# Patient Record
Sex: FEMALE | Race: ASIAN | NOT HISPANIC OR LATINO | Employment: UNEMPLOYED | URBAN - METROPOLITAN AREA
[De-identification: names, ages, dates, MRNs, and addresses within clinical notes are randomized per-mention and may not be internally consistent; named-entity substitution may affect disease eponyms.]

---

## 2017-01-13 ENCOUNTER — COMMUNICATION - HEALTHEAST (OUTPATIENT)
Dept: FAMILY MEDICINE | Facility: CLINIC | Age: 52
End: 2017-01-13

## 2017-01-13 ENCOUNTER — COMMUNICATION - HEALTHEAST (OUTPATIENT)
Dept: NURSING | Facility: CLINIC | Age: 52
End: 2017-01-13

## 2017-01-15 ENCOUNTER — AMBULATORY - HEALTHEAST (OUTPATIENT)
Dept: BEHAVIORAL HEALTH | Facility: CLINIC | Age: 52
End: 2017-01-15

## 2018-10-17 ENCOUNTER — OFFICE VISIT - HEALTHEAST (OUTPATIENT)
Dept: FAMILY MEDICINE | Facility: CLINIC | Age: 53
End: 2018-10-17

## 2018-10-17 DIAGNOSIS — Z76.89 ESTABLISHING CARE WITH NEW DOCTOR, ENCOUNTER FOR: ICD-10-CM

## 2018-10-17 DIAGNOSIS — M54.50 CHRONIC LOWER BACK PAIN: ICD-10-CM

## 2018-10-17 DIAGNOSIS — K21.9 GASTROESOPHAGEAL REFLUX DISEASE WITHOUT ESOPHAGITIS: ICD-10-CM

## 2018-10-17 DIAGNOSIS — F32.A DEPRESSION: ICD-10-CM

## 2018-10-17 DIAGNOSIS — J30.1 SEASONAL ALLERGIC RHINITIS DUE TO POLLEN: ICD-10-CM

## 2018-10-17 DIAGNOSIS — G89.29 CHRONIC LOWER BACK PAIN: ICD-10-CM

## 2018-10-17 DIAGNOSIS — J00 COMMON COLD: ICD-10-CM

## 2018-10-17 DIAGNOSIS — E53.8 B12 DEFICIENCY: ICD-10-CM

## 2018-10-17 DIAGNOSIS — D64.9 ANEMIA: ICD-10-CM

## 2018-10-17 DIAGNOSIS — G89.4 CHRONIC PAIN SYNDROME: ICD-10-CM

## 2018-10-18 LAB
ALBUMIN SERPL-MCNC: 3.9 G/DL (ref 3.5–5)
ALP SERPL-CCNC: 85 U/L (ref 45–120)
ALT SERPL W P-5'-P-CCNC: 62 U/L (ref 0–45)
ANION GAP SERPL CALCULATED.3IONS-SCNC: 10 MMOL/L (ref 5–18)
AST SERPL W P-5'-P-CCNC: 43 U/L (ref 0–40)
BASOPHILS # BLD AUTO: 0.1 THOU/UL (ref 0–0.2)
BASOPHILS NFR BLD AUTO: 1 % (ref 0–2)
BILIRUB SERPL-MCNC: 0.9 MG/DL (ref 0–1)
BUN SERPL-MCNC: 11 MG/DL (ref 8–22)
CALCIUM SERPL-MCNC: 9.3 MG/DL (ref 8.5–10.5)
CHLORIDE BLD-SCNC: 103 MMOL/L (ref 98–107)
CHOLEST SERPL-MCNC: 180 MG/DL
CO2 SERPL-SCNC: 28 MMOL/L (ref 22–31)
CREAT SERPL-MCNC: 0.69 MG/DL (ref 0.6–1.1)
EOSINOPHIL # BLD AUTO: 0.2 THOU/UL (ref 0–0.4)
EOSINOPHIL NFR BLD AUTO: 2 % (ref 0–6)
ERYTHROCYTE [DISTWIDTH] IN BLOOD BY AUTOMATED COUNT: 13.7 % (ref 11–14.5)
FASTING STATUS PATIENT QL REPORTED: YES
GFR SERPL CREATININE-BSD FRML MDRD: >60 ML/MIN/1.73M2
GLUCOSE BLD-MCNC: 72 MG/DL (ref 70–125)
HBA1C MFR BLD: 5.8 % (ref 3.5–6)
HCT VFR BLD AUTO: 43 % (ref 35–47)
HDLC SERPL-MCNC: 44 MG/DL
HGB BLD-MCNC: 12.8 G/DL (ref 12–16)
LDLC SERPL CALC-MCNC: 110 MG/DL
LYMPHOCYTES # BLD AUTO: 4.1 THOU/UL (ref 0.8–4.4)
LYMPHOCYTES NFR BLD AUTO: 38 % (ref 20–40)
MCH RBC QN AUTO: 22.9 PG (ref 27–34)
MCHC RBC AUTO-ENTMCNC: 29.8 G/DL (ref 32–36)
MCV RBC AUTO: 77 FL (ref 80–100)
MONOCYTES # BLD AUTO: 0.7 THOU/UL (ref 0–0.9)
MONOCYTES NFR BLD AUTO: 6 % (ref 2–10)
NEUTROPHILS # BLD AUTO: 5.8 THOU/UL (ref 2–7.7)
NEUTROPHILS NFR BLD AUTO: 54 % (ref 50–70)
PLATELET # BLD AUTO: 215 THOU/UL (ref 140–440)
PMV BLD AUTO: 13.8 FL (ref 8.5–12.5)
POTASSIUM BLD-SCNC: 3.7 MMOL/L (ref 3.5–5)
PROT SERPL-MCNC: 7.9 G/DL (ref 6–8)
RBC # BLD AUTO: 5.58 MILL/UL (ref 3.8–5.4)
SODIUM SERPL-SCNC: 141 MMOL/L (ref 136–145)
TRIGL SERPL-MCNC: 132 MG/DL
WBC: 10.8 THOU/UL (ref 4–11)

## 2018-10-19 LAB — 25(OH)D3 SERPL-MCNC: 16 NG/ML (ref 30–80)

## 2018-10-22 ENCOUNTER — COMMUNICATION - HEALTHEAST (OUTPATIENT)
Dept: FAMILY MEDICINE | Facility: CLINIC | Age: 53
End: 2018-10-22

## 2018-11-16 ENCOUNTER — OFFICE VISIT - HEALTHEAST (OUTPATIENT)
Dept: FAMILY MEDICINE | Facility: CLINIC | Age: 53
End: 2018-11-16

## 2018-11-16 DIAGNOSIS — R05.9 COUGH: ICD-10-CM

## 2018-11-16 DIAGNOSIS — R74.8 ELEVATED LIVER ENZYMES: ICD-10-CM

## 2018-11-16 DIAGNOSIS — R71.8 MICROCYTOSIS: ICD-10-CM

## 2018-11-16 DIAGNOSIS — K21.9 GASTROESOPHAGEAL REFLUX DISEASE WITHOUT ESOPHAGITIS: ICD-10-CM

## 2018-11-16 LAB — FERRITIN SERPL-MCNC: 1384 NG/ML (ref 10–130)

## 2018-11-19 LAB
GAMMA INTERFERON BACKGROUND BLD IA-ACNC: 0.17 IU/ML
M TB IFN-G BLD-IMP: POSITIVE
MITOGEN IGNF BCKGRD COR BLD-ACNC: 5.65 IU/ML
MITOGEN IGNF BCKGRD COR BLD-ACNC: 5.83 IU/ML
QTF INTERPRETATION: ABNORMAL
QTF MITOGEN - NIL: >10 IU/ML

## 2018-11-21 LAB
HEMOGLOBIN A2 QUANTITATION: 4.5 % (ref 2.2–3.5)
HEMOGLOBIN ELECTROPHRESIS: ABNORMAL
HEMOGLOBIN F QUANTITATION: <0.8 % (ref 0–2)
PATH ICD:: ABNORMAL
REVIEWING PATHOLOGIST: ABNORMAL

## 2018-11-27 ENCOUNTER — OFFICE VISIT - HEALTHEAST (OUTPATIENT)
Dept: FAMILY MEDICINE | Facility: CLINIC | Age: 53
End: 2018-11-27

## 2018-11-27 DIAGNOSIS — R76.12 POSITIVE QUANTIFERON-TB GOLD TEST: ICD-10-CM

## 2018-11-27 DIAGNOSIS — R79.89 ELEVATED FERRITIN LEVEL: ICD-10-CM

## 2018-11-27 LAB
ALBUMIN SERPL-MCNC: 3.7 G/DL (ref 3.5–5)
ALP SERPL-CCNC: 83 U/L (ref 45–120)
ALT SERPL W P-5'-P-CCNC: 43 U/L (ref 0–45)
AST SERPL W P-5'-P-CCNC: 32 U/L (ref 0–40)
BILIRUB DIRECT SERPL-MCNC: 0.1 MG/DL
BILIRUB SERPL-MCNC: 0.4 MG/DL (ref 0–1)
FERRITIN SERPL-MCNC: 1446 NG/ML (ref 10–130)
IRON SATN MFR SERPL: 90 % (ref 20–50)
IRON SERPL-MCNC: 171 UG/DL (ref 42–175)
PROT SERPL-MCNC: 7.8 G/DL (ref 6–8)
TIBC SERPL-MCNC: 189 UG/DL (ref 313–563)
TRANSFERRIN SERPL-MCNC: 151 MG/DL (ref 212–360)

## 2018-11-29 ENCOUNTER — OFFICE VISIT - HEALTHEAST (OUTPATIENT)
Dept: BEHAVIORAL HEALTH | Facility: CLINIC | Age: 53
End: 2018-11-29

## 2018-11-29 DIAGNOSIS — F33.9 EPISODE OF RECURRENT MAJOR DEPRESSIVE DISORDER, UNSPECIFIED DEPRESSION EPISODE SEVERITY (H): ICD-10-CM

## 2018-12-14 ENCOUNTER — OFFICE VISIT - HEALTHEAST (OUTPATIENT)
Dept: BEHAVIORAL HEALTH | Facility: CLINIC | Age: 53
End: 2018-12-14

## 2018-12-14 DIAGNOSIS — F43.10 PTSD (POST-TRAUMATIC STRESS DISORDER): ICD-10-CM

## 2018-12-14 DIAGNOSIS — F33.1 MODERATE EPISODE OF RECURRENT MAJOR DEPRESSIVE DISORDER (H): ICD-10-CM

## 2018-12-28 ENCOUNTER — OFFICE VISIT - HEALTHEAST (OUTPATIENT)
Dept: BEHAVIORAL HEALTH | Facility: CLINIC | Age: 53
End: 2018-12-28

## 2018-12-28 ENCOUNTER — AMBULATORY - HEALTHEAST (OUTPATIENT)
Dept: BEHAVIORAL HEALTH | Facility: CLINIC | Age: 53
End: 2018-12-28

## 2018-12-28 DIAGNOSIS — F43.10 PTSD (POST-TRAUMATIC STRESS DISORDER): ICD-10-CM

## 2018-12-28 DIAGNOSIS — F33.9 EPISODE OF RECURRENT MAJOR DEPRESSIVE DISORDER, UNSPECIFIED DEPRESSION EPISODE SEVERITY (H): ICD-10-CM

## 2019-01-02 ENCOUNTER — COMMUNICATION - HEALTHEAST (OUTPATIENT)
Dept: NURSING | Facility: CLINIC | Age: 54
End: 2019-01-02

## 2019-01-02 ENCOUNTER — AMBULATORY - HEALTHEAST (OUTPATIENT)
Dept: CARE COORDINATION | Facility: CLINIC | Age: 54
End: 2019-01-02

## 2019-01-02 DIAGNOSIS — Z22.7 TB LUNG, LATENT: ICD-10-CM

## 2019-01-02 DIAGNOSIS — F32.0 MAJOR DEPRESSIVE DISORDER, SINGLE EPISODE, MILD (H): ICD-10-CM

## 2019-01-02 DIAGNOSIS — F43.20 ADJUSTMENT REACTION: ICD-10-CM

## 2019-01-03 ENCOUNTER — COMMUNICATION - HEALTHEAST (OUTPATIENT)
Dept: NURSING | Facility: CLINIC | Age: 54
End: 2019-01-03

## 2019-01-04 ENCOUNTER — COMMUNICATION - HEALTHEAST (OUTPATIENT)
Dept: FAMILY MEDICINE | Facility: CLINIC | Age: 54
End: 2019-01-04

## 2019-01-07 ENCOUNTER — AMBULATORY - HEALTHEAST (OUTPATIENT)
Dept: NURSING | Facility: CLINIC | Age: 54
End: 2019-01-07

## 2019-01-07 ENCOUNTER — COMMUNICATION - HEALTHEAST (OUTPATIENT)
Dept: NURSING | Facility: CLINIC | Age: 54
End: 2019-01-07

## 2019-01-07 ENCOUNTER — AMBULATORY - HEALTHEAST (OUTPATIENT)
Dept: CARE COORDINATION | Facility: CLINIC | Age: 54
End: 2019-01-07

## 2019-01-07 DIAGNOSIS — F43.20 ADJUSTMENT REACTION: ICD-10-CM

## 2019-01-07 DIAGNOSIS — F32.0 MAJOR DEPRESSIVE DISORDER, SINGLE EPISODE, MILD (H): ICD-10-CM

## 2019-01-11 ENCOUNTER — RECORDS - HEALTHEAST (OUTPATIENT)
Dept: MAMMOGRAPHY | Facility: CLINIC | Age: 54
End: 2019-01-11

## 2019-01-11 ENCOUNTER — OFFICE VISIT - HEALTHEAST (OUTPATIENT)
Dept: BEHAVIORAL HEALTH | Facility: CLINIC | Age: 54
End: 2019-01-11

## 2019-01-11 ENCOUNTER — OFFICE VISIT - HEALTHEAST (OUTPATIENT)
Dept: FAMILY MEDICINE | Facility: CLINIC | Age: 54
End: 2019-01-11

## 2019-01-11 DIAGNOSIS — M54.50 CHRONIC LOWER BACK PAIN: ICD-10-CM

## 2019-01-11 DIAGNOSIS — G89.4 CHRONIC PAIN SYNDROME: ICD-10-CM

## 2019-01-11 DIAGNOSIS — R10.13 EPIGASTRIC PAIN: ICD-10-CM

## 2019-01-11 DIAGNOSIS — F33.1 MODERATE EPISODE OF RECURRENT MAJOR DEPRESSIVE DISORDER (H): ICD-10-CM

## 2019-01-11 DIAGNOSIS — Z12.31 VISIT FOR SCREENING MAMMOGRAM: ICD-10-CM

## 2019-01-11 DIAGNOSIS — Z22.7 TB LUNG, LATENT: ICD-10-CM

## 2019-01-11 DIAGNOSIS — F43.10 PTSD (POST-TRAUMATIC STRESS DISORDER): ICD-10-CM

## 2019-01-11 DIAGNOSIS — E53.8 B12 DEFICIENCY: ICD-10-CM

## 2019-01-11 DIAGNOSIS — J30.1 SEASONAL ALLERGIC RHINITIS DUE TO POLLEN: ICD-10-CM

## 2019-01-11 DIAGNOSIS — J00 COMMON COLD: ICD-10-CM

## 2019-01-11 DIAGNOSIS — G89.29 CHRONIC LOWER BACK PAIN: ICD-10-CM

## 2019-01-11 DIAGNOSIS — Z12.31 ENCOUNTER FOR SCREENING MAMMOGRAM FOR MALIGNANT NEOPLASM OF BREAST: ICD-10-CM

## 2019-01-11 DIAGNOSIS — R79.89 ELEVATED FERRITIN: ICD-10-CM

## 2019-01-11 DIAGNOSIS — K21.9 GASTROESOPHAGEAL REFLUX DISEASE WITHOUT ESOPHAGITIS: ICD-10-CM

## 2019-01-11 LAB
ALBUMIN SERPL-MCNC: 3.8 G/DL (ref 3.5–5)
ALP SERPL-CCNC: 71 U/L (ref 45–120)
ALT SERPL W P-5'-P-CCNC: 33 U/L (ref 0–45)
AST SERPL W P-5'-P-CCNC: 26 U/L (ref 0–40)
BILIRUB DIRECT SERPL-MCNC: 0.2 MG/DL
BILIRUB SERPL-MCNC: 0.9 MG/DL (ref 0–1)
FERRITIN SERPL-MCNC: 1217 NG/ML (ref 10–130)
IRON SATN MFR SERPL: 64 % (ref 20–50)
IRON SERPL-MCNC: 129 UG/DL (ref 42–175)
PROT SERPL-MCNC: 7.9 G/DL (ref 6–8)
TIBC SERPL-MCNC: 201 UG/DL (ref 313–563)
TRANSFERRIN SERPL-MCNC: 161 MG/DL (ref 212–360)

## 2019-01-11 ASSESSMENT — MIFFLIN-ST. JEOR: SCORE: 1179.63

## 2019-01-14 ENCOUNTER — AMBULATORY - HEALTHEAST (OUTPATIENT)
Dept: FAMILY MEDICINE | Facility: CLINIC | Age: 54
End: 2019-01-14

## 2019-01-14 DIAGNOSIS — R79.89 ELEVATED FERRITIN LEVEL: ICD-10-CM

## 2019-01-14 DIAGNOSIS — R79.89 ELEVATED FERRITIN: ICD-10-CM

## 2019-01-25 ENCOUNTER — OFFICE VISIT - HEALTHEAST (OUTPATIENT)
Dept: BEHAVIORAL HEALTH | Facility: CLINIC | Age: 54
End: 2019-01-25

## 2019-01-25 ENCOUNTER — RECORDS - HEALTHEAST (OUTPATIENT)
Dept: ADMINISTRATIVE | Facility: OTHER | Age: 54
End: 2019-01-25

## 2019-01-25 DIAGNOSIS — F43.10 PTSD (POST-TRAUMATIC STRESS DISORDER): ICD-10-CM

## 2019-01-25 DIAGNOSIS — F33.1 MODERATE EPISODE OF RECURRENT MAJOR DEPRESSIVE DISORDER (H): ICD-10-CM

## 2019-02-05 ENCOUNTER — COMMUNICATION - HEALTHEAST (OUTPATIENT)
Dept: NURSING | Facility: CLINIC | Age: 54
End: 2019-02-05

## 2019-02-06 ENCOUNTER — COMMUNICATION - HEALTHEAST (OUTPATIENT)
Dept: FAMILY MEDICINE | Facility: CLINIC | Age: 54
End: 2019-02-06

## 2019-02-06 ENCOUNTER — AMBULATORY - HEALTHEAST (OUTPATIENT)
Dept: BEHAVIORAL HEALTH | Facility: CLINIC | Age: 54
End: 2019-02-06

## 2019-02-08 ENCOUNTER — OFFICE VISIT - HEALTHEAST (OUTPATIENT)
Dept: FAMILY MEDICINE | Facility: CLINIC | Age: 54
End: 2019-02-08

## 2019-02-08 ENCOUNTER — COMMUNICATION - HEALTHEAST (OUTPATIENT)
Dept: NURSING | Facility: CLINIC | Age: 54
End: 2019-02-08

## 2019-02-08 DIAGNOSIS — G89.29 OTHER CHRONIC PAIN: ICD-10-CM

## 2019-02-08 DIAGNOSIS — R42 DIZZINESS: ICD-10-CM

## 2019-02-08 DIAGNOSIS — G44.209 TENSION HEADACHE: ICD-10-CM

## 2019-02-08 DIAGNOSIS — R10.13 EPIGASTRIC PAIN: ICD-10-CM

## 2019-02-08 DIAGNOSIS — F33.1 MODERATE EPISODE OF RECURRENT MAJOR DEPRESSIVE DISORDER (H): ICD-10-CM

## 2019-02-08 DIAGNOSIS — R79.89 ELEVATED FERRITIN LEVEL: ICD-10-CM

## 2019-02-11 ENCOUNTER — COMMUNICATION - HEALTHEAST (OUTPATIENT)
Dept: NURSING | Facility: CLINIC | Age: 54
End: 2019-02-11

## 2019-02-12 ENCOUNTER — AMBULATORY - HEALTHEAST (OUTPATIENT)
Dept: LAB | Facility: CLINIC | Age: 54
End: 2019-02-12

## 2019-02-12 DIAGNOSIS — R10.13 EPIGASTRIC PAIN: ICD-10-CM

## 2019-02-14 LAB
H PYLORI AG STL QL IA: NORMAL
REPORT STATUS: NORMAL
SPECIMEN DESCRIPTION: NORMAL

## 2019-02-15 ENCOUNTER — OFFICE VISIT - HEALTHEAST (OUTPATIENT)
Dept: BEHAVIORAL HEALTH | Facility: CLINIC | Age: 54
End: 2019-02-15

## 2019-02-15 DIAGNOSIS — F33.1 MODERATE EPISODE OF RECURRENT MAJOR DEPRESSIVE DISORDER (H): ICD-10-CM

## 2019-02-15 DIAGNOSIS — F43.10 PTSD (POST-TRAUMATIC STRESS DISORDER): ICD-10-CM

## 2019-02-20 ENCOUNTER — COMMUNICATION - HEALTHEAST (OUTPATIENT)
Dept: NURSING | Facility: CLINIC | Age: 54
End: 2019-02-20

## 2019-02-22 LAB — MOLECULAR DX INHERIT DISORDER - HISTORICAL: NORMAL

## 2019-02-24 ENCOUNTER — AMBULATORY - HEALTHEAST (OUTPATIENT)
Dept: FAMILY MEDICINE | Facility: CLINIC | Age: 54
End: 2019-02-24

## 2019-02-24 DIAGNOSIS — R79.89 ELEVATED FERRITIN LEVEL: ICD-10-CM

## 2019-02-25 ENCOUNTER — COMMUNICATION - HEALTHEAST (OUTPATIENT)
Dept: ONCOLOGY | Facility: HOSPITAL | Age: 54
End: 2019-02-25

## 2019-02-25 ENCOUNTER — COMMUNICATION - HEALTHEAST (OUTPATIENT)
Dept: FAMILY MEDICINE | Facility: CLINIC | Age: 54
End: 2019-02-25

## 2019-02-27 ENCOUNTER — OFFICE VISIT - HEALTHEAST (OUTPATIENT)
Dept: FAMILY MEDICINE | Facility: CLINIC | Age: 54
End: 2019-02-27

## 2019-02-27 DIAGNOSIS — R10.13 EPIGASTRIC PAIN: ICD-10-CM

## 2019-02-27 DIAGNOSIS — M99.02 SOMATIC DYSFUNCTION OF THORACIC REGION: ICD-10-CM

## 2019-02-27 DIAGNOSIS — G44.209 TENSION HEADACHE: ICD-10-CM

## 2019-02-27 DIAGNOSIS — M99.09 SOMATIC DYSFUNCTION OF ABDOMINAL REGION: ICD-10-CM

## 2019-02-27 DIAGNOSIS — M99.01 SOMATIC DYSFUNCTION OF CERVICAL REGION: ICD-10-CM

## 2019-02-27 DIAGNOSIS — M99.00 SOMATIC DYSFUNCTION OF HEAD REGION: ICD-10-CM

## 2019-03-01 ENCOUNTER — OFFICE VISIT - HEALTHEAST (OUTPATIENT)
Dept: BEHAVIORAL HEALTH | Facility: CLINIC | Age: 54
End: 2019-03-01

## 2019-03-01 DIAGNOSIS — F43.10 PTSD (POST-TRAUMATIC STRESS DISORDER): ICD-10-CM

## 2019-03-01 DIAGNOSIS — F33.1 MODERATE EPISODE OF RECURRENT MAJOR DEPRESSIVE DISORDER (H): ICD-10-CM

## 2019-03-08 ENCOUNTER — COMMUNICATION - HEALTHEAST (OUTPATIENT)
Dept: NURSING | Facility: CLINIC | Age: 54
End: 2019-03-08

## 2019-03-13 ENCOUNTER — OFFICE VISIT - HEALTHEAST (OUTPATIENT)
Dept: FAMILY MEDICINE | Facility: CLINIC | Age: 54
End: 2019-03-13

## 2019-03-13 DIAGNOSIS — M99.02 SOMATIC DYSFUNCTION OF THORACIC REGION: ICD-10-CM

## 2019-03-13 DIAGNOSIS — M99.09 SOMATIC DYSFUNCTION OF ABDOMINAL REGION: ICD-10-CM

## 2019-03-13 DIAGNOSIS — G44.209 TENSION HEADACHE: ICD-10-CM

## 2019-03-13 DIAGNOSIS — M54.50 CHRONIC BILATERAL LOW BACK PAIN WITHOUT SCIATICA: ICD-10-CM

## 2019-03-13 DIAGNOSIS — M99.00 SOMATIC DYSFUNCTION OF HEAD REGION: ICD-10-CM

## 2019-03-13 DIAGNOSIS — G89.29 CHRONIC BILATERAL LOW BACK PAIN WITHOUT SCIATICA: ICD-10-CM

## 2019-03-13 DIAGNOSIS — M99.01 SOMATIC DYSFUNCTION OF CERVICAL REGION: ICD-10-CM

## 2019-03-13 DIAGNOSIS — R79.89 ELEVATED FERRITIN LEVEL: ICD-10-CM

## 2019-03-13 DIAGNOSIS — M99.05 SOMATIC DYSFUNCTION OF PELVIS REGION: ICD-10-CM

## 2019-03-13 LAB
FERRITIN SERPL-MCNC: 1367 NG/ML (ref 10–130)
IRON SATN MFR SERPL: 63 % (ref 20–50)
IRON SERPL-MCNC: 119 UG/DL (ref 42–175)
TIBC SERPL-MCNC: 190 UG/DL (ref 313–563)
TRANSFERRIN SERPL-MCNC: 152 MG/DL (ref 212–360)

## 2019-03-14 ENCOUNTER — OFFICE VISIT - HEALTHEAST (OUTPATIENT)
Dept: BEHAVIORAL HEALTH | Facility: CLINIC | Age: 54
End: 2019-03-14

## 2019-03-14 ENCOUNTER — COMMUNICATION - HEALTHEAST (OUTPATIENT)
Dept: ONCOLOGY | Facility: HOSPITAL | Age: 54
End: 2019-03-14

## 2019-03-14 DIAGNOSIS — F33.1 MODERATE EPISODE OF RECURRENT MAJOR DEPRESSIVE DISORDER (H): ICD-10-CM

## 2019-03-14 DIAGNOSIS — F43.10 PTSD (POST-TRAUMATIC STRESS DISORDER): ICD-10-CM

## 2019-03-15 ENCOUNTER — AMBULATORY - HEALTHEAST (OUTPATIENT)
Dept: ONCOLOGY | Facility: HOSPITAL | Age: 54
End: 2019-03-15

## 2019-03-18 ENCOUNTER — AMBULATORY - HEALTHEAST (OUTPATIENT)
Dept: CARE COORDINATION | Facility: CLINIC | Age: 54
End: 2019-03-18

## 2019-03-21 ENCOUNTER — OFFICE VISIT - HEALTHEAST (OUTPATIENT)
Dept: ONCOLOGY | Facility: HOSPITAL | Age: 54
End: 2019-03-21

## 2019-03-21 ENCOUNTER — COMMUNICATION - HEALTHEAST (OUTPATIENT)
Dept: ONCOLOGY | Facility: HOSPITAL | Age: 54
End: 2019-03-21

## 2019-03-21 DIAGNOSIS — R79.89 ELEVATED FERRITIN LEVEL: ICD-10-CM

## 2019-03-21 DIAGNOSIS — R71.8 RBC MICROCYTOSIS: ICD-10-CM

## 2019-03-21 ASSESSMENT — MIFFLIN-ST. JEOR: SCORE: 1179.86

## 2019-03-22 ENCOUNTER — COMMUNICATION - HEALTHEAST (OUTPATIENT)
Dept: ONCOLOGY | Facility: HOSPITAL | Age: 54
End: 2019-03-22

## 2019-03-22 DIAGNOSIS — R79.89 ELEVATED FERRITIN LEVEL: ICD-10-CM

## 2019-03-27 ENCOUNTER — OFFICE VISIT - HEALTHEAST (OUTPATIENT)
Dept: BEHAVIORAL HEALTH | Facility: CLINIC | Age: 54
End: 2019-03-27

## 2019-03-27 ENCOUNTER — AMBULATORY - HEALTHEAST (OUTPATIENT)
Dept: BEHAVIORAL HEALTH | Facility: CLINIC | Age: 54
End: 2019-03-27

## 2019-03-27 DIAGNOSIS — F43.10 PTSD (POST-TRAUMATIC STRESS DISORDER): ICD-10-CM

## 2019-03-27 DIAGNOSIS — F33.1 MODERATE EPISODE OF RECURRENT MAJOR DEPRESSIVE DISORDER (H): ICD-10-CM

## 2019-03-29 ENCOUNTER — COMMUNICATION - HEALTHEAST (OUTPATIENT)
Dept: NURSING | Facility: CLINIC | Age: 54
End: 2019-03-29

## 2019-03-29 ENCOUNTER — OFFICE VISIT - HEALTHEAST (OUTPATIENT)
Dept: FAMILY MEDICINE | Facility: CLINIC | Age: 54
End: 2019-03-29

## 2019-03-29 ENCOUNTER — AMBULATORY - HEALTHEAST (OUTPATIENT)
Dept: NURSING | Facility: CLINIC | Age: 54
End: 2019-03-29

## 2019-03-29 DIAGNOSIS — G89.4 CHRONIC PAIN SYNDROME: ICD-10-CM

## 2019-03-29 DIAGNOSIS — M99.00 SOMATIC DYSFUNCTION OF HEAD REGION: ICD-10-CM

## 2019-03-29 DIAGNOSIS — J00 COMMON COLD: ICD-10-CM

## 2019-03-29 DIAGNOSIS — K21.9 GASTROESOPHAGEAL REFLUX DISEASE WITHOUT ESOPHAGITIS: ICD-10-CM

## 2019-03-29 DIAGNOSIS — J30.1 SEASONAL ALLERGIC RHINITIS DUE TO POLLEN: ICD-10-CM

## 2019-03-29 DIAGNOSIS — E53.8 B12 DEFICIENCY: ICD-10-CM

## 2019-03-29 DIAGNOSIS — M99.01 SOMATIC DYSFUNCTION OF CERVICAL REGION: ICD-10-CM

## 2019-03-29 DIAGNOSIS — M99.02 SOMATIC DYSFUNCTION OF THORACIC REGION: ICD-10-CM

## 2019-04-01 ENCOUNTER — AMBULATORY - HEALTHEAST (OUTPATIENT)
Dept: ONCOLOGY | Facility: HOSPITAL | Age: 54
End: 2019-04-01

## 2019-04-02 ENCOUNTER — AMBULATORY - HEALTHEAST (OUTPATIENT)
Dept: NURSING | Facility: CLINIC | Age: 54
End: 2019-04-02

## 2019-04-03 ENCOUNTER — COMMUNICATION - HEALTHEAST (OUTPATIENT)
Dept: NURSING | Facility: CLINIC | Age: 54
End: 2019-04-03

## 2019-04-12 ENCOUNTER — AMBULATORY - HEALTHEAST (OUTPATIENT)
Dept: NURSING | Facility: CLINIC | Age: 54
End: 2019-04-12

## 2019-04-12 ENCOUNTER — COMMUNICATION - HEALTHEAST (OUTPATIENT)
Dept: ONCOLOGY | Facility: HOSPITAL | Age: 54
End: 2019-04-12

## 2019-04-12 ENCOUNTER — COMMUNICATION - HEALTHEAST (OUTPATIENT)
Dept: INFUSION THERAPY | Facility: HOSPITAL | Age: 54
End: 2019-04-12

## 2019-04-12 ENCOUNTER — COMMUNICATION - HEALTHEAST (OUTPATIENT)
Dept: NURSING | Facility: CLINIC | Age: 54
End: 2019-04-12

## 2019-04-12 ENCOUNTER — OFFICE VISIT - HEALTHEAST (OUTPATIENT)
Dept: FAMILY MEDICINE | Facility: CLINIC | Age: 54
End: 2019-04-12

## 2019-04-12 DIAGNOSIS — R79.89 ELEVATED FERRITIN LEVEL: ICD-10-CM

## 2019-04-12 DIAGNOSIS — I73.9 CLAUDICATION (H): ICD-10-CM

## 2019-04-12 DIAGNOSIS — G89.29 OTHER CHRONIC PAIN: ICD-10-CM

## 2019-04-12 DIAGNOSIS — E53.8 VITAMIN B 12 DEFICIENCY: ICD-10-CM

## 2019-04-12 DIAGNOSIS — M79.89 LEG SWELLING: ICD-10-CM

## 2019-04-12 DIAGNOSIS — Z22.7 TB LUNG, LATENT: ICD-10-CM

## 2019-04-12 LAB
ALBUMIN SERPL-MCNC: 3.9 G/DL (ref 3.5–5)
ALP SERPL-CCNC: 74 U/L (ref 45–120)
ALT SERPL W P-5'-P-CCNC: 46 U/L (ref 0–45)
ANION GAP SERPL CALCULATED.3IONS-SCNC: 8 MMOL/L (ref 5–18)
AST SERPL W P-5'-P-CCNC: 35 U/L (ref 0–40)
BILIRUB SERPL-MCNC: 0.5 MG/DL (ref 0–1)
BNP SERPL-MCNC: 26 PG/ML (ref 0–84)
BUN SERPL-MCNC: 11 MG/DL (ref 8–22)
CALCIUM SERPL-MCNC: 9.6 MG/DL (ref 8.5–10.5)
CHLORIDE BLD-SCNC: 105 MMOL/L (ref 98–107)
CO2 SERPL-SCNC: 29 MMOL/L (ref 22–31)
CREAT SERPL-MCNC: 0.68 MG/DL (ref 0.6–1.1)
GFR SERPL CREATININE-BSD FRML MDRD: >60 ML/MIN/1.73M2
GLUCOSE BLD-MCNC: 105 MG/DL (ref 70–125)
POTASSIUM BLD-SCNC: 4.3 MMOL/L (ref 3.5–5)
PROT SERPL-MCNC: 7.7 G/DL (ref 6–8)
SODIUM SERPL-SCNC: 142 MMOL/L (ref 136–145)
TSH SERPL DL<=0.005 MIU/L-ACNC: 0.61 UIU/ML (ref 0.3–5)
VIT B12 SERPL-MCNC: >2000 PG/ML (ref 213–816)

## 2019-04-15 ENCOUNTER — AMBULATORY - HEALTHEAST (OUTPATIENT)
Dept: BEHAVIORAL HEALTH | Facility: CLINIC | Age: 54
End: 2019-04-15

## 2019-04-15 ENCOUNTER — OFFICE VISIT - HEALTHEAST (OUTPATIENT)
Dept: BEHAVIORAL HEALTH | Facility: CLINIC | Age: 54
End: 2019-04-15

## 2019-04-15 ENCOUNTER — HOSPITAL ENCOUNTER (OUTPATIENT)
Dept: ULTRASOUND IMAGING | Facility: CLINIC | Age: 54
Discharge: HOME | End: 2019-04-15
Attending: FAMILY MEDICINE

## 2019-04-15 DIAGNOSIS — I73.9 CLAUDICATION (H): ICD-10-CM

## 2019-04-15 DIAGNOSIS — F43.10 PTSD (POST-TRAUMATIC STRESS DISORDER): ICD-10-CM

## 2019-04-15 DIAGNOSIS — F33.1 MODERATE EPISODE OF RECURRENT MAJOR DEPRESSIVE DISORDER (H): ICD-10-CM

## 2019-04-19 ENCOUNTER — HOME CARE/HOSPICE - HEALTHEAST (OUTPATIENT)
Dept: HOME HEALTH SERVICES | Facility: HOME HEALTH | Age: 54
End: 2019-04-19

## 2019-04-19 ENCOUNTER — AMBULATORY - HEALTHEAST (OUTPATIENT)
Dept: NURSING | Facility: CLINIC | Age: 54
End: 2019-04-19

## 2019-04-19 DIAGNOSIS — F43.10 PTSD (POST-TRAUMATIC STRESS DISORDER): ICD-10-CM

## 2019-04-19 DIAGNOSIS — G89.29 OTHER CHRONIC PAIN: ICD-10-CM

## 2019-04-19 DIAGNOSIS — R79.89 ELEVATED FERRITIN LEVEL: ICD-10-CM

## 2019-04-22 ENCOUNTER — COMMUNICATION - HEALTHEAST (OUTPATIENT)
Dept: HOME HEALTH SERVICES | Facility: HOME HEALTH | Age: 54
End: 2019-04-22

## 2019-04-23 ENCOUNTER — HOME CARE/HOSPICE - HEALTHEAST (OUTPATIENT)
Dept: HOME HEALTH SERVICES | Facility: HOME HEALTH | Age: 54
End: 2019-04-23

## 2019-04-24 ENCOUNTER — AMBULATORY - HEALTHEAST (OUTPATIENT)
Dept: NURSING | Facility: CLINIC | Age: 54
End: 2019-04-24

## 2019-04-24 ENCOUNTER — COMMUNICATION - HEALTHEAST (OUTPATIENT)
Dept: HOME HEALTH SERVICES | Facility: HOME HEALTH | Age: 54
End: 2019-04-24

## 2019-04-24 ENCOUNTER — COMMUNICATION - HEALTHEAST (OUTPATIENT)
Dept: CARE COORDINATION | Facility: CLINIC | Age: 54
End: 2019-04-24

## 2019-04-24 ENCOUNTER — HOME CARE/HOSPICE - HEALTHEAST (OUTPATIENT)
Dept: HOME HEALTH SERVICES | Facility: HOME HEALTH | Age: 54
End: 2019-04-24

## 2019-04-25 ENCOUNTER — HOME CARE/HOSPICE - HEALTHEAST (OUTPATIENT)
Dept: HOME HEALTH SERVICES | Facility: HOME HEALTH | Age: 54
End: 2019-04-25

## 2019-04-26 ENCOUNTER — HOSPITAL ENCOUNTER (OUTPATIENT)
Dept: ULTRASOUND IMAGING | Facility: CLINIC | Age: 54
Discharge: HOME | End: 2019-04-26
Attending: FAMILY MEDICINE

## 2019-04-29 ENCOUNTER — HOME CARE/HOSPICE - HEALTHEAST (OUTPATIENT)
Dept: HOME HEALTH SERVICES | Facility: HOME HEALTH | Age: 54
End: 2019-04-29

## 2019-04-30 ENCOUNTER — OFFICE VISIT - HEALTHEAST (OUTPATIENT)
Dept: BEHAVIORAL HEALTH | Facility: CLINIC | Age: 54
End: 2019-04-30

## 2019-04-30 ENCOUNTER — COMMUNICATION - HEALTHEAST (OUTPATIENT)
Dept: HOME HEALTH SERVICES | Facility: HOME HEALTH | Age: 54
End: 2019-04-30

## 2019-04-30 DIAGNOSIS — F33.1 MODERATE EPISODE OF RECURRENT MAJOR DEPRESSIVE DISORDER (H): ICD-10-CM

## 2019-04-30 DIAGNOSIS — R42 DIZZINESS: ICD-10-CM

## 2019-04-30 DIAGNOSIS — G89.4 CHRONIC PAIN SYNDROME: ICD-10-CM

## 2019-04-30 DIAGNOSIS — F43.10 PTSD (POST-TRAUMATIC STRESS DISORDER): ICD-10-CM

## 2019-05-01 ENCOUNTER — COMMUNICATION - HEALTHEAST (OUTPATIENT)
Dept: FAMILY MEDICINE | Facility: CLINIC | Age: 54
End: 2019-05-01

## 2019-05-01 ENCOUNTER — HOME CARE/HOSPICE - HEALTHEAST (OUTPATIENT)
Dept: HOME HEALTH SERVICES | Facility: HOME HEALTH | Age: 54
End: 2019-05-01

## 2019-05-01 DIAGNOSIS — G89.29 OTHER CHRONIC PAIN: ICD-10-CM

## 2019-05-02 ENCOUNTER — HOME CARE/HOSPICE - HEALTHEAST (OUTPATIENT)
Dept: HOME HEALTH SERVICES | Facility: HOME HEALTH | Age: 54
End: 2019-05-02

## 2019-05-03 ENCOUNTER — HOME CARE/HOSPICE - HEALTHEAST (OUTPATIENT)
Dept: HOME HEALTH SERVICES | Facility: HOME HEALTH | Age: 54
End: 2019-05-03

## 2019-05-04 ENCOUNTER — COMMUNICATION - HEALTHEAST (OUTPATIENT)
Dept: HOME HEALTH SERVICES | Facility: HOME HEALTH | Age: 54
End: 2019-05-04

## 2019-05-04 DIAGNOSIS — G89.4 CHRONIC PAIN SYNDROME: ICD-10-CM

## 2019-05-07 ENCOUNTER — HOME CARE/HOSPICE - HEALTHEAST (OUTPATIENT)
Dept: HOME HEALTH SERVICES | Facility: HOME HEALTH | Age: 54
End: 2019-05-07

## 2019-05-07 ENCOUNTER — COMMUNICATION - HEALTHEAST (OUTPATIENT)
Dept: NURSING | Facility: CLINIC | Age: 54
End: 2019-05-07

## 2019-05-08 ENCOUNTER — COMMUNICATION - HEALTHEAST (OUTPATIENT)
Dept: NURSING | Facility: CLINIC | Age: 54
End: 2019-05-08

## 2019-05-09 ENCOUNTER — HOME CARE/HOSPICE - HEALTHEAST (OUTPATIENT)
Dept: HOME HEALTH SERVICES | Facility: HOME HEALTH | Age: 54
End: 2019-05-09

## 2019-05-10 ENCOUNTER — HOME CARE/HOSPICE - HEALTHEAST (OUTPATIENT)
Dept: HOME HEALTH SERVICES | Facility: HOME HEALTH | Age: 54
End: 2019-05-10

## 2019-05-12 ENCOUNTER — COMMUNICATION - HEALTHEAST (OUTPATIENT)
Dept: FAMILY MEDICINE | Facility: CLINIC | Age: 54
End: 2019-05-12

## 2019-05-12 DIAGNOSIS — K21.9 GASTROESOPHAGEAL REFLUX DISEASE WITHOUT ESOPHAGITIS: ICD-10-CM

## 2019-05-13 ENCOUNTER — COMMUNICATION - HEALTHEAST (OUTPATIENT)
Dept: NURSING | Facility: CLINIC | Age: 54
End: 2019-05-13

## 2019-05-14 ENCOUNTER — HOME CARE/HOSPICE - HEALTHEAST (OUTPATIENT)
Dept: HOME HEALTH SERVICES | Facility: HOME HEALTH | Age: 54
End: 2019-05-14

## 2019-05-14 ENCOUNTER — OFFICE VISIT - HEALTHEAST (OUTPATIENT)
Dept: BEHAVIORAL HEALTH | Facility: CLINIC | Age: 54
End: 2019-05-14

## 2019-05-14 DIAGNOSIS — F43.10 PTSD (POST-TRAUMATIC STRESS DISORDER): ICD-10-CM

## 2019-05-14 DIAGNOSIS — F33.1 MODERATE EPISODE OF RECURRENT MAJOR DEPRESSIVE DISORDER (H): ICD-10-CM

## 2019-05-16 ENCOUNTER — HOME CARE/HOSPICE - HEALTHEAST (OUTPATIENT)
Dept: HOME HEALTH SERVICES | Facility: HOME HEALTH | Age: 54
End: 2019-05-16

## 2019-05-16 ENCOUNTER — COMMUNICATION - HEALTHEAST (OUTPATIENT)
Dept: HOME HEALTH SERVICES | Facility: HOME HEALTH | Age: 54
End: 2019-05-16

## 2019-05-16 DIAGNOSIS — G89.29 OTHER CHRONIC PAIN: ICD-10-CM

## 2019-05-17 ENCOUNTER — HOME CARE/HOSPICE - HEALTHEAST (OUTPATIENT)
Dept: HOME HEALTH SERVICES | Facility: HOME HEALTH | Age: 54
End: 2019-05-17

## 2019-05-17 ENCOUNTER — COMMUNICATION - HEALTHEAST (OUTPATIENT)
Dept: CARE COORDINATION | Facility: CLINIC | Age: 54
End: 2019-05-17

## 2019-05-17 ENCOUNTER — COMMUNICATION - HEALTHEAST (OUTPATIENT)
Dept: NURSING | Facility: CLINIC | Age: 54
End: 2019-05-17

## 2019-05-20 ENCOUNTER — HOME CARE/HOSPICE - HEALTHEAST (OUTPATIENT)
Dept: HOME HEALTH SERVICES | Facility: HOME HEALTH | Age: 54
End: 2019-05-20

## 2019-05-21 ENCOUNTER — OFFICE VISIT - HEALTHEAST (OUTPATIENT)
Dept: FAMILY MEDICINE | Facility: CLINIC | Age: 54
End: 2019-05-21

## 2019-05-21 ENCOUNTER — HOME CARE/HOSPICE - HEALTHEAST (OUTPATIENT)
Dept: HOME HEALTH SERVICES | Facility: HOME HEALTH | Age: 54
End: 2019-05-21

## 2019-05-21 DIAGNOSIS — R10.13 ABDOMINAL PAIN, EPIGASTRIC: ICD-10-CM

## 2019-05-21 DIAGNOSIS — R79.89 ELEVATED FERRITIN LEVEL: ICD-10-CM

## 2019-05-21 DIAGNOSIS — G44.209 TENSION HEADACHE: ICD-10-CM

## 2019-05-21 LAB
ALBUMIN SERPL-MCNC: 3.8 G/DL (ref 3.5–5)
ALP SERPL-CCNC: 77 U/L (ref 45–120)
ALT SERPL W P-5'-P-CCNC: 38 U/L (ref 0–45)
AST SERPL W P-5'-P-CCNC: 25 U/L (ref 0–40)
BILIRUB DIRECT SERPL-MCNC: <0.1 MG/DL
BILIRUB SERPL-MCNC: 0.3 MG/DL (ref 0–1)
PROT SERPL-MCNC: 7.5 G/DL (ref 6–8)

## 2019-05-22 ENCOUNTER — HOME CARE/HOSPICE - HEALTHEAST (OUTPATIENT)
Dept: HOME HEALTH SERVICES | Facility: HOME HEALTH | Age: 54
End: 2019-05-22

## 2019-05-23 ENCOUNTER — HOME CARE/HOSPICE - HEALTHEAST (OUTPATIENT)
Dept: HOME HEALTH SERVICES | Facility: HOME HEALTH | Age: 54
End: 2019-05-23

## 2019-05-24 ENCOUNTER — COMMUNICATION - HEALTHEAST (OUTPATIENT)
Dept: HOME HEALTH SERVICES | Facility: HOME HEALTH | Age: 54
End: 2019-05-24

## 2019-05-24 ENCOUNTER — HOME CARE/HOSPICE - HEALTHEAST (OUTPATIENT)
Dept: HOME HEALTH SERVICES | Facility: HOME HEALTH | Age: 54
End: 2019-05-24

## 2019-05-27 ENCOUNTER — COMMUNICATION - HEALTHEAST (OUTPATIENT)
Dept: HOME HEALTH SERVICES | Facility: HOME HEALTH | Age: 54
End: 2019-05-27

## 2019-05-28 ENCOUNTER — OFFICE VISIT - HEALTHEAST (OUTPATIENT)
Dept: BEHAVIORAL HEALTH | Facility: CLINIC | Age: 54
End: 2019-05-28

## 2019-05-28 ENCOUNTER — COMMUNICATION - HEALTHEAST (OUTPATIENT)
Dept: HOME HEALTH SERVICES | Facility: HOME HEALTH | Age: 54
End: 2019-05-28

## 2019-05-28 ENCOUNTER — HOME CARE/HOSPICE - HEALTHEAST (OUTPATIENT)
Dept: HOME HEALTH SERVICES | Facility: HOME HEALTH | Age: 54
End: 2019-05-28

## 2019-05-28 ENCOUNTER — COMMUNICATION - HEALTHEAST (OUTPATIENT)
Dept: FAMILY MEDICINE | Facility: CLINIC | Age: 54
End: 2019-05-28

## 2019-05-28 DIAGNOSIS — F43.10 PTSD (POST-TRAUMATIC STRESS DISORDER): ICD-10-CM

## 2019-05-28 DIAGNOSIS — F33.1 MODERATE EPISODE OF RECURRENT MAJOR DEPRESSIVE DISORDER (H): ICD-10-CM

## 2019-05-28 LAB
HEMOGLOBIN A2 QUANTITATION: 3 % (ref 2.2–3.5)
HEMOGLOBIN ELECTROPHRESIS: NORMAL
HEMOGLOBIN F QUANTITATION: <0.8 % (ref 0–2)
PATH ICD:: NORMAL
REVIEWING PATHOLOGIST: NORMAL

## 2019-05-29 ENCOUNTER — HOME CARE/HOSPICE - HEALTHEAST (OUTPATIENT)
Dept: HOME HEALTH SERVICES | Facility: HOME HEALTH | Age: 54
End: 2019-05-29

## 2019-05-30 ENCOUNTER — HOME CARE/HOSPICE - HEALTHEAST (OUTPATIENT)
Dept: HOME HEALTH SERVICES | Facility: HOME HEALTH | Age: 54
End: 2019-05-30

## 2019-05-31 ENCOUNTER — HOME CARE/HOSPICE - HEALTHEAST (OUTPATIENT)
Dept: HOME HEALTH SERVICES | Facility: HOME HEALTH | Age: 54
End: 2019-05-31

## 2019-06-03 ENCOUNTER — HOME CARE/HOSPICE - HEALTHEAST (OUTPATIENT)
Dept: HOME HEALTH SERVICES | Facility: HOME HEALTH | Age: 54
End: 2019-06-03

## 2019-06-03 ENCOUNTER — OFFICE VISIT - HEALTHEAST (OUTPATIENT)
Dept: FAMILY MEDICINE | Facility: CLINIC | Age: 54
End: 2019-06-03

## 2019-06-03 DIAGNOSIS — G89.4 CHRONIC PAIN SYNDROME: ICD-10-CM

## 2019-06-03 DIAGNOSIS — E53.8 DISORDER OF VITAMIN B12: ICD-10-CM

## 2019-06-03 DIAGNOSIS — M79.89 LEG SWELLING: ICD-10-CM

## 2019-06-03 DIAGNOSIS — R42 DIZZINESS: ICD-10-CM

## 2019-06-03 DIAGNOSIS — R10.13 EPIGASTRIC PAIN: ICD-10-CM

## 2019-06-03 DIAGNOSIS — R79.89 ELEVATED FERRITIN LEVEL: ICD-10-CM

## 2019-06-03 LAB
AMYLASE SERPL-CCNC: 67 U/L (ref 5–120)
FERRITIN SERPL-MCNC: 957 NG/ML (ref 10–130)
IRON SATN MFR SERPL: 53 % (ref 20–50)
IRON SERPL-MCNC: 109 UG/DL (ref 42–175)
LIPASE SERPL-CCNC: 17 U/L (ref 0–52)
TIBC SERPL-MCNC: 207 UG/DL (ref 313–563)
TRANSFERRIN SERPL-MCNC: 166 MG/DL (ref 212–360)
VIT B12 SERPL-MCNC: 987 PG/ML (ref 213–816)

## 2019-06-03 ASSESSMENT — MIFFLIN-ST. JEOR: SCORE: 1189.84

## 2019-06-04 ENCOUNTER — HOME CARE/HOSPICE - HEALTHEAST (OUTPATIENT)
Dept: HOME HEALTH SERVICES | Facility: HOME HEALTH | Age: 54
End: 2019-06-04

## 2019-06-06 ENCOUNTER — HOME CARE/HOSPICE - HEALTHEAST (OUTPATIENT)
Dept: HOME HEALTH SERVICES | Facility: HOME HEALTH | Age: 54
End: 2019-06-06

## 2019-06-06 ENCOUNTER — AMBULATORY - HEALTHEAST (OUTPATIENT)
Dept: BEHAVIORAL HEALTH | Facility: CLINIC | Age: 54
End: 2019-06-06

## 2019-06-10 ENCOUNTER — COMMUNICATION - HEALTHEAST (OUTPATIENT)
Dept: FAMILY MEDICINE | Facility: CLINIC | Age: 54
End: 2019-06-10

## 2019-06-10 ENCOUNTER — HOME CARE/HOSPICE - HEALTHEAST (OUTPATIENT)
Dept: HOME HEALTH SERVICES | Facility: HOME HEALTH | Age: 54
End: 2019-06-10

## 2019-06-10 ENCOUNTER — COMMUNICATION - HEALTHEAST (OUTPATIENT)
Dept: HOME HEALTH SERVICES | Facility: HOME HEALTH | Age: 54
End: 2019-06-10

## 2019-06-10 ENCOUNTER — COMMUNICATION - HEALTHEAST (OUTPATIENT)
Dept: ONCOLOGY | Facility: HOSPITAL | Age: 54
End: 2019-06-10

## 2019-06-10 DIAGNOSIS — R79.89 ELEVATED FERRITIN LEVEL: ICD-10-CM

## 2019-06-11 ENCOUNTER — OFFICE VISIT - HEALTHEAST (OUTPATIENT)
Dept: BEHAVIORAL HEALTH | Facility: CLINIC | Age: 54
End: 2019-06-11

## 2019-06-11 DIAGNOSIS — F43.10 PTSD (POST-TRAUMATIC STRESS DISORDER): ICD-10-CM

## 2019-06-11 DIAGNOSIS — F33.1 MODERATE EPISODE OF RECURRENT MAJOR DEPRESSIVE DISORDER (H): ICD-10-CM

## 2019-06-13 ENCOUNTER — COMMUNICATION - HEALTHEAST (OUTPATIENT)
Dept: NURSING | Facility: CLINIC | Age: 54
End: 2019-06-13

## 2019-06-13 ENCOUNTER — HOME CARE/HOSPICE - HEALTHEAST (OUTPATIENT)
Dept: HOME HEALTH SERVICES | Facility: HOME HEALTH | Age: 54
End: 2019-06-13

## 2019-06-18 ENCOUNTER — HOME CARE/HOSPICE - HEALTHEAST (OUTPATIENT)
Dept: HOME HEALTH SERVICES | Facility: HOME HEALTH | Age: 54
End: 2019-06-18

## 2019-06-19 ENCOUNTER — HOME CARE/HOSPICE - HEALTHEAST (OUTPATIENT)
Dept: HOME HEALTH SERVICES | Facility: HOME HEALTH | Age: 54
End: 2019-06-19

## 2019-06-19 ENCOUNTER — COMMUNICATION - HEALTHEAST (OUTPATIENT)
Dept: FAMILY MEDICINE | Facility: CLINIC | Age: 54
End: 2019-06-19

## 2019-06-20 ENCOUNTER — COMMUNICATION - HEALTHEAST (OUTPATIENT)
Dept: HOME HEALTH SERVICES | Facility: HOME HEALTH | Age: 54
End: 2019-06-20

## 2019-06-21 ENCOUNTER — COMMUNICATION - HEALTHEAST (OUTPATIENT)
Dept: NURSING | Facility: CLINIC | Age: 54
End: 2019-06-21

## 2019-06-25 ENCOUNTER — AMBULATORY - HEALTHEAST (OUTPATIENT)
Dept: BEHAVIORAL HEALTH | Facility: CLINIC | Age: 54
End: 2019-06-25

## 2019-06-25 ENCOUNTER — COMMUNICATION - HEALTHEAST (OUTPATIENT)
Dept: NURSING | Facility: CLINIC | Age: 54
End: 2019-06-25

## 2019-06-25 ENCOUNTER — OFFICE VISIT - HEALTHEAST (OUTPATIENT)
Dept: BEHAVIORAL HEALTH | Facility: CLINIC | Age: 54
End: 2019-06-25

## 2019-06-25 DIAGNOSIS — F33.1 MODERATE EPISODE OF RECURRENT MAJOR DEPRESSIVE DISORDER (H): ICD-10-CM

## 2019-06-25 DIAGNOSIS — F43.10 PTSD (POST-TRAUMATIC STRESS DISORDER): ICD-10-CM

## 2019-06-26 ENCOUNTER — COMMUNICATION - HEALTHEAST (OUTPATIENT)
Dept: FAMILY MEDICINE | Facility: CLINIC | Age: 54
End: 2019-06-26

## 2019-06-26 ENCOUNTER — OFFICE VISIT - HEALTHEAST (OUTPATIENT)
Dept: FAMILY MEDICINE | Facility: CLINIC | Age: 54
End: 2019-06-26

## 2019-06-26 DIAGNOSIS — R42 DIZZINESS: ICD-10-CM
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
## 2019-06-26 DIAGNOSIS — Z02.89 ENCOUNTER FOR COMPLETION OF FORM WITH PATIENT: ICD-10-CM

## 2019-06-26 DIAGNOSIS — H53.8 BLURRED VISION: ICD-10-CM

## 2019-06-26 DIAGNOSIS — G89.4 CHRONIC PAIN SYNDROME: ICD-10-CM

## 2019-06-26 ASSESSMENT — MIFFLIN-ST. JEOR: SCORE: 1180.2

## 2019-07-02 ENCOUNTER — HOME CARE/HOSPICE - HEALTHEAST (OUTPATIENT)
Dept: HOME HEALTH SERVICES | Facility: HOME HEALTH | Age: 54
End: 2019-07-02

## 2019-07-15 ENCOUNTER — AMBULATORY - HEALTHEAST (OUTPATIENT)
Dept: ONCOLOGY | Facility: HOSPITAL | Age: 54
End: 2019-07-15

## 2019-07-15 DIAGNOSIS — R79.89 ELEVATED FERRITIN LEVEL: ICD-10-CM

## 2019-07-16 ENCOUNTER — HOME CARE/HOSPICE - HEALTHEAST (OUTPATIENT)
Dept: HOME HEALTH SERVICES | Facility: HOME HEALTH | Age: 54
End: 2019-07-16

## 2019-07-18 ENCOUNTER — COMMUNICATION - HEALTHEAST (OUTPATIENT)
Dept: NURSING | Facility: CLINIC | Age: 54
End: 2019-07-18

## 2019-07-22 ENCOUNTER — OFFICE VISIT - HEALTHEAST (OUTPATIENT)
Dept: BEHAVIORAL HEALTH | Facility: CLINIC | Age: 54
End: 2019-07-22

## 2019-07-22 DIAGNOSIS — F43.10 PTSD (POST-TRAUMATIC STRESS DISORDER): ICD-10-CM

## 2019-07-22 DIAGNOSIS — F33.1 MODERATE EPISODE OF RECURRENT MAJOR DEPRESSIVE DISORDER (H): ICD-10-CM

## 2019-07-24 ENCOUNTER — RECORDS - HEALTHEAST (OUTPATIENT)
Dept: ADMINISTRATIVE | Facility: OTHER | Age: 54
End: 2019-07-24

## 2019-07-30 ENCOUNTER — HOME CARE/HOSPICE - HEALTHEAST (OUTPATIENT)
Dept: HOME HEALTH SERVICES | Facility: HOME HEALTH | Age: 54
End: 2019-07-30

## 2019-07-31 ENCOUNTER — COMMUNICATION - HEALTHEAST (OUTPATIENT)
Dept: NURSING | Facility: CLINIC | Age: 54
End: 2019-07-31

## 2019-08-05 ENCOUNTER — COMMUNICATION - HEALTHEAST (OUTPATIENT)
Dept: BEHAVIORAL HEALTH | Facility: CLINIC | Age: 54
End: 2019-08-05

## 2019-08-05 ENCOUNTER — OFFICE VISIT - HEALTHEAST (OUTPATIENT)
Dept: BEHAVIORAL HEALTH | Facility: CLINIC | Age: 54
End: 2019-08-05

## 2019-08-05 DIAGNOSIS — F33.1 MODERATE EPISODE OF RECURRENT MAJOR DEPRESSIVE DISORDER (H): ICD-10-CM

## 2019-08-05 DIAGNOSIS — F43.10 PTSD (POST-TRAUMATIC STRESS DISORDER): ICD-10-CM

## 2019-08-05 DIAGNOSIS — R10.13 EPIGASTRIC PAIN: ICD-10-CM

## 2019-08-09 ENCOUNTER — OFFICE VISIT - HEALTHEAST (OUTPATIENT)
Dept: ONCOLOGY | Facility: HOSPITAL | Age: 54
End: 2019-08-09

## 2019-08-09 ENCOUNTER — AMBULATORY - HEALTHEAST (OUTPATIENT)
Dept: INFUSION THERAPY | Facility: HOSPITAL | Age: 54
End: 2019-08-09

## 2019-08-09 DIAGNOSIS — R79.89 ELEVATED FERRITIN LEVEL: ICD-10-CM

## 2019-08-09 LAB
ALBUMIN SERPL-MCNC: 4.1 G/DL (ref 3.5–5)
ALP SERPL-CCNC: 81 U/L (ref 45–120)
ALT SERPL W P-5'-P-CCNC: 25 U/L (ref 0–45)
ANION GAP SERPL CALCULATED.3IONS-SCNC: 7 MMOL/L (ref 5–18)
AST SERPL W P-5'-P-CCNC: 23 U/L (ref 0–40)
BASOPHILS # BLD AUTO: 0.1 THOU/UL (ref 0–0.2)
BASOPHILS NFR BLD AUTO: 1 % (ref 0–2)
BILIRUB SERPL-MCNC: 0.6 MG/DL (ref 0–1)
BUN SERPL-MCNC: 9 MG/DL (ref 8–22)
CALCIUM SERPL-MCNC: 10 MG/DL (ref 8.5–10.5)
CHLORIDE BLD-SCNC: 106 MMOL/L (ref 98–107)
CO2 SERPL-SCNC: 29 MMOL/L (ref 22–31)
CREAT SERPL-MCNC: 0.76 MG/DL (ref 0.6–1.1)
EOSINOPHIL # BLD AUTO: 0.2 THOU/UL (ref 0–0.4)
EOSINOPHIL NFR BLD AUTO: 2 % (ref 0–6)
ERYTHROCYTE [DISTWIDTH] IN BLOOD BY AUTOMATED COUNT: 14.2 % (ref 11–14.5)
FERRITIN SERPL-MCNC: 791 NG/ML (ref 10–130)
GFR SERPL CREATININE-BSD FRML MDRD: >60 ML/MIN/1.73M2
GLUCOSE BLD-MCNC: 103 MG/DL (ref 70–125)
HCT VFR BLD AUTO: 41.1 % (ref 35–47)
HGB BLD-MCNC: 12.4 G/DL (ref 12–16)
LYMPHOCYTES # BLD AUTO: 2.6 THOU/UL (ref 0.8–4.4)
LYMPHOCYTES NFR BLD AUTO: 28 % (ref 20–40)
MCH RBC QN AUTO: 23.3 PG (ref 27–34)
MCHC RBC AUTO-ENTMCNC: 30.2 G/DL (ref 32–36)
MCV RBC AUTO: 77 FL (ref 80–100)
MONOCYTES # BLD AUTO: 0.5 THOU/UL (ref 0–0.9)
MONOCYTES NFR BLD AUTO: 5 % (ref 2–10)
NEUTROPHILS # BLD AUTO: 5.9 THOU/UL (ref 2–7.7)
NEUTROPHILS NFR BLD AUTO: 64 % (ref 50–70)
PLATELET # BLD AUTO: 246 THOU/UL (ref 140–440)
PMV BLD AUTO: 10.2 FL (ref 8.5–12.5)
POTASSIUM BLD-SCNC: 3.9 MMOL/L (ref 3.5–5)
PROT SERPL-MCNC: 8.1 G/DL (ref 6–8)
RBC # BLD AUTO: 5.33 MILL/UL (ref 3.8–5.4)
SODIUM SERPL-SCNC: 142 MMOL/L (ref 136–145)
WBC: 9.2 THOU/UL (ref 4–11)

## 2019-08-13 ENCOUNTER — HOME CARE/HOSPICE - HEALTHEAST (OUTPATIENT)
Dept: HOME HEALTH SERVICES | Facility: HOME HEALTH | Age: 54
End: 2019-08-13

## 2019-08-13 ENCOUNTER — AMBULATORY - HEALTHEAST (OUTPATIENT)
Dept: BEHAVIORAL HEALTH | Facility: CLINIC | Age: 54
End: 2019-08-13

## 2019-08-20 ENCOUNTER — HOME CARE/HOSPICE - HEALTHEAST (OUTPATIENT)
Dept: HOME HEALTH SERVICES | Facility: HOME HEALTH | Age: 54
End: 2019-08-20

## 2019-08-20 ENCOUNTER — COMMUNICATION - HEALTHEAST (OUTPATIENT)
Dept: HOME HEALTH SERVICES | Facility: HOME HEALTH | Age: 54
End: 2019-08-20

## 2019-08-26 ENCOUNTER — COMMUNICATION - HEALTHEAST (OUTPATIENT)
Dept: NURSING | Facility: CLINIC | Age: 54
End: 2019-08-26

## 2019-08-26 ENCOUNTER — HOME CARE/HOSPICE - HEALTHEAST (OUTPATIENT)
Dept: HOME HEALTH SERVICES | Facility: HOME HEALTH | Age: 54
End: 2019-08-26

## 2019-08-26 ENCOUNTER — OFFICE VISIT - HEALTHEAST (OUTPATIENT)
Dept: BEHAVIORAL HEALTH | Facility: CLINIC | Age: 54
End: 2019-08-26

## 2019-08-26 DIAGNOSIS — F43.10 PTSD (POST-TRAUMATIC STRESS DISORDER): ICD-10-CM

## 2019-08-26 DIAGNOSIS — F33.1 MODERATE EPISODE OF RECURRENT MAJOR DEPRESSIVE DISORDER (H): ICD-10-CM

## 2019-08-27 ENCOUNTER — COMMUNICATION - HEALTHEAST (OUTPATIENT)
Dept: CARE COORDINATION | Facility: CLINIC | Age: 54
End: 2019-08-27

## 2019-08-30 ENCOUNTER — HOME CARE/HOSPICE - HEALTHEAST (OUTPATIENT)
Dept: HOME HEALTH SERVICES | Facility: HOME HEALTH | Age: 54
End: 2019-08-30

## 2019-09-04 ENCOUNTER — COMMUNICATION - HEALTHEAST (OUTPATIENT)
Dept: HOME HEALTH SERVICES | Facility: HOME HEALTH | Age: 54
End: 2019-09-04

## 2019-09-09 ENCOUNTER — COMMUNICATION - HEALTHEAST (OUTPATIENT)
Dept: FAMILY MEDICINE | Facility: CLINIC | Age: 54
End: 2019-09-09

## 2019-09-09 DIAGNOSIS — G89.29 CHRONIC LOWER BACK PAIN: ICD-10-CM

## 2019-09-09 DIAGNOSIS — M54.50 CHRONIC LOWER BACK PAIN: ICD-10-CM

## 2019-09-10 ENCOUNTER — COMMUNICATION - HEALTHEAST (OUTPATIENT)
Dept: HOME HEALTH SERVICES | Facility: HOME HEALTH | Age: 54
End: 2019-09-10

## 2019-09-10 ENCOUNTER — HOME CARE/HOSPICE - HEALTHEAST (OUTPATIENT)
Dept: HOME HEALTH SERVICES | Facility: HOME HEALTH | Age: 54
End: 2019-09-10

## 2019-09-11 ENCOUNTER — OFFICE VISIT - HEALTHEAST (OUTPATIENT)
Dept: BEHAVIORAL HEALTH | Facility: CLINIC | Age: 54
End: 2019-09-11

## 2019-09-11 ENCOUNTER — OFFICE VISIT - HEALTHEAST (OUTPATIENT)
Dept: FAMILY MEDICINE | Facility: CLINIC | Age: 54
End: 2019-09-11

## 2019-09-11 DIAGNOSIS — F33.1 MODERATE EPISODE OF RECURRENT MAJOR DEPRESSIVE DISORDER (H): ICD-10-CM

## 2019-09-11 DIAGNOSIS — H00.033: ICD-10-CM

## 2019-09-11 DIAGNOSIS — F43.10 PTSD (POST-TRAUMATIC STRESS DISORDER): ICD-10-CM

## 2019-09-11 ASSESSMENT — MIFFLIN-ST. JEOR: SCORE: 1185.87

## 2019-09-13 ENCOUNTER — AMBULATORY - HEALTHEAST (OUTPATIENT)
Dept: INFUSION THERAPY | Facility: HOSPITAL | Age: 54
End: 2019-09-13

## 2019-09-13 DIAGNOSIS — R79.89 ELEVATED FERRITIN LEVEL: ICD-10-CM

## 2019-09-13 LAB
ALBUMIN SERPL-MCNC: 3.8 G/DL (ref 3.5–5)
ALP SERPL-CCNC: 79 U/L (ref 45–120)
ALT SERPL W P-5'-P-CCNC: 18 U/L (ref 0–45)
ANION GAP SERPL CALCULATED.3IONS-SCNC: 5 MMOL/L (ref 5–18)
AST SERPL W P-5'-P-CCNC: 17 U/L (ref 0–40)
BASOPHILS # BLD AUTO: 0 THOU/UL (ref 0–0.2)
BASOPHILS NFR BLD AUTO: 0 % (ref 0–2)
BILIRUB SERPL-MCNC: 0.5 MG/DL (ref 0–1)
BUN SERPL-MCNC: 14 MG/DL (ref 8–22)
CALCIUM SERPL-MCNC: 9.4 MG/DL (ref 8.5–10.5)
CHLORIDE BLD-SCNC: 106 MMOL/L (ref 98–107)
CO2 SERPL-SCNC: 31 MMOL/L (ref 22–31)
CREAT SERPL-MCNC: 0.79 MG/DL (ref 0.6–1.1)
EOSINOPHIL # BLD AUTO: 0.2 THOU/UL (ref 0–0.4)
EOSINOPHIL NFR BLD AUTO: 2 % (ref 0–6)
ERYTHROCYTE [DISTWIDTH] IN BLOOD BY AUTOMATED COUNT: 14.3 % (ref 11–14.5)
FERRITIN SERPL-MCNC: 549 NG/ML (ref 10–130)
GFR SERPL CREATININE-BSD FRML MDRD: >60 ML/MIN/1.73M2
GLUCOSE BLD-MCNC: 133 MG/DL (ref 70–125)
HCT VFR BLD AUTO: 39.5 % (ref 35–47)
HGB BLD-MCNC: 11.8 G/DL (ref 12–16)
LYMPHOCYTES # BLD AUTO: 2.5 THOU/UL (ref 0.8–4.4)
LYMPHOCYTES NFR BLD AUTO: 28 % (ref 20–40)
MCH RBC QN AUTO: 23.2 PG (ref 27–34)
MCHC RBC AUTO-ENTMCNC: 29.9 G/DL (ref 32–36)
MCV RBC AUTO: 78 FL (ref 80–100)
MONOCYTES # BLD AUTO: 0.5 THOU/UL (ref 0–0.9)
MONOCYTES NFR BLD AUTO: 6 % (ref 2–10)
NEUTROPHILS # BLD AUTO: 5.6 THOU/UL (ref 2–7.7)
NEUTROPHILS NFR BLD AUTO: 64 % (ref 50–70)
PLATELET # BLD AUTO: 262 THOU/UL (ref 140–440)
PMV BLD AUTO: 11.4 FL (ref 8.5–12.5)
POTASSIUM BLD-SCNC: 3.7 MMOL/L (ref 3.5–5)
PROT SERPL-MCNC: 7.8 G/DL (ref 6–8)
RBC # BLD AUTO: 5.08 MILL/UL (ref 3.8–5.4)
SODIUM SERPL-SCNC: 142 MMOL/L (ref 136–145)
WBC: 8.9 THOU/UL (ref 4–11)

## 2019-09-18 ENCOUNTER — HOSPITAL ENCOUNTER (OUTPATIENT)
Dept: MRI IMAGING | Facility: HOSPITAL | Age: 54
Setting detail: RADIATION/ONCOLOGY SERIES
Discharge: STILL ADMITTED OR ADMINISTRATIVE | End: 2019-09-18
Attending: INTERNAL MEDICINE

## 2019-09-18 ENCOUNTER — HOSPITAL ENCOUNTER (OUTPATIENT)
Dept: MRI IMAGING | Facility: HOSPITAL | Age: 54
Discharge: HOME | End: 2019-09-18
Attending: INTERNAL MEDICINE

## 2019-09-18 DIAGNOSIS — R79.89 ELEVATED FERRITIN LEVEL: ICD-10-CM

## 2019-09-20 LAB
MR HEIGHT: 1.5 M
MR WEIGHT: 68 KG

## 2019-09-23 ENCOUNTER — OFFICE VISIT - HEALTHEAST (OUTPATIENT)
Dept: BEHAVIORAL HEALTH | Facility: CLINIC | Age: 54
End: 2019-09-23

## 2019-09-23 ENCOUNTER — COMMUNICATION - HEALTHEAST (OUTPATIENT)
Dept: FAMILY MEDICINE | Facility: CLINIC | Age: 54
End: 2019-09-23

## 2019-09-23 ENCOUNTER — HOME CARE/HOSPICE - HEALTHEAST (OUTPATIENT)
Dept: HOME HEALTH SERVICES | Facility: HOME HEALTH | Age: 54
End: 2019-09-23

## 2019-09-23 DIAGNOSIS — F33.1 MODERATE EPISODE OF RECURRENT MAJOR DEPRESSIVE DISORDER (H): ICD-10-CM

## 2019-09-23 DIAGNOSIS — F43.10 PTSD (POST-TRAUMATIC STRESS DISORDER): ICD-10-CM

## 2019-09-24 ENCOUNTER — COMMUNICATION - HEALTHEAST (OUTPATIENT)
Dept: NURSING | Facility: CLINIC | Age: 54
End: 2019-09-24

## 2019-10-04 ENCOUNTER — HOSPITAL ENCOUNTER (OUTPATIENT)
Dept: MRI IMAGING | Facility: HOSPITAL | Age: 54
Setting detail: RADIATION/ONCOLOGY SERIES
Discharge: STILL ADMITTED OR ADMINISTRATIVE | End: 2019-10-04
Attending: INTERNAL MEDICINE

## 2019-10-04 DIAGNOSIS — R79.89 ELEVATED FERRITIN LEVEL: ICD-10-CM

## 2019-10-07 ENCOUNTER — HOME CARE/HOSPICE - HEALTHEAST (OUTPATIENT)
Dept: HOME HEALTH SERVICES | Facility: HOME HEALTH | Age: 54
End: 2019-10-07

## 2019-10-07 ENCOUNTER — AMBULATORY - HEALTHEAST (OUTPATIENT)
Dept: FAMILY MEDICINE | Facility: CLINIC | Age: 54
End: 2019-10-07

## 2019-10-07 ENCOUNTER — AMBULATORY - HEALTHEAST (OUTPATIENT)
Dept: BEHAVIORAL HEALTH | Facility: CLINIC | Age: 54
End: 2019-10-07

## 2019-10-10 ENCOUNTER — OFFICE VISIT - HEALTHEAST (OUTPATIENT)
Dept: BEHAVIORAL HEALTH | Facility: CLINIC | Age: 54
End: 2019-10-10

## 2019-10-10 DIAGNOSIS — F33.1 MODERATE EPISODE OF RECURRENT MAJOR DEPRESSIVE DISORDER (H): ICD-10-CM

## 2019-10-10 DIAGNOSIS — F43.10 PTSD (POST-TRAUMATIC STRESS DISORDER): ICD-10-CM

## 2019-10-11 ENCOUNTER — OFFICE VISIT - HEALTHEAST (OUTPATIENT)
Dept: FAMILY MEDICINE | Facility: CLINIC | Age: 54
End: 2019-10-11

## 2019-10-11 DIAGNOSIS — R07.9 ACUTE CHEST PAIN: ICD-10-CM

## 2019-10-11 DIAGNOSIS — R79.89 ELEVATED FERRITIN LEVEL: ICD-10-CM

## 2019-10-11 LAB
ALBUMIN SERPL-MCNC: 3.8 G/DL (ref 3.5–5)
ALP SERPL-CCNC: 78 U/L (ref 45–120)
ALT SERPL W P-5'-P-CCNC: 17 U/L (ref 0–45)
ANION GAP SERPL CALCULATED.3IONS-SCNC: 7 MMOL/L (ref 5–18)
AST SERPL W P-5'-P-CCNC: 18 U/L (ref 0–40)
ATRIAL RATE - MUSE: 76 BPM
BASOPHILS # BLD AUTO: 0.1 THOU/UL (ref 0–0.2)
BASOPHILS NFR BLD AUTO: 1 % (ref 0–2)
BILIRUB SERPL-MCNC: 0.5 MG/DL (ref 0–1)
BUN SERPL-MCNC: 12 MG/DL (ref 8–22)
CALCIUM SERPL-MCNC: 9.2 MG/DL (ref 8.5–10.5)
CHLORIDE BLD-SCNC: 105 MMOL/L (ref 98–107)
CHOLEST SERPL-MCNC: 163 MG/DL
CO2 SERPL-SCNC: 30 MMOL/L (ref 22–31)
CREAT SERPL-MCNC: 0.71 MG/DL (ref 0.6–1.1)
DIASTOLIC BLOOD PRESSURE - MUSE: NORMAL
EOSINOPHIL # BLD AUTO: 0.2 THOU/UL (ref 0–0.4)
EOSINOPHIL NFR BLD AUTO: 2 % (ref 0–6)
ERYTHROCYTE [DISTWIDTH] IN BLOOD BY AUTOMATED COUNT: 13.4 % (ref 11–14.5)
FASTING STATUS PATIENT QL REPORTED: YES
FERRITIN SERPL-MCNC: 579 NG/ML (ref 10–130)
GFR SERPL CREATININE-BSD FRML MDRD: >60 ML/MIN/1.73M2
GLUCOSE BLD-MCNC: 97 MG/DL (ref 70–125)
HCT VFR BLD AUTO: 36.2 % (ref 35–47)
HDLC SERPL-MCNC: 44 MG/DL
HGB BLD-MCNC: 11.8 G/DL (ref 12–16)
INTERPRETATION ECG - MUSE: NORMAL
LDLC SERPL CALC-MCNC: 88 MG/DL
LYMPHOCYTES # BLD AUTO: 3.2 THOU/UL (ref 0.8–4.4)
LYMPHOCYTES NFR BLD AUTO: 33 % (ref 20–40)
MCH RBC QN AUTO: 23.8 PG (ref 27–34)
MCHC RBC AUTO-ENTMCNC: 32.6 G/DL (ref 32–36)
MCV RBC AUTO: 73 FL (ref 80–100)
MONOCYTES # BLD AUTO: 0.6 THOU/UL (ref 0–0.9)
MONOCYTES NFR BLD AUTO: 6 % (ref 2–10)
NEUTROPHILS # BLD AUTO: 5.7 THOU/UL (ref 2–7.7)
NEUTROPHILS NFR BLD AUTO: 58 % (ref 50–70)
P AXIS - MUSE: 57 DEGREES
PLATELET # BLD AUTO: 223 THOU/UL (ref 140–440)
PMV BLD AUTO: 8.7 FL (ref 7–10)
POTASSIUM BLD-SCNC: 4.1 MMOL/L (ref 3.5–5)
PR INTERVAL - MUSE: 126 MS
PROT SERPL-MCNC: 7.6 G/DL (ref 6–8)
QRS DURATION - MUSE: 78 MS
QT - MUSE: 400 MS
QTC - MUSE: 450 MS
R AXIS - MUSE: 50 DEGREES
RBC # BLD AUTO: 4.94 MILL/UL (ref 3.8–5.4)
SODIUM SERPL-SCNC: 142 MMOL/L (ref 136–145)
SYSTOLIC BLOOD PRESSURE - MUSE: NORMAL
T AXIS - MUSE: 33 DEGREES
TRIGL SERPL-MCNC: 154 MG/DL
TROPONIN I SERPL-MCNC: 0.01 NG/ML (ref 0–0.29)
TSH SERPL DL<=0.005 MIU/L-ACNC: 0.64 UIU/ML (ref 0.3–5)
VENTRICULAR RATE- MUSE: 76 BPM
WBC: 9.7 THOU/UL (ref 4–11)

## 2019-10-11 ASSESSMENT — MIFFLIN-ST. JEOR: SCORE: 1174.76

## 2019-10-14 ENCOUNTER — COMMUNICATION - HEALTHEAST (OUTPATIENT)
Dept: FAMILY MEDICINE | Facility: CLINIC | Age: 54
End: 2019-10-14

## 2019-10-17 ENCOUNTER — COMMUNICATION - HEALTHEAST (OUTPATIENT)
Dept: HOME HEALTH SERVICES | Facility: HOME HEALTH | Age: 54
End: 2019-10-17

## 2019-10-17 ENCOUNTER — HOME CARE/HOSPICE - HEALTHEAST (OUTPATIENT)
Dept: HOME HEALTH SERVICES | Facility: HOME HEALTH | Age: 54
End: 2019-10-17

## 2019-10-21 ENCOUNTER — OFFICE VISIT - HEALTHEAST (OUTPATIENT)
Dept: BEHAVIORAL HEALTH | Facility: CLINIC | Age: 54
End: 2019-10-21

## 2019-10-21 ENCOUNTER — HOME CARE/HOSPICE - HEALTHEAST (OUTPATIENT)
Dept: HOME HEALTH SERVICES | Facility: HOME HEALTH | Age: 54
End: 2019-10-21

## 2019-10-21 ENCOUNTER — AMBULATORY - HEALTHEAST (OUTPATIENT)
Dept: BEHAVIORAL HEALTH | Facility: CLINIC | Age: 54
End: 2019-10-21

## 2019-10-21 DIAGNOSIS — F43.10 PTSD (POST-TRAUMATIC STRESS DISORDER): ICD-10-CM

## 2019-10-21 DIAGNOSIS — F33.1 MODERATE EPISODE OF RECURRENT MAJOR DEPRESSIVE DISORDER (H): ICD-10-CM

## 2019-10-21 ASSESSMENT — ANXIETY QUESTIONNAIRES
5. BEING SO RESTLESS THAT IT IS HARD TO SIT STILL: NOT AT ALL
7. FEELING AFRAID AS IF SOMETHING AWFUL MIGHT HAPPEN: SEVERAL DAYS
IF YOU CHECKED OFF ANY PROBLEMS ON THIS QUESTIONNAIRE, HOW DIFFICULT HAVE THESE PROBLEMS MADE IT FOR YOU TO DO YOUR WORK, TAKE CARE OF THINGS AT HOME, OR GET ALONG WITH OTHER PEOPLE: SOMEWHAT DIFFICULT
4. TROUBLE RELAXING: SEVERAL DAYS
2. NOT BEING ABLE TO STOP OR CONTROL WORRYING: SEVERAL DAYS
1. FEELING NERVOUS, ANXIOUS, OR ON EDGE: SEVERAL DAYS
GAD7 TOTAL SCORE: 5
6. BECOMING EASILY ANNOYED OR IRRITABLE: NOT AT ALL
3. WORRYING TOO MUCH ABOUT DIFFERENT THINGS: SEVERAL DAYS

## 2019-10-21 ASSESSMENT — PATIENT HEALTH QUESTIONNAIRE - PHQ9: SUM OF ALL RESPONSES TO PHQ QUESTIONS 1-9: 12

## 2019-10-30 ENCOUNTER — HOME CARE/HOSPICE - HEALTHEAST (OUTPATIENT)
Dept: HOME HEALTH SERVICES | Facility: HOME HEALTH | Age: 54
End: 2019-10-30

## 2019-11-04 ENCOUNTER — HOME CARE/HOSPICE - HEALTHEAST (OUTPATIENT)
Dept: HOME HEALTH SERVICES | Facility: HOME HEALTH | Age: 54
End: 2019-11-04

## 2019-11-05 ENCOUNTER — COMMUNICATION - HEALTHEAST (OUTPATIENT)
Dept: BEHAVIORAL HEALTH | Facility: CLINIC | Age: 54
End: 2019-11-05

## 2019-11-05 ENCOUNTER — COMMUNICATION - HEALTHEAST (OUTPATIENT)
Dept: NURSING | Facility: CLINIC | Age: 54
End: 2019-11-05

## 2019-11-05 ENCOUNTER — OFFICE VISIT - HEALTHEAST (OUTPATIENT)
Dept: BEHAVIORAL HEALTH | Facility: CLINIC | Age: 54
End: 2019-11-05

## 2019-11-05 ENCOUNTER — COMMUNICATION - HEALTHEAST (OUTPATIENT)
Dept: FAMILY MEDICINE | Facility: CLINIC | Age: 54
End: 2019-11-05

## 2019-11-05 DIAGNOSIS — F43.10 PTSD (POST-TRAUMATIC STRESS DISORDER): ICD-10-CM

## 2019-11-05 DIAGNOSIS — F33.1 MODERATE EPISODE OF RECURRENT MAJOR DEPRESSIVE DISORDER (H): ICD-10-CM

## 2019-11-05 DIAGNOSIS — R79.89 ELEVATED FERRITIN LEVEL: ICD-10-CM

## 2019-11-18 ENCOUNTER — HOME CARE/HOSPICE - HEALTHEAST (OUTPATIENT)
Dept: HOME HEALTH SERVICES | Facility: HOME HEALTH | Age: 54
End: 2019-11-18

## 2019-11-19 ENCOUNTER — OFFICE VISIT - HEALTHEAST (OUTPATIENT)
Dept: BEHAVIORAL HEALTH | Facility: CLINIC | Age: 54
End: 2019-11-19

## 2019-11-19 DIAGNOSIS — F33.1 MODERATE EPISODE OF RECURRENT MAJOR DEPRESSIVE DISORDER (H): ICD-10-CM

## 2019-11-19 DIAGNOSIS — F43.10 PTSD (POST-TRAUMATIC STRESS DISORDER): ICD-10-CM

## 2019-11-20 ENCOUNTER — COMMUNICATION - HEALTHEAST (OUTPATIENT)
Dept: HOME HEALTH SERVICES | Facility: HOME HEALTH | Age: 54
End: 2019-11-20

## 2019-11-20 ENCOUNTER — OFFICE VISIT - HEALTHEAST (OUTPATIENT)
Dept: FAMILY MEDICINE | Facility: CLINIC | Age: 54
End: 2019-11-20

## 2019-11-20 ENCOUNTER — HOME CARE/HOSPICE - HEALTHEAST (OUTPATIENT)
Dept: HOME HEALTH SERVICES | Facility: HOME HEALTH | Age: 54
End: 2019-11-20

## 2019-11-20 DIAGNOSIS — F43.10 PTSD (POST-TRAUMATIC STRESS DISORDER): ICD-10-CM

## 2019-11-20 DIAGNOSIS — J98.01 BRONCHOSPASM: ICD-10-CM

## 2019-11-20 DIAGNOSIS — R79.89 ELEVATED FERRITIN LEVEL: ICD-10-CM

## 2019-11-20 DIAGNOSIS — Z22.7 TB LUNG, LATENT: ICD-10-CM

## 2019-11-22 ENCOUNTER — AMBULATORY - HEALTHEAST (OUTPATIENT)
Dept: NURSING | Facility: CLINIC | Age: 54
End: 2019-11-22

## 2019-11-25 ENCOUNTER — COMMUNICATION - HEALTHEAST (OUTPATIENT)
Dept: NURSING | Facility: CLINIC | Age: 54
End: 2019-11-25

## 2019-11-29 ENCOUNTER — OFFICE VISIT - HEALTHEAST (OUTPATIENT)
Dept: FAMILY MEDICINE | Facility: CLINIC | Age: 54
End: 2019-11-29

## 2019-11-29 ENCOUNTER — HOME CARE/HOSPICE - HEALTHEAST (OUTPATIENT)
Dept: HOME HEALTH SERVICES | Facility: HOME HEALTH | Age: 54
End: 2019-11-29

## 2019-11-29 DIAGNOSIS — R05.9 COUGH: ICD-10-CM

## 2019-11-29 DIAGNOSIS — R79.89 ELEVATED FERRITIN LEVEL: ICD-10-CM

## 2019-11-29 DIAGNOSIS — R25.2 FOOT CRAMPS: ICD-10-CM

## 2019-11-29 DIAGNOSIS — M79.672 LEFT FOOT PAIN: ICD-10-CM

## 2019-11-29 DIAGNOSIS — Z11.59 ENCOUNTER FOR HEPATITIS C SCREENING TEST FOR LOW RISK PATIENT: ICD-10-CM

## 2019-11-29 DIAGNOSIS — Z11.4 SCREENING FOR HIV (HUMAN IMMUNODEFICIENCY VIRUS): ICD-10-CM

## 2019-11-29 LAB
FERRITIN SERPL-MCNC: 625 NG/ML (ref 10–130)
HIV 1+2 AB+HIV1 P24 AG SERPL QL IA: NEGATIVE
IRON SATN MFR SERPL: 53 % (ref 20–50)
IRON SERPL-MCNC: 122 UG/DL (ref 42–175)
TIBC SERPL-MCNC: 230 UG/DL (ref 313–563)
TRANSFERRIN SERPL-MCNC: 184 MG/DL (ref 212–360)

## 2019-11-29 ASSESSMENT — MIFFLIN-ST. JEOR: SCORE: 1176.57

## 2019-12-02 LAB — HCV AB SERPL QL IA: NEGATIVE

## 2019-12-04 ENCOUNTER — OFFICE VISIT - HEALTHEAST (OUTPATIENT)
Dept: PHARMACY | Facility: CLINIC | Age: 54
End: 2019-12-04

## 2019-12-04 ENCOUNTER — COMMUNICATION - HEALTHEAST (OUTPATIENT)
Dept: NURSING | Facility: CLINIC | Age: 54
End: 2019-12-04

## 2019-12-04 ENCOUNTER — COMMUNICATION - HEALTHEAST (OUTPATIENT)
Dept: FAMILY MEDICINE | Facility: CLINIC | Age: 54
End: 2019-12-04

## 2019-12-04 DIAGNOSIS — J98.01 BRONCHOSPASM: ICD-10-CM

## 2019-12-04 DIAGNOSIS — K21.9 GASTROESOPHAGEAL REFLUX DISEASE WITHOUT ESOPHAGITIS: ICD-10-CM

## 2019-12-04 DIAGNOSIS — Z22.7 TB LUNG, LATENT: ICD-10-CM

## 2019-12-04 DIAGNOSIS — R79.89 ELEVATED FERRITIN LEVEL: ICD-10-CM

## 2019-12-04 DIAGNOSIS — F33.1 MODERATE EPISODE OF RECURRENT MAJOR DEPRESSIVE DISORDER (H): ICD-10-CM

## 2019-12-04 DIAGNOSIS — G89.29 OTHER CHRONIC PAIN: ICD-10-CM

## 2019-12-04 ASSESSMENT — PATIENT HEALTH QUESTIONNAIRE - PHQ9: SUM OF ALL RESPONSES TO PHQ QUESTIONS 1-9: 10

## 2019-12-06 ENCOUNTER — COMMUNICATION - HEALTHEAST (OUTPATIENT)
Dept: SCHEDULING | Facility: CLINIC | Age: 54
End: 2019-12-06

## 2019-12-06 DIAGNOSIS — R10.13 EPIGASTRIC PAIN: ICD-10-CM

## 2019-12-09 ENCOUNTER — AMBULATORY - HEALTHEAST (OUTPATIENT)
Dept: NURSING | Facility: CLINIC | Age: 54
End: 2019-12-09

## 2019-12-10 ENCOUNTER — OFFICE VISIT - HEALTHEAST (OUTPATIENT)
Dept: PHARMACY | Facility: CLINIC | Age: 54
End: 2019-12-10

## 2019-12-10 ENCOUNTER — OFFICE VISIT - HEALTHEAST (OUTPATIENT)
Dept: BEHAVIORAL HEALTH | Facility: CLINIC | Age: 54
End: 2019-12-10

## 2019-12-10 DIAGNOSIS — R79.89 ELEVATED FERRITIN LEVEL: ICD-10-CM

## 2019-12-10 DIAGNOSIS — F33.1 MODERATE EPISODE OF RECURRENT MAJOR DEPRESSIVE DISORDER (H): ICD-10-CM

## 2019-12-10 DIAGNOSIS — G89.29 OTHER CHRONIC PAIN: ICD-10-CM

## 2019-12-10 DIAGNOSIS — Z22.7 TB LUNG, LATENT: ICD-10-CM

## 2019-12-10 DIAGNOSIS — F43.10 PTSD (POST-TRAUMATIC STRESS DISORDER): ICD-10-CM

## 2019-12-10 DIAGNOSIS — G89.4 CHRONIC PAIN SYNDROME: ICD-10-CM

## 2019-12-10 DIAGNOSIS — G89.29 CHRONIC LOWER BACK PAIN: ICD-10-CM

## 2019-12-10 DIAGNOSIS — J30.1 SEASONAL ALLERGIC RHINITIS DUE TO POLLEN: ICD-10-CM

## 2019-12-10 DIAGNOSIS — M54.50 CHRONIC LOWER BACK PAIN: ICD-10-CM

## 2019-12-10 DIAGNOSIS — K21.9 GASTROESOPHAGEAL REFLUX DISEASE WITHOUT ESOPHAGITIS: ICD-10-CM

## 2019-12-17 ENCOUNTER — COMMUNICATION - HEALTHEAST (OUTPATIENT)
Dept: FAMILY MEDICINE | Facility: CLINIC | Age: 54
End: 2019-12-17

## 2019-12-17 ENCOUNTER — OFFICE VISIT - HEALTHEAST (OUTPATIENT)
Dept: PHARMACY | Facility: CLINIC | Age: 54
End: 2019-12-17

## 2019-12-17 DIAGNOSIS — R79.89 ELEVATED FERRITIN LEVEL: ICD-10-CM

## 2019-12-17 DIAGNOSIS — Z22.7 TB LUNG, LATENT: ICD-10-CM

## 2019-12-17 DIAGNOSIS — G89.29 OTHER CHRONIC PAIN: ICD-10-CM

## 2019-12-17 DIAGNOSIS — F33.1 MODERATE EPISODE OF RECURRENT MAJOR DEPRESSIVE DISORDER (H): ICD-10-CM

## 2019-12-19 ENCOUNTER — OFFICE VISIT - HEALTHEAST (OUTPATIENT)
Dept: ONCOLOGY | Facility: HOSPITAL | Age: 54
End: 2019-12-19

## 2019-12-19 DIAGNOSIS — R79.89 ELEVATED FERRITIN LEVEL: ICD-10-CM

## 2019-12-19 DIAGNOSIS — E83.19 IRON OVERLOAD SYNDROME: ICD-10-CM

## 2019-12-20 ENCOUNTER — OFFICE VISIT - HEALTHEAST (OUTPATIENT)
Dept: BEHAVIORAL HEALTH | Facility: CLINIC | Age: 54
End: 2019-12-20

## 2019-12-20 DIAGNOSIS — F33.1 MODERATE EPISODE OF RECURRENT MAJOR DEPRESSIVE DISORDER (H): ICD-10-CM

## 2019-12-20 DIAGNOSIS — F43.10 PTSD (POST-TRAUMATIC STRESS DISORDER): ICD-10-CM

## 2019-12-23 ENCOUNTER — COMMUNICATION - HEALTHEAST (OUTPATIENT)
Dept: NURSING | Facility: CLINIC | Age: 54
End: 2019-12-23

## 2019-12-24 ENCOUNTER — COMMUNICATION - HEALTHEAST (OUTPATIENT)
Dept: NURSING | Facility: CLINIC | Age: 54
End: 2019-12-24

## 2019-12-27 ENCOUNTER — OFFICE VISIT - HEALTHEAST (OUTPATIENT)
Dept: PHARMACY | Facility: CLINIC | Age: 54
End: 2019-12-27

## 2019-12-27 DIAGNOSIS — R79.89 ELEVATED FERRITIN LEVEL: ICD-10-CM

## 2019-12-27 DIAGNOSIS — Z22.7 TB LUNG, LATENT: ICD-10-CM

## 2019-12-27 DIAGNOSIS — F33.1 MODERATE EPISODE OF RECURRENT MAJOR DEPRESSIVE DISORDER (H): ICD-10-CM

## 2020-01-03 ENCOUNTER — COMMUNICATION - HEALTHEAST (OUTPATIENT)
Dept: PHARMACY | Facility: CLINIC | Age: 55
End: 2020-01-03

## 2020-01-08 ENCOUNTER — COMMUNICATION - HEALTHEAST (OUTPATIENT)
Dept: NURSING | Facility: CLINIC | Age: 55
End: 2020-01-08

## 2020-01-08 ENCOUNTER — OFFICE VISIT - HEALTHEAST (OUTPATIENT)
Dept: BEHAVIORAL HEALTH | Facility: CLINIC | Age: 55
End: 2020-01-08

## 2020-01-08 DIAGNOSIS — F33.1 MODERATE EPISODE OF RECURRENT MAJOR DEPRESSIVE DISORDER (H): ICD-10-CM

## 2020-01-08 DIAGNOSIS — F43.10 PTSD (POST-TRAUMATIC STRESS DISORDER): ICD-10-CM

## 2020-01-15 ENCOUNTER — COMMUNICATION - HEALTHEAST (OUTPATIENT)
Dept: NURSING | Facility: CLINIC | Age: 55
End: 2020-01-15

## 2020-01-15 ENCOUNTER — AMBULATORY - HEALTHEAST (OUTPATIENT)
Dept: PHARMACY | Facility: CLINIC | Age: 55
End: 2020-01-15

## 2020-01-15 ENCOUNTER — OFFICE VISIT - HEALTHEAST (OUTPATIENT)
Dept: BEHAVIORAL HEALTH | Facility: CLINIC | Age: 55
End: 2020-01-15

## 2020-01-15 DIAGNOSIS — F43.10 PTSD (POST-TRAUMATIC STRESS DISORDER): ICD-10-CM

## 2020-01-15 DIAGNOSIS — F33.1 MODERATE EPISODE OF RECURRENT MAJOR DEPRESSIVE DISORDER (H): ICD-10-CM

## 2020-01-17 ENCOUNTER — COMMUNICATION - HEALTHEAST (OUTPATIENT)
Dept: PHARMACY | Facility: CLINIC | Age: 55
End: 2020-01-17

## 2020-01-17 ENCOUNTER — AMBULATORY - HEALTHEAST (OUTPATIENT)
Dept: NURSING | Facility: CLINIC | Age: 55
End: 2020-01-17

## 2020-01-22 ENCOUNTER — OFFICE VISIT - HEALTHEAST (OUTPATIENT)
Dept: BEHAVIORAL HEALTH | Facility: CLINIC | Age: 55
End: 2020-01-22

## 2020-01-22 DIAGNOSIS — F33.1 MODERATE EPISODE OF RECURRENT MAJOR DEPRESSIVE DISORDER (H): ICD-10-CM

## 2020-01-22 DIAGNOSIS — F43.10 PTSD (POST-TRAUMATIC STRESS DISORDER): ICD-10-CM

## 2020-01-25 ENCOUNTER — COMMUNICATION - HEALTHEAST (OUTPATIENT)
Dept: FAMILY MEDICINE | Facility: CLINIC | Age: 55
End: 2020-01-25

## 2020-01-30 ENCOUNTER — COMMUNICATION - HEALTHEAST (OUTPATIENT)
Dept: NURSING | Facility: CLINIC | Age: 55
End: 2020-01-30

## 2020-01-30 ENCOUNTER — AMBULATORY - HEALTHEAST (OUTPATIENT)
Dept: INFUSION THERAPY | Facility: HOSPITAL | Age: 55
End: 2020-01-30

## 2020-01-30 DIAGNOSIS — E83.19 IRON OVERLOAD SYNDROME: ICD-10-CM

## 2020-01-30 DIAGNOSIS — R79.89 ELEVATED FERRITIN LEVEL: ICD-10-CM

## 2020-01-30 LAB
ALBUMIN SERPL-MCNC: 3.8 G/DL (ref 3.5–5)
ALP SERPL-CCNC: 89 U/L (ref 45–120)
ALT SERPL W P-5'-P-CCNC: 17 U/L (ref 0–45)
ANION GAP SERPL CALCULATED.3IONS-SCNC: 7 MMOL/L (ref 5–18)
AST SERPL W P-5'-P-CCNC: 19 U/L (ref 0–40)
BASOPHILS # BLD AUTO: 0 THOU/UL (ref 0–0.2)
BASOPHILS NFR BLD AUTO: 0 % (ref 0–2)
BILIRUB SERPL-MCNC: 0.5 MG/DL (ref 0–1)
BUN SERPL-MCNC: 12 MG/DL (ref 8–22)
CALCIUM SERPL-MCNC: 9.6 MG/DL (ref 8.5–10.5)
CHLORIDE BLD-SCNC: 108 MMOL/L (ref 98–107)
CO2 SERPL-SCNC: 27 MMOL/L (ref 22–31)
CREAT SERPL-MCNC: 0.75 MG/DL (ref 0.6–1.1)
EOSINOPHIL # BLD AUTO: 0.1 THOU/UL (ref 0–0.4)
EOSINOPHIL NFR BLD AUTO: 1 % (ref 0–6)
ERYTHROCYTE [DISTWIDTH] IN BLOOD BY AUTOMATED COUNT: 14.1 % (ref 11–14.5)
FERRITIN SERPL-MCNC: 538 NG/ML (ref 10–130)
GFR SERPL CREATININE-BSD FRML MDRD: >60 ML/MIN/1.73M2
GLUCOSE BLD-MCNC: 99 MG/DL (ref 70–125)
HCT VFR BLD AUTO: 40.8 % (ref 35–47)
HGB BLD-MCNC: 12.3 G/DL (ref 12–16)
LYMPHOCYTES # BLD AUTO: 2.6 THOU/UL (ref 0.8–4.4)
LYMPHOCYTES NFR BLD AUTO: 28 % (ref 20–40)
MCH RBC QN AUTO: 22.9 PG (ref 27–34)
MCHC RBC AUTO-ENTMCNC: 30.1 G/DL (ref 32–36)
MCV RBC AUTO: 76 FL (ref 80–100)
MONOCYTES # BLD AUTO: 0.5 THOU/UL (ref 0–0.9)
MONOCYTES NFR BLD AUTO: 6 % (ref 2–10)
NEUTROPHILS # BLD AUTO: 5.8 THOU/UL (ref 2–7.7)
NEUTROPHILS NFR BLD AUTO: 64 % (ref 50–70)
PLATELET # BLD AUTO: 258 THOU/UL (ref 140–440)
PMV BLD AUTO: 11.7 FL (ref 8.5–12.5)
POTASSIUM BLD-SCNC: 3.5 MMOL/L (ref 3.5–5)
PROT SERPL-MCNC: 8.2 G/DL (ref 6–8)
RBC # BLD AUTO: 5.36 MILL/UL (ref 3.8–5.4)
SODIUM SERPL-SCNC: 142 MMOL/L (ref 136–145)
WBC: 9.1 THOU/UL (ref 4–11)

## 2020-01-31 ENCOUNTER — COMMUNICATION - HEALTHEAST (OUTPATIENT)
Dept: NURSING | Facility: CLINIC | Age: 55
End: 2020-01-31

## 2020-02-03 ENCOUNTER — COMMUNICATION - HEALTHEAST (OUTPATIENT)
Dept: NURSING | Facility: CLINIC | Age: 55
End: 2020-02-03

## 2020-02-04 ENCOUNTER — COMMUNICATION - HEALTHEAST (OUTPATIENT)
Dept: NURSING | Facility: CLINIC | Age: 55
End: 2020-02-04

## 2020-02-04 ENCOUNTER — OFFICE VISIT - HEALTHEAST (OUTPATIENT)
Dept: PHARMACY | Facility: CLINIC | Age: 55
End: 2020-02-04

## 2020-02-04 DIAGNOSIS — F43.10 PTSD (POST-TRAUMATIC STRESS DISORDER): ICD-10-CM

## 2020-02-04 DIAGNOSIS — R42 DIZZINESS: ICD-10-CM

## 2020-02-13 ENCOUNTER — COMMUNICATION - HEALTHEAST (OUTPATIENT)
Dept: NURSING | Facility: CLINIC | Age: 55
End: 2020-02-13

## 2020-02-14 ENCOUNTER — COMMUNICATION - HEALTHEAST (OUTPATIENT)
Dept: FAMILY MEDICINE | Facility: CLINIC | Age: 55
End: 2020-02-14

## 2020-02-14 DIAGNOSIS — R10.13 EPIGASTRIC PAIN: ICD-10-CM

## 2020-02-18 ENCOUNTER — AMBULATORY - HEALTHEAST (OUTPATIENT)
Dept: NURSING | Facility: CLINIC | Age: 55
End: 2020-02-18

## 2020-03-03 ENCOUNTER — AMBULATORY - HEALTHEAST (OUTPATIENT)
Dept: NURSING | Facility: CLINIC | Age: 55
End: 2020-03-03

## 2020-03-12 ENCOUNTER — COMMUNICATION - HEALTHEAST (OUTPATIENT)
Dept: NURSING | Facility: CLINIC | Age: 55
End: 2020-03-12

## 2020-03-19 ENCOUNTER — AMBULATORY - HEALTHEAST (OUTPATIENT)
Dept: INFUSION THERAPY | Facility: HOSPITAL | Age: 55
End: 2020-03-19

## 2020-03-19 DIAGNOSIS — E83.19 IRON OVERLOAD SYNDROME: ICD-10-CM

## 2020-03-19 DIAGNOSIS — R79.89 ELEVATED FERRITIN LEVEL: ICD-10-CM

## 2020-03-19 LAB
ALBUMIN SERPL-MCNC: 3.8 G/DL (ref 3.5–5)
ALP SERPL-CCNC: 80 U/L (ref 45–120)
ALT SERPL W P-5'-P-CCNC: 44 U/L (ref 0–45)
ANION GAP SERPL CALCULATED.3IONS-SCNC: 5 MMOL/L (ref 5–18)
AST SERPL W P-5'-P-CCNC: 29 U/L (ref 0–40)
BASOPHILS # BLD AUTO: 0.1 THOU/UL (ref 0–0.2)
BASOPHILS NFR BLD AUTO: 1 % (ref 0–2)
BILIRUB SERPL-MCNC: 0.5 MG/DL (ref 0–1)
BUN SERPL-MCNC: 9 MG/DL (ref 8–22)
CALCIUM SERPL-MCNC: 9.5 MG/DL (ref 8.5–10.5)
CHLORIDE BLD-SCNC: 109 MMOL/L (ref 98–107)
CO2 SERPL-SCNC: 29 MMOL/L (ref 22–31)
CREAT SERPL-MCNC: 0.69 MG/DL (ref 0.6–1.1)
EOSINOPHIL # BLD AUTO: 0.1 THOU/UL (ref 0–0.4)
EOSINOPHIL NFR BLD AUTO: 1 % (ref 0–6)
ERYTHROCYTE [DISTWIDTH] IN BLOOD BY AUTOMATED COUNT: 14.4 % (ref 11–14.5)
FERRITIN SERPL-MCNC: 507 NG/ML (ref 10–130)
GFR SERPL CREATININE-BSD FRML MDRD: >60 ML/MIN/1.73M2
GLUCOSE BLD-MCNC: 105 MG/DL (ref 70–125)
HCT VFR BLD AUTO: 41.6 % (ref 35–47)
HGB BLD-MCNC: 12.1 G/DL (ref 12–16)
LYMPHOCYTES # BLD AUTO: 2.1 THOU/UL (ref 0.8–4.4)
LYMPHOCYTES NFR BLD AUTO: 25 % (ref 20–40)
MCH RBC QN AUTO: 22.7 PG (ref 27–34)
MCHC RBC AUTO-ENTMCNC: 29.1 G/DL (ref 32–36)
MCV RBC AUTO: 78 FL (ref 80–100)
MONOCYTES # BLD AUTO: 0.3 THOU/UL (ref 0–0.9)
MONOCYTES NFR BLD AUTO: 4 % (ref 2–10)
NEUTROPHILS # BLD AUTO: 5.8 THOU/UL (ref 2–7.7)
NEUTROPHILS NFR BLD AUTO: 69 % (ref 50–70)
PLATELET # BLD AUTO: 221 THOU/UL (ref 140–440)
PMV BLD AUTO: 12.8 FL (ref 8.5–12.5)
POTASSIUM BLD-SCNC: 4 MMOL/L (ref 3.5–5)
PROT SERPL-MCNC: 7.8 G/DL (ref 6–8)
RBC # BLD AUTO: 5.33 MILL/UL (ref 3.8–5.4)
SODIUM SERPL-SCNC: 143 MMOL/L (ref 136–145)
WBC: 8.4 THOU/UL (ref 4–11)

## 2020-03-20 ENCOUNTER — OFFICE VISIT - HEALTHEAST (OUTPATIENT)
Dept: ONCOLOGY | Facility: HOSPITAL | Age: 55
End: 2020-03-20

## 2020-03-20 DIAGNOSIS — E83.19 IRON OVERLOAD SYNDROME: ICD-10-CM

## 2020-03-22 ENCOUNTER — COMMUNICATION - HEALTHEAST (OUTPATIENT)
Dept: FAMILY MEDICINE | Facility: CLINIC | Age: 55
End: 2020-03-22

## 2020-03-26 ENCOUNTER — COMMUNICATION - HEALTHEAST (OUTPATIENT)
Dept: FAMILY MEDICINE | Facility: CLINIC | Age: 55
End: 2020-03-26

## 2020-04-17 ENCOUNTER — COMMUNICATION - HEALTHEAST (OUTPATIENT)
Dept: NURSING | Facility: CLINIC | Age: 55
End: 2020-04-17

## 2020-04-20 ENCOUNTER — COMMUNICATION - HEALTHEAST (OUTPATIENT)
Dept: NURSING | Facility: CLINIC | Age: 55
End: 2020-04-20

## 2020-04-21 ENCOUNTER — COMMUNICATION - HEALTHEAST (OUTPATIENT)
Dept: NURSING | Facility: CLINIC | Age: 55
End: 2020-04-21

## 2020-05-08 ENCOUNTER — COMMUNICATION - HEALTHEAST (OUTPATIENT)
Dept: NURSING | Facility: CLINIC | Age: 55
End: 2020-05-08

## 2020-05-08 ENCOUNTER — OFFICE VISIT - HEALTHEAST (OUTPATIENT)
Dept: FAMILY MEDICINE | Facility: CLINIC | Age: 55
End: 2020-05-08

## 2020-05-08 ENCOUNTER — COMMUNICATION - HEALTHEAST (OUTPATIENT)
Dept: SCHEDULING | Facility: CLINIC | Age: 55
End: 2020-05-08

## 2020-05-08 DIAGNOSIS — R25.2 FOOT CRAMPS: ICD-10-CM

## 2020-05-08 DIAGNOSIS — G44.209 TENSION HEADACHE: ICD-10-CM

## 2020-05-08 DIAGNOSIS — R25.2 CRAMPING OF HANDS: ICD-10-CM

## 2020-05-20 ENCOUNTER — COMMUNICATION - HEALTHEAST (OUTPATIENT)
Dept: NURSING | Facility: CLINIC | Age: 55
End: 2020-05-20

## 2020-05-26 ENCOUNTER — OFFICE VISIT - HEALTHEAST (OUTPATIENT)
Dept: FAMILY MEDICINE | Facility: CLINIC | Age: 55
End: 2020-05-26

## 2020-05-26 DIAGNOSIS — R50.9 FEVER, UNSPECIFIED FEVER CAUSE: ICD-10-CM

## 2020-05-26 DIAGNOSIS — G44.209 TENSION HEADACHE: ICD-10-CM

## 2020-05-26 DIAGNOSIS — R25.2 CRAMPING OF HANDS: ICD-10-CM

## 2020-05-26 DIAGNOSIS — E83.19 IRON OVERLOAD SYNDROME: ICD-10-CM

## 2020-06-02 ENCOUNTER — COMMUNICATION - HEALTHEAST (OUTPATIENT)
Dept: NURSING | Facility: CLINIC | Age: 55
End: 2020-06-02

## 2020-06-09 ENCOUNTER — HOME CARE/HOSPICE - HEALTHEAST (OUTPATIENT)
Dept: HOME HEALTH SERVICES | Facility: HOME HEALTH | Age: 55
End: 2020-06-09

## 2020-06-09 ENCOUNTER — COMMUNICATION - HEALTHEAST (OUTPATIENT)
Dept: HOME HEALTH SERVICES | Facility: HOME HEALTH | Age: 55
End: 2020-06-09

## 2020-06-10 ENCOUNTER — COMMUNICATION - HEALTHEAST (OUTPATIENT)
Dept: FAMILY MEDICINE | Facility: CLINIC | Age: 55
End: 2020-06-10

## 2020-06-11 ENCOUNTER — OFFICE VISIT - HEALTHEAST (OUTPATIENT)
Dept: ONCOLOGY | Facility: HOSPITAL | Age: 55
End: 2020-06-11

## 2020-06-11 DIAGNOSIS — R79.89 ELEVATED FERRITIN LEVEL: ICD-10-CM

## 2020-06-11 DIAGNOSIS — E83.19 IRON OVERLOAD SYNDROME: ICD-10-CM

## 2020-06-15 ENCOUNTER — OFFICE VISIT - HEALTHEAST (OUTPATIENT)
Dept: FAMILY MEDICINE | Facility: CLINIC | Age: 55
End: 2020-06-15

## 2020-06-15 DIAGNOSIS — L02.219 CELLULITIS AND ABSCESS OF TRUNK: ICD-10-CM

## 2020-06-15 DIAGNOSIS — R45.851 SUICIDAL IDEATION: ICD-10-CM

## 2020-06-15 DIAGNOSIS — Z09 HOSPITAL DISCHARGE FOLLOW-UP: ICD-10-CM

## 2020-06-15 DIAGNOSIS — E83.19 IRON OVERLOAD SYNDROME: ICD-10-CM

## 2020-06-15 DIAGNOSIS — F33.1 MODERATE EPISODE OF RECURRENT MAJOR DEPRESSIVE DISORDER (H): ICD-10-CM

## 2020-06-15 DIAGNOSIS — L03.319 CELLULITIS AND ABSCESS OF TRUNK: ICD-10-CM

## 2020-06-15 ASSESSMENT — PATIENT HEALTH QUESTIONNAIRE - PHQ9: SUM OF ALL RESPONSES TO PHQ QUESTIONS 1-9: 11

## 2020-06-18 ENCOUNTER — RECORDS - HEALTHEAST (OUTPATIENT)
Dept: ADMINISTRATIVE | Facility: OTHER | Age: 55
End: 2020-06-18

## 2020-06-23 ENCOUNTER — COMMUNICATION - HEALTHEAST (OUTPATIENT)
Dept: NURSING | Facility: CLINIC | Age: 55
End: 2020-06-23

## 2020-07-08 ENCOUNTER — COMMUNICATION - HEALTHEAST (OUTPATIENT)
Dept: NURSING | Facility: CLINIC | Age: 55
End: 2020-07-08

## 2020-07-14 ENCOUNTER — COMMUNICATION - HEALTHEAST (OUTPATIENT)
Dept: CARE COORDINATION | Facility: CLINIC | Age: 55
End: 2020-07-14

## 2020-07-23 ENCOUNTER — COMMUNICATION - HEALTHEAST (OUTPATIENT)
Dept: EMERGENCY MEDICINE | Facility: CLINIC | Age: 55
End: 2020-07-23

## 2020-07-28 ENCOUNTER — COMMUNICATION - HEALTHEAST (OUTPATIENT)
Dept: FAMILY MEDICINE | Facility: CLINIC | Age: 55
End: 2020-07-28

## 2020-07-31 ENCOUNTER — COMMUNICATION - HEALTHEAST (OUTPATIENT)
Dept: NURSING | Facility: CLINIC | Age: 55
End: 2020-07-31

## 2020-08-14 ENCOUNTER — OFFICE VISIT - HEALTHEAST (OUTPATIENT)
Dept: FAMILY MEDICINE | Facility: CLINIC | Age: 55
End: 2020-08-14

## 2020-08-14 DIAGNOSIS — E87.6 HYPOKALEMIA: ICD-10-CM

## 2020-08-14 DIAGNOSIS — N28.9 RENAL INSUFFICIENCY: ICD-10-CM

## 2020-08-14 DIAGNOSIS — R11.2 NAUSEA AND VOMITING, INTRACTABILITY OF VOMITING NOT SPECIFIED, UNSPECIFIED VOMITING TYPE: ICD-10-CM

## 2020-08-18 ENCOUNTER — COMMUNICATION - HEALTHEAST (OUTPATIENT)
Dept: CARE COORDINATION | Facility: CLINIC | Age: 55
End: 2020-08-18

## 2020-08-21 ENCOUNTER — COMMUNICATION - HEALTHEAST (OUTPATIENT)
Dept: FAMILY MEDICINE | Facility: CLINIC | Age: 55
End: 2020-08-21

## 2020-08-21 DIAGNOSIS — F32.A ANXIETY AND DEPRESSION: ICD-10-CM

## 2020-08-21 DIAGNOSIS — F41.9 ANXIETY AND DEPRESSION: ICD-10-CM

## 2020-08-21 DIAGNOSIS — F43.10 PTSD (POST-TRAUMATIC STRESS DISORDER): ICD-10-CM

## 2020-08-31 ENCOUNTER — COMMUNICATION - HEALTHEAST (OUTPATIENT)
Dept: NURSING | Facility: CLINIC | Age: 55
End: 2020-08-31

## 2020-09-10 ENCOUNTER — COMMUNICATION - HEALTHEAST (OUTPATIENT)
Dept: NURSING | Facility: CLINIC | Age: 55
End: 2020-09-10

## 2020-09-11 ENCOUNTER — COMMUNICATION - HEALTHEAST (OUTPATIENT)
Dept: FAMILY MEDICINE | Facility: CLINIC | Age: 55
End: 2020-09-11

## 2020-09-18 ENCOUNTER — OFFICE VISIT - HEALTHEAST (OUTPATIENT)
Dept: FAMILY MEDICINE | Facility: CLINIC | Age: 55
End: 2020-09-18

## 2020-09-18 DIAGNOSIS — R79.89 ELEVATED FERRITIN LEVEL: ICD-10-CM

## 2020-09-18 DIAGNOSIS — T74.31XA ADULT SUBJECT TO EMOTIONAL ABUSE, INITIAL ENCOUNTER: ICD-10-CM

## 2020-09-18 DIAGNOSIS — R11.0 NAUSEA: ICD-10-CM

## 2020-09-18 DIAGNOSIS — F33.1 MODERATE EPISODE OF RECURRENT MAJOR DEPRESSIVE DISORDER (H): ICD-10-CM

## 2020-09-18 DIAGNOSIS — E83.19 IRON OVERLOAD SYNDROME: ICD-10-CM

## 2020-09-18 LAB
ALBUMIN SERPL-MCNC: 4.3 G/DL (ref 3.5–5)
ALP SERPL-CCNC: 89 U/L (ref 45–120)
ALT SERPL W P-5'-P-CCNC: 16 U/L (ref 0–45)
ANION GAP SERPL CALCULATED.3IONS-SCNC: 11 MMOL/L (ref 5–18)
AST SERPL W P-5'-P-CCNC: 19 U/L (ref 0–40)
BASOPHILS # BLD AUTO: 0 THOU/UL (ref 0–0.2)
BASOPHILS NFR BLD AUTO: 0 % (ref 0–2)
BILIRUB SERPL-MCNC: 2 MG/DL (ref 0–1)
BUN SERPL-MCNC: 22 MG/DL (ref 8–22)
CALCIUM SERPL-MCNC: 9.8 MG/DL (ref 8.5–10.5)
CHLORIDE BLD-SCNC: 104 MMOL/L (ref 98–107)
CO2 SERPL-SCNC: 23 MMOL/L (ref 22–31)
CREAT SERPL-MCNC: 1.45 MG/DL (ref 0.6–1.1)
EOSINOPHIL # BLD AUTO: 0.1 THOU/UL (ref 0–0.4)
EOSINOPHIL NFR BLD AUTO: 1 % (ref 0–6)
ERYTHROCYTE [DISTWIDTH] IN BLOOD BY AUTOMATED COUNT: 18.1 % (ref 11–14.5)
FERRITIN SERPL-MCNC: 130 NG/ML (ref 10–130)
GFR SERPL CREATININE-BSD FRML MDRD: 37 ML/MIN/1.73M2
GLUCOSE BLD-MCNC: 83 MG/DL (ref 70–125)
HCT VFR BLD AUTO: 48 % (ref 35–47)
HGB BLD-MCNC: 14.6 G/DL (ref 12–16)
IMM GRANULOCYTES # BLD: 0 THOU/UL
IMM GRANULOCYTES NFR BLD: 0 %
LIPASE SERPL-CCNC: 12 U/L (ref 0–52)
LYMPHOCYTES # BLD AUTO: 1.9 THOU/UL (ref 0.8–4.4)
LYMPHOCYTES NFR BLD AUTO: 20 % (ref 20–40)
MCH RBC QN AUTO: 23.3 PG (ref 27–34)
MCHC RBC AUTO-ENTMCNC: 30.4 G/DL (ref 32–36)
MCV RBC AUTO: 77 FL (ref 80–100)
MONOCYTES # BLD AUTO: 0.5 THOU/UL (ref 0–0.9)
MONOCYTES NFR BLD AUTO: 5 % (ref 2–10)
NEUTROPHILS # BLD AUTO: 7 THOU/UL (ref 2–7.7)
NEUTROPHILS NFR BLD AUTO: 74 % (ref 50–70)
PLATELET # BLD AUTO: 305 THOU/UL (ref 140–440)
PMV BLD AUTO: 11.4 FL (ref 8.5–12.5)
POTASSIUM BLD-SCNC: 4.2 MMOL/L (ref 3.5–5)
PROT SERPL-MCNC: 8.2 G/DL (ref 6–8)
RBC # BLD AUTO: 6.27 MILL/UL (ref 3.8–5.4)
SODIUM SERPL-SCNC: 138 MMOL/L (ref 136–145)
WBC: 9.6 THOU/UL (ref 4–11)

## 2020-09-21 ENCOUNTER — COMMUNICATION - HEALTHEAST (OUTPATIENT)
Dept: FAMILY MEDICINE | Facility: CLINIC | Age: 55
End: 2020-09-21

## 2020-09-21 ENCOUNTER — COMMUNICATION - HEALTHEAST (OUTPATIENT)
Dept: NURSING | Facility: CLINIC | Age: 55
End: 2020-09-21

## 2020-09-22 ENCOUNTER — COMMUNICATION - HEALTHEAST (OUTPATIENT)
Dept: NURSING | Facility: CLINIC | Age: 55
End: 2020-09-22

## 2020-09-29 ENCOUNTER — COMMUNICATION - HEALTHEAST (OUTPATIENT)
Dept: FAMILY MEDICINE | Facility: CLINIC | Age: 55
End: 2020-09-29

## 2020-10-01 ENCOUNTER — COMMUNICATION - HEALTHEAST (OUTPATIENT)
Dept: NURSING | Facility: CLINIC | Age: 55
End: 2020-10-01

## 2020-10-07 ENCOUNTER — OFFICE VISIT - HEALTHEAST (OUTPATIENT)
Dept: FAMILY MEDICINE | Facility: CLINIC | Age: 55
End: 2020-10-07

## 2020-10-07 DIAGNOSIS — R79.89 ELEVATED FERRITIN LEVEL: ICD-10-CM

## 2020-10-07 DIAGNOSIS — R17 ELEVATED BILIRUBIN: ICD-10-CM

## 2020-10-12 ENCOUNTER — COMMUNICATION - HEALTHEAST (OUTPATIENT)
Dept: CARE COORDINATION | Facility: CLINIC | Age: 55
End: 2020-10-12

## 2020-10-14 ENCOUNTER — RECORDS - HEALTHEAST (OUTPATIENT)
Dept: ADMINISTRATIVE | Facility: OTHER | Age: 55
End: 2020-10-14

## 2020-10-14 ENCOUNTER — COMMUNICATION - HEALTHEAST (OUTPATIENT)
Dept: NURSING | Facility: CLINIC | Age: 55
End: 2020-10-14

## 2020-10-16 ENCOUNTER — COMMUNICATION - HEALTHEAST (OUTPATIENT)
Dept: NURSING | Facility: CLINIC | Age: 55
End: 2020-10-16

## 2020-10-20 ENCOUNTER — OFFICE VISIT - HEALTHEAST (OUTPATIENT)
Dept: FAMILY MEDICINE | Facility: CLINIC | Age: 55
End: 2020-10-20

## 2020-10-20 DIAGNOSIS — E83.19 IRON OVERLOAD SYNDROME: ICD-10-CM

## 2020-10-20 DIAGNOSIS — R17 ELEVATED BILIRUBIN: ICD-10-CM

## 2020-10-20 DIAGNOSIS — R79.89 ELEVATED FERRITIN LEVEL: ICD-10-CM

## 2020-10-20 DIAGNOSIS — Z02.89 ENCOUNTER FOR COMPLETION OF FORM WITH PATIENT: ICD-10-CM

## 2020-10-20 LAB
ALBUMIN SERPL-MCNC: 3.9 G/DL (ref 3.5–5)
ALP SERPL-CCNC: 84 U/L (ref 45–120)
ALT SERPL W P-5'-P-CCNC: 11 U/L (ref 0–45)
AST SERPL W P-5'-P-CCNC: 16 U/L (ref 0–40)
BILIRUB DIRECT SERPL-MCNC: 0.3 MG/DL
BILIRUB SERPL-MCNC: 1 MG/DL (ref 0–1)
FERRITIN SERPL-MCNC: 130 NG/ML (ref 10–130)
INR PPP: 0.99 (ref 0.9–1.1)
IRON SATN MFR SERPL: 36 % (ref 20–50)
IRON SERPL-MCNC: 114 UG/DL (ref 42–175)
PROT SERPL-MCNC: 7.5 G/DL (ref 6–8)
TIBC SERPL-MCNC: 316 UG/DL (ref 313–563)
TRANSFERRIN SERPL-MCNC: 253 MG/DL (ref 212–360)

## 2020-10-21 ENCOUNTER — COMMUNICATION - HEALTHEAST (OUTPATIENT)
Dept: FAMILY MEDICINE | Facility: CLINIC | Age: 55
End: 2020-10-21

## 2020-10-21 LAB — APTT PPP: 30 SECONDS (ref 24–37)

## 2020-10-22 ENCOUNTER — COMMUNICATION - HEALTHEAST (OUTPATIENT)
Dept: NURSING | Facility: CLINIC | Age: 55
End: 2020-10-22

## 2020-10-23 ENCOUNTER — COMMUNICATION - HEALTHEAST (OUTPATIENT)
Dept: ONCOLOGY | Facility: HOSPITAL | Age: 55
End: 2020-10-23

## 2020-10-23 DIAGNOSIS — R79.89 ELEVATED FERRITIN LEVEL: ICD-10-CM

## 2020-10-23 DIAGNOSIS — E83.19 IRON OVERLOAD SYNDROME: ICD-10-CM

## 2020-10-29 ENCOUNTER — COMMUNICATION - HEALTHEAST (OUTPATIENT)
Dept: NURSING | Facility: CLINIC | Age: 55
End: 2020-10-29

## 2020-11-10 ENCOUNTER — HOSPITAL ENCOUNTER (OUTPATIENT)
Dept: MRI IMAGING | Facility: HOSPITAL | Age: 55
Discharge: HOME | End: 2020-11-10
Attending: FAMILY MEDICINE

## 2020-11-30 ENCOUNTER — COMMUNICATION - HEALTHEAST (OUTPATIENT)
Dept: CARE COORDINATION | Facility: CLINIC | Age: 55
End: 2020-11-30

## 2020-12-01 ENCOUNTER — COMMUNICATION - HEALTHEAST (OUTPATIENT)
Dept: NURSING | Facility: CLINIC | Age: 55
End: 2020-12-01

## 2020-12-07 ENCOUNTER — COMMUNICATION - HEALTHEAST (OUTPATIENT)
Dept: NURSING | Facility: CLINIC | Age: 55
End: 2020-12-07

## 2020-12-13 ENCOUNTER — RECORDS - HEALTHEAST (OUTPATIENT)
Dept: ADMINISTRATIVE | Facility: OTHER | Age: 55
End: 2020-12-13

## 2020-12-14 ENCOUNTER — COMMUNICATION - HEALTHEAST (OUTPATIENT)
Dept: FAMILY MEDICINE | Facility: CLINIC | Age: 55
End: 2020-12-14

## 2020-12-14 DIAGNOSIS — J98.01 BRONCHOSPASM: ICD-10-CM

## 2020-12-17 ENCOUNTER — HOSPITAL ENCOUNTER (OUTPATIENT)
Dept: MRI IMAGING | Facility: HOSPITAL | Age: 55
Discharge: HOME | End: 2020-12-17
Attending: FAMILY MEDICINE

## 2020-12-17 DIAGNOSIS — R79.89 ELEVATED FERRITIN LEVEL: ICD-10-CM

## 2020-12-17 DIAGNOSIS — E83.19 IRON OVERLOAD SYNDROME: ICD-10-CM

## 2020-12-18 ENCOUNTER — COMMUNICATION - HEALTHEAST (OUTPATIENT)
Dept: FAMILY MEDICINE | Facility: CLINIC | Age: 55
End: 2020-12-18

## 2020-12-19 ENCOUNTER — RECORDS - HEALTHEAST (OUTPATIENT)
Dept: ADMINISTRATIVE | Facility: OTHER | Age: 55
End: 2020-12-19

## 2021-01-06 ENCOUNTER — TRANSFERRED RECORDS (OUTPATIENT)
Dept: HEALTH INFORMATION MANAGEMENT | Facility: CLINIC | Age: 56
End: 2021-01-06
Payer: COMMERCIAL

## 2021-01-08 ENCOUNTER — COMMUNICATION - HEALTHEAST (OUTPATIENT)
Dept: NURSING | Facility: CLINIC | Age: 56
End: 2021-01-08

## 2021-01-10 ENCOUNTER — MEDICAL CORRESPONDENCE (OUTPATIENT)
Dept: HEALTH INFORMATION MANAGEMENT | Facility: CLINIC | Age: 56
End: 2021-01-10
Payer: COMMERCIAL

## 2021-01-14 ENCOUNTER — COMMUNICATION - HEALTHEAST (OUTPATIENT)
Dept: CARE COORDINATION | Facility: CLINIC | Age: 56
End: 2021-01-14

## 2021-01-18 ENCOUNTER — COMMUNICATION - HEALTHEAST (OUTPATIENT)
Dept: NURSING | Facility: CLINIC | Age: 56
End: 2021-01-18

## 2021-01-29 ENCOUNTER — OFFICE VISIT - HEALTHEAST (OUTPATIENT)
Dept: FAMILY MEDICINE | Facility: CLINIC | Age: 56
End: 2021-01-29

## 2021-01-29 ENCOUNTER — COMMUNICATION - HEALTHEAST (OUTPATIENT)
Dept: SCHEDULING | Facility: CLINIC | Age: 56
End: 2021-01-29

## 2021-01-29 ENCOUNTER — COMMUNICATION - HEALTHEAST (OUTPATIENT)
Dept: FAMILY MEDICINE | Facility: CLINIC | Age: 56
End: 2021-01-29

## 2021-01-29 DIAGNOSIS — M79.672 LEFT FOOT PAIN: ICD-10-CM

## 2021-01-29 DIAGNOSIS — G89.29 CHRONIC LOWER BACK PAIN: ICD-10-CM

## 2021-01-29 DIAGNOSIS — R06.2 WHEEZING: ICD-10-CM

## 2021-01-29 DIAGNOSIS — F32.A ANXIETY AND DEPRESSION: ICD-10-CM

## 2021-01-29 DIAGNOSIS — R10.13 EPIGASTRIC PAIN: ICD-10-CM

## 2021-01-29 DIAGNOSIS — F41.9 ANXIETY AND DEPRESSION: ICD-10-CM

## 2021-01-29 DIAGNOSIS — G44.209 TENSION HEADACHE: ICD-10-CM

## 2021-01-29 DIAGNOSIS — R25.2 FOOT CRAMPS: ICD-10-CM

## 2021-01-29 DIAGNOSIS — F43.10 PTSD (POST-TRAUMATIC STRESS DISORDER): ICD-10-CM

## 2021-01-29 DIAGNOSIS — M54.50 CHRONIC LOWER BACK PAIN: ICD-10-CM

## 2021-01-29 DIAGNOSIS — J30.1 SEASONAL ALLERGIC RHINITIS DUE TO POLLEN: ICD-10-CM

## 2021-01-29 DIAGNOSIS — F33.1 MODERATE EPISODE OF RECURRENT MAJOR DEPRESSIVE DISORDER (H): ICD-10-CM

## 2021-01-29 DIAGNOSIS — J98.01 BRONCHOSPASM: ICD-10-CM

## 2021-01-29 DIAGNOSIS — R05.9 COUGH: ICD-10-CM

## 2021-01-29 DIAGNOSIS — G89.4 CHRONIC PAIN SYNDROME: ICD-10-CM

## 2021-01-29 ASSESSMENT — PATIENT HEALTH QUESTIONNAIRE - PHQ9: SUM OF ALL RESPONSES TO PHQ QUESTIONS 1-9: 12

## 2021-01-30 RX ORDER — LIDOCAINE 50 MG/G
1 PATCH TOPICAL DAILY PRN
Qty: 15 PATCH | Refills: 0 | Status: SHIPPED | OUTPATIENT
Start: 2021-01-30

## 2021-02-01 ENCOUNTER — COMMUNICATION - HEALTHEAST (OUTPATIENT)
Dept: CARE COORDINATION | Facility: CLINIC | Age: 56
End: 2021-02-01

## 2021-02-02 ENCOUNTER — COMMUNICATION - HEALTHEAST (OUTPATIENT)
Dept: FAMILY MEDICINE | Facility: CLINIC | Age: 56
End: 2021-02-02

## 2021-02-02 DIAGNOSIS — R42 DIZZINESS: ICD-10-CM

## 2021-02-03 ENCOUNTER — COMMUNICATION - HEALTHEAST (OUTPATIENT)
Dept: NURSING | Facility: CLINIC | Age: 56
End: 2021-02-03

## 2021-02-16 ENCOUNTER — OFFICE VISIT - HEALTHEAST (OUTPATIENT)
Dept: FAMILY MEDICINE | Facility: CLINIC | Age: 56
End: 2021-02-16

## 2021-02-16 DIAGNOSIS — J20.9 ACUTE BRONCHITIS WITH SYMPTOMS > 10 DAYS: ICD-10-CM

## 2021-02-17 LAB
SARS-COV-2 PCR COMMENT: NORMAL
SARS-COV-2 RNA SPEC QL NAA+PROBE: NEGATIVE
SARS-COV-2 VIRUS SPECIMEN SOURCE: NORMAL

## 2021-02-18 ENCOUNTER — COMMUNICATION - HEALTHEAST (OUTPATIENT)
Dept: SCHEDULING | Facility: CLINIC | Age: 56
End: 2021-02-18

## 2021-02-23 ENCOUNTER — OFFICE VISIT - HEALTHEAST (OUTPATIENT)
Dept: ONCOLOGY | Facility: HOSPITAL | Age: 56
End: 2021-02-23

## 2021-02-23 ENCOUNTER — AMBULATORY - HEALTHEAST (OUTPATIENT)
Dept: INFUSION THERAPY | Facility: HOSPITAL | Age: 56
End: 2021-02-23

## 2021-02-23 DIAGNOSIS — E83.19 IRON OVERLOAD SYNDROME: ICD-10-CM

## 2021-02-23 DIAGNOSIS — R79.89 ELEVATED FERRITIN LEVEL: ICD-10-CM

## 2021-02-23 LAB
ALBUMIN SERPL-MCNC: 4 G/DL (ref 3.5–5)
ALP SERPL-CCNC: 84 U/L (ref 45–120)
ALT SERPL W P-5'-P-CCNC: 34 U/L (ref 0–45)
ANION GAP SERPL CALCULATED.3IONS-SCNC: 7 MMOL/L (ref 5–18)
AST SERPL W P-5'-P-CCNC: 18 U/L (ref 0–40)
BASOPHILS # BLD AUTO: 0 THOU/UL (ref 0–0.2)
BASOPHILS NFR BLD AUTO: 0 % (ref 0–2)
BILIRUB SERPL-MCNC: 0.4 MG/DL (ref 0–1)
BUN SERPL-MCNC: 22 MG/DL (ref 8–22)
CALCIUM SERPL-MCNC: 9.1 MG/DL (ref 8.5–10.5)
CHLORIDE BLD-SCNC: 107 MMOL/L (ref 98–107)
CO2 SERPL-SCNC: 31 MMOL/L (ref 22–31)
CREAT SERPL-MCNC: 0.78 MG/DL (ref 0.6–1.1)
EOSINOPHIL # BLD AUTO: 0 THOU/UL (ref 0–0.4)
EOSINOPHIL NFR BLD AUTO: 0 % (ref 0–6)
ERYTHROCYTE [DISTWIDTH] IN BLOOD BY AUTOMATED COUNT: 15.5 % (ref 11–14.5)
FERRITIN SERPL-MCNC: 185 NG/ML (ref 10–130)
GFR SERPL CREATININE-BSD FRML MDRD: >60 ML/MIN/1.73M2
GLUCOSE BLD-MCNC: 135 MG/DL (ref 70–125)
HCT VFR BLD AUTO: 37.7 % (ref 35–47)
HGB BLD-MCNC: 11.1 G/DL (ref 12–16)
IMM GRANULOCYTES # BLD: 0.1 THOU/UL
IMM GRANULOCYTES NFR BLD: 1 %
LYMPHOCYTES # BLD AUTO: 2.3 THOU/UL (ref 0.8–4.4)
LYMPHOCYTES NFR BLD AUTO: 13 % (ref 20–40)
MCH RBC QN AUTO: 23.3 PG (ref 27–34)
MCHC RBC AUTO-ENTMCNC: 29.4 G/DL (ref 32–36)
MCV RBC AUTO: 79 FL (ref 80–100)
MONOCYTES # BLD AUTO: 0.7 THOU/UL (ref 0–0.9)
MONOCYTES NFR BLD AUTO: 4 % (ref 2–10)
NEUTROPHILS # BLD AUTO: 15.5 THOU/UL (ref 2–7.7)
NEUTROPHILS NFR BLD AUTO: 83 % (ref 50–70)
PLATELET # BLD AUTO: 188 THOU/UL (ref 140–440)
PMV BLD AUTO: ABNORMAL FL
POTASSIUM BLD-SCNC: 3.7 MMOL/L (ref 3.5–5)
PROT SERPL-MCNC: 8.1 G/DL (ref 6–8)
RBC # BLD AUTO: 4.76 MILL/UL (ref 3.8–5.4)
SODIUM SERPL-SCNC: 145 MMOL/L (ref 136–145)
WBC: 18.7 THOU/UL (ref 4–11)

## 2021-02-23 ASSESSMENT — MIFFLIN-ST. JEOR: SCORE: 1178.27

## 2021-02-24 ENCOUNTER — COMMUNICATION - HEALTHEAST (OUTPATIENT)
Dept: FAMILY MEDICINE | Facility: CLINIC | Age: 56
End: 2021-02-24

## 2021-03-03 ENCOUNTER — OFFICE VISIT - HEALTHEAST (OUTPATIENT)
Dept: FAMILY MEDICINE | Facility: CLINIC | Age: 56
End: 2021-03-03

## 2021-03-03 DIAGNOSIS — F43.10 PTSD (POST-TRAUMATIC STRESS DISORDER): ICD-10-CM

## 2021-03-03 DIAGNOSIS — R25.2 FOOT CRAMPS: ICD-10-CM

## 2021-03-03 DIAGNOSIS — R42 DIZZINESS: ICD-10-CM

## 2021-03-03 DIAGNOSIS — Z23 NEED FOR INFLUENZA VACCINATION: ICD-10-CM

## 2021-03-03 DIAGNOSIS — F32.A ANXIETY AND DEPRESSION: ICD-10-CM

## 2021-03-03 DIAGNOSIS — T74.31XA ADULT SUBJECT TO EMOTIONAL ABUSE, INITIAL ENCOUNTER: ICD-10-CM

## 2021-03-03 DIAGNOSIS — F33.1 MODERATE EPISODE OF RECURRENT MAJOR DEPRESSIVE DISORDER (H): ICD-10-CM

## 2021-03-03 DIAGNOSIS — G44.209 TENSION HEADACHE: ICD-10-CM

## 2021-03-03 DIAGNOSIS — R60.9 EDEMA, UNSPECIFIED TYPE: ICD-10-CM

## 2021-03-03 DIAGNOSIS — D70.2 OTHER DRUG-INDUCED NEUTROPENIA (H): ICD-10-CM

## 2021-03-03 DIAGNOSIS — F41.9 ANXIETY AND DEPRESSION: ICD-10-CM

## 2021-03-03 LAB
BASOPHILS # BLD AUTO: 0.1 THOU/UL (ref 0–0.2)
BASOPHILS NFR BLD AUTO: 0 % (ref 0–2)
BNP SERPL-MCNC: 25 PG/ML (ref 0–89)
EOSINOPHIL # BLD AUTO: 0.2 THOU/UL (ref 0–0.4)
EOSINOPHIL NFR BLD AUTO: 1 % (ref 0–6)
ERYTHROCYTE [DISTWIDTH] IN BLOOD BY AUTOMATED COUNT: 15.6 % (ref 11–14.5)
HCT VFR BLD AUTO: 39 % (ref 35–47)
HGB BLD-MCNC: 11.7 G/DL (ref 12–16)
IMM GRANULOCYTES # BLD: 0 THOU/UL
IMM GRANULOCYTES NFR BLD: 0 %
LYMPHOCYTES # BLD AUTO: 2 THOU/UL (ref 0.8–4.4)
LYMPHOCYTES NFR BLD AUTO: 16 % (ref 20–40)
MCH RBC QN AUTO: 23.4 PG (ref 27–34)
MCHC RBC AUTO-ENTMCNC: 30 G/DL (ref 32–36)
MCV RBC AUTO: 78 FL (ref 80–100)
MONOCYTES # BLD AUTO: 0.4 THOU/UL (ref 0–0.9)
MONOCYTES NFR BLD AUTO: 3 % (ref 2–10)
NEUTROPHILS # BLD AUTO: 9.7 THOU/UL (ref 2–7.7)
NEUTROPHILS NFR BLD AUTO: 78 % (ref 50–70)
PLATELET # BLD AUTO: 200 THOU/UL (ref 140–440)
PMV BLD AUTO: 12.2 FL (ref 7–10)
RBC # BLD AUTO: 5.01 MILL/UL (ref 3.8–5.4)
TSH SERPL DL<=0.005 MIU/L-ACNC: 0.69 UIU/ML (ref 0.3–5)
WBC: 12.3 THOU/UL (ref 4–11)

## 2021-03-03 ASSESSMENT — MIFFLIN-ST. JEOR: SCORE: 1197.78

## 2021-03-05 ENCOUNTER — COMMUNICATION - HEALTHEAST (OUTPATIENT)
Dept: FAMILY MEDICINE | Facility: CLINIC | Age: 56
End: 2021-03-05

## 2021-03-16 ENCOUNTER — COMMUNICATION - HEALTHEAST (OUTPATIENT)
Dept: CARE COORDINATION | Facility: CLINIC | Age: 56
End: 2021-03-16

## 2021-03-26 ENCOUNTER — COMMUNICATION - HEALTHEAST (OUTPATIENT)
Dept: FAMILY MEDICINE | Facility: CLINIC | Age: 56
End: 2021-03-26

## 2021-04-01 ENCOUNTER — COMMUNICATION - HEALTHEAST (OUTPATIENT)
Dept: NURSING | Facility: CLINIC | Age: 56
End: 2021-04-01

## 2021-04-08 ENCOUNTER — COMMUNICATION - HEALTHEAST (OUTPATIENT)
Dept: NURSING | Facility: CLINIC | Age: 56
End: 2021-04-08

## 2021-04-19 ENCOUNTER — RECORDS - HEALTHEAST (OUTPATIENT)
Dept: ADMINISTRATIVE | Facility: OTHER | Age: 56
End: 2021-04-19

## 2021-04-21 ENCOUNTER — RECORDS - HEALTHEAST (OUTPATIENT)
Dept: ADMINISTRATIVE | Facility: OTHER | Age: 56
End: 2021-04-21

## 2021-04-21 ENCOUNTER — COMMUNICATION - HEALTHEAST (OUTPATIENT)
Dept: SCHEDULING | Facility: CLINIC | Age: 56
End: 2021-04-21

## 2021-04-21 ENCOUNTER — NURSE TRIAGE (OUTPATIENT)
Dept: NURSING | Facility: CLINIC | Age: 56
End: 2021-04-21

## 2021-04-21 ENCOUNTER — COMMUNICATION - HEALTHEAST (OUTPATIENT)
Dept: FAMILY MEDICINE | Facility: CLINIC | Age: 56
End: 2021-04-21

## 2021-04-21 DIAGNOSIS — J20.9 ACUTE BRONCHITIS WITH SYMPTOMS > 10 DAYS: ICD-10-CM

## 2021-04-26 ENCOUNTER — OFFICE VISIT - HEALTHEAST (OUTPATIENT)
Dept: FAMILY MEDICINE | Facility: CLINIC | Age: 56
End: 2021-04-26

## 2021-04-26 DIAGNOSIS — R79.89 ELEVATED FERRITIN LEVEL: ICD-10-CM

## 2021-04-26 DIAGNOSIS — R11.0 NAUSEA: ICD-10-CM

## 2021-04-26 DIAGNOSIS — R42 DIZZINESS: ICD-10-CM

## 2021-04-26 DIAGNOSIS — F41.9 ANXIETY AND DEPRESSION: ICD-10-CM

## 2021-04-26 DIAGNOSIS — F33.1 MODERATE EPISODE OF RECURRENT MAJOR DEPRESSIVE DISORDER (H): ICD-10-CM

## 2021-04-26 DIAGNOSIS — E83.19 IRON OVERLOAD SYNDROME: ICD-10-CM

## 2021-04-26 DIAGNOSIS — J98.01 BRONCHOSPASM: ICD-10-CM

## 2021-04-26 DIAGNOSIS — R25.2 FOOT CRAMPS: ICD-10-CM

## 2021-04-26 DIAGNOSIS — J30.1 SEASONAL ALLERGIC RHINITIS DUE TO POLLEN: ICD-10-CM

## 2021-04-26 DIAGNOSIS — F43.10 PTSD (POST-TRAUMATIC STRESS DISORDER): ICD-10-CM

## 2021-04-26 DIAGNOSIS — G89.4 CHRONIC PAIN SYNDROME: ICD-10-CM

## 2021-04-26 DIAGNOSIS — G44.209 TENSION HEADACHE: ICD-10-CM

## 2021-04-26 DIAGNOSIS — F32.A ANXIETY AND DEPRESSION: ICD-10-CM

## 2021-04-26 DIAGNOSIS — M79.672 LEFT FOOT PAIN: ICD-10-CM

## 2021-04-26 DIAGNOSIS — J20.9 ACUTE BRONCHITIS WITH SYMPTOMS > 10 DAYS: ICD-10-CM

## 2021-04-26 DIAGNOSIS — R10.13 EPIGASTRIC PAIN: ICD-10-CM

## 2021-04-26 RX ORDER — ACETAMINOPHEN 500 MG
500 TABLET ORAL EVERY 4 HOURS PRN
Qty: 100 TABLET | Refills: 2 | Status: SHIPPED | OUTPATIENT
Start: 2021-04-26 | End: 2021-09-27

## 2021-04-26 RX ORDER — MECLIZINE HYDROCHLORIDE 25 MG/1
25 TABLET ORAL 3 TIMES DAILY PRN
Qty: 30 TABLET | Refills: 1 | Status: SHIPPED | OUTPATIENT
Start: 2021-04-26 | End: 2021-09-27

## 2021-04-26 RX ORDER — DEFERASIROX 500 MG/1
1000 TABLET, FOR SUSPENSION ORAL
Qty: 60 TABLET | Refills: 11 | Status: SHIPPED | OUTPATIENT
Start: 2021-04-26

## 2021-04-26 RX ORDER — FLUOXETINE 40 MG/1
40 CAPSULE ORAL DAILY
Qty: 90 CAPSULE | Refills: 1 | Status: SHIPPED | OUTPATIENT
Start: 2021-04-26 | End: 2021-09-27

## 2021-04-26 RX ORDER — CETIRIZINE HYDROCHLORIDE 10 MG/1
10 TABLET ORAL DAILY
Qty: 90 TABLET | Refills: 3 | Status: SHIPPED | OUTPATIENT
Start: 2021-04-26 | End: 2021-09-27

## 2021-04-26 RX ORDER — DULOXETIN HYDROCHLORIDE 30 MG/1
30 CAPSULE, DELAYED RELEASE ORAL DAILY
Qty: 90 CAPSULE | Refills: 1 | Status: SHIPPED | OUTPATIENT
Start: 2021-04-26 | End: 2021-09-27

## 2021-04-26 RX ORDER — CAPSAICIN 0.025 %
CREAM (GRAM) TOPICAL
Qty: 60 G | Refills: 1 | Status: SHIPPED | OUTPATIENT
Start: 2021-04-26

## 2021-04-26 RX ORDER — QUETIAPINE FUMARATE 25 MG/1
25 TABLET, FILM COATED ORAL AT BEDTIME
Qty: 90 TABLET | Refills: 1 | Status: SHIPPED | OUTPATIENT
Start: 2021-04-26 | End: 2021-09-27

## 2021-04-26 RX ORDER — ONDANSETRON 4 MG/1
4 TABLET, ORALLY DISINTEGRATING ORAL EVERY 8 HOURS PRN
Qty: 30 TABLET | Refills: 0 | Status: SHIPPED | OUTPATIENT
Start: 2021-04-26

## 2021-04-26 RX ORDER — ALBUTEROL SULFATE 90 UG/1
AEROSOL, METERED RESPIRATORY (INHALATION)
Qty: 18 G | Refills: 2 | Status: SHIPPED | OUTPATIENT
Start: 2021-04-26 | End: 2021-09-27

## 2021-04-26 RX ORDER — PREDNISONE 20 MG/1
TABLET ORAL
Qty: 15 TABLET | Refills: 0 | Status: SHIPPED | OUTPATIENT
Start: 2021-04-26 | End: 2021-10-03

## 2021-04-28 ENCOUNTER — COMMUNICATION - HEALTHEAST (OUTPATIENT)
Dept: CARE COORDINATION | Facility: CLINIC | Age: 56
End: 2021-04-28

## 2021-04-30 ENCOUNTER — AMBULATORY - HEALTHEAST (OUTPATIENT)
Dept: FAMILY MEDICINE | Facility: CLINIC | Age: 56
End: 2021-04-30

## 2021-04-30 DIAGNOSIS — R10.13 EPIGASTRIC PAIN: ICD-10-CM

## 2021-04-30 RX ORDER — FAMOTIDINE 40 MG/1
40 TABLET, FILM COATED ORAL DAILY
Qty: 90 TABLET | Refills: 1 | Status: SHIPPED | OUTPATIENT
Start: 2021-04-30 | End: 2021-09-27

## 2021-05-13 ENCOUNTER — OFFICE VISIT - HEALTHEAST (OUTPATIENT)
Dept: FAMILY MEDICINE | Facility: CLINIC | Age: 56
End: 2021-05-13

## 2021-05-13 DIAGNOSIS — R03.0 ELEVATED BLOOD PRESSURE READING WITHOUT DIAGNOSIS OF HYPERTENSION: ICD-10-CM

## 2021-05-13 DIAGNOSIS — K12.0 APHTHOUS ULCER OF MOUTH: ICD-10-CM

## 2021-05-14 ENCOUNTER — COMMUNICATION - HEALTHEAST (OUTPATIENT)
Dept: NURSING | Facility: CLINIC | Age: 56
End: 2021-05-14

## 2021-05-14 ENCOUNTER — COMMUNICATION - HEALTHEAST (OUTPATIENT)
Dept: CARE COORDINATION | Facility: CLINIC | Age: 56
End: 2021-05-14

## 2021-05-17 ENCOUNTER — COMMUNICATION - HEALTHEAST (OUTPATIENT)
Dept: FAMILY MEDICINE | Facility: CLINIC | Age: 56
End: 2021-05-17

## 2021-05-26 ENCOUNTER — RECORDS - HEALTHEAST (OUTPATIENT)
Dept: ADMINISTRATIVE | Facility: OTHER | Age: 56
End: 2021-05-26

## 2021-05-26 VITALS
SYSTOLIC BLOOD PRESSURE: 100 MMHG | HEART RATE: 80 BPM | DIASTOLIC BLOOD PRESSURE: 60 MMHG | OXYGEN SATURATION: 96 % | RESPIRATION RATE: 18 BRPM | TEMPERATURE: 98.2 F

## 2021-05-26 VITALS
SYSTOLIC BLOOD PRESSURE: 130 MMHG | HEART RATE: 68 BPM | RESPIRATION RATE: 18 BRPM | OXYGEN SATURATION: 96 % | DIASTOLIC BLOOD PRESSURE: 90 MMHG | TEMPERATURE: 97.6 F

## 2021-05-26 VITALS
SYSTOLIC BLOOD PRESSURE: 124 MMHG | RESPIRATION RATE: 16 BRPM | DIASTOLIC BLOOD PRESSURE: 86 MMHG | TEMPERATURE: 98 F | HEART RATE: 71 BPM | OXYGEN SATURATION: 99 %

## 2021-05-26 VITALS
TEMPERATURE: 97.8 F | OXYGEN SATURATION: 94 % | RESPIRATION RATE: 18 BRPM | DIASTOLIC BLOOD PRESSURE: 74 MMHG | HEART RATE: 70 BPM | SYSTOLIC BLOOD PRESSURE: 120 MMHG

## 2021-05-26 VITALS
TEMPERATURE: 97.3 F | DIASTOLIC BLOOD PRESSURE: 70 MMHG | HEART RATE: 80 BPM | OXYGEN SATURATION: 95 % | RESPIRATION RATE: 16 BRPM | SYSTOLIC BLOOD PRESSURE: 128 MMHG

## 2021-05-26 VITALS
RESPIRATION RATE: 18 BRPM | TEMPERATURE: 97.9 F | OXYGEN SATURATION: 95 % | HEART RATE: 96 BPM | SYSTOLIC BLOOD PRESSURE: 120 MMHG | DIASTOLIC BLOOD PRESSURE: 76 MMHG

## 2021-05-26 VITALS
TEMPERATURE: 97.6 F | SYSTOLIC BLOOD PRESSURE: 138 MMHG | HEART RATE: 74 BPM | RESPIRATION RATE: 18 BRPM | OXYGEN SATURATION: 95 % | DIASTOLIC BLOOD PRESSURE: 94 MMHG

## 2021-05-26 VITALS
DIASTOLIC BLOOD PRESSURE: 86 MMHG | SYSTOLIC BLOOD PRESSURE: 124 MMHG | HEART RATE: 68 BPM | RESPIRATION RATE: 16 BRPM | TEMPERATURE: 97.8 F | OXYGEN SATURATION: 99 %

## 2021-05-26 VITALS
DIASTOLIC BLOOD PRESSURE: 76 MMHG | SYSTOLIC BLOOD PRESSURE: 146 MMHG | TEMPERATURE: 97.9 F | RESPIRATION RATE: 16 BRPM | OXYGEN SATURATION: 99 % | HEART RATE: 90 BPM

## 2021-05-26 ASSESSMENT — PATIENT HEALTH QUESTIONNAIRE - PHQ9
SUM OF ALL RESPONSES TO PHQ QUESTIONS 1-9: 12
SUM OF ALL RESPONSES TO PHQ QUESTIONS 1-9: 10

## 2021-05-26 NOTE — TELEPHONE ENCOUNTER
Called [facility] to check the status of the Exjade prescription that was sent over to them yesterday by Dr Sanchez.  They did get the prescription but needed some additional information on the patient, such as her weight.  All information was given and they will call us with any other questions, otherwise they will call the patient to get delivery set up.    Esperanza Portillo RN

## 2021-05-27 VITALS
HEART RATE: 85 BPM | RESPIRATION RATE: 16 BRPM | SYSTOLIC BLOOD PRESSURE: 151 MMHG | OXYGEN SATURATION: 99 % | DIASTOLIC BLOOD PRESSURE: 97 MMHG | TEMPERATURE: 96.4 F | WEIGHT: 157 LBS

## 2021-05-27 VITALS — TEMPERATURE: 97.6 F | SYSTOLIC BLOOD PRESSURE: 138 MMHG | DIASTOLIC BLOOD PRESSURE: 78 MMHG

## 2021-05-27 ASSESSMENT — PATIENT HEALTH QUESTIONNAIRE - PHQ9
SUM OF ALL RESPONSES TO PHQ QUESTIONS 1-9: 11
SUM OF ALL RESPONSES TO PHQ QUESTIONS 1-9: 12

## 2021-05-27 NOTE — PROGRESS NOTES
Mental Health Visit Note    4/15/2019    Start time: 9:07am    Stop Time: 9:50am   Session # 7    Session Type: Patient is presenting for an Individual session.    Indra Jason is a 54 y.o. female is being seen today for    Chief Complaint   Patient presents with     MH Follow Up     Patient presented for follow up psychotherapy to address coping with fear, worries and feeling down.    .     New symptoms or complaints: None    Functional Impairment:   Personal: 4  Family: 3  Work: 4  Social:4    Clinical assessment of mental status: Reviewed. MSE is current effective 4/15/2019  Grooming: Well groomed  Attire: Appropriate  Age: Appears Stated  Behavior Towards Examiner: Cooperative  Motor Activity: Within normal   Eye Contact: avoidant  Mood: Depressed  Affect: Congruent w/content of speech  Speech/Language: Within normal.  Attention: Distractible  Concentration: Brief  Thought Process: Within normal  Thought Content: Hallucinations: Within noraml  Delusions: Within normal  Orientation: X 3  Memory: Impairment  Judgement: No Evidence of Impairment  Estimated Intelligence: Below Average  Demonstrated Insight: Diminished  Fund of Knowledge: inadequate        Suicidal/Homicidal Ideation present: None Reported This Session.     Patient's impression of their current status: Patient feels tired every day. She reports continued worries. She has not received any follow up from [facility] re: KATIE. She reports taking Seroquel 50mg and Cymbalta 60mg as prescribed by psychiatrist Wilmer Fam PA-C. Her next psychiatry follow up appointment is May 7th. She reports sleeping better with the Seroquel. She has pain and concerns about her leg swelling.     Therapist impression of patients current state: Patient presented for follow up individual psychotherapy visit. A male professional French  from [facility], [place], was present for the visit due to the language complexity. Her daughter was also present for the visit.  She  attended PCP appointments for OMT and also leg swelling. She requires the help of family to ensure she takes medications and follows physician recommendations. Poor memory impacts her abilities to manage her conditions independently. Her depression symptoms are likely the cause of her daily fatigue. Patient would benefit from more activity as physically able for more activation/energy and improved mood.    Type of psychotherapeutic technique provided: Insight oriented, Client centered and Solution-focused    Progress toward short term goals:Progress as expected, attended PCP appointments. Good medication adherence. Good family support.     Review of long term goals: Not done at today's visit. Treatment Plan effective 3/27/2019-6/27/2019..     Diagnosis:   1. PTSD (post-traumatic stress disorder)    2. Moderate episode of recurrent major depressive disorder (H)        Plan and Follow up: Patient is scheduled for follow up psychotherapy on 4/30/2019      Discharge Criteria/Planning: Client has chronic symptoms and ongoing therapy for maintenance stability recommended.    Karena Ashton [facility] 4/15/2019

## 2021-05-27 NOTE — PROGRESS NOTES
Please call patient and inform:  Please stop taking your vitamin B12.  Your level is elevated.  I think we have overdone it on the B12 and iron pills.  This might be contributing some of your symptoms.  We will continue to monitor.  The rest of the labs look fine.

## 2021-05-27 NOTE — TELEPHONE ENCOUNTER
Pt has OB appointment today so I spoke with Dr Hernandez to let pt know speciality pharmacy trying to reach her to set up med delivery and contact us to help assist with this.
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
## 2021-05-27 NOTE — PROGRESS NOTES
"Mental Health Visit Note    3/27/2019    Start time: 10:25am    Stop Time: 11:09am   Session # 6    Session Type: Patient is presenting for an Individual session.    Indra Jason is a 54 y.o. female is being seen today for    Chief Complaint   Patient presents with     MH Follow Up     Patient presented for follow psychotherapy to address coping with bodily pain that causes worry and heavy heart.   .     New symptoms or complaints: None    Functional Impairment:   Personal: 4  Family: 3  Work: 4  Social:4    Clinical assessment of mental status: Reviewed. MSE is current effective 3/27/19  Grooming: Well groomed  Attire: Appropriate  Age: Appears Stated  Behavior Towards Examiner: Cooperative  Motor Activity: Within normal   Eye Contact: avoidant  Mood: Depressed  Affect: Congruent w/content of speech and Flat  Speech/Language: Within normal and Soft.  Attention: Distractible  Concentration: Brief  Thought Process: Within normal  Thought Content: Hallucinations: Within noraml  Delusions: Within normal  Orientation: X 3  Memory: Impairment  Judgement: No Evidence of Impairment  Estimated Intelligence: Below Average  Demonstrated Insight: Diminished  Fund of Knowledge: inadequate        Suicidal/Homicidal Ideation present: None Reported This Session. Denies SI/ HI.   Completed C-SSRS in session. No ideation. No self-harm. No preparatory behaviors. No past attempts. Low risk for suicide.     Patient's impression of their current status: Patient endorses the following symptoms: shortness of breath while walking, headache, abdominal cramping and bloating (\"feels like a mass\"), sweating, leg edema, feet burning. Her health is causing a lot of worries. She feels depressed and worried every day. She stated she is \"getting old and can't walk as far.\" She is [facility] at [facility] for high ferritin levels and there is still a lot of unknowns, which causes anxiety. She reports they have resolved her " medication confusion. She is taking her medications as prescribed.     Therapist impression of patients current state: Patient presented for follow up individual psychotherapy visit. A male professional Kiswahili  from [facility], [place], was present for the visit due to the language complexity. Her son-in-law was also present for the visit.  She has appointment with PCP this week. She attended [facility] and Cymbalta was increased to 60mg. Provider for psychotropics will be Wilmer Fam PA-C. She hasn't heard from [facility] yet re: [facility].  Therapist to follow up with Tyree or look into a different provider for [facility]. Therapist and patient reviewed treatment plan goals and updated goals and measures. She presents with moderate anxiety and severe depression levels.  MICAELA-7 = 14  PHQ-9 = 20  CAGE= 0/4  Prior to next session, follow up with PCP. Start taking increased dosage of Cymbalta.     Type of psychotherapeutic technique provided: Insight oriented, Client centered and Solution-focused    Progress toward short term goals:Progress as expected, attended psychiatry appointment at [facility].     Review of long term goals: Treatment Plan updated and Effective 3/27/2019-06/27/2019.     Diagnosis:   1. PTSD (post-traumatic stress disorder)    2. Moderate episode of recurrent major depressive disorder (H)        Plan and Follow up: Patient is scheduled for follow up psychotherapy on 4/15/2019      Discharge Criteria/Planning: Client has chronic symptoms and ongoing therapy for maintenance stability recommended.    Karena Ashton [facility] 3/27/2019

## 2021-05-27 NOTE — PROGRESS NOTES
4-12-19  Met with patient and patient's daughter Victor M in clinic and follow up on goals and action steps.  No Italian  available.  Daughter stated she understand some English and able to response to questions.    Coordinated Cele Kaur CCC RN to meet patient to address medications.  Patient scheduled with CCC RN 4-19-19 at 1pm for medication set up.    Daughter reported that she is still waiting to hear from the [facility] about PCA assessment and  from [facility].    Plan:  Follow up in the clinic 4-30-19 and 5-14-19

## 2021-05-27 NOTE — TELEPHONE ENCOUNTER
----- Message from Cele Kaur RN sent at 3/18/2019  8:38 AM CDT -----  Regarding: RE: medication confusion  Of Course!  Aun could u see if she would be willing to see me?  ----- Message -----  From: Karena Ashton LICSW  Sent: 3/17/2019   6:49 AM  To: Cele Kaur RN  Subject: FW: medication confusion                             ----- Message -----  From: Heidi Hernandez DO  Sent: 3/16/2019   6:59 PM  To: TAL Kruger  Subject: RE: medication confusion                         Confirmed again she should be on this. :)  Will work with Cele    ----- Message -----  From: Karena Ashton LICSW  Sent: 3/14/2019  10:46 AM  To: Heidi Hernandez DO  Subject: medication confusion                             Is she still supposed to be taking this medication?  isoniazid 300 mg Oral DAILY     I want her to follow up with [facility] RN re: medication set up and education on refills. She may benefit from PharmD consult- we will see.

## 2021-05-27 NOTE — PROGRESS NOTES
[facility] clinic EXAM note      Chief Complaint   Patient presents with     Follow-up     Headache     Leg Pain     both legs, chronic pain, edema       Due to language barrier, an  was present during the history-taking and subsequent discussion (and for part of the physical exam) with this patient.        Assessment & Plan    Problem List Items Addressed This Visit        ENT/CARD/PULM/ENDO Problems    TB lung, latent: continue isoniazid         Other    Chronic pain: will check base with clinic pharmacist regarding options to treat chronic pain but not cause as much drowsiness.     Elevated ferritin level: specialty  coordinated delivery of deferasirox with hematology clinic.       Other Visit Diagnoses     Leg swelling    -  Primary: chronic issue. Unclear work up in the past. Start with labs below. No swelling noted on exam today.     Relevant Orders    Thyroid Stimulating Hormone (TSH) (Completed)    Comprehensive Metabolic Panel (Completed)    BNP(B-type Natriuretic Peptide) (Completed)    Claudication (H)    : reports symptoms of claudication. Will check VAIBHAV today. Exam wasn't concerning.     Relevant Orders    US Ankle Brachial Indices    Vitamin B 12 deficiency    : note elevated in the past has been on supplements for a long time.     Relevant Orders    Vitamin B12 (Completed)            History    Indra Jason is a 54 y.o.  female who presents for the following issues:    1.  Follow-up chronic pain/Headaches: OMT is not helping so we switched over to just a normal visit today.  She states the Cymbalta makes her sleepy.  She does take it at night but still continues to be drowsy and sleepy over the last 2-3 days.  She also has noticed a decreased appetite with it.    2.  Lower extremity swelling and cramping pain in her calves: Cramping pains worse with walking.  She states she has had this for a while.  Does get better with rest.  Swelling in lower extremities also chronic issue for  her.  Has compression stockings at home but these just make her leg cramping worse.  Massaging it hurts a lot.  She has a burning sensation with the compression stockings. She has not had US or work up that she recalls for the cramping. Not a smoker.     3.  She states compliance with her medications including Isoniazid     4.  I did receive a call from hematologist office as they have been having a hard time getting touch with her to deliver her of the medication she is supposed to be on for the iron overload.  They had seen that she was scheduled for an appointment with me today and asked for help coordinating this delivery.    mEDICATIONS    Current Outpatient Medications on File Prior to Visit   Medication Sig Dispense Refill     acetaminophen (Q-PAP EXTRA STRENGTH) 500 MG tablet TAKE 1-2 TABLETS (500-1,000 MG TOTAL) BY MOUTH 3 TIMES A DAY AS NEEDED FOR PAIN. 100 tablet 5     ascorbic acid, vitamin C, (ASCORBIC ACID WITH JEN HIPS) 500 MG tablet TAKE 1 TABLET (500 MG TOTAL) BY MOUTH 2 TIMES A DAY WITH IRON TABLETS 180 tablet 3     cetirizine (ZYRTEC) 10 MG tablet Take 1 tablet (10 mg total) by mouth daily. 90 tablet 2     cholecalciferol, vitamin D3, (VITAMIN D3) 1,000 unit capsule Take 1 capsule (1,000 Units total) by mouth daily. 90 capsule 3     cyanocobalamin (VITAMIN B-12) 1000 MCG tablet Take 1 tablet (1,000 mcg total) by mouth daily. 30 tablet 11     deferasirox (EXJADE) 250 MG disintegrating tablet Take 5 tablets (1,250 mg total) by mouth daily before breakfast. 150 tablet 2     DULoxetine (CYMBALTA) 60 MG capsule   0     fluticasone (FLONASE ALLERGY RELIEF) 50 mcg/actuation nasal spray 1-2 sprays a day as needed. 16 g 5     isoniazid (NYDRAZID) 300 MG tablet Take 300 mg by mouth daily.  7     lidocaine (XYLOCAINE) 5 % ointment Apply sized amount to affected area up to 3 times daily. Lower back 35.44 g 2     omeprazole (PRILOSEC) 20 MG capsule Take 1 capsule (20 mg total) by mouth daily. 30 capsule 5      ranitidine (ZANTAC) 150 MG tablet TAKE ONE TABLET BY MOUTH AS NEEDED FOR HEARTBURN. 30 tablet 1     SEA SOFT NASAL MIST 0.65 % nasal spray USE 1 SPRAY INTO EACH NOSTRIL AS NEEDED FOR CONGESTION. 45 mL 5     No current facility-administered medications on file prior to visit.        Pertinent past medical, surgical, social and family history reviewed and updated in Epic.    Social History     Socioeconomic History     Marital status:      Spouse name: Not on file     Number of children: 8     Years of education: Not on file     Highest education level: Not on file   Occupational History     Not on file   Social Needs     Financial resource strain: Not on file     Food insecurity:     Worry: Not on file     Inability: Not on file     Transportation needs:     Medical: Not on file     Non-medical: Not on file   Tobacco Use     Smoking status: Former Smoker     Years: 15.00     Last attempt to quit: 3/21/2004     Years since quitting: 15.0     Smokeless tobacco: Never Used     Tobacco comment: no passive exposure in home.   Substance and Sexual Activity     Alcohol use: No     Drug use: No     Sexual activity: Never   Lifestyle     Physical activity:     Days per week: Not on file     Minutes per session: Not on file     Stress: Not on file   Relationships     Social connections:     Talks on phone: Not on file     Gets together: Not on file     Attends Latter day service: Not on file     Active member of club or organization: Not on file     Attends meetings of clubs or organizations: Not on file     Relationship status: Not on file     Intimate partner violence:     Fear of current or ex partner: Not on file     Emotionally abused: Not on file     Physically abused: Not on file     Forced sexual activity: Not on file   Other Topics Concern     Not on file   Social History Narrative     Not on file         Review of systems     Pertinent Positives and negatives in HPI.     Physical Exam    /82 (Patient  Site: Left Arm, Patient Position: Sitting, Cuff Size: Adult Regular)   Pulse 84   Resp 20   Wt 144 lb (65.3 kg)   LMP 12/01/2014 (Exact Date)   BMI 27.66 kg/m    GEN:  54 y.o. female sitting comfortably in no apparent distress.   HEENT: EOMI, no scleral icterus, buccal mucosa moist  CHEST/LUNG: No respiratory distress, good air flow to bases, CTAB   CV: RRR, S1, S2 normal; no murmurs, rubs or gallops. No edema.   MSK:  Strength grossly normal  EXTR:  No cyanosis, pulses intact, cap refill about 2-3 seconds appropriate warmth in toes.   SKIN: warm, dry, no rashes or lesions  NEURO: Gait normal, coordination intact  PSYCH:  Appears depressed      At the end of the encounter, I discussed results, diagnosis, medications. Discussed red flags for immediate return to clinic/ER, as well as indications for follow up if no improvement. Patient and/or caregiver understood and agreed to plan. Patient was stable for discharge.      Heidi Hernandez

## 2021-05-27 NOTE — PROGRESS NOTES
Assessment    The pt,  Naresh and pt s daughter present for SW visit to discuss citizenship and PCA. SW assisted with intake with [facility]. SW discussed PCA services, pt is eligible and SW will complete a referral to the [facility] for a PCA assessment.    Action Plan:    will:  1) Refer the pt to the [facility] for PCA assessment  2) Meet with the pt again on 4/24/19 to discuss questions about immigration loan--pt informed to bring all paperwork/letters about this to the appt.      Care Guide will:  Care Guide Delegation:   1)  Due Date:  None at this time       Delegation:      Subjective     The pt,  Naresh and pt s daughter present for meeting to assist with citizenship and PCA. SW assisted with completing intake with [facility] for applying for citizenship, pt will receive f/u call with a  to begin the application process.    Pt also reported she owed money to [employer] for her immigration loan, reported she could not pay this. SW informed pt to bring any paperwork/letters about that to a f/u appt with the SW to review and provide direction on how to address.    SW discussed PCA, pt reported she has dizziness and headaches that prevent her from completing many tasks. SW asked for clarification on ADL needs, pt reported she needed assist with bathing and dressing mostly. Pt also does not cook her meals and needs assist with homemaking tasks such as laundry and cleaning the home. SW educated the pt and daughter on process of getting PCA and wait time at this time--2-3 months. Pt s number in chart is the son-in-law s number, prefer that number to be called to schedule the assessment.      Objective      Appearance: Casually and appropriately dressed, well-groomed, normal gait.      Behavior: Quiet      Speech: Non-English; soft-spoken      Emotion/Affect: Calm      Thought Processes: linear      Orientation: observed x3      Memory: adequate      General Intellectual Abilities: adequate        Judgment: adequate       Insight: adequate    Clinical Recommendations: The pt benefits from CCC guidance with navigating services at this time.

## 2021-05-27 NOTE — PROGRESS NOTES
"  Outpatient Mental Health Treatment Plan     Name:  Indra Jason  :  1965  MRN:  [id number]     Treatment Plan:  UpdateTreatment Plan  Intake/initial treatment plan date:  2018  Benefit and risks and alternatives have been discussed: Yes  Is this treatment appropriate with minimal intrusion/restrictions: Yes  Estimated duration of treatment:  Approximately 20 sessions.  Anticipated frequency of services:  Every 1-2 weeks  Necessity for frequency: This frequency is needed to establish therapeutic goals and for continuity of care in order to monitor progress.  Necessity for treatment: To address cognitive, behavioral, and/or emotional barriers in order to work toward goals and to improve quality of life.     Session Type: Patient is presenting for an Individual session.     Plan:      Personal goal: \"To feel less sad and worried\"                ? Depression                 Goal:    Decrease average depression level from 4 to 1.              Strategies:       ?[x] Decrease social isolation                                      [x] Increase involvement in meaningful activities                                      ?[x] Discuss sleep hygiene                                      ?[x] Explore thoughts and expectations about self and others                                      ?[x] Process grief (loss of significant person, independence, role,                                     etc.)                                      ?[x] Assess for suicide risk                                      ?[x] Implement physical activity routine (with physician approval)                                      [x] Consider introduction of bibliotherapy and/or videos                                      [x] Continue compliance with medical treatment plan (or explore                                      barriers)                                         ?  ?Degree to which this is a problem: 4  Degree to which goal is met: 1  Date of " Review: 12/28/2018-03/28/2019                                                                                ?              ? Anxiety                        Goal:    Decrease average anxiety level from 4 to 1.              Strategies:       ? [x]Learn and practice relaxation techniques and other coping                                         strategies (e.g., thought stopping, reframing, meditation)                                      ? [x] Increase involvement in meaningful activities                                      ? [x] Discuss sleep hygiene                                      ? [x] Explore thoughts and expectations about self and others                                      ? [x] Identify and monitor triggers for panic/anxiety symptoms                                      ? [x] Implement physical activity routine (with physician approval)                                      ? [x] Consider introduction of bibliotherapy and/or videos                                      ? [x] Continue compliance with medical treatment plan (or explore                                      barriers)                                                Degree to which this is a problem: 4  Degree to which goal is met: 1  Date of Review: 12/28/2018-03/28/2019        Chronic pain and other somatic symptoms  Goal:    ? Decrease average pain level from 4 to 1.              ? Increase % acceptance of pain from 30 to 60.              Strategies:       ? [x] Explore thoughts and expectations about self and others                            [x] Explore emotional reactions to illness/injury                                       ? [x] Learn and practice relaxation techniques and other coping                                          strategies                                             [x] Implement physical activity routine (with physician approval)                                      ? [x] Engage in values clarification and goal-setting                                       ? [x] Consider introduction of bibliotherapy and/or videos                                      ? [x] Increase involvement in meaningful activities                                      ? [x] Discuss sleep hygiene                                      ? [x] Process grief (loss of significant person, independence, role,                                      etc.)                                      ? [x] Assess for suicide risk                                      ?  Degree to which this is a problem: 4  Degree to which goal is met: 1  Date of Review: 12/28/2018-03/28/2019        Plan:   Other:  PTSD  Goal: Decrease average impact of PTSD symptoms from 4 to 1                        Strategies:                              ?[x] Forge a therapeutic alliance built on trust in order to allow for further processing of trauma experience.                             ?[x] Assessment of client-acculturation process. Psycho-education about western-therapy.                             ?[x] Exposure therapy and psycho-education to develop a sense normalization, legitimization, and description of trauma reactions.                              ?[x] Development of trauma timeline or narrative.                             ?[x] Cognitive Restructuring to help make sense of the trauma and to put meaning to the trauma- develop new insights and thorough understanding of behaviors during the event- modify feelings of guilt and shame.                              ?[x] Learn and implement somatic/experiential techniques.                             ?[x] Learn and Implement Mindfulness/Grounding techniques to decrease hyperarousal.     ?Degree to which this is a problem: 4  Degree to which goal is met: 1  Date of Review:12/28/2018-03/28/2019                                           Functional Impairment:  1=Not at all/Rarely  2=Some days  3=Most Days  4=Every Day    Personal : 4  Family : 3  Social : 4    Work/school : 4     Diagnosis:  PTSD  Major Depressive Disorder, recurrent        WHODAS 2.0 12-item version: 27 or 56%   Scores presented in qualifiers to represent level of disability.  SEVERE Problem (high, extreme, ...) 50-95%  H1= 30  H2= unsure  H3= some           Clinical assessments and measures completed:. MICAELA-7, PHQ-9 and CAGE-AID, C-SSRS, PHQ-15  PHQ-9 = 17  PHQ-15= 17  MICAELA-7 = 16  C-SSRS: Low risk. No current SI.      Strengths:  She receives the support of her daughter and son in law. Her son-in-law drives her to appointments. She has been referred for psychiatry and Clinic care coordination. She has been in therapy before; therefore, she has some understanding of the therapeutic process. She is open and engaged in session.  Limitations:  Continued acculturation stressors and other basic needs that need to be met first.  Memory impairment- may have difficulty recalling what is discussed, learned and practiced in sessions.  Cultural Considerations: Patient is a Turkish female. She requires the use of a Turkish  to communicate with providers. She has fled war in [facility] and lived in refugee camps prior to resettling in the [place]. She experiences grief from the death of her  about 7 years ago. She is a single mother of 7 children, some of whom are adults, but some are young and live at home.     Persons responsible for this plan: Patient and Provider. Coordinate care with PCP as needed. Coordinate care with Psychiatrist and CCC team once established with them.              Psychotherapist Signature           Patient Signature:              Guardian Signature             Provider: Performed and documented by TAL Kruger   Date:  3/27/2019

## 2021-05-27 NOTE — TELEPHONE ENCOUNTER
4-3-19  Patient scheduled with Cele Nodes, [facility] RN for MEV on 4-12-19 at 11am.

## 2021-05-27 NOTE — PROGRESS NOTES
Patient's appointment time was at 11:00.  Patient's appointment went long with PCP; then patient was with specialty .  Writer went to specialty  office for medication bag.  Daughter speaks some english as no  available.  Writer offered to call language line and daughter declined.  Writer set up brand new MSU box as follows:    AM:  Vitamin C, vitamin b-12, omeprazole  PM:  Vitamin C    Call to [facility] to inquire on status of multiple missing medications.  [facility] stated they had just refilled medications and the meds were sitting there for .  Zyrtec, vitamin D, isoniazid, seroquel, duloxetine.  Patient with specialty  to coordinate the delivery of deferasirox thru specialty pharmacy.  [facility] unable to fill this medication.      Appointment made to see patient in 1 week with CCC RN.  Instructed daughter to  all scripts from [facility].  Instructed how to take medications out of new medication set up box.  Offered again for language line and dtr declined.

## 2021-05-28 ASSESSMENT — ANXIETY QUESTIONNAIRES: GAD7 TOTAL SCORE: 5

## 2021-05-28 NOTE — TELEPHONE ENCOUNTER
Medication Question or Clarification  Who is calling: [facility]: [facility]  What medication are you calling about? (include dose and sig)     DULoxetine (CYMBALTA) 60 MG capsule  0 3/25/2019     Class: Historical Med      Who prescribed the medication?: Dr. Beard / Historical   What is your question/concern?: Directions are needed how to take the medication. The bottle states to take one pill daily.   Pharmacy: [facility] - [place], [place]   Okay to leave a detailed message?: No  Site CMT - Please call the pharmacy to obtain any additional needed information.

## 2021-05-28 NOTE — TELEPHONE ENCOUNTER
Request for Orders    Who s Requesting: Home Care Occupational Therapist for MSW and tub bench    Orders being requested: Comprehensive MSW eval with focus on community resources and housing.    Tub transfer bench. Please fax the order for this to [facility]   [address] [place]  [address]    [phone], Fax: [phone]    Where to send Orders: [facility], response through [employer] preferred for MSW. Please fax order for tub bench as above. Please call if you have questions.    Thank you!  Sara Norwood, OTR/L  [phone]

## 2021-05-28 NOTE — TELEPHONE ENCOUNTER
Medication was actually making her sleepy.  Please ask her if it still having this effect?  If so I was planning on talking to her psychiatrist and discontinue this medication and considering switch to Elavil.

## 2021-05-28 NOTE — TELEPHONE ENCOUNTER
Called and spoke with Mercy, inform her about the medication change. Per Jeanie she just set up the pills today, so it will not match what the prescription is written for Start date 5/2/2019-5/9/2019. TC

## 2021-05-28 NOTE — PROGRESS NOTES
Please call patient and inform:  The test we did of your ankles did not show any signs of vascular issues.

## 2021-05-28 NOTE — TELEPHONE ENCOUNTER
Following up if you complete a letter for the patient per Gale Cameron,  regarding letter patient unable to pay for travel payment.

## 2021-05-28 NOTE — PROGRESS NOTES
"Mental Health Visit Note    2019    Start time: 11:07am    Stop Time: 11:50am   Session # 8    Session Type: Patient is presenting for an Individual session.    Indra Jason is a 54 y.o. female is being seen today for    Chief Complaint   Patient presents with     MH Follow Up     Patient presented for follow up psychotherapy to address coping wiht fatigue, heavyheart, and somatic concerns.    .     New symptoms or complaints: None    Functional Impairment:   Personal: 4  Family: 3  Work: 4  Social:4    Clinical assessment of mental status: Reviewed. MSE is current effective 19  Grooming: Well groomed  Attire: Appropriate  Age: Appears Stated  Behavior Towards Examiner: Cooperative  Motor Activity: Within normal   Eye Contact: avoidant  Mood: Depressed  Affect: Congruent w/content of speech and Flat  Speech/Language: Within normal and Soft.  Attention: Distractible  Concentration: Brief  Thought Process: Within normal  Thought Content: Hallucinations: Within noraml  Delusions: Within normal  Orientation: X 3  Memory: Impairment  Judgement: No Evidence of Impairment  Estimated Intelligence: Below Average  Demonstrated Insight: Diminished  Fund of Knowledge: inadequate        Suicidal/Homicidal Ideation present: None Reported This Session. Denies SI.    Patient's impression of their current status: Patient reports leg pain, dizziness, leg edema, cough, depressed mood. These symptoms have caused decreased activity, especially due to \"constant pain.\" She is hopeful that home-care PT/OT will be helpful in managing pain. She continues to take acetaminophen as needed to mange pain. She reports taking the medications she is prescribed as it is set up by the nurse. She reports improved sleep, but also concerns about feeling drowsy in the day. She feels triggered when dizzy because of her 's past experiences. He had dizziness, fell and suddenly . She fears that will happen to her.       Therapist " impression of patients current state: Patient presented for follow up individual psychotherapy visit. A male professional Irish  from [place], [place], was present for the visit due to the language complexity. Her daughter was also present for the visit. She continues to present with symptoms of depression and severe somatic symptoms that are related to trauma history. Developed insight into the difference between her and her 's health experience and medical care. She was receptive to reframing those thoughts and challenging her fear that the worse thing could happen. She continues to develop insight into the mind-body connection. Therapist provided education on the somatic symptoms and how her body tells the story. She may benefit from medication to help decrease dizziness.    Type of psychotherapeutic technique provided: Insight oriented, Client centered and CBT, Psychoeducation.    Progress toward short term goals:Progress as expected, continued severe symptoms, but working with providers to seek improvement. Good adherence    Review of long term goals: Not done at today's visit and Effective  3/27/2019-06/27/2019.    Diagnosis:   1. PTSD (post-traumatic stress disorder)    2. Moderate episode of recurrent major depressive disorder (H)        Plan and Follow up: Psychiatry follow up on 5/7/19- reminded patient.  Patient is scheduled for follow up psychotherapy on 5/14/2019      Discharge Criteria/Planning: Client has chronic symptoms and ongoing therapy for maintenance stability recommended.    Karena PARADA 4/30/2019

## 2021-05-28 NOTE — PROGRESS NOTES
Assessment    The pt, pt s daughter,  Gema from [facility], MICHELLE Beasley and MICHELLE Craven present for meeting to discuss travel loan for immigration. SW assisted with calling the agency the loan is through, pt will need letter of support from PCP regarding her inability to work at this time before they will consider deferring the loan until pt gets SSI; SW informed they will not forgive the loan unless the pt is receiving SSI--pt cannot pursue this until she has citizenship, pt currently working on obtaining citizenship. Please see Subjective section for more info.    SW wrote letter of support, also sent request to PCP for letter of support that pt is unable to work.    Pt has a good support system through family. SW recommends pt continue to work with [employer] to pursue citizenship--after obtaining this, pursue SSI.     Pt has a good support system through family. SW recommends pt continue to work with [employer] to pursue citizenship--after obtaining this, pursue SSI.    Action Plan:    will:  1)  Available as needed       Care Guide will:  Care Guide Delegation:   1)  Due Date:  N/A       Delegation: Please coordinate faxing both SW and PCP letters to:      [facility] Refuge[facility]  ATTN: Steve Simms  [address] [place] [address]    Phone: [phone]  Fax: [phone]      Subjective     MICHELLE assisted with calling [facility] (case number [id number])--this is where pt s travel loan is being billed through. MICHELLE updated the current address with the agency. Pt currently has $1,637 she owes on the loan. Worker informed MICHELLE the pt could defer or reduced payment.     SW asked about loan forgiveness/financial aid. SW informed they can t waive the loan unless the pt is receiving SSI. SW explained the pt is disabled and cannot work, however is not eligible for SSI until she gets citizenship. Worker reported she could try to apply for an extended deferral which  could have the loan deferred until the pt gets SSI. Worker advised SW to have the PCP write a letter of support stating the pt is disabled and unable to work.    SW will also write a letter stating the pt is currently working towards citizenship and once pt is a [place] citizen, pt will be pursuing SSI.      Objective      Appearance: Casually and appropriately dressed, well-groomed, normal gait.      Behavior: Open      Speech: Non-English; clear, ordinary.      Emotion/Affect: Calm      Thought Processes: Linear      Orientation: observed x4      Memory: adequate       General Intellectual Abilities: adequate      Judgment: adequate       Insight: adequate     Clinical Recommendations: Pt has a good support system through family. SW recommends pt continue to work with [facility] to pursue citizenship--after obtaining this, pursue SSI.

## 2021-05-28 NOTE — PROGRESS NOTES
5-7-19  Called and spoke to patient through Syriac  Declan and follow up on goals.  Lost connection with .  Called Language line back and requested for Declan Becketti .    Patient handed the phone to speak to her daughter Victor M to update on her behalf.   Daughter reported on behalf of mother/patient:  -went to [facility] but did not complete the medical waiver yet.  -still waiting for [facility] to call to connect with  to support with citizenship process  -still waiting for [facility] to call to schedule for assessment for PCA.    Reminded of appt with Karena therapist on 5-14-19 at 11am.  Daughter confirmed appt.    Plan:   follow up 6-10-19  Check on status of PCA referral  and who is the assign  at [facility].

## 2021-05-28 NOTE — TELEPHONE ENCOUNTER
Refill Approved    Rx renewed per Medication Renewal Policy. Medication was last renewed on 1/11/19.    Amalia Galvin, [facility] Connection Triage/Med Refill 5/13/2019     Requested Prescriptions   Pending Prescriptions Disp Refills     ranitidine (ZANTAC) 150 MG tablet [Pharmacy Med Name: RANITIDINE 150 MG TABLET] 30 tablet 0     Sig: TAKE ONE TABLET BY MOUTH DAILY AS NEEDED FOR HEARTBURN       GI Medications Refill Protocol Passed - 5/12/2019 11:37 AM        Passed - PCP or prescribing provider visit in last 12 or next 3 months.     Last office visit with prescriber/PCP: Visit date not found OR same dept: 4/12/2019 Heidi Hernandez DO OR same specialty: 4/12/2019 Heidi Hernandez DO  Last physical: Visit date not found Last MTM visit: Visit date not found   Next visit within 3 mo: Visit date not found  Next physical within 3 mo: Visit date not found  Prescriber OR PCP: Rajat Rothman MD  Last diagnosis associated with med order: 1. Gastroesophageal reflux disease without esophagitis  - ranitidine (ZANTAC) 150 MG tablet [Pharmacy Med Name: RANITIDINE 150 MG TABLET]; TAKE ONE TABLET BY MOUTH DAILY AS NEEDED FOR HEARTBURN  Dispense: 30 tablet; Refill: 0    If protocol passes may refill for 12 months if within 3 months of last provider visit (or a total of 15 months).

## 2021-05-28 NOTE — TELEPHONE ENCOUNTER
Dr. Hernandez,    We had received a referral from you to see patient.  When we were able to get a hold of him, he requested to have Skilled Nurse Start of care on 4/23/19. This is outside the original 48 hour requested time.    Would like order approving start date of 4/23/19 per patient's request.    Response to this message serves as verbal order.     Thank you, Purnima Mckinley RN

## 2021-05-28 NOTE — PROGRESS NOTES
5-13-19  ER Follow up 5-9-19  Called and left voice message to call care guide [phone].  No ER follow up appt with PCP yet.

## 2021-05-28 NOTE — PROGRESS NOTES
"Mental Health Visit Note    5/14/2019    Start time: 11:09am    Stop Time: 11:51am   Session # 9    Session Type: Patient is presenting for an Individual session.    Indra Jason is a 54 y.o. female is being seen today for    Chief Complaint   Patient presents with     MH Follow Up     Patient presented for follow up psychotherapy to address coping with pain and fatigue   .     New symptoms or complaints: None    Functional Impairment:   Personal: 4  Family: 3  Work: 4  Social:4    Clinical assessment of mental status: Reviewed. MSE is current effective 5/14/19  Grooming: Well groomed  Attire: Appropriate  Age: Appears Stated  Behavior Towards Examiner: Cooperative  Motor Activity: Within normal - holding abdomen due to pain.  Eye Contact: avoidant  Mood: Depressed  Affect: Congruent w/content of speech and Flat  Speech/Language: Within normal and Soft.  Attention: Distractible  Concentration: Brief  Thought Process: Within normal  Thought Content: Hallucinations: Within noraml  Delusions: Within normal  Orientation: X 3  Memory: Impairment  Judgement: No Evidence of Impairment  Estimated Intelligence: Below Average  Demonstrated Insight: Diminished  Fund of Knowledge: inadequate        Suicidal/Homicidal Ideation present: None Reported This Session.     Patient's impression of their current status: Patient reports worsening leg pain and some abdominal pain. She had an ED visit since our last visit due to high blood pressure. Homecare nurse continues visits.   Patient endorses the following symptoms: extreme fatigue with any activity, including homecare therapy.  Sleep: tired every day  Depressed mood: \"swings\"- sudden irritation, angry, unhappy  Pain: Denies chest pain. Denies shortness of breath. Reports burning sensation in legs, headache, and low back pain. Constant pain daily  Engagement in activities: Went outside with daughter and grandchildren one time last week. Visited by son and daughter for mother's " "day- enjoyed eating together.   Medication Adherence: Good- RN visits.    Therapist impression of patients current state: Patient presented for follow up individual psychotherapy visit. A male professional Ukrainian  from [facility], Phillip, was present for the visit due to the language complexity. Her son-in-law was also present for the visit.  Review of ED Note on 5/9/19: \"Patient presents with 4 hours of headache.  Nausea.  Patient reportedly sent in after home caregiver noted her blood pressure was elevated and had a headache at 911 was reportedly called.  Normal blood pressure upon arrival to the ED. Suspect likely tension headache since patient is a history of the same \" CT scan at ED was normal result. No medication changes.   She has started PT/OT at home and continues RN visits. Good medication adherence. Good family support.  Time was spent identifying an actual place that makes her feel calm and peaceful. Also explored option of visual imagery that can help with feeling calm/at peace. Encouraged utilizing this along with deep breathing practices when feeling irritable/angry to help regulate emotions. She was able to identify a park/forest area she likes to go to and pick a particular nettle plant she likes to cook velazquez with.  Prior to next session, attend follow up PCP appointment from recent ED visit.         Type of psychotherapeutic technique provided: Insight oriented, Client centered and Solution-focused, guided imagery/emotional regulation    Progress toward short term goals:Poor progress, worsened somatic concerns that caused recent ED visit. HAsn't followed up with PCP as recommended by ED.     Review of long term goals: Not done at today's visit and Effective  3/27/2019-06/27/2019    Diagnosis:   1. PTSD (post-traumatic stress disorder)    2. Moderate episode of recurrent major depressive disorder (H)        Plan and Follow up: Patient is scheduled for follow up psychotherapy on " 5/28/2019      Discharge Criteria/Planning: Client has chronic symptoms and ongoing therapy for maintenance stability recommended.    Karena [facility] 5/14/2019

## 2021-05-28 NOTE — TELEPHONE ENCOUNTER
Writer is annieting 2 Social Work order to assist with community resources to assist family caring for pt.  2 sw in 21 days.  Please respond in epic.  Thank you !  Samantha SABA

## 2021-05-28 NOTE — PROGRESS NOTES
Called and spoke with pt , Message was given, pt understood, no further questions.  Use  line to contact :Lexi ID:[id number]

## 2021-05-28 NOTE — TELEPHONE ENCOUNTER
Please have her decrease the medication to half the dose or 30 mg for a week and then discontinue completely.  We will restart on different medication at her next visit.    Are we able to call patient psychiatrist to inform her of this as well?

## 2021-05-28 NOTE — TELEPHONE ENCOUNTER
Hi Dr. Hernandez,    I saw Indra Jason for an assessment to admit her to [facility] and I am requesting orders for:    Skilled Nurse Visit 1x/wk x2, 1xEOW x8 and 3 prn for medication management, system management. Patient c/o generalized pain and is dependent on all cares. I would like to order PT/OT eval and treat.    Please respond with orders.    Thank you,    Kellie Zhu RN  [facility]

## 2021-05-28 NOTE — TELEPHONE ENCOUNTER
5-17-19  Received call from MICHELLE [facility]   [phone]  Re:  Clarification of services and support    Spoke to Samantha and provided background information about patient regarding housing and services.  She would like to know what services and support for patient.  Patient moved with son to [place] and then moved back to [place].   She shared that patient would like to do like going to day program not sure if there is a Slovak speaking day program. Patient wants to go to visit her friends.  Informed Samantha that there is [facility] in [facility].  Patient would need CADI Waiver to go to adult day program.  Patient [place] not have [place].  Has  from [facility] helping with citizenship application and process.   She will follow up on services.  Thanked Samantha for her support and getting services for patient.

## 2021-05-28 NOTE — TELEPHONE ENCOUNTER
5-8-19  Faxed PCP and CCC SW's letters to:    [facility] Immigration [facility] Refugee [facility]  ATTN: Steve Simms  [address] [place] [address]  Phone: [phone]  Fax: [phone]    Delegation completed.

## 2021-05-28 NOTE — TELEPHONE ENCOUNTER
Request for Orders    Who s Requesting: Home Care Occupational Therapist    Orders being requested: OT  2 visit(s) every 1 week(s) for 4 week(s) and 2 prn for PNE (pain neuroscience education), ADL/IADL indep and safety, coping strategies.    Where to send Orders: [facility], response through Epic preferred. Please call if you have questions.    Thank you!  Sara Norwood, OTR/L  [phone]

## 2021-05-28 NOTE — PROGRESS NOTES
"Met with patient and daughter for follow up from 4/12.  Daughter was supposed to go to [facility] on 4/12 to  several scripts.  Patient arrived today with new script of Exjade she was not sure how to take.  Patient had not picked up any other scripts.  Daughter stating she had gone to [facility] and was told it was \"too soon\" for .  However per the [facility] RN note on 4/12 writer was told there were several scripts available for .  Call placed to [facility] and writer was told patient has 5 scripts waiting for .  Writer was also told it was \"too soon\" for cymbalta and seroquel.  Unclear how daughter was given miscommunication from the pharmacy.  Daughter to  the scripts today.  Writer offered to have home care assist with medication management.  Patient and family have a language barrier and struggle to manage medications independently.  Patient and daughter very happy to have extra assistance due to complex medications.  Writer instructed on new medication of Exjade.  Instructed for patient to dissolve 5 tablets in either water, apple juice or orange juice 30 mins before food in the morning.  Writer showed patient the size glass she should use to equal 8 oz. Patient to take medication every day. Patient and daughter stated an understanding.  Home Care to continue to teaching compliance and education.  Discussed with PCP in person and orders placed.  "

## 2021-05-28 NOTE — TELEPHONE ENCOUNTER
Action Plan:    will:  1)  Available as needed       Care Guide will:  Care Guide Delegation:   1)  Due Date:  N/A       Delegation: Please coordinate faxing both SW and PCP letters to:      [facility]  ATTN: Steve Simms  [address] [place] [address]    Phone: [phone]  Fax: [phone]    See SW visit from 4/24/19 for more info.

## 2021-05-28 NOTE — TELEPHONE ENCOUNTER
Yes somehow it went underneath Ihsan's name?  But is in the chart under letters.  I tried to send you a new one just now with my name on it.

## 2021-05-28 NOTE — TELEPHONE ENCOUNTER
Request for Orders    Who s Requesting: Home Care Physical Therapist    Orders being requested:   Requesting order to be put into Epic for  2WW (front wheeled walker). Thank you.    Home Care PT 2x/week for 4 weeks for gait, strength, endurance, balance, transfers, HEP s/p functional decline, B knee pain, and falls.    Where to send Orders: Please reply within Epic for verbal okay and put order into Epic for our office to pull.

## 2021-05-28 NOTE — TELEPHONE ENCOUNTER
5-8-19  Faxed PCP and CCC SW letters to:    [facility] Immigration and Refugee [facility]  ATTN: Steve Simms  [address] [place] [address]  Phone: [phone]  Fax: [phone]

## 2021-05-28 NOTE — TELEPHONE ENCOUNTER
Spoke with patients Son in law message was given, he states that yes Cymbalta is still making her sleepy and she sleeps all day and has to be woken food and is still sleepy.

## 2021-05-28 NOTE — TELEPHONE ENCOUNTER
Can you please write a letter of support stating the patient is currently unable to work due to her conditions? Please give letter to the care guide and they will coordinate submitting to the appropriate contact.      SW is helping patient get forgiveness for immigration travel loan.

## 2021-05-28 NOTE — TELEPHONE ENCOUNTER
Left message for Jeanie to call back. Okay to relay message upon return call.    Left detailed message for psychiatrist Wilmer Fam at [facility] [phone] ext [phone]. She is out of the office until next week Tuesday. Pt has a follow up visit with psychiatrist Wilmer Fam Tuesday, May 7th at 10:20 a.m.    Will you be willing to send over the taper dose below per Dr. Hernandez? Please advise.

## 2021-05-28 NOTE — TELEPHONE ENCOUNTER
Request for Orders    Who s Requesting: Home Care OT for MSW and tub bench.    Orders being requested: Comprehensive MSW eval with focus on resources and housing.    Tub bench; please fax order to [facility]   [address] [place]  [address]    [phone], Fax: [phone]    Where to send Orders: [facility], response through [facility] preferred for MSW. Please fax order for tub bench as above. Please call if you have questions.    Thank you!  Sara Norwood, OTR/L  [phone]

## 2021-05-29 NOTE — PROGRESS NOTES
[facility] EXAM NOTE      Chief Complaint   Patient presents with     Follow-up     swelling and pain in both legs, abdominal pain       Due to language barrier, an  was present during the history-taking and subsequent discussion (and for part of the physical exam) with this patient.      ASSESSMENT & PLAN    Indra was seen today for follow-up.    Diagnoses and all orders for this visit:    Elevated ferritin level: We will check ferritin level and iron studies today to see how medication is helping.  Labs show that there is some improvement but would like her to continue on the medication.  We will call over to hematology for refill as I believe it had to be special ordered and delivered.  -     Ferritin  -     Iron and Transferrin Iron Binding Capacity  -     deferasirox (EXJADE) 250 MG disintegrating tablet; Take 5 tablets (1,250 mg total) by mouth daily before breakfast.    Chronic pain syndrome : Asking for refill of the lidocaine.  -     lidocaine (XYLOCAINE) 5 % ointment; Apply sized amount to affected area up to 3 times daily. Lower back    Epigastric pain: CMP was reassuring at last visit.  Will add on lipase and amylase today which were normal.  At this point I do feel a little bit of a lump wondering about a hiatal hernia in the epigastric region.  Given the chronic nature of this will referred to GI at this time for likely EGD possible CT scan per the recommendations.  -     Lipase  -     Amylase  -     Ambulatory referral to Gastroenterology    Disorder of vitamin B12: Continue vitamin B12 for another month we will repeat lab at that time and see if it comes down to normal level in the morning dose appropriately may be every other day or so.  -     Vitamin B12    Dizziness: Chronic dizziness, unstable.  Will trial meclizine she has had this in the past.  -     meclizine (ANTIVERT) 25 mg tablet; Take 1 tablet (25 mg total) by mouth 3 (three) times a day as needed for dizziness or  nausea.    We will also fill out disability form and have her pick it up as requested.    Lower extremity swelling: ABIs were normal.  Previous work-up at last appointment was negative for any more serious cause.  Discussed continue with compression stockings.    HISTORY    Indra Jason is a 54 y.o.  female who presents for the following issues:    1. Getting some help at home. Appreciates this. Its been helpful.new items at home to make it easier for her to perform her ADLs.     2.  Epigastric pain: Constant.  Sometimes she is a hard lump that comes and goes.  Even without eating still has the pain.  Has not noticed association with the food or anything else.  Previous H. pylori test were negative.  No improvement on omeprazole or ranitidine.  No hematemesis, melena or hematochezia.    3. Asking for disability parking: I do think that she is appropriate for this and I discussed filling it out and letting her know when she can pick it up.    4. Reports swelling in LE and pain. ABIs were normal.  Previous work-up at last appointment was negative for any more serious cause.  Discussed continue with compression stockings.    5. Elevated Ferritin level. Was on Exjade but just ran out. Hemoglobinopathy studies normal (first set in the fall was slightly abnormal).     6. Mentions her chronic dizziness today.  Wondering if there is anything she can take to help her.    7. Elevated B12 level- instructed to stop taking about 6 weeks ago and she did. Needs recheck today.     MEDICATIONS    Current Outpatient Medications on File Prior to Visit   Medication Sig Dispense Refill     acetaminophen (Q-PAP EXTRA STRENGTH) 500 MG tablet TAKE 1-2 TABLETS (500-1,000 MG TOTAL) BY MOUTH 3 TIMES A DAY AS NEEDED FOR PAIN. 100 tablet 5     ascorbic acid, vitamin C, (ASCORBIC ACID WITH JEN HIPS) 500 MG tablet TAKE 1 TABLET (500 MG TOTAL) BY MOUTH 2 TIMES A DAY WITH IRON TABLETS 180 tablet 3     cetirizine (ZYRTEC) 10 MG tablet Take 1  tablet (10 mg total) by mouth daily. 90 tablet 2     cholecalciferol, vitamin D3, (VITAMIN D3) 1,000 unit capsule Take 1 capsule (1,000 Units total) by mouth daily. 90 capsule 3     cyanocobalamin 1000 MCG tablet Take 1,000 mcg by mouth daily. Indications: Prevention of Vitamin B12 Deficiency       FLUoxetine (PROZAC) 20 MG capsule Take 20 mg by mouth daily. Indications: major depressive disorder       fluticasone (FLONASE ALLERGY RELIEF) 50 mcg/actuation nasal spray 1-2 sprays a day as needed. 16 g 5     omeprazole (PRILOSEC) 20 MG capsule Take 1 capsule (20 mg total) by mouth daily. 30 capsule 5     ranitidine (ZANTAC) 150 MG tablet TAKE ONE TABLET BY MOUTH DAILY AS NEEDED FOR HEARTBURN 90 tablet 3     deferasirox (EXJADE) 250 MG disintegrating tablet Take 20 mg/kg by mouth daily before breakfast. Dispense 5 tabs (1,250mg) in an appropriate amount of water, organe or apply juice and take po daily before breakfast  Indications: excess iron due to repeated blood transfusions       FLUoxetine (PROZAC) 20 MG capsule Take 20 mg by mouth daily. Indications: major depressive disorder       isoniazid (NYDRAZID) 300 MG tablet Take 300 mg by mouth daily.  7     QUEtiapine (SEROQUEL) 50 MG tablet Take 50 mg by mouth at bedtime.       SEA SOFT NASAL MIST 0.65 % nasal spray USE 1 SPRAY INTO EACH NOSTRIL AS NEEDED FOR CONGESTION. 45 mL 5     [DISCONTINUED] deferasirox (EXJADE) 250 MG disintegrating tablet Take 5 tablets (1,250 mg total) by mouth daily before breakfast. 150 tablet 2     No current facility-administered medications on file prior to visit.        Pertinent past medical, surgical, social and family history reviewed and updated in [facility].    Social History     Socioeconomic History     Marital status:      Spouse name: Not on file     Number of children: 8     Years of education: Not on file     Highest education level: Not on file   Occupational History     Not on file   Social Needs     Financial resource  "strain: Not on file     Food insecurity:     Worry: Not on file     Inability: Not on file     Transportation needs:     Medical: Not on file     Non-medical: Not on file   Tobacco Use     Smoking status: Former Smoker     Years: 15.00     Last attempt to quit: 3/21/2004     Years since quitting: 15.2     Smokeless tobacco: Never Used     Tobacco comment: no passive exposure in home.   Substance and Sexual Activity     Alcohol use: No     Drug use: No     Sexual activity: Never   Lifestyle     Physical activity:     Days per week: Not on file     Minutes per session: Not on file     Stress: Not on file   Relationships     Social connections:     Talks on phone: Not on file     Gets together: Not on file     Attends Caodaism service: Not on file     Active member of club or organization: Not on file     Attends meetings of clubs or organizations: Not on file     Relationship status: Not on file     Intimate partner violence:     Fear of current or ex partner: Not on file     Emotionally abused: Not on file     Physically abused: Not on file     Forced sexual activity: Not on file   Other Topics Concern     Not on file   Social History Narrative     Not on file         REVIEW OF SYSTEMS    ROS: 10 pt review of systems preformed and all negative except noted in HPI.     PHYSICAL EXAM    /84 (Patient Site: Left Arm, Patient Position: Sitting, Cuff Size: Adult Regular)   Pulse 84   Temp 98.4  F (36.9  C) (Oral)   Resp 24   Ht 5' 0.75\" (1.543 m)   Wt 149 lb 2 oz (67.6 kg)   LMP 12/01/2014 (Exact Date)   BMI 28.41 kg/m    GEN:  54 y.o. female sitting comfortably in no apparent distress.   HEENT: EOMI, no scleral icterus, buccal mucosa moist  Neck: Supple without lymphadenopathy or thyromegally   CHEST/LUNG: No respiratory distress, good air flow to bases, CTAB   CV: RRR, S1, S2 normal; no murmurs, rubs or gallops. No edema. Pulses   ABD:  Soft,non distended, no guarding,  No masses.  Normal bowel sounds.  I " do feel a little lump in the epigastric region probably due to a hiatal hernia.  She has a little tenderness to palpation in this area.  MSK:  Strength grossly normal  EXTR:  No cyanosis, I do not see any swelling or edema today.  She reports is worse at the end of the day.  SKIN: warm, dry, no rashes or lesions  NEURO: Slow gait vut normal  PSYCH: Depressed affect      At the end of the encounter, I discussed results, diagnosis, medications. Discussed red flags for immediate return to clinic/ER, as well as indications for follow up if no improvement. Patient and/or caregiver understood and agreed to plan. Patient was stable for discharge.    Heidi Hernandez

## 2021-05-29 NOTE — TELEPHONE ENCOUNTER
Hi Dr. Hernandez,    I saw Indra for an OASIS re-certification. After multiple weeks of home care and med teaching it appears patient and her CG are not able to understand how to manage her medications. I am requesting to continued skilled nurse visits 1x/EOW x60 days with 3 prn for complex medication management and on-going assessment.    Please respond with orders.    Thanks,    Kellie Zhu RN  [facility]

## 2021-05-29 NOTE — TELEPHONE ENCOUNTER
Request for Orders    Who s Requesting: Home Care Occupational Therapist    Orders being requested: OT  2 visit(s) every 1 week(s) for 4 week(s) for coping skills, ADL/IADL indep and safety.    Where to send Orders: [facility], response through Epic preferred. Please call if you have questions.    Thank you!  Sara Norwood, OTR/L  [phone]

## 2021-05-29 NOTE — PROGRESS NOTES
"ASSESSMENT/PLAN:  1. Tension headache     2. Elevated ferritin level  Hepatic Profile    Hemoglobinopathy/Thalassemia Cascade   3. Abdominal pain, epigastric  Hepatic Profile       This is a 55 yo female here for ER followup - was seen 5/9/19 in the ER with headache and low blood pressure.  She has history of chronic headaches - tension headaches - patient today has signs/symptom of tension headache - no neurological red flags - discussed treatment.  I have reviewed available ER records,  notes, as well as imaging and lab results.  In addition I have reviewed the medication list and made any adjustments as indicated in orders.    Also:  Elevated ferritin level - patient has h/o multiple tranfusions - will check hepatic profile, hemoglobinopathy/thalassemia cascade as suggested by her hematologist (she has not followed through).      And - patient with epigastric abdominal pain - check hepatic profile.  H pylori testing has been negative.   She is still taking isoniazid for TB prophylaxis.      Return in about 2 weeks (around 6/4/2019) for Recheck labs.      There are no discontinued medications.  There are no Patient Instructions on file for this visit.    Chief Complaint:  Chief Complaint   Patient presents with     ER follow up     at [facility] DC: 05/09/2019, DX: headaches & high blood pressure        HPI:   Indra Jason is a 54 y.o. female c/o  Was seen in ER 5/9/19 - for headache  Had pain \"all over\", stomach pain, leg swelling and pain  Had hypertension -   Headache is a little bit better than before  Stomach pain and leg pain are still the same  Taking Ranitidine and Omeprazole    Stomach pain is epigastric   Has pain all the time - whether eating or not eating  Did have vomiting on way to ER and in ER - none since    Has leg swelling    Did see hematologist for evaluation of ?hemochromotosis   It's not clear that she had any further lab evaluation    Still taking medication for TB (isoniazid)        PMH: "   Patient Active Problem List    Diagnosis Date Noted     RBC microcytosis 03/21/2019     TB lung, latent 03/14/2019     Somatic dysfunction of cervical region 02/27/2019     Somatic dysfunction of head region 02/27/2019     Somatic dysfunction of thoracic region 02/27/2019     Somatic dysfunction of abdominal region 02/27/2019     Dizziness 02/09/2019     Epigastric pain 02/09/2019     Elevated ferritin level 01/14/2019     PTSD (post-traumatic stress disorder) 01/11/2019     Moderate episode of recurrent major depressive disorder (H) 01/11/2019     Chronic pain 05/26/2015     Tension headache      Fatigue      Vitamin D deficiency      Adjustment Disorder      Past Medical History:   Diagnosis Date     Abdominal pain     Created by Conversion   Overview:  Normal colonoscopy 05/02/2013     Allergic rhinitis 10/7/2016     Anxiety      Cervical Polyps     Created by Conversion   Overview:  Overview:  Created by Conversion     Chronic lower back pain 2/23/2016     Depression      Eosinophilia 2/6/2012     H. pylori infection      Nonspecific abnormal finding of lung field 2/27/2019    Overview:  Refer to clinic note Dr Jones 2/27/2013     Pulmonary tuberculosis 5/19/2016    Overview:  History of pulmonary tuberculosis treated in 1994.  AFB smears/culture September 13-15, 2011 negative.     Screen for colon cancer 5/2/2013    Overview:  Normal colonoscopy 05/02/2013     Past Surgical History:   Procedure Laterality Date     ABDOMINAL SURGERY N/A      Social History     Socioeconomic History     Marital status:      Spouse name: Not on file     Number of children: 8     Years of education: Not on file     Highest education level: Not on file   Occupational History     Not on file   Social Needs     Financial resource strain: Not on file     Food insecurity:     Worry: Not on file     Inability: Not on file     Transportation needs:     Medical: Not on file     Non-medical: Not on file   Tobacco Use     Smoking  status: Former Smoker     Years: 15.00     Last attempt to quit: 3/21/2004     Years since quitting: 15.1     Smokeless tobacco: Never Used     Tobacco comment: no passive exposure in home.   Substance and Sexual Activity     Alcohol use: No     Drug use: No     Sexual activity: Never   Lifestyle     Physical activity:     Days per week: Not on file     Minutes per session: Not on file     Stress: Not on file   Relationships     Social connections:     Talks on phone: Not on file     Gets together: Not on file     Attends Anglican service: Not on file     Active member of club or organization: Not on file     Attends meetings of clubs or organizations: Not on file     Relationship status: Not on file     Intimate partner violence:     Fear of current or ex partner: Not on file     Emotionally abused: Not on file     Physically abused: Not on file     Forced sexual activity: Not on file   Other Topics Concern     Not on file   Social History Narrative     Not on file     History reviewed. No pertinent family history.    Meds:    Current Outpatient Medications:      acetaminophen (Q-PAP EXTRA STRENGTH) 500 MG tablet, TAKE 1-2 TABLETS (500-1,000 MG TOTAL) BY MOUTH 3 TIMES A DAY AS NEEDED FOR PAIN., Disp: 100 tablet, Rfl: 5     ascorbic acid, vitamin C, (ASCORBIC ACID WITH JEN HIPS) 500 MG tablet, TAKE 1 TABLET (500 MG TOTAL) BY MOUTH 2 TIMES A DAY WITH IRON TABLETS, Disp: 180 tablet, Rfl: 3     cetirizine (ZYRTEC) 10 MG tablet, Take 1 tablet (10 mg total) by mouth daily., Disp: 90 tablet, Rfl: 2     cholecalciferol, vitamin D3, (VITAMIN D3) 1,000 unit capsule, Take 1 capsule (1,000 Units total) by mouth daily., Disp: 90 capsule, Rfl: 3     deferasirox (EXJADE) 250 MG disintegrating tablet, Take 20 mg/kg by mouth daily before breakfast. Dispense 5 tabs (1,250mg) in an appropriate amount of water, organe or apply juice and take po daily before breakfast  Indications: excess iron due to repeated blood transfusions,  Disp: , Rfl:      FLUoxetine (PROZAC) 20 MG capsule, Take 20 mg by mouth daily. Indications: major depressive disorder, Disp: , Rfl:      fluticasone (FLONASE ALLERGY RELIEF) 50 mcg/actuation nasal spray, 1-2 sprays a day as needed., Disp: 16 g, Rfl: 5     isoniazid (NYDRAZID) 300 MG tablet, Take 300 mg by mouth daily., Disp: , Rfl: 7     lidocaine (XYLOCAINE) 5 % ointment, Apply sized amount to affected area up to 3 times daily. Lower back, Disp: 35.44 g, Rfl: 2     omeprazole (PRILOSEC) 20 MG capsule, Take 1 capsule (20 mg total) by mouth daily., Disp: 30 capsule, Rfl: 5     QUEtiapine (SEROQUEL) 50 MG tablet, Take 50 mg by mouth at bedtime., Disp: , Rfl:      ranitidine (ZANTAC) 150 MG tablet, TAKE ONE TABLET BY MOUTH DAILY AS NEEDED FOR HEARTBURN, Disp: 90 tablet, Rfl: 3     SEA SOFT NASAL MIST 0.65 % nasal spray, USE 1 SPRAY INTO EACH NOSTRIL AS NEEDED FOR CONGESTION., Disp: 45 mL, Rfl: 5     cyanocobalamin 1000 MCG tablet, Take 1,000 mcg by mouth daily. Indications: Prevention of Vitamin B12 Deficiency, Disp: , Rfl:      deferasirox (EXJADE) 250 MG disintegrating tablet, Take 5 tablets (1,250 mg total) by mouth daily before breakfast., Disp: 150 tablet, Rfl: 2     FLUoxetine (PROZAC) 20 MG capsule, Take 20 mg by mouth daily. Indications: major depressive disorder, Disp: , Rfl:     Allergies:  No Known Allergies    ROS:  Pertinent positives as noted in HPI; otherwise 12 point ROS negative.      Physical Exam:  EXAM:  /90 (Patient Site: Right Arm, Patient Position: Sitting, Cuff Size: Adult Regular)   Pulse 98   Wt 150 lb 5 oz (68.2 kg)   LMP 12/01/2014 (Exact Date)   SpO2 95%   Breastfeeding? No   BMI 28.87 kg/m     Gen:  NAD, appears well, well-hydrated  HEENT:  TMs nl, oropharynx benign, nasal mucosa nl, conjunctiva clear  Neck:  Supple, no adenopathy, no thyromegaly, no carotid bruits, no JVD  Lungs:  Clear to auscultation bilaterally  Cor:  RRR no murmur  Abd:  Soft, mild tenderness in  epigastrium, BS+, no masses, no guarding or rebound, no HSM  Extr:  Neg.  Neuro:  No asymmetry  Skin:  Warm/dry        Results:  Results for orders placed or performed in visit on 05/21/19   Hepatic Profile   Result Value Ref Range    Bilirubin, Total 0.3 0.0 - 1.0 mg/dL    Bilirubin, Direct <0.1 <=0.5 mg/dL    Protein, Total 7.5 6.0 - 8.0 g/dL    Albumin 3.8 3.5 - 5.0 g/dL    Alkaline Phosphatase 77 45 - 120 U/L    AST 25 0 - 40 U/L    ALT 38 0 - 45 U/L

## 2021-05-29 NOTE — TELEPHONE ENCOUNTER
Please also tell the patient that I have the [place] parking disability form printed.  We can go over this form when she comes in and sees me on 6/25 for assessment for cane.  The cane alone would qualify her for the disability parking sticker.

## 2021-05-29 NOTE — TELEPHONE ENCOUNTER
"Dr. David MONTEZ.       Called and left message for pt to return call.# 1 Upon call back please inform patient that she needs to be seen with hematology and oncology before they can refill her medication.  \" Okay to relay message\"  Use  line to contact :Cindy ID:[id number]      "

## 2021-05-29 NOTE — TELEPHONE ENCOUNTER
Called the phone number in the chart with Language line  Alessandroabdullahinaye ID# [id number]. Was told that this is the phone number for patient's brother in-law. He stated he was driving and unable to give me a good number to reach Indra, and unable to take down the office number to call back. He requested that we give him a call tomorrow 6/12/19 to get a number to call the patient. Will call him back tomorrow.

## 2021-05-29 NOTE — TELEPHONE ENCOUNTER
"Called and left message for pt to call clinic back using  line: Madai ID#[id number]  \"Okay to relay message below\"      ----- Message from Heidi Hernandez DO sent at 6/10/2019  1:33 PM CDT -----  Please call patient and inform:  Iron studies are improving.  I would like you to keep taking that pill that gets rid of the iron.  We are try to figure out a refill for you.  Vitamin B12 is getting much closer to normal level.  We will plan to recheck it again in 1 to 3 months.  The labs I did for your abdominal pain were normal.  At this point I would like you to see a GI specialist regarding your abdominal pain.  They might consider a CT scan or doing a scope where they look into into your stomach like we talked about.    "

## 2021-05-29 NOTE — TELEPHONE ENCOUNTER
Attempted to call patient with Language Line  Sergio ID# [id number]. Left a voice message for a return call. Please relay below message upon return call.

## 2021-05-29 NOTE — TELEPHONE ENCOUNTER
Patient Returning Call  Reason for call:  Return call to Brayan. Esperanza stated Brayan left her message asking for [facility] to refill the patient's Exjade. Please call her back.   Information relayed to patient:  n/a  Patient has additional questions:  Yes  If YES, what are your questions/concerns:  Please call [facility] back because they cannot fill the Exjade. Caller stated patient has been in to see them for over a year now.  Okay to leave a detailed message?: No   [phone]

## 2021-05-29 NOTE — TELEPHONE ENCOUNTER
Use  line to contact :  Bao ID:[id number]  Called and spoke with Bhavin, On CTC, Message was given, Bhavin understood, no further questions.

## 2021-05-29 NOTE — PROGRESS NOTES
"Mental Health Visit Note    5/28/2019    Start time: 11:10am    Stop Time: 11:48am   Session # 10    Session Type: Patient is presenting for an Individual session.    Indra Jason is a 54 y.o. female is being seen today for    Chief Complaint   Patient presents with     MH Follow Up     Patient presented for follow up psychotherapy to address coping with acculturation difficulty and pain concerns that impact her mood and functioning.   .     New symptoms or complaints: None    Functional Impairment:   Personal: 4  Family: 3  Work: 4  Social:4    Clinical assessment of mental status:Reviewed. MSE is current effective 5/28/19  Grooming: Well groomed  Attire: Appropriate  Age: Appears Stated  Behavior Towards Examiner: Cooperative  Motor Activity: Within normal   Eye Contact: avoidant  Mood: Depressed  Affect: Congruent w/content of speech, smile at times.   Speech/Language: Within normal.  Attention: Distractible  Concentration: Brief  Thought Process: Within normal  Thought Content: Hallucinations: Within noraml  Delusions: Within normal  Orientation: X 3  Memory: Impairment  Judgement: No Evidence of Impairment  Estimated Intelligence: Below Average  Demonstrated Insight: Diminished  Fund of Knowledge: inadequate        Suicidal/Homicidal Ideation present: None Reported This Session.     Patient's impression of their current status: Patient reports persistent pain. She is able to recall a time without pain, which was while living in [facility] over [place] years go before going the [place]. She does not know the cause of her pain, but reports it started suddenly and gradually worsened over time. She denies any accident or harm to her body. She continues to have constant headaches. She followed up with physician about the \"tension headaches.\"   Patient endorses the following symptoms:   Sleep:poor at times  Depressed mood: heavy heart  Anxiety/Worries: most days  Pain: \"all over body\"  \"When my heart is heavy, I worry, " "and don't sleep, it makes my pain worse.\"        Therapist impression of patients current state: Patient presented for follow up individual psychotherapy visit. A male professional Khmer  from [facility] [place], [place], was present for the visit due to the language complexity. Her son-in-law was also present for the visit. Patient's report about her pain history is an indication that pain is a somatic manifestation of her mental health. 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 It was during the flight and migration stage to the camp that brought an onset of pain. She has developed insight to throughout therapy and increased awareness of the mind-body connection. We continue to work on coping with pain and internal healing to help decrease somatic pain. Therapist encouraged her to work through limiting/fearful thoughts that stop her from engaging in activities. We practiced examples of reframing together in session. Explored the concept of \"if it makes me feel good on the insight, it will make me feel good on the outside.\" For example, feeling pain may stop her from going on walk. If she works through those thoughts and goes on a walk because she knows it makes her worries and depression decrease, ultimately, her pain will likely decrease as well.   PHQ-9 was 18 on 5/21/19 physician visit.    Type of psychotherapeutic technique provided: Insight oriented and Client centered, CBT    Progress toward short term goals:Progress as expected, completed ED follow up with physician. Also plans to follow up with PCP next week.    Prior to next session, take action to make improvements in mood and pain rather than waiting to feel better to take action.    Review of long term goals: Not done at today's visit and Effective  3/27/2019-06/27/2019    Diagnosis:   1. PTSD (post-traumatic stress disorder)    2. Moderate episode of recurrent major depressive disorder (H)        Plan and Follow up: Patient is scheduled for " follow up psychotherapy on 6/11/2019      Discharge Criteria/Planning: Client has chronic symptoms and ongoing therapy for maintenance stability recommended.    Karena Ashton [facility] 5/28/2019

## 2021-05-29 NOTE — TELEPHONE ENCOUNTER
----- Message from Wes Drake sent at 5/7/2019  1:55 PM CDT -----  Check on status of PCA referral  and who is the assign  at [facility].

## 2021-05-29 NOTE — TELEPHONE ENCOUNTER
Request for Orders    Who s Requesting: Home Care Physical Therapist    Orders being requested: please approve extending home care PT 2x/week for 2 additional weeks to continue progression towards goals. Thank you    Where to send Orders: Please reply within Epic for verbal okay

## 2021-05-29 NOTE — TELEPHONE ENCOUNTER
Patient dropped off EDICAL OPINION FORM for Dr. Hernandez to fill out. Placed the original copies in the DrSaloni's slot.    When forms are completed, patient would like it:    -Please call patient to let them know it's ready. Please call son-in-law Martinez Jack @ [phone] to  form.      Please re-route task back to the  to shred the copied forms and complete the task. Thanks!

## 2021-05-29 NOTE — TELEPHONE ENCOUNTER
Hi Dr. Hernandez,    Is Indra Jason supposed to continue taking deferasirox (EXJADE) 250 MG disintegrating tablet Take 20 mg/kg by mouth daily before breakfast. Dispense 5 tabs (1,250mg) in an appropriate amount of water, organe or apply juice and take po daily before breakfast Indications: excess iron due to repeated blood transfusions?     Patient has ran out of this medications and [facility] has no current prescript to refill. If patient is to continue this medication, please call refill to [facility]  [phone].    Thank,    Kellie Zhu, RN  [facility]

## 2021-05-29 NOTE — PROGRESS NOTES
6-13-19  Attempt 1: Care Guide called patient.  If this patient is returning my call, please transfer to [phone]  Upcoming appt 6-25-19 at 11am with therapist    Plan: meet patient in clinic 6-25-19 with Karena Ashton therapist

## 2021-05-29 NOTE — TELEPHONE ENCOUNTER
6-21-19   Re: name of   Called [facility] [phone] and left voice message to call back regarding name and contact information of  for patient.

## 2021-05-29 NOTE — TELEPHONE ENCOUNTER
Called patient left message to call clinic using  Marco Antonio #[id number]          ----- Message from Heidi Hernandez, DO sent at 6/10/2019  1:33 PM CDT -----  Please call patient and inform:  Iron studies are improving.  I would like you to keep taking that pill that gets rid of the iron.  We are try to figure out a refill for you.  Vitamin B12 is getting much closer to normal level.  We will plan to recheck it again in 1 to 3 months.  The labs I did for your abdominal pain were normal.  At this point I would like you to see a GI specialist regarding your abdominal pain.  They might consider a CT scan or doing a scope where they look into into your stomach like we talked about.

## 2021-05-29 NOTE — PROGRESS NOTES
Fax sent to [facility] requesting copy of patient's current medication list for coordination of mental health care and coordination of jonathan with primary clinic. An JAIME is on file effective 3/14/19-3/14/20. Karena PARADA

## 2021-05-29 NOTE — TELEPHONE ENCOUNTER
Called the phone number in the chart with Language line  Alessandroabdullahinaye ID# [id number]. Was told that this is the phone number for patient's brother in-law. He stated he was driving and unable to give me a good number to reach Indra, and unable to take down the office number to call back. He requested that we give him a call tomorrow 6/12/19 to get a number to call the patient. Will call him back tomorrow.

## 2021-05-29 NOTE — PROGRESS NOTES
Please call patient and inform:  Iron studies are improving.  I would like you to keep taking that pill that gets rid of the iron.  We are try to figure out a refill for you.  Vitamin B12 is getting much closer to normal level.  We will plan to recheck it again in 1 to 3 months.  The labs I did for your abdominal pain were normal.  At this point I would like you to see a GI specialist regarding your abdominal pain.  They might consider a CT scan or doing a scope where they look into into your stomach like we talked about.

## 2021-05-29 NOTE — TELEPHONE ENCOUNTER
Request for Orders    Who s Requesting: Home Care Physical Therapist    Orders being requested: PLease approve 1 additional home care PT visit for PT discharge as patient missed her last PT visit for DC due to conflicting medical appointment.    Where to send Orders: Please reply within Epic for verbal okay

## 2021-05-29 NOTE — TELEPHONE ENCOUNTER
Use  line to contact :  Bao ID:[id number]  Called and spoke with kat, on CTC, Message was given, Kat understood, no further questions.

## 2021-05-29 NOTE — PROGRESS NOTES
Mental Health Visit Note    6/11/2019    Start time: 11:13am    Stop Time: 11:58am   Session # 11    Session Type: Patient is presenting for an Individual session.    Indra Jason is a 54 y.o. female is being seen today for    Chief Complaint   Patient presents with      Follow Up   .     New symptoms or complaints: None    Functional Impairment:   Personal: 4  Family: 3  Work: 4  Social:4    Clinical assessment of mental status:Reviewed. MSE is current effective 6/11/19  Grooming: Well groomed  Attire: Appropriate  Age: Appears Stated  Behavior Towards Examiner: Cooperative  Motor Activity: Within normal   Eye Contact: appropriate  Mood: Anxious  Affect: Congruent w/content of speech and Flat  Speech/Language: Within normal.  Attention: Distractible  Concentration: Brief  Thought Process: Within normal  Thought Content: Hallucinations: Within noraml  Delusions: Within normal  Orientation: X 3  Memory: Impairment  Judgement: No Evidence of Impairment  Estimated Intelligence: Below Average  Demonstrated Insight: Diminished  Fund of Knowledge: adequate      Suicidal/Homicidal Ideation present: None Reported This Session.     Patient's impression of their current status: Patient reports recently completing a PCA assessment with the [facility]- approved for 7 hrs a day. She continues to attend PT. She comes with concerns about a fear of falling. She endorses, heavy feet that are hard to lift at times. She has moments of wondering if she is becoming paralyzed. She reports unsteady gait and weakness and is requesting a cane. She endorses dizziness upon standing/transferring. These symptoms have illicited a fear response. She worries something bad will happen. Patient notices a feeling of a shaky heart at times. She denies depressed mood in the last too weeks.   She asks about work on her goal for citizenship and confirms she continues to attend [employer].       Therapist impression of patients current state: Patient  presented for follow up individual psychotherapy visit. Patient's daughter was present. A different [employer] male Spanish , Landonkyechase MONZON, was present for the visit due to the language complexity.  Continued work on developing awareness of body. Patient was engaged in session and participated in a body-scan and deep breathing practice. She was able to remain present during the practice- no dissociation upon assessment. Some discomfort with dizziness symptoms arose- normalized this feeling when getting into body work.  The presentation of dizziness symptoms upon somatic experiencing is indicative of trauma stored in the body. We continue to work on awareness without needing to change and slowly working towards spending more time in the body to increase comfort and decrease fear response over time. Therapist believes continued practice will allow for development of internal relaxation rather than restlessness, distress and agitation that is currently felt in the body. Continued relaxation practices and increased awareness will allow for the release of tension in body. It is likely this will also help decrease her tension headaches, which have been a severe concern and caused ED visits int he last month. One improvement is that she has had days without headaches in the last 2 weeks. PT will also be a good practice for being in her body.     Type of psychotherapeutic technique provided: Insight oriented and Client centered, CBT    Progress toward short term goals:Progress as expected, attending PT and practicing exercises at home to help improve pain. Reports improvement in mood as well. Decrease in headaches.    Prior to next session, patient will practice deep breathing and body awareness through scans- working on relaxation and grounding.   Care guide to follow up with patient re: citizenship process goal they are working on as she is out of the office today (message to care guide).   Patient to schedule PCP  appointment to discuss further request for a cane and assessment/written orders if PCP feels this is appropriate.     Review of long term goals: Not done at today's visit and Effective  3/27/2019-06/27/2019. Will update at next session.     Diagnosis:   1. PTSD (post-traumatic stress disorder)    2. Moderate episode of recurrent major depressive disorder (H)        Plan and Follow up: Patient is scheduled for follow up psychotherapy on 6/25/2019      Discharge Criteria/Planning: Client has chronic symptoms and ongoing therapy for maintenance stability recommended.    Karena Ashton [facility] 6/11/2019

## 2021-05-29 NOTE — TELEPHONE ENCOUNTER
Medication had to be special delivered.  I would contact the hematologist to see if we can get a refill for her.  Patient had to be at the home for the delivery.  And yes she should be on it.

## 2021-05-30 NOTE — TELEPHONE ENCOUNTER
Central PA team  [phone]  Pool: TAI PA MED ([id number])          PA has been initiated.       PA form completed and faxed insurance via Cover My Meds     Key:  MAISHA MOTA Key: [id number] - Rx #: [id number]        Medication:  Lidocaine 5% ointment      Insurance:  [facility] Medicaid Electronic PA Form (CB)          Response will be received via fax and may take up to 5-10 business days depending on plan

## 2021-05-30 NOTE — PROGRESS NOTES
6-25-19  Met with patient and daughter ( Victor M) /PCA in the clinic and follow up on goals.  [facility]  Adam Arceo-MALIK  Patient's daughter reported:  -has PCA services now. 7 hours a day. Daughter (Victor M) is the PCA at [facility] [employer] [phone]. Goal completed. Updated in care teams.  -still waiting to hear from [facility] for citizenship application.    Requesting information to send back to [facility] in order to schedule appt for the Medical Waiver [id number]  Provided daughter name and address to [facility] and  names.    Faxed the requested information to Keenan at [facility] [phone] to schedule appt to start the Medical waiver form.      Follow up 7-29-19

## 2021-05-30 NOTE — PROGRESS NOTES
7-18-19  [facility] Plane Upgrade    Open Encounter today.  Episodes created, care management status validated and encounters linked
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
## 2021-05-30 NOTE — PROGRESS NOTES
Outpatient Mental Health Treatment Plan     Name:  Indra Jason  :  1965  MRN:  [id number]     Treatment Plan:  UpdateTreatment Plan   Intake/initial treatment plan date:  2018  Benefit and risks and alternatives have been discussed: Yes  Is this treatment appropriate with minimal intrusion/restrictions: Yes  Estimated duration of treatment:  Approximately 20 sessions.  Anticipated frequency of services:  Every 1-2 weeks  Necessity for frequency: This frequency is needed to establish therapeutic goals and for continuity of care in order to monitor progress.  Necessity for treatment: To address cognitive, behavioral, and/or emotional barriers in order to work toward goals and to improve quality of life.     Session Type: Patient is presenting for an Individual session.     Plan:         ? Depression                 Goal:    Decrease average depression level from 4 to 1.              Strategies:       ?[x] Decrease social isolation                                      [x] Increase involvement in meaningful activities                                      ?[x] Discuss sleep hygiene                                      ?[x] Explore thoughts and expectations about self and others                                      ?[x] Process grief (loss of significant person, independence, role,                                     etc.)                                      ?[x] Assess for suicide risk                                      ?[x] Implement physical activity routine (with physician approval)                                      [x] Consider introduction of bibliotherapy and/or videos                                      [x] Continue compliance with medical treatment plan (or explore barriers)                                         ?  ?Degree to which this is a problem: 4  Degree to which goal is met: 1  Date of Review:   06/25/2019-9/25/2019                                                                                ?              ? Anxiety                        Goal:    Decrease average anxiety level from 4 to 1.              Strategies:       ? [x]Learn and practice relaxation techniques and other coping  strategies (e.g., thought stopping, reframing, meditation)                                      ? [x] Increase involvement in meaningful activities                                      ? [x] Discuss sleep hygiene                                      ? [x] Explore thoughts and expectations about self and others                                      ? [x] Identify and monitor triggers for panic/anxiety symptoms                                      ? [x] Implement physical activity routine (with physician approval)                                      ? [x] Consider introduction of bibliotherapy and/or videos                                      ? [x] Continue compliance with medical treatment plan (or explore                                      barriers)             Degree to which this is a problem: 4  Degree to which goal is met: 1  Date of Review:  06/25/2019-9/25/2019        Chronic pain and other somatic symptoms  Goal:    ? Decrease average pain level from 4 to 1.              ? Increase % acceptance of pain from 35 to 60.              Strategies:       ? [x] Explore thoughts and expectations about self and others                            [x] Explore emotional reactions to illness/injury                                       ? [x] Learn and practice relaxation techniques and other coping                                          strategies                                             [x] Implement physical activity routine (with physician approval)                                      ? [x] Engage in values clarification and goal-setting                                      ? [x] Consider introduction of bibliotherapy  and/or videos                                      ? [x] Increase involvement in meaningful activities                                      ? [x] Discuss sleep hygiene                                      ? [x] Process grief (loss of significant person, independence, role,                                      etc.)                                      ? [x] Assess for suicide risk                                      ?  Degree to which this is a problem: 4  Degree to which goal is met: 1  Date of Review: 06/25/2019-9/25/2019        Plan:   Other:  PTSD  Goal: Decrease average impact of PTSD symptoms from 4 to 1                        Strategies:                              ?[x] Forge a therapeutic alliance built on trust in order to allow for further processing of trauma experience.                             ?[x] Assessment of client-acculturation process. Psycho-education about western-therapy.                             ?[x] Exposure therapy and psycho-education to develop a sense normalization, legitimization, and description of trauma reactions.                              ?[x] Development of trauma timeline or narrative.                             ?[x] Cognitive Restructuring to help make sense of the trauma and to put meaning to the trauma- develop new insights and thorough understanding of behaviors during the event- modify feelings of guilt and shame.                              ?[x] Learn and implement somatic/experiential techniques.                             ?[x] Learn and Implement Mindfulness/Grounding techniques to decrease hyperarousal.     ?Degree to which this is a problem: 4  Degree to which goal is met: 1  Date of Review: 06/25/2019-9/25/2019        Functional Impairment:  1=Not at all/Rarely  2=Some days  3=Most Days  4=Every Day    Personal : 4  Family : 3  Social : 4   Work/school : 4     Diagnosis:  PTSD  Major Depressive Disorder, recurrent, moderate        WHODAS 2.0 12-item  version: 38 or 79%   Scores presented in qualifiers to represent level of disability.  SEVERE Problem (high, extreme, ...) 50-95%  H1= 30  H2= 8-10  H3= 20        Clinical assessments and measures completed:. MICAELA-7, PHQ-9 and CAGE-AID, C-SSRS, PHQ-15  PHQ-9 Total Score: 15  MICAELA 7 Total Score: 11  Score: __/4: 0  * PHQ-9 and MICAELA-7 scores have decreased since last treatment plan.   PHQ-15= 17  C-SSRS: Thoughts of harming self when irritable and having difficulty coping with the pain. Thinks of stabbing self with a knife (impulsive thought). Denies any preparatory behavior. Denies any plans to act. Thoughts of her family stop her from acting on the thought. She takes a moment to breathe and work through the pain.      Strengths:  She receives the support of her daughter and son in law. Her son-in-law drives her to appointments. She has been referred for psychiatry and Clinic care coordination. She has been in therapy before; therefore, she has some understanding of the therapeutic process. She is open and engaged in session.  Limitations:  Continued acculturation stressors and other basic needs that need to be met first.  Memory impairment- may have difficulty recalling what is discussed, learned and practiced in sessions.  Cultural Considerations: Patient is a [place] female. She requires the use of a [place]  to communicate with providers. She has fled war in [place] and lived in refugee camps prior to resettling in the [place]. She experiences grief from the death of her  about 7 years ago. She is a single mother of 7 children, some of whom are adults, but some are young and live at home.     Persons responsible for this plan: Patient and Provider. Coordinate care with PCP as needed. Coordinate care with Psychiatrist and CCC team once established with them.         Karena Ashton [facility] 06/25/2019  Psychotherapist Signature         Indra Jason  Patient Signature:              Guardian  Signature             Provider: Performed and documented by TAL Kruger   Date:  6/25/2019

## 2021-05-30 NOTE — TELEPHONE ENCOUNTER
Fax received from [facility], they have started the Prior Authorization Process via Cover My Meds    CoverMyMeds Key: [id number]    Medication Name:   lidocaine (XYLOCAINE) 5 % ointment 35.44 g 2 6/26/2019     Sig: Apply sized amount to affected area up to 3 times daily. Lower back    Sent to pharmacy as: lidocaine (XYLOCAINE) 5 % ointment    E-Prescribing Status: Receipt confirmed by pharmacy (6/26/2019 10:19 AM CDT)          Insurance Plan: BLAS  PBM:   Patient ID: [id number]    Please complete the PA process

## 2021-05-30 NOTE — PROGRESS NOTES
"Mental Health Visit Note    6/25/2019    Start time: 11:08am    Stop Time: 11:53am   Session # 12    Session Type: Patient is presenting for an Individual session.    Indra Jason is a 54 y.o. female is being seen today for    Chief Complaint   Patient presents with      Follow Up     Patient presented for follow up psychotherapy to address coping with pain and depressed mood.   .     New symptoms or complaints: None    Functional Impairment:   Personal: 4  Family: 3  Work: 4  Social:4    Clinical assessment of mental status:Reviewed. MSE is current effective 6/26/2019  Grooming: Well groomed  Attire: Appropriate  Age: Appears Stated  Behavior Towards Examiner: Cooperative  Motor Activity: slow, stiff. appears in pain   Eye Contact: appropriate  Mood: Depressed, Anxious  Affect: Congruent w/content of speech and Flat  Speech/Language: Within normal.  Attention: Distractible  Concentration: Brief  Thought Process: Within normal  Thought Content: Hallucinations: Within noraml  Delusions: Within normal  Orientation: X 3  Memory: Impairment  Judgement: No Evidence of Impairment  Estimated Intelligence: Below Average  Demonstrated Insight: Diminished  Fund of Knowledge: adequate      Suicidal/Homicidal Ideation present: Yes: thoughts of stabbing self when pain is severe. HArd to sit wiht the pain and internal discomfort. . Denies any preparatory behavior. Denies any plans to act. Thoughts of her family stop her from acting on the thought. She takes a moment to breathe and work through the pain.   Reviewed crisis plan to call 911, go to nearest ER, or contact other supports/services. Provided printout of contact information for Pending sale to [facility] crisis, suicide hotline, and  urgent care.     Patient's impression of their current status: Patient reports she recently fell out of bed (about 4-5 days ago). She reports being \"unconscious\" and her grandchildren eventually shook her awake. She doesn't think she hit her head. She felt " dizzy after. It was a scary experience for her. She is now scared it will happen again. It is hard for her to fall asleep. She now has pain and soreness on her body, especially elbow that hit the floor. She reports worries, poor memory and poor organization with appointments.     Therapist impression of patients current state: Patient presented for follow up individual psychotherapy visit. Patient's daughter was present. A different [facility] male Y[place] , Adam JULES #[id number], was present for the visit due to the language complexity. Her daughter was also present. Patient presented with slow, shaky gait and anxiety symptoms. She has been engaging in her deep breathing practices. She followed up with PCP re: cane and continues to want to follow up on it due to fear of falls. She plans to follow up soon due to her recent fall. She continues to benefit from an integrated care approach due to complexities of her mental and physical health as well as acculturation difficulties. Therapist consulted with PCP who reports assessment of musculoskeletal health is good and strong. PCP states her perception is that much of patient's weakness and unsteadiness is due to her mental health concerns. Patient continues to have good medication adherence. Patient also connected with clinic care guide today. At this time, her anxiety symptoms have exacerbated from recent fall.   Therapist and patient updated treatment plan goals and measures. View document and flowsheets.   PHQ-9 Total Score: 15  MICAELA 7 Total Score: 11  Score: __/4: 0  * PHQ-9 and MICAELA-7 scores have decreased since last treatment plan.   PHQ-15= 17  C-SSRS: Moderate risk- depression and additional pain concerns increase risk. Thoughts of harming self when irritable and having difficulty coping with the pain. Thinks of stabbing self with a knife (impulsive thought).      Type of psychotherapeutic technique provided: Insight oriented and Client centered, CBT    Progress  toward short term goals:Progress as expected, has been practicing deep breathing and relaxation techniques. Followed up with care guide today while in clniic re; Natal appintments and citizenship process which has caused patient worry/anxiety. Good follow up with PCP.     Review of long term goals: Treatment Plan updated. Effective 6/25/2019-09/25/2019.    Diagnosis:   1. PTSD (post-traumatic stress disorder)    2. Moderate episode of recurrent major depressive disorder (H)        Plan and Follow up: Patient is scheduled for follow up psychotherapy on 7/9/2019      Discharge Criteria/Planning: Client has chronic symptoms and ongoing therapy for maintenance stability recommended.    Karena Ashton [facility] 6/25/2019

## 2021-05-30 NOTE — PROGRESS NOTES
"  Mental Health Visit Note    7/22/2019    Start time: 1:23pm    Stop Time: 2:10pm   Session # 13    Session Type: Patient is presenting for an Individual session.    Indra Jason is a 54 y.o. female is being seen today for    Chief Complaint   Patient presents with     MH Follow Up     patient presented for follow up psychotherapy to address coping with depressed mood and pain.   .     New symptoms or complaints: None    Functional Impairment:   Personal: 4  Family: 3  Work: 4  Social:4    Clinical assessment of mental status:Reviewed. MSE is current effective 7/22/19  Grooming: Well groomed  Attire: Appropriate  Age: Appears Stated  Behavior Towards Examiner: Cooperative  Motor Activity: slow, stiff. appears in pain - holding right side in pain  Eye Contact: appropriate  Mood: Depressed, Anxious  Affect: Congruent w/content of speech and Flat, grimace pain at times  Speech/Language: Within normal.  Attention: Distractible  Concentration: Brief  Thought Process: Within normal  Thought Content: Hallucinations: Within noraml  Delusions: Within normal  Orientation: X 3  Memory: Impairment  Judgement: No Evidence of Impairment  Estimated Intelligence: Below Average  Demonstrated Insight: Diminished  Fund of Knowledge: adequate      Suicidal/Homicidal Ideation present: Yes: continues  to have some passive SI thoughts . Triggered by severe pain. Denies any preparatory behavior. Denies any plans to act. Protective factor: family stop her from acting on the thought. Reviewed crisis plan to call 911, go to nearest ER, or contact other supports/services. She has contact information for crisis resources.     Patient's impression of their current status: Patient has been feeling depressed about 2-3 times per week.  During these moments she has wish to be dead, which is usually due to severe pain.  She believes her stress and anxiety is \"getting better.\"  Today her symptoms include: Headache, nausea, painful knee is.  She has " been sleeping well with taking Seroquel before bed and denies any nightmares.  She has had limited activities because her dizziness has been frequent.  Therefore she spends most of the day laying down and resting.  She eddie by seeing her friends and talking to them.  They visit her 1-2 times a week.  She states she is able to feel breezy in those moments.  She has moments where she thinks about her  and grieves the loss of him.  This has been on her mind more as she is considering converting religions to feel more connected to him.     Therapist impression of patients current state: Patient presented for follow up individual psychotherapy visit. She arrived late. Patient's son-in-law was present. A [facility] female [facility] , Gema KIM (#[id number]), was present for the visit due to the language complexity.  Patient presented with pain and depressed mood.  We developed insight into how laying around for most of the day allows her to have more time to think.  Developed awareness about how anxiety adds fuel to the fire.  Her dizziness symptom causes fear of falling which then further increases pain further increases anxiety and further increases her dizziness.  Her depression and anxiety exacerbates her physical symptoms.  Her physical symptoms then in turn exacerbate her depression and anxiety.  We continue to work on getting her unstuck from this cycle.  Patient typically avoids talking about the loss of her  and therefore today that showed trust in the relationship that she wanted to process these thoughts with therapist and feel heard and validated for her feelings and potential plans that some of her family are not agreeing with.    Prior to next session she will practice self talk to reassure herself.  We work on increasing positive thoughts rather than ruminating over worries.     Type of psychotherapeutic technique provided: Insight oriented and Client centered, CBT    Progress toward short term  goals:Progress as expected, attended appointment with Dr. Hernandez  Continues to work on managing pain. Reports improvement sin stress and anxiety. Frequency of depressed mood has decreased. Severity of SI has decreased.    Review of long term goals: Long-term goals reviewed ,signed and scanned into emr. Effective 6/25/2019-09/25/2019.    Diagnosis:   1. PTSD (post-traumatic stress disorder)    2. Moderate episode of recurrent major depressive disorder (H)        Plan and Follow up: Patient is scheduled for follow up psychotherapy on 8/5/2019      Discharge Criteria/Planning: Client has chronic symptoms and ongoing therapy for maintenance stability recommended.    Karena Ashton [facility] 7/22/2019

## 2021-05-31 NOTE — PROGRESS NOTES
Clinic Care Coordination Contact    Follow Up Progress Note      Assessment: Follow up on status of RN CCC goal to ensure ok to complete     Goals Addressed                 This Visit's Progress       Patient Stated      COMPLETED: RN GOAL:  I would like assistance with my medication set up in the next 30-90 days. (pt-stated)        Action steps to achieve this goal  1.  I will meet with the CCC RN on 4/19/2019.  2.  My children will  the medications from [facility] as instructed on 4/12/19.  3.  My children will be home to sign for the specialty pharmacy medication as coordinated on 4/12/19.  4.  I will be open to the idea of Home Care to set up my medications on a monthly basis.    Date goal set:  4/15/2019  Update: 08/27/19  RN CCC outreach and spoke Son in Law   He states that they have home care set up -goal completed          S= Situation: Chart review of clinic care coordination enrollment status   B= Background: CHW met with pt at PCP visit - reviewed and completed CHW goal and progression to completions status with resources and supports identified.  RN CCC goal to be reivewed. RN CCC outreach to family and confirmed medication management plan in place. No new concerns no new goals  A= Action Steps:Outreach in 2 months per protocol. Clinic care coordination will be available for patient before if needed.   R= Recommendation: Transition to maintenance status  Care guide Delegations from RN CC : Please send maintenance plan to patient and outreach per standard work protocol     Understanding Post-Traumatic Stress Disorder (PTSD)  You may have post-traumatic stress disorder (PTSD) if you ve been through a traumatic event and are having trouble dealing with it. Such events may include a car crash, rape, domestic violence,  combat, or violent crime. While it is normal to have some anxiety after such an event, it usually goes away in time. But with PTSD, the anxiety is more intense and keeps  coming back. And the trauma is relived through nightmares, intrusive memories, and flashbacks (vivid memories that seem real). The symptoms of PTSD can cause problems with relationships and make it hard to cope with daily life. But it can be treated. With help, you can feel better.  How does it feel?  Symptoms of PTSD often start within a few months of the event. Here are some common symptoms:  You startle more easily, and feel anxious and on edge all the time. This can lead to sleep problems. It a can cause you to feel overwhelmed or become angry or upset more easily. Panic attacks (sudden, intense feelings of terror and a strong need to escape from wherever you are) can also occur.  You relive the event through nightmares and flashbacks. During these, you may feel strong emotions and as though you re reliving the event all over again.  You avoid people, places, or activities that remind you of the trauma. You may hold in your feelings and become emotionally numb. It may be hard to concentrate at work or school or to relax with friends. You may be afraid to let people get close to you.  Who does it affect?  Not everyone who survives a trauma will have PTSD. But many will. In fact, millions of people have the condition. PTSD can happen to anyone, but it most often develops after a person feels his or her or another s life is threatened.  You re at risk for PTSD if you have experienced or witnessed:  A rape or sexual abuse  A mugging or carjacking  A car accident or plane crash  A life-threatening illness  War  Domestic violence  Childhood abuse  Natural disasters such as earthquakes, floods, or hurricanes  The sudden death of a loved one  Finding help  The first step is to talk to a trusted counselor or healthcare provider. He or she can help you take the next step to treatment. This may involve therapy (also called counseling) and medication.  Are you having suicidal thoughts?  You may be feeling helpless,  hopeless, and that you can t go on. You may even have thoughts of suicide. But help is available. There are ways to ease this pain and manage the problems in your life.  If you are thinking about harming or killing yourself, please tell your healthcare provider or someone you care about immediately or go to the nearest crisis walk-in center or emergency room.  You can also call, toll-free,  800-SUICIDE (130-225-0602)   [phone] ([phone])     Resources  American Psychiatric Association  606.987.6656  www.healthyminds.org  American Psychological Association  www.apa.org/helpcenter  Anxiety and Depression Association of [place]  www.adaa.org  Mental Health [place]  www.nmha.org  National Center for PTSD  www.ptsd.va.gov  National [place] of Mental Health  www.nimh.nih.gov/topics/urcft-rpga-rbha.shtml  National Suicide Prevention Lifeline  505.804.4159 (626-846-AXIV)  Www.suicidepreventionlifeline.org   Depression  Everyone feels down at times. The blues are a natural part of life. But an unhappy period that s intense or lasts for more than a couple of weeks can be a sign of depression. Depression is a serious illness. It can disrupt the lives of family and friends. If you know someone you think may be depressed, find out what you can do to help.    Know the serious signals  Warning signals for suicide include:    Threats or talk of suicide    Statements such as  I won t be a problem much longer  or  Nothing matters     Giving away possessions or making a will or  arrangements    Buying a gun or other weapon    Sudden, unexplained cheerfulness or calm after a period of depression  If you notice any of these signs, get help right away. Call a health care professional, mental health clinic, or suicide hotline and ask what action to take. In an emergency, don t hesitate to call the police.    [facility] Mental Health Crisis Lines:  [facility] [phone]  [facility] [phone]  [place]  [facility] [phone]  [facility] [phone]  [facility], Adults [phone]  [facility], Children [phone]  [facility], Adults [phone]  [facility], Children [phone]  When a Loved One Has a Mental Illness   It s hard to watch a loved one deal with mental illness. You want to help. Yet you may not know what to do. Your loved one may even push you away. But don t give up. Your support is needed now more than ever. Talk to your loved one s health care provider. Or contact a group for families of people with mental illness. They can help give you the guidance you need.  What you can do  Living with mental illness can be overwhelming. Your loved one may say or do things that shock or frighten you. Sometimes your loved one may resist treatment. Knowing what to do can help you cope:  Help your loved one get proper care. Often people with a mental illness deny there s a problem. Or they may not be able to seek help on their own.  Encourage your loved one to stick with treatment. This may be your most crucial job. Medicines that treat mental illness can have side effects. As a result, your loved one may stop taking them. But this will likely cause symptoms to come back. You might also want to go to healthcare provider visits with your loved one to discuss medicine and other issues.  Provide emotional support. Encourage your loved one to share his or her feelings. Listen and don t . Let your loved one know he or she can count on you.  Be patient. The healing process takes time. In some cases, your loved one may never fully recover. But his or her symptoms will likely improve.  Ask them to join in. Invite your loved one to take part in activities. But don t push.  Take care of yourself. Helping your loved one can be very stressful. Take time to care for yourself. You ll have more patience and will be better able to cope.    Seeking help  If you are worried your loved one may harm  themselves or attempt suicide, ask him or her. Asking doesn t cause suicide.  If the suicide risk is not immediate, call the person s healthcare provider, go to a local mental health clinic, or call the National Suicide Prevention Lifeline at 599-717-HTSV (4479). It is open 24 hours a day, every day. They speak English and Uzbek. This resource provides immediate crisis intervention and information on local resources. It is free and confidential.   Don t ignore a person's talk of suicide or think it s just an attention-seeking behavior. As hard as it is, talking can save lives.  Call 911  Call 911 if the person is at immediate risk of suicide (he or she has a suicide plan and access to a method such as guns or drugs). Or take the person to the nearest emergency room. Don t leave the person alone. Remove any guns and medicines.   Resources  National [place] on Mental Illness 237-994-9584 www.mariela.org  National [place] of Mental Health 556-840-8701 www.[facility].nih.gov  Mental Health [place] 451-541-7063 www.[facility].org  National Suicide Prevention Lifeline 123-222-KDPS (6205) www.suicidepreventionlifeline.org        [facility] Mental Health Crisis Lines:  [facility] [phone]  [facility] [phone]  [facility] [phone]  [facility] [phone]  [facility], Adults [phone]  [facility], Children [phone]  [facility], Adults [phone]  [facility], Children [phone]  Emergency Plan Recommendation:    When to Use the Emergency Department (ED)  An emergency means you could die if you don t get care quickly. Or you could be hurt permanently (disabled). Read below to know when to use -- and when not to use -- an emergency department (also called ED).    Dangers to your life  Here are examples of emergencies. These need immediate care:  A hard time breathing  Severe chest pain  Choking  Severe bleeding  Suddenly not able to move or speak  Blacking out  (fainting)`  Poisoning    Dangers of permanent injuries  Here are other emergencies. These also need immediate care:  Deep cuts or severe burns  Broken bones, or sudden severe pain and swelling in a joint    When it s an emergency  If you have an emergency, follow these steps:    1. Go to the nearest emergency department  If you can, go to the hospital ED closest to you right away.  If you cannot get there right away, or if it is not safe to take yourself, call 911 or your police emergency number.  2. Call your primary care doctor  Tell your doctor about the emergency. Call within 24 hours of going to the ED.  If you cannot call, have someone call for you.  Go to your doctor (not the ED) for any follow-up care.    When it s not an emergency  If a problem is not an emergency, follow these steps:    1. Call your primary care doctor  If you don t know the name of your doctor, call your health plan.  If you cannot call, have someone call for you.  2. Follow instructions  Your doctor will tell you what you should do.  You may be told to see your doctor right away. You may be told to go to the ED. Or you may be told to go to an urgent care center.  Follow your doctor s advice.

## 2021-05-31 NOTE — PROGRESS NOTES
Mental Health Visit Note    8/5/2019    Start time: 9:09am    Stop Time: 10:04am   Session # 14    Session Type: Patient is presenting for an Individual session.    Indra Jason is a 54 y.o. female is being seen today for    Chief Complaint   Patient presents with      Follow Up     session to address coping with pain concerns and grief   .     New symptoms or complaints: None    Functional Impairment:   Personal: 4  Family: 3  Work: 4  Social:4    Clinical assessment of mental status:Reviewed. MSE is current effective 8/5/19  Grooming: Well groomed  Attire: Appropriate  Age: Appears Stated  Behavior Towards Examiner: Cooperative  Motor Activity: Within normal. cane  Eye Contact: appropriate  Mood: Depressed  Affect: Congruent w/content of speech  Speech/Language: Within normal.  Attention: Within normal  Concentration: Within normal  Thought Process: Within normal  Thought Content: Hallucinations: Within noraml  Delusions: Within normal  Orientation: X 3  Memory: Impairment  Judgement: No Evidence of Impairment  Estimated Intelligence: Below Average  Demonstrated Insight: Diminished  Fund of Knowledge: adequate      Suicidal/Homicidal Ideation present: None Reported This Session. Denies SI/HI. (Indicates improvement in symptoms)     Patient's impression of their current status: Not feeling well in regards to leg pain and swelling and dizziness today.  Reports good medication compliance.  He continues to process plans to convert religions.  She has come to the decision that she will convert to Mandaen, but has not told some of her sons yet.  She worries about some disagreements within the family about her choice.  She has some children who are providing support in regards to her decision.    Therapist impression of patients current state: Patient presented for follow up individual psychotherapy visit. Patient's son-in-law was present. A male Romansh , Ottoniel, from [employer] was present for the  visit due to the language complexity.  Patient continues to present with pain and other somatic symptoms with depressed mood.  She was well-groomed, alert, oriented, and had full affect today.  She uses a cane for mobility support.  Therapist normalized family conflicts that can arise due to different beliefs whether that be Jain or political in nature.  It sounds as though she has engaged in a lot of internal reflection in order to come to her own decision.  Encouraged patient to openly communicate her feelings with her family members and her reasons why she is making this choice to help them better understand her own experience.  Validated patient's right to follow her own choice and do what feels right for her in regards to fulfilling her Jain and spiritual needs as a part of her own well-being.      Type of psychotherapeutic technique provided: Insight oriented and Client centered, CBT, relational    Progress toward short term goals:Progress as expected, less ruminating and worries. Has done a lot of internal reflection and reassuring herself.     Review of long term goals: Not done at today's visit. Effective 6/25/2019-09/25/2019.    Diagnosis:   1. PTSD (post-traumatic stress disorder)    2. Moderate episode of recurrent major depressive disorder (H)        Plan and Follow up: Patient is scheduled for follow up psychotherapy on 8/26/2019      Discharge Criteria/Planning: Client has chronic symptoms and ongoing therapy for maintenance stability recommended.    [facility]a Ashton [facility] 8/5/2019

## 2021-05-31 NOTE — PROGRESS NOTES
Phone call to patient with telelanguage Antoine  re: [facility] [address]-  supplies at 10:45am on Saturday, 8/17/19. Location: [facility] [address] [place] [address], [facility], [place].   Karena Ashton [facility]

## 2021-05-31 NOTE — PROGRESS NOTES
8-26-19  Patient requested to see CHW.  Patient follow up with TAL Kruger today.    Met with patient and her daughter tient came with daughter Victor M.  [facility] : Nakul Lundberg    Patient brought in the completed medical waiver form from [facility] and notification letter for fingerprint.   Fingerprint scheduled for 9-5-19 at 3pm.  [address]  [place] [address]  Suggested daughter to Google map if she does not know how to get there.  Bring to appt the letter,  [place] ID and green card.  Patient and daughter confirmed information.    Made copy of the Medical Waiver form to scan to chart.  Provided copy for Karena Ashton, therapist and  PCP to review Medical Waiver and scan to chart.    Mailed the original Medical Waiver to [facility]ast[facility]ia Francis/Natasha Burgess [address], [place] [address].     Goal completed. Patient has support from daughter to go complete finger prints.  No other goal besides RN Goal.    Patient does not have any other goals just the citizenship for now.    Discussed transition to maintenance.   Patient and daughter stated to wait until affer she completes the finger pirnt 9-5-19    Routed to CCC RN to review RN goal since no other goals for CHW.    Plan:   discuss transition to maintenance after consult with CCC RN  CHW follow up 9-26-19

## 2021-05-31 NOTE — PROGRESS NOTES
"  Mental Health Visit Note    8/26/2019    Start time: 9:06am    Stop Time: 9:39am   Session # 15    Session Type: Patient is presenting for an Individual session.    Indra Jason is a 54 y.o. female is being seen today for    Chief Complaint   Patient presents with      Follow Up     session to address fatigue and heavy heart   .     New symptoms or complaints: None    Functional Impairment:   Personal: 4  Family: 3  Work: 4  Social:4    Clinical assessment of mental status:Reviewed. MSE is current effective 8/26/19  Grooming: Well groomed  Attire: Appropriate  Age: Appears Stated  Behavior Towards Examiner: Cooperative  Motor Activity: Within normal. Slow- cane  Eye Contact: appropriate  Mood: Depressed  Affect: Congruent w/content of speech  Speech/Language: Within normal.  Attention: Within normal  Concentration: Within normal  Thought Process: Within normal  Thought Content: Hallucinations: Within noraml  Delusions: Within normal  Orientation: X 3  Memory: Impairment  Judgement: No Evidence of Impairment  Estimated Intelligence: Below Average  Demonstrated Insight: Diminished  Fund of Knowledge: adequate      Suicidal/Homicidal Ideation present: None Reported This Session    Patient's impression of their current status: She continues to \"feel bad about life\" because of pain every day.  Rates pain 2 out of 10 today.  Believes her pain medications help her get to a rating of 2.  She notices \"the more pain in my body, my brain hurts.\"  She explained that the more she sits, the more pain she has in her legs.  She finds it helpful to walk.  She no longer does activities around the house such as cooking or cleaning.  She relies on help from her daughter to do all of those things.    Therapist impression of patients current state: Patient presented for follow up individual psychotherapy visit. Patient's daughter was present. A different male Polish  from [employer] was present for the visit due " "to the language complexity.  Time was spent explaining the connection between mind and body.  It is difficult for her to be aware of slight changes in her levels of pain.  She does have a good understanding of the mind and body connection.  Another strength is that she is good about knowing what her body needs and what helps it feel better.  We continue to develop insight into how we cause mental suffering if seeking to be pain free.  We continue to work on acceptance to help decrease the mental suffering which in turn will help decrease the perception of pain.  Explored willingness to participate more in activities at home.  Explored what steps she might be able to be involved in during the cooking process.  Developed insight into the importance of playing a role in her family to have a sense of purpose and meaning and not lose this due to pain and suffering she has.  Prior to next session she will find a way that she can participate in cooking activities such as making tea, pouring water, washing or chopping vegetables, sitting and talking during the meal preparation process, or making rice.    Patient brought in forms regarding the citizenship process and was able to meet with CHW today to review the goal.  Therapist reviewed N-[id number] form completed by Irasema Norris, licensed clinical psychologist at [facility].    DSM-5 Diagnoses: F89- Unspecified Neurodevelopmental Disorder; F33.2 Major Depressive Disorder, severe.    Per document, patient's intellectual functioning is estimated to be in the \"extremely low range.\" \"Some scores were low enough as to not be calculable.\" \"Results suggest she will not be able to acquire new information...   that she will have significant difficulty processing new information.\"      Type of psychotherapeutic technique provided: Insight oriented and Client centered, ACT, motivational interviewing    Progress toward short term goals:Progress as expected, continues to implement " coping skills outside of session to help manage depressed mood, worries and pain concerns. Continues to receive good family support. Continues to work towards the citizenship process.     Review of long term goals: Not done at today's visit. Effective 6/25/2019-09/25/2019.    Diagnosis:   1. PTSD (post-traumatic stress disorder)    2. Moderate episode of recurrent major depressive disorder (H)        Plan and Follow up: Patient is scheduled for follow up psychotherapy on 9/9/2019      Discharge Criteria/Planning: Client has chronic symptoms and ongoing therapy for maintenance stability recommended.    Karena Ashton [facility] 8/26/2019

## 2021-05-31 NOTE — TELEPHONE ENCOUNTER
Requesting orders for recertification of skilled nursing every 2 weeks for 9 weeks for medication management and general health assessment. 3 PRN visits for medication changes, reassessment, and OASIS data collection  Thanks

## 2021-05-31 NOTE — PROGRESS NOTES
7-31-19  Called and spoke to patient's son in law Amandeep Delcid and follow up on goals.  Son In Law reported:  - did not hear from [facility] yet about citizenship     Called to [facility] [phone] and left message for Anastasiia Bush to call CHW back regarding status of citizenship application.  Reminded son in law of upcoming appt 8-5-18 at 9am with TAL Kruger.      Plan:  Monthly follow up   CHW next out reach: 8-30-19  follow up in clinic on 8-5-19 if needed

## 2021-05-31 NOTE — TELEPHONE ENCOUNTER
Patient reports omeprazole DR 20mg prescription is not covered by her insurance. She had to pay $10 for the prescription. She would like to change to a different medication that would be covered by her insurance plan of possible.

## 2021-05-31 NOTE — PROGRESS NOTES
[facility] Hematology and Oncology Progress Note    Patient: Indra Jason  MRN: [id number]  Date of Service: 08/09/2019        Reason for Visit    Chief Complaint   Patient presents with     Benign Hematology     Elevated ferritin level       Assessment and Plan      Elevated ferritin, possible hemochromatosis  Microcytic red cells with normal hemoglobin electrophoresis    Patient started Exjade and appears to be tolerating medication fairly well.  Will check lab work today to assess tolerance and response to treatment.  We will plan to continue the medication at the current dose for now.  We will get liver MRI and cardiac MRI before a follow-up in about 6 weeks.  Will recheck labs again before she returns.  Can then determine need to continue on Exjade.    Hemoglobin electrophoresis was normal.    Plan: Continue Exjade 1000 mill grams p.o. daily  Check labs today and before return in 6 weeks  We will schedule liver MRI and cardiac MRI to assess for iron overload    Measurable disease: Ferritin, iron saturation    Current therapy: Exjade 1000 mg p.o. daily        ECOG Performance   ECOG Performance Status: 2    Distress Assessment  Distress Assessment Score: No distress    Pain         Problem List    1. Elevated ferritin level  MR Liver With Contrast    MR Myocardium With Without Contrast    HM1(CBC and Differential)    Comprehensive Metabolic Panel    Ferritin        CC: Heidi Hernandez,     ______________________________________________________________________________    History of Present Illness    Ms. Indra Jason returns for follow-up.  She was seen back in April for evaluation of elevated ferritin and by Dr. Sanchez.  She had some lab testing done.  She has started on Exjade but is taking 1000 mg daily and not the prescribed 1250 mg daily.    Tolerating it fairly well.  Some nausea but no diarrhea.  Recent rash but this is not itching.  No significant fatigue.  No history of diabetes or thyroid dysfunction.   No history of cardiac or liver issues.  She does have some chronic joint pain symptoms.        Pain Status  Currently in Pain: Yes    Review of Systems    Constitutional  Constitutional (WDL): Exceptions to WDL  Fatigue: Fatigue not relieved by rest - Limiting instrumental ADL  Neurosensory  Neurosensory (WDL): Exceptions to WDL  Ataxia: Asymptomatic, clinical or diagnostic observations only, intervention not indicated(Uses cane. )  Peripheral Sensory Neuropathy: Asymptomatic, loss of deep tendon reflexes or paresthesia(Numbness to legs. )  Cardiovascular  Cardiovascular (WDL): Exceptions to WDL  Palpitations: Definition: A disorder characterized by inflammation of the muscle tissue of the heart.  Edema: Yes(Feet/ankles.)  Pulmonary  Respiratory (WDL): Within Defined Limits  Gastrointestinal  Gastrointestinal (WDL): Exceptions to WDL  Anorexia: Loss of appetite without alteration in eating habits(Only eating once/day.)  Constipation: Occasional or intermittent symptoms, occasional use of stool softeners, laxatives, dietary modification, or enema  Nausea: Loss of appetite without alteration in eating habits  Genitourinary  Genitourinary (WDL): All genitourinary elements are within defined limits  Integumentary  Integumentary (WDL): All integumentary elements are within defined limits  Patient Coping  Patient Coping: Accepting  Distress Assessment  Distress Assessment Score: No distress  Accompanied by  Accompanied by: Family Member    Past History  Past Medical History:   Diagnosis Date     Abdominal pain     Created by Conversion   Overview:  Normal colonoscopy 05/02/2013     Allergic rhinitis 10/7/2016     Anxiety      Cervical Polyps     Created by Conversion   Overview:  Overview:  Created by Conversion     Chronic lower back pain 2/23/2016     Depression      Eosinophilia 2/6/2012     H. pylori infection      Nonspecific abnormal finding of lung field 2/27/2019    Overview:  Refer to clinic note Dr Jones  2/27/2013     Pulmonary tuberculosis 5/19/2016    Overview:  History of pulmonary tuberculosis treated in 1994.  AFB smears/culture September 13-15, 2011 negative.     Screen for colon cancer 5/2/2013    Overview:  Normal colonoscopy 05/02/2013         Past Surgical History:   Procedure Laterality Date     ABDOMINAL SURGERY N/A        Physical Exam    Recent Vitals 8/9/2019   Height -   Weight 146 lbs 13 oz   BSA (m2) 1.69 m2   /86   Pulse 76   Temp 98.6   Temp src 1   SpO2 97   Some recent data might be hidden       GENERAL: Alert and oriented to time place and person. Seated comfortably. In no distress.    HEAD: Atraumatic and normocephalic.    EYES: JEANETTE, EOMI.  No pallor.  No icterus.    Oral cavity: no mucosal lesion or tonsillar enlargement.    NECK: supple. JVP normal.  No thyroid enlargement.    LYMPH NODES: No palpable, cervical, axillary or inguinal lymphadenopathy.    CHEST: clear to auscultation bilaterally.  Resonant to percussion throughout bilaterally.  Symmetrical breath movements bilaterally.    CVS: S1 and S2 are heard. Regular rate and rhythm.  No murmur or gallop or rub heard.  No peripheral edema.    ABDOMEN: Soft. Not tender. Not distended.  No palpable hepatomegaly or splenomegaly.  No other mass palpable.  Bowel sounds heard.    EXTREMITIES: Warm.    SKIN: no rash, or bruising or purpura.  Has a full head of hair.      Lab Results    No results found for this or any previous visit (from the past 168 hour(s)).    Imaging    No results found.      Signed by: Steve Pavon MD

## 2021-06-01 NOTE — TELEPHONE ENCOUNTER
Patient is taking Omeprazole 20mg daily. Her insurance only pays for 120 days per year. Her co-pay has increased to $10 month which is difficult for patient to pay.  Is there an alternative for the patient or could a prior auth be sent to see if insurance will pay more?    Patient is also on Protonix and Ranitidine daily for heartburn relief.    Please advise and let us know of any medication changes or new orders.    [employer] or call [phone]    Thank you,    Zenaida Hamilton RN

## 2021-06-01 NOTE — PROGRESS NOTES
Mental Health Visit Note     9/23/2019    Start time: 9:04am    Stop Time: 9:53am   Session # 18    Session Type: Patient is presenting for an Individual session.    Indra Jason is a 54 y.o. female is being seen today for    Chief Complaint   Patient presents with      Follow Up     session to address coping with pain, fatigue and depressed mood       New symptoms or complaints: None       Functional Impairment:   Personal: 4  Family: 3  Work: 4  Social:4    Clinical assessment of mental status:Reviewed. MSE is current effective 9/23/19  Grooming: Well groomed  Attire: Appropriate  Age: Appears Stated  Behavior Towards Examiner: Cooperative, actively engaged  Motor Activity: Within normal. Slow- cane  Eye Contact: appropriate  Mood: Anxious, frustrated  Affect: Congruent w/content of speech and Anxious.  Speech/Language: Within normal.  Attention: Within normal   Concentration: Brief  Thought Process: Within normal  Thought Content: Hallucinations: Within noraml  Delusions: Within normal  Orientation: X 3  Memory: Impairment  Judgement: No Evidence of Impairment  Estimated Intelligence: Below Average  Demonstrated Insight: Diminished  Fund of Knowledge: adequate      Suicidal/Homicidal Ideation present: None Reported This Session    Patient's impression of current status: Patient expressed concerns about knee pain and leg swelling. She believes her eye is getting better and is taking medications for it. Her homecare nurse comes today. She expressed frustration about  concerns at recent appointments she had. She is anxious about getting results about her health- she worries that she might have cancer. She is working with a physician at [facility] who has ordered MRI imaging. She expressed being very anxious, but trying to live each day in the present moment.     Therapist impression of patients current state: Patient presented for follow up individual psychotherapy visit. Patient's son in law was  present. A female Macedonian , Gema KIM, was present for the visit due to the language complexity.  Patient was actively engaged in session. Empowered patient to advocate for her needs. Provided empathic listening and patient voiced her concerns and her worries. She was extremely alert and present in session- more activated than usual. Absent of flat affect or dissociation. Patient left session feeling heard and understood. Therapist also followed up with  services management about patient's  requests. Encouraged patient to continue to advocate for her self, needs and comfort.     Type of psychotherapeutic technique provided: Insight oriented, Client centered and Solution-focused    Progress toward short term goals:Progress as expected, improving self-empowerment and advocacy skills. Working through anxiety symptoms through practicing mindfulness, which she will continue.    Review of long term goals: Not done at today's visit. Effective 6/25/2019-09/25/2019. Update next session.     Diagnosis:   1. PTSD (post-traumatic stress disorder)    2. Moderate episode of recurrent major depressive disorder (H)    F89- Unspecified Neurodevelopmental Disorder per Irasema Norris LP    Plan and Follow up: Patient is scheduled for follow up psychotherapy on 10/08/2019      Discharge Criteria/Planning: Client has chronic symptoms and ongoing therapy for maintenance stability recommended.    Karena Ashton [facility] 9/23/2019

## 2021-06-01 NOTE — TELEPHONE ENCOUNTER
Patient has a large lump on her right upper eyelid(about almond size). Red, warm, tender. No drainage. No change in vision.  Patient states it has been there about 1 wk.  Attempted to make appt at PCP but no openings till Monday.  Patient will go to [facility] tomorrow(9/11) to have eye assessed.

## 2021-06-01 NOTE — TELEPHONE ENCOUNTER
Could you please update Med list to reflect d/c of Omeprazole if that is the action to be taken.  She has a 2 wk supply of Omeprazole, would you like her to finish this or just switch to the Protonix and stop the Omeprazole.    Please advise.    Thank you,  Zenaida Hamilton RN

## 2021-06-01 NOTE — TELEPHONE ENCOUNTER
She should not be on both omeprazole and Protonix.  If she has the Protonix it should be covered by insurance and she can continue on that.

## 2021-06-01 NOTE — TELEPHONE ENCOUNTER
RN cannot approve Refill Request    RN can NOT refill this medication med is not covered by policy/route to provider. Last office visit: 6/26/2019 Heidi Hernandez DO Last Physical: 10/17/2018 Last MTM visit: Visit date not found Last visit same specialty: 6/26/2019 Heidi Hernandez DO.  Next visit within 3 mo: Visit date not found  Next physical within 3 mo: Visit date not found      Gale Silva, [facility] Triage/Med Refill 9/9/2019    Requested Prescriptions   Pending Prescriptions Disp Refills     acetaminophen (Q-PAP EXTRA STRENGTH) 500 MG tablet 100 tablet 5     Sig: TAKE 1-2 TABLETS (500-1,000 MG TOTAL) BY MOUTH 3 TIMES A DAY AS NEEDED FOR PAIN.       There is no refill protocol information for this order

## 2021-06-01 NOTE — PROGRESS NOTES
9-24-19  My Clinic Care Coordination Wellness Plan  This Maintenance Wellness Plan provides private information in regard to the work I have done with my Care Team at my Primary Care Clinic.  This document provides insight on the goals I have worked hard to achieve.  My Care Team congratulates me on my journey to become well.  With the assistance of the Clinic Care Coordination Team and my Primary Care Provider, I have succeeded in improving areas of my health that I identified as barriers to becoming well.  I will continue to seek wellness and use the skills I have obtained to further my journey.  My Community Health Worker will follow up with me in 2 months  (11-27-19).  In the meantime, if I should have any questions or concerns I will contact my Community Health Worker.  [address]  [address]  [phone]    My Preferred Method of Contact:  Phone: [phone]    My Primary/Preferred Language:  Greenlandic    Preferred Learning Style:  Reading information, Face to face discussion, Pictures/Diagrams and Hands on teaching    Emergency Contact: Extended Emergency Contact Information  Primary Emergency Contact: Vaishnavi Prabhakar   [place]  Mobile Phone: [phone]  Relation: Child  Secondary Emergency Contact: Syd Prabhakar   [place]  Mobile Phone: [phone]  Relation: Child     PCP:  Heidi Hernandez DO  Care Team            Heidi Henrandez DO   [phone] PCP - General, Family Medicine    Kelsey Barajas   [phone] Audiologist, Audiology    [facility]    Lora Sherman   [phone] Nurse Practitioner, Audiology    [facility]    Olive Ortega, CNP   [phone] Nurse Practitioner, Pain Medicine    [facility]  (Pain Center) [address] [place] [address]    Jen Baxter (PT)   [phone] Physical Therapist, Rehabilitation    Physical Therapy at [facility]  [address]  [place] [address]    Blue Ride-     Member ID:                                             PMI#: [id number]     Amandeep Delcid (son)   [phone]     AJIME signed 1-7-19     Idalia Villavicencio, MSW, [facility], [facility],   [phone] , Behavioral Health    [facility]  appt that was Feb 2, 2019 was rescheduled d/t Karena Aparicio, [facility]   [phone] , Behavioral Health    [facility]  [facility] [phone]    Wilmer Fam PA-C   [phone] Psychiatry    [facility] -Psychiatry services - psych medication management     Steve Pavon MD   [phone] Physician, Hematology and Oncology    Victor M-daughter/PCA     daughter/PCA 7 hours a day     [facility]   [phone] , Home Health Services    [facility]   [phone] Home Health Services    Wes Juan, W   [phone] Community Health Worker, Primary Care - CC    Julisa HernandezeDO   [phone] Assigned PCP    Anastasiia Bush, [facility]   [phone]     support with citizenship process and application     Olivia Palm, RN Lead Care Coordinator, Primary Care - CC        Accomplishments:  Goals        Patient Stated      COMPLETED: Housing issues have been resolved.  (pt-stated)      Personal Plan:   Daughter in law can call [facility] Intake Line [phone] if they have issues with landlord or to learn about tenant's rights.  [address]  [place], [place] [address] [phone]  Fax: [phone]  Intake Line [phone]  Call between 9-11:45am and 1-3pm.              COMPLETED: I have connected with [facility] to help with citizenship process. (pt-stated)      Personal Plan:  Son in law Amandeep Delcid will contact [facility] at [phone] if I have any questions about  citizenship process and application.  [place] [address] [address]  [place] [address] [phone]  Daughter will support to go to  complete fingerprint on 9-5-19 at 3pm.  [address]  [place] [address]          COMPLETED: I now have food support from the [facility].  (pt-stated)      Personal Plan:  My daughter in law will support to renew food stamp or apply for [facility] benefit by going to   [facility]  [address] [place] [address]  EZ info Line [phone] to check on status of application and benefit.          COMPLETED: I now have PCA services to assist me with my daily needs at home.  (pt-stated)      Personal Plan:   My daughter Victor M will support me at home.  If there is issues with PCA services daughter can contact [facility] [phone]         COMPLETED: I now see Wilmer Fam PA-C  pscyhiatrist at [facility] for medications. (pt-stated)      Personal Plan:  My son will support me to appt at [facility] to see psychiatrist.   [address]  [place], [place] [address] [phone]  Fax: [phone]        COMPLETED: I have my son to help schedule for eye exam as recommended to maintain healthy vision. (pt-stated    Personal Plan:  Son will support to call to [facility] to schedule eye exam [phone] [address] [address]   Phone: [phone]       COMPLETED: I have my son to help schedule appt for hearing check or check hearing aid as recommended to maintain healthy hearing aid.        Personal Plan:   Son call to [facility]  [phone] to schedule hearing check or check hearing aid.  [address] [place] [address]          [phone]       COMPLETED: RN GOAL:  I would like assistance with my medication set up in the next 30-90 days. (pt-stated)      Action steps to achieve this goal  1.  I will meet with the [facility] RN on 4/19/2019.  2.  My children will  the medications from [facility] as instructed on 4/12/19.  3.  My children will be home to  sign for the specialty pharmacy medication as coordinated on 4/12/19.  4.  I will be open to the idea of Home Care to set up my medications on a monthly basis.    Date goal set:  4/15/2019  Update: 08/27/19  RN CCC outreach and spoke Son in Law   He states that they have home care set up -goal completed            Advanced Directive/Living Will: The patient was given information regarding Adanced Directives/Living Will    Understanding Post-Traumatic Stress Disorder (PTSD)  You may have post-traumatic stress disorder (PTSD) if you ve been through a traumatic event and are having trouble dealing with it. Such events may include a car crash, rape, domestic violence,  combat, or violent crime. While it is normal to have some anxiety after such an event, it usually goes away in time. But with PTSD, the anxiety is more intense and keeps coming back. And the trauma is relived through nightmares, intrusive memories, and flashbacks (vivid memories that seem real). The symptoms of PTSD can cause problems with relationships and make it hard to cope with daily life. But it can be treated. With help, you can feel better.  How does it feel?  Symptoms of PTSD often start within a few months of the event. Here are some common symptoms:    You startle more easily, and feel anxious and on edge all the time. This can lead to sleep problems. It a can cause you to feel overwhelmed or become angry or upset more easily. Panic attacks (sudden, intense feelings of terror and a strong need to escape from wherever you are) can also occur.    You relive the event through nightmares and flashbacks. During these, you may feel strong emotions and as though you re reliving the event all over again.    You avoid people, places, or activities that remind you of the trauma. You may hold in your feelings and become emotionally numb. It may be hard to concentrate at work or school or to relax with friends. You may be afraid to let people get  close to you.  Who does it affect?  Not everyone who survives a trauma will have PTSD. But many will. In fact, millions of people have the condition. PTSD can happen to anyone, but it most often develops after a person feels his or her or another s life is threatened.  You re at risk for PTSD if you have experienced or witnessed:    A rape or sexual abuse    A mugging or carjacking    A car accident or plane crash    A life-threatening illness    War    Domestic violence    Childhood abuse    Natural disasters such as earthquakes, floods, or hurricanes    The sudden death of a loved one  Finding help  The first step is to talk to a trusted counselor or healthcare provider. He or she can help you take the next step to treatment. This may involve therapy (also called counseling) and medication.  Are you having suicidal thoughts?  You may be feeling helpless, hopeless, and that you can t go on. You may even have thoughts of suicide. But help is available. There are ways to ease this pain and manage the problems in your life.  If you are thinking about harming or killing yourself, please tell your healthcare provider or someone you care about immediately or go to the nearest crisis walk-in center or emergency room.  You can also call, toll-free,    800-SUICIDE (937-943-0884)     976-819-OKJC ([phone])      Resources    American Psychiatric Association  [phone]  www.healthyminds.org    American Psychological Association  www.apa.org/helpcenter    Anxiety and Depression Association of [place]  www.adaa.org    Mental Health [place]  www.nmha.org    National Center for PTSD  www.ptsd.va.gov    National [place] of Mental Health  www.nimh.nih.gov/topics/akzsp-xsjo-bgwc.shtml  National Suicide Prevention Lifeline  319.572.5102 (809-518-IROH)  Www.suicidepreventionlifeline.org   Depression  Everyone feels down at times. The blues are a natural part of life. But an unhappy period that s intense or lasts for more than  a couple of weeks can be a sign of depression. Depression is a serious illness. It can disrupt the lives of family and friends. If you know someone you think may be depressed, find out what you can do to help.     Know the serious signals  Warning signals for suicide include:    Threats or talk of suicide    Statements such as  I won t be a problem much longer  or  Nothing matters     Giving away possessions or making a will or  arrangements    Buying a gun or other weapon    Sudden, unexplained cheerfulness or calm after a period of depression  If you notice any of these signs, get help right away. Call a health care professional, mental health clinic, or suicide hotline and ask what action to take. In an emergency, don t hesitate to call the police.     [facility] Mental Health Crisis Lines:  [facility] [phone]  [facility] [phone]  [facility] [phone]  [facility] [phone]  [facility], Adults [phone]  [facility], Children [phone]  [facility], Adults [phone]  [facility], Children [phone]  When a Loved One Has a Mental Illness   It s hard to watch a loved one deal with mental illness. You want to help. Yet you may not know what to do. Your loved one may even push you away. But don t give up. Your support is needed now more than ever. Talk to your loved one s health care provider. Or contact a group for families of people with mental illness. They can help give you the guidance you need.  What you can do  Living with mental illness can be overwhelming. Your loved one may say or do things that shock or frighten you. Sometimes your loved one may resist treatment. Knowing what to do can help you cope:    Help your loved one get proper care. Often people with a mental illness deny there s a problem. Or they may not be able to seek help on their own.    Encourage your loved one to stick with treatment. This may be your most crucial job.  Medicines that treat mental illness can have side effects. As a result, your loved one may stop taking them. But this will likely cause symptoms to come back. You might also want to go to healthcare provider visits with your loved one to discuss medicine and other issues.    Provide emotional support. Encourage your loved one to share his or her feelings. Listen and don t . Let your loved one know he or she can count on you.    Be patient. The healing process takes time. In some cases, your loved one may never fully recover. But his or her symptoms will likely improve.    Ask them to join in. Invite your loved one to take part in activities. But don t push.    Take care of yourself. Helping your loved one can be very stressful. Take time to care for yourself. You ll have more patience and will be better able to cope.       Seeking help    If you are worried your loved one may harm themselves or attempt suicide, ask him or her. Asking doesn t cause suicide.    If the suicide risk is not immediate, call the person s healthcare provider, go to a local mental health clinic, or call the National Suicide Prevention Lifeline at 438-239-FPMO (3587). It is open 24 hours a day, every day. They speak English and Welsh. This resource provides immediate crisis intervention and information on local resources. It is free and confidential.     Don t ignore a person's talk of suicide or think it s just an attention-seeking behavior. As hard as it is, talking can save lives.  Call 911    Call 911 if the person is at immediate risk of suicide (he or she has a suicide plan and access to a method such as guns or drugs). Or take the person to the nearest emergency room. Don t leave the person alone. Remove any guns and medicines.   Resources    National [place] on Mental Illness 873-851-1555 www.mariela.org    National [place] of Mental Health 453-484-2322 www.nimh.nih.gov    Mental Health [place] 249-180-2126  www.[facility].org    National Suicide Prevention Lifeline 907-514-GJAT (7135) www.suicidepreventionlifeline.org           [facility] Mental Health Crisis Lines:  [facility] [phone]  [facility] [phone]  [facility] [phone]  [facility] [phone]  [facility], Adults [phone]  [facility], Children [phone]  [facility], Adults [phone]  [facility], Children [phone]  Emergency Plan Recommendation:     When to Use the Emergency Department (ED)  An emergency means you could die if you don t get care quickly. Or you could be hurt permanently (disabled). Read below to know when to use -- and when not to use -- an emergency department (also called ED).     Dangers to your life  Here are examples of emergencies. These need immediate care:  A hard time breathing  Severe chest pain  Choking  Severe bleeding  Suddenly not able to move or speak  Blacking out (fainting)`  Poisoning     Dangers of permanent injuries  Here are other emergencies. These also need immediate care:  Deep cuts or severe burns  Broken bones, or sudden severe pain and swelling in a joint     When it s an emergency  If you have an emergency, follow these steps:     1. Go to the nearest emergency department  If you can, go to the hospital ED closest to you right away.  If you cannot get there right away, or if it is not safe to take yourself, call 911 or your police emergency number.  2. Call your primary care doctor  Tell your doctor about the emergency. Call within 24 hours of going to the ED.  If you cannot call, have someone call for you.  Go to your doctor (not the ED) for any follow-up care.     When it s not an emergency  If a problem is not an emergency, follow these steps:     1. Call your primary care doctor  If you don t know the name of your doctor, call your health plan.  If you cannot call, have someone call for you.  2. Follow instructions  Your doctor will tell you what you  should do.  You may be told to see your doctor right away. You may be told to go to the ED. Or you may be told to go to an urgent care center.  Follow your doctor s advice.       Clinical Emergency Plan    All [facility] clinic patients have access to a Nurse 24 hours a day, 7 days a week.  If you have questions or want advice from a Nurse, please know [facility] is here for you.  You can call your clinic and they will connect you or you can call [facility] at [phone].  [facility] also has Walk In Care clinics in multiple locations.  Call the number listed above for more information about our Walk In Care clinics or visit the [facility] website at [email or web address].

## 2021-06-01 NOTE — TELEPHONE ENCOUNTER
omeprazole (PRILOSEC) 20 MG capsule [[id number]]     Electronically signed by: Heidi Hernandez DO on 03/29/19 0925 Status: Discontinued   Ordering user: Heidi Hernandez DO 03/29/19 0925 Authorized by: Heidi Hernandez DO   Frequency: DAILY 03/29/19 - 09/04/19  Released by: Heidi Hernandez DO 03/29/19 0925   Discontinued by: Heidi Hernandez DO 09/04/19 2123     Amalia Galvin RN Care Connection Triage/Medication Refill

## 2021-06-01 NOTE — PROGRESS NOTES
"  Mental Health Visit Note - appointment was \"cancelled\" after encounter was opened. Therapist met with patient and  for a brief visit to assess patient's needs and priorities for her overall health today.    9/11/2019    Start time: 1:04pm    Stop Time: 1:16pm   Session # 17    Session Type: Patient is presenting for an Individual session.    Indra Jason is a 54 y.o. female is being seen today for    Chief Complaint   Patient presents with      Follow Up     session to address coping with fatigue, pain and depressed mood.     New symptoms or complaints: bump on eye- swelling, causing blurry vision, pain, headache.        Functional Impairment:   Personal: 4  Family: 3  Work: 4  Social:4    Clinical assessment of mental status:Reviewed. MSE is current effective 09/11/2019  Grooming: Well groomed  Attire: Appropriate  Age: Appears Stated  Behavior Towards Examiner: disengaged  Motor Activity: Within normal. Slow- cane  Eye Contact: minimal- right eye is almost shut due to bump on eyelid.   Mood: Depressed, Anxious  Affect: Congruent w/content of speech, Flat and Depressed. Appears in pain  Speech/Language: Within normal.  Attention: Distractible - due to eye problem that is maintaining her focus of attention  Concentration: Brief  Thought Process: Within normal  Thought Content: Hallucinations: Within noraml  Delusions: Within normal  Orientation: X 3  Memory: Impairment  Judgement: No Evidence of Impairment  Estimated Intelligence: Below Average  Demonstrated Insight: Diminished  Fund of Knowledge: adequate      Suicidal/Homicidal Ideation present: None Reported This Session    Patient's impression of current status: She vocalizes pain and notes a bump on eye- causing swelling, causing blurry vision, pain, headache. It has gotten worse over the last week. She has never had this problem before. She has not been seen by a physician for this yet. She would like to be seen. It is all she can focus on at " this time and it is her biggest concern today.    Therapist impression of patients current state: Patient presented for follow up individual psychotherapy visit. Patient's on in law was present. A female [place]  was present for the visit due to the language complexity.  Explored her concerns and she was able to vocalize her need to see a physician which this provider was in support of. Therapist was able to coordinate with clinic CMA and physician to have patient's eye assessed. Her current eye problem is causing pain, worsening mood and worries. Having her eye looked at today and treated will help her physical and mental well being at this time. Therefore, short visit and therapy cancelled to have patient scheduled with physician.     Type of psychotherapeutic technique provided: Client centered    Progress toward short term goals:Progress as expected, met with physician at clinic today to address eye problem rather than continuing therapy session.    Review of long term goals: Not done at today's visit. Effective 6/25/2019-09/25/2019. Update next session.     Diagnosis:   1. PTSD (post-traumatic stress disorder)    2. Moderate episode of recurrent major depressive disorder (H)    F89- Unspecified Neurodevelopmental Disorder per Irasema Norris LP    Plan and Follow up: Patient is scheduled for follow up psychotherapy on 9/23/2019      Discharge Criteria/Planning: Client has chronic symptoms and ongoing therapy for maintenance stability recommended.    Karena Ashton [facility] 9/11/2019

## 2021-06-02 VITALS — BODY MASS INDEX: 26.7 KG/M2 | WEIGHT: 146 LBS

## 2021-06-02 VITALS — BODY MASS INDEX: 26.22 KG/M2 | WEIGHT: 142.5 LBS | HEIGHT: 62 IN

## 2021-06-02 VITALS — BODY MASS INDEX: 26.89 KG/M2 | WEIGHT: 147 LBS

## 2021-06-02 VITALS — WEIGHT: 144 LBS | BODY MASS INDEX: 26.34 KG/M2

## 2021-06-02 VITALS — WEIGHT: 140 LBS | BODY MASS INDEX: 25.61 KG/M2

## 2021-06-02 VITALS — WEIGHT: 136 LBS | BODY MASS INDEX: 24.87 KG/M2

## 2021-06-02 VITALS — WEIGHT: 147.8 LBS | HEIGHT: 61 IN | BODY MASS INDEX: 27.9 KG/M2

## 2021-06-02 VITALS — WEIGHT: 138 LBS | BODY MASS INDEX: 25.24 KG/M2

## 2021-06-02 VITALS — WEIGHT: 145 LBS | BODY MASS INDEX: 27.85 KG/M2

## 2021-06-02 NOTE — PROGRESS NOTES
[facility] EXAM NOTE      Chief Complaint   Patient presents with     Test Results     discuss MRI results       Due to language barrier, an  was present during the history-taking and subsequent discussion (and for part of the physical exam) with this patient.      ASSESSMENT & PLAN    Indra was seen today for test results.    Diagnoses and all orders for this visit:    Acute chest pain: This is a 54-year-old patient with long-standing history of chronic pain and full body pain.  Given that she reports tenderness palpation of the chest I have a feeling that this is all musculoskeletal and this tenderness was found on exam as well.  However given her report of feeling of pressure with radiation to her shoulders and no cardiac work-up on file I do think it is worth getting EKG today at the very least to get baseline as she complains about chronic pain a lot.  EKG today was reassuring with normal sinus rhythm.  Labs below were also within normal range.  Provided reassurance that likely musculoskeletal/costochondritis versus just chronic pain and depression.  We will have her back to continue to work on her PTSD chronic pain.  -     Comprehensive Metabolic Panel  -     HM1(CBC and Differential)  -     Lipid Cascade  -     Thyroid Stimulating Hormone (TSH)  -     Troponin I  -     HM1 (CBC with Diff)  -     Electrocardiogram Perform and Read    Elevated ferritin level: She has been following along with heme oncology.  MRI ordered to assess iron overload in her body.  None noted on her heart.  Mildly noted on her liver.  Her ferritin level continues to decrease last month it was down into the 500 range.  We will repeat today as she is getting other labs done today.  I would like her to follow-up with heme oncology to discuss how much longer she needs to be on the medication.  -     Ferritin        HISTORY    Indra Jason is a 54 y.o.  female who presents for the following issues:    1.  Reason for  appointment said follow-up MRI results.  The patient had concerns for chest pain.  Wondering about her results she had MRI of the heart and liver.  Wants to know ifshe should continue on her medication.  Does not have a follow-up appointment scheduled with hematology oncology.    2.  Chest pain: For the last week.  Hurts when she presses on it.  Reports a pressure /heaviness feeling.  Worse with laying down.  States is a constant pain.  No cough.  She has had it in the past but she states is getting worse.  She does not have any idea what is going on.  When the pain is really bad it goes to both of her shoulders bilaterally.  Pain is in her central chest kind of on both sides of her sternum.  She reports shortness of breath with the chest pain.  But nothing is helping.  With walking she has a shortness of breath difficulty breathing.  But no increased chest pain.  I feel like we have talked about some chest pain in the past but it seems like shoulder pain that was causing the chest pain.  Today she makes it sound as though the chest pain is causing the shoulder pain.  No diaphoresis or lower external swelling.      MEDICATIONS    Current Outpatient Medications on File Prior to Visit   Medication Sig Dispense Refill     acetaminophen (Q-PAP EXTRA STRENGTH) 500 MG tablet TAKE 1-2 TABLETS (500-1,000 MG TOTAL) BY MOUTH 3 TIMES A DAY AS NEEDED FOR PAIN. 100 tablet 5     ascorbic acid, vitamin C, (ASCORBIC ACID WITH JEN HIPS) 500 MG tablet TAKE 1 TABLET (500 MG TOTAL) BY MOUTH 2 TIMES A DAY WITH IRON TABLETS 180 tablet 3     cetirizine (ZYRTEC) 10 MG tablet Take 1 tablet (10 mg total) by mouth daily. 90 tablet 2     cholecalciferol, vitamin D3, (VITAMIN D3) 1,000 unit capsule Take 1 capsule (1,000 Units total) by mouth daily. 90 capsule 3     cyanocobalamin 1000 MCG tablet Take 1,000 mcg by mouth daily. Indications: Prevention of Vitamin B12 Deficiency       deferasirox (EXJADE) 250 MG disintegrating tablet Dispense 5  tabs (1,250mg) in an appropriate amount of water, orange or apply juice and take po daily before breakfast 150 tablet 0     FLUoxetine (PROZAC) 20 MG capsule Take 20 mg by mouth daily. Indications: major depressive disorder       fluticasone (FLONASE ALLERGY RELIEF) 50 mcg/actuation nasal spray 1-2 sprays a day as needed. 16 g 5     isoniazid (NYDRAZID) 300 MG tablet Take 300 mg by mouth daily.  7     lidocaine (XYLOCAINE) 5 % ointment Apply sized amount to affected area up to 3 times daily. Lower back 35.44 g 2     meclizine (ANTIVERT) 25 mg tablet Take 1 tablet (25 mg total) by mouth 3 (three) times a day as needed for dizziness or nausea. 30 tablet 1     pantoprazole (PROTONIX) 20 MG tablet Take 1 tablet (20 mg total) by mouth daily. 90 tablet 1     QUEtiapine (SEROQUEL) 50 MG tablet Take 50 mg by mouth at bedtime.       ranitidine (ZANTAC) 150 MG tablet TAKE ONE TABLET BY MOUTH DAILY AS NEEDED FOR HEARTBURN 90 tablet 3     SEA SOFT NASAL MIST 0.65 % nasal spray USE 1 SPRAY INTO EACH NOSTRIL AS NEEDED FOR CONGESTION. 45 mL 5     No current facility-administered medications on file prior to visit.        Pertinent past medical, surgical, social and family history reviewed and updated in [employer].    Social History     Socioeconomic History     Marital status:      Spouse name: Not on file     Number of children: 8     Years of education: Not on file     Highest education level: Not on file   Occupational History     Not on file   Social Needs     Financial resource strain: Not on file     Food insecurity:     Worry: Not on file     Inability: Not on file     Transportation needs:     Medical: Not on file     Non-medical: Not on file   Tobacco Use     Smoking status: Former Smoker     Years: 15.00     Last attempt to quit: 3/21/2004     Years since quitting: 15.5     Smokeless tobacco: Never Used     Tobacco comment: no passive exposure in home.   Substance and Sexual Activity     Alcohol use: No     Drug use:  "No     Sexual activity: Never   Lifestyle     Physical activity:     Days per week: Not on file     Minutes per session: Not on file     Stress: Not on file   Relationships     Social connections:     Talks on phone: Not on file     Gets together: Not on file     Attends Shinto service: Not on file     Active member of club or organization: Not on file     Attends meetings of clubs or organizations: Not on file     Relationship status: Not on file     Intimate partner violence:     Fear of current or ex partner: Not on file     Emotionally abused: Not on file     Physically abused: Not on file     Forced sexual activity: Not on file   Other Topics Concern     Not on file   Social History Narrative     Not on file         REVIEW OF SYSTEMS    ROS: 10 pt review of systems preformed and all negative except noted in HPI.     PHYSICAL EXAM    /76 (Patient Site: Left Arm, Patient Position: Sitting, Cuff Size: Adult Regular)   Pulse (!) 48   Ht 5' 0.24\" (1.53 m)   Wt 147 lb 9.6 oz (67 kg)   LMP 12/01/2014 (Exact Date)   BMI 28.60 kg/m  .  When I was listening pulse was 72.  I will see if CA wrote the pulse backwards.  GEN:  54 y.o. female sitting  in some distress due to discomfort.  HEENT: EOMI, no scleral icterus, buccal mucosa moist  Neck: Supple without lymphadenopathy  CHEST/LUNG: No respiratory distress, good air flow to bases, CTAB, no wheezing rhonchi or crackles.  There is tenderness palpation along the rib cage on either side of the sternum bilaterally.  CV: RRR, S1, S2 normal; no murmurs, rubs or gallops. No edema. Pulses: Radial plus 2 out of 4 bilaterally.  SKIN: warm, dry, no rashes or lesions  NEURO: Gait normal, coordination intact  PSYCH: Appears depressed.      At the end of the encounter, I discussed results, diagnosis, medications. Discussed red flags for immediate return to clinic/ER, as well as indications for follow up if no improvement. Patient and/or caregiver understood and agreed to " plan. Patient was stable for discharge.      Heidi Hernandez

## 2021-06-02 NOTE — PROGRESS NOTES
Phone call with patient with use of [facility] , Gema Mahmood fesyaw tomorrow; therefore, patient rescheduling appointment. Patient was rescheduled with psychotherapist for different date per request. Karena PARADA

## 2021-06-02 NOTE — PROGRESS NOTES
Mental Health Visit Note     10/10/2019    Start time: 12:30pm    Stop Time: 1:06pm   Session # 19    Session Type: Patient is presenting for an Individual session.    Indra Jason is a 54 y.o. female is being seen today for    Chief Complaint   Patient presents with      Follow Up     session to address coping with health concerns and worries.         New symptoms or complaints: None       Functional Impairment:   Personal: 4  Family: 3  Work: 4  Social:4    Clinical assessment of mental status:Reviewed. MSE is current effective 10/10/2019  Grooming: Well groomed  Attire: Appropriate  Age: Appears Stated  Behavior Towards Examiner: Cooperative, actively engaged  Motor Activity: Within normal. cane  Eye Contact: appropriate  Mood: Anxious  Affect: Congruent w/content of speech and Anxious.  Speech/Language: Within normal.  Attention: Within normal   Concentration: Brief  Thought Process: Within normal  Thought Content: Hallucinations: Within noraml  Delusions: Within normal  Orientation: X 3  Memory: Impairment  Judgement: No Evidence of Impairment  Estimated Intelligence: Below Average  Demonstrated Insight: Diminished  Fund of Knowledge: adequate      Suicidal/Homicidal Ideation present: None Reported This Session    Patient's impression of current status: Upon entering the appointment she reports feeling dizzy in the lobby which she attributes to the crowds, TV and loud talking.  She reports times like this at home she ends up going into her room to be in a quiet space with not many people.  It is difficult for her to be in large social settings.  Patient endorses feeling anxious this week as she awaits MRI results.  She has a follow-up with PCP tomorrow.  She continues to struggle with pain.  She notices worsening pain for the last 5 days that includes chest pain, headache, leg pain.  She finds it hard to change positions.  She is able to sleep better because of her medications.  She eddie by walking to help  with blood flow.    Therapist impression of patients current state: Patient presented for follow up individual psychotherapy visit. Patient's daughter was present. A female Frisian , Gema KIM, was present for the visit due to the language complexity.  Session started late because an  was at another appointment.  Patient presented with anxious mood and affect.  We developed insight into her stress symptoms worsening since having MRI her.  She was in agreement that it was likely related to stress and worry.  Developed awareness in her body of how she holds onto worry and tension which further exacerbates her pain and headaches.  She appears to be overstimulated and arousal levels are activated when she is in crowded and loud environments, such as the lobby.  Long-term goal would be to feel comfortable in those environments and to be able to stay and remain social rather than isolates in her room or feel these somatic symptoms.  She shows improvement in regards to her ability to engage in positive self talk and reassurance.  She shows good insight into her triggers today.  Therapist continues to assess readiness for further trauma work.    Type of psychotherapeutic technique provided: Insight oriented, Client centered and Solution-focused    Progress toward short term goals:Progress as expected, ability to engage in positive self-talk. Developing more insight into triggers.    Prior to next session, follow up with PCP. Continue to engage in positive self talk.     Review of long term goals: Not done at today's visit. Effective 6/25/2019-09/25/2019. Did not have time today due to starting session 30 minutes late because of  conflict. We will need to update next session.     Diagnosis:   1. PTSD (post-traumatic stress disorder)    2. Moderate episode of recurrent major depressive disorder (H)    F89- Unspecified Neurodevelopmental Disorder per Irasema Norris LP    Plan and Follow up:  Patient is scheduled for follow up psychotherapy on 10/23/2019      Discharge Criteria/Planning: Client has chronic symptoms and ongoing therapy for maintenance stability recommended.    Karena Ashton LICSW 10/10/2019

## 2021-06-02 NOTE — TELEPHONE ENCOUNTER
----- Message from Heidi Hernandez DO sent at 10/13/2019  8:32 AM CDT -----  Please call patient and inform:  Labs look good. No signs of heart problems/stress on the EKG or  Labs. Ferritin level is staying in the 500s. Please schedule follow up with hematology/oncology

## 2021-06-02 NOTE — TELEPHONE ENCOUNTER
Called patient with  Anitha ID# [id number], spoke to patient's son in-law and informed of the lab results. They had no questions. Agreed to schedule.

## 2021-06-02 NOTE — PROGRESS NOTES
Mental Health Visit Note     10/21/2019    Start time: 10:02am    Stop Time: 11:05am   Session # 20    Session Type: Patient is presenting for an Individual session.    Indra Jason is a 54 y.o. female is being seen today for    Chief Complaint   Patient presents with      Follow Up     coping with heavy heart and fatigue         New symptoms or complaints: None       Functional Impairment:   Personal: 4  Family: 3  Work: 4  Social:4    Clinical assessment of mental status:Reviewed. MSE is current effective 10/21/2019  Grooming: Well groomed  Attire: Appropriate  Age: Appears Stated  Behavior Towards Examiner: Cooperative  Motor Activity: Within normal. Cane for mobility.   Eye Contact: appropriate and also appear energized/engaged at times.  Mood: Depressed- improved  Affect: Congruent w/content of speech. Less flat or constricted- showing more affect and smiling at times.   Speech/Language: Within normal.  Attention: Within normal   Concentration: Within normal  Thought Process: Within normal  Thought Content: Hallucinations: Within noraml  Delusions: Within normal  Orientation: X 3  Memory: Impairment  Judgement: No Evidence of Impairment  Estimated Intelligence: Below Average  Demonstrated Insight: Diminished  Fund of Knowledge: adequate      Suicidal/Homicidal Ideation present: None Reported This Session. Denies SI/ HI.   Completed C-SSRS in session. No ideation. No self-harm. No preparatory behaviors. No past attempts. Low risk for suicide.       Patient's impression of current status: She reports many health concerns within her family lately that have caused some worry.  She tries not to allow herself to worry about it because of her own health and need to focus on her own well-being.  She uses her concetta to cope in these moments.  She shared a saying or parable from her Frisian culture that relates to this.  She continues to have low energy and poor sleep at times.  There are moments when she wakes up in  the night because of chest pain and rapid heart racing, but finds deep breathing and positive self talk helps if the symptoms dissipate.    Therapist impression of patients current state: Patient presented for follow up individual psychotherapy visit. A female Occitan , Gema KIM, was present for the visit due to the language complexity.  Her son-in-law is present for the visit.  Patient has started to exhibit more affect in session and is more openly communicative and engaged.  She smiles more these last few sessions that she has in our history of working together, which is a sign of improvement in mood and energy levels she appears less hypo-aroused as exhibited in her body movements and affect.  She is able to remain emotionally regulated in session and does not present with hyper arousal.  We continue to thoroughly assess his somatic symptoms as many of her mental health symptoms present somatically.  She also follows up with primary care about the symptoms.  She shows improvement in depression and anxiety based on measures completed today and patient reports and presentation.  She shows evidence of healthy boundaries in her relationships and a healthy ability to focus on her own needs and care.  She continues to benefit from psychotherapy services as a place to process her thoughts and feelings and work on coping skills to decrease symptoms that impact daily functioning.    Patient and therapist updated treatment plan goals and measures. View  Treatment plan document and flow-sheets attached to that documentation note.  PHQ-9:12  MICAELA-7:5  PHQ-15: 20  CAGE: 0/4    Clinical Global Impressions Scale Ratings:    1. Considering your total clinical experience with this particular population, how mentally ill is the patient at this time? (which is rated on the following seven-point scale: 1=normal, not at all ill; 2=borderline mentally ill; 3=mildly ill; 4=moderately ill; 5=markedly ill; 6=severely ill;  7=among the most extremely ill patients:) score of 4 today    2. Compared to the patient's condition at admission to the program, currently this patient's condition is: (1=very much improved since the initiation of treatment; 2=much improved; 3=minimally improved; 4=no change from baseline (the initiation of treatment); 5=minimally worse; 6= much worse; 7=very much worse since the initiation of treatment:) score of 3 today      Type of psychotherapeutic technique provided: Insight oriented, Client centered and Solution-focused    Progress toward short term goals:Progress as expected, was able to identify many strengths about herself today. Was also able to acknowledge decreased anxiety and not letting herself engage in worries, but rather use prayer to cope.     Review of long term goals: Treatment Plan updated. Effective 10/21/2019-01/21/2020.    Diagnosis:   1. PTSD (post-traumatic stress disorder)    2. Moderate episode of recurrent major depressive disorder (H)    F89- Unspecified Neurodevelopmental Disorder per Irasema Norris LP    Plan and Follow up: Patient is scheduled for follow up psychotherapy on 11/05/2019      Discharge Criteria/Planning: Client has chronic symptoms and ongoing therapy for maintenance stability recommended.    TAL Kruger 10/21/2019

## 2021-06-02 NOTE — TELEPHONE ENCOUNTER
Requesting orders for recertification of skilled nursing every 2 weeks for 9 weeks for medication management and general health assessment. 3 PRN visits for med changes, reassessments, and [employer] data collection

## 2021-06-02 NOTE — PROGRESS NOTES
Please call patient and inform:  Labs look good. No signs of heart problems/stress on the EKG or  Labs. Ferritin level is staying in the 500s. Please schedule follow up with hematology/oncology

## 2021-06-02 NOTE — PROGRESS NOTES
Outpatient Mental Health Treatment Plan     Name:  Indra Jason  :  1965  MRN:  [id number]     Treatment Plan:  UpdateTreatment Plan   Intake/initial treatment plan date:  2018  Benefit and risks and alternatives have been discussed: Yes  Is this treatment appropriate with minimal intrusion/restrictions: Yes  Estimated duration of treatment:  Approximately 20 sessions.  Anticipated frequency of services:  Every 1-2 weeks  Necessity for frequency: This frequency is needed to establish therapeutic goals and for continuity of care in order to monitor progress.  Necessity for treatment: To address cognitive, behavioral, and/or emotional barriers in order to work toward goals and to improve quality of life.     Session Type: Patient is presenting for an Individual session.     Plan:      Decrease isolation. Goal to be able to be social and around crowds without feelings of fear and hyperarousal.      ? Depression                 Goal:    Decrease average depression level from 4 to 1. Decrease PHQ-9 score to 5.              Strategies:       ?[x]? Decrease social isolation                                      [x]? Increase involvement in meaningful activities                                      ?[x]? Discuss sleep hygiene                                      ?[x]? Explore thoughts and expectations about self and others                                      ?[x]? Process grief (loss of significant person, independence, role,                                     etc.)                                      ?[x]? Assess for suicide risk                                      ?[x]? Implement physical activity routine (with physician approval)                                      [x]? Consider introduction of bibliotherapy and/or videos                                      [x]? Continue compliance with medical treatment plan (or explore barriers)                                         ?  ?Degree to which this is a  problem: 4  Degree to which goal is met: 1  Date of Review:  10/21/2019-01/21/2020                                                                                ?              ? Anxiety                        Goal:    Decrease average anxiety level from 3 to 1. Decrease MICAELA-7 score to 3.              Strategies:       ? [x]?Learn and practice relaxation techniques and other coping  strategies (e.g., thought stopping, reframing, meditation)                                      ? [x]? Increase involvement in meaningful activities                                      ? [x]? Discuss sleep hygiene                                      ? [x]? Explore thoughts and expectations about self and others                                      ? [x]? Identify and monitor triggers for panic/anxiety symptoms                                      ? [x]? Implement physical activity routine (with physician approval)                                      ? [x]? Consider introduction of bibliotherapy and/or videos                                      ? [x]? Continue compliance with medical treatment plan (or explore                                      barriers)             Degree to which this is a problem: 3  Degree to which goal is met: 1  Date of Review:   10/21/2019-01/21/2020        Chronic pain and other somatic symptoms  Goal:    ? Decrease average pain level from 4 to 1. Decrease PHQ-15 score to 8.              ? Increase % acceptance of pain from 40 to 60.              Strategies:       ? [x]? Explore thoughts and expectations about self and others                            [x]? Explore emotional reactions to illness/injury                                       ? [x]? Learn and practice relaxation techniques and other coping                                          strategies                                             [x]? Implement physical activity routine (with physician  approval)                                      ? [x]? Engage in values clarification and goal-setting                                      ? [x]? Consider introduction of bibliotherapy and/or videos                                      ? [x]? Increase involvement in meaningful activities                                      ? [x]? Discuss sleep hygiene                                      ? [x]? Process grief (loss of significant person, independence, role,                                      etc.)                                      ? [x]? Assess for suicide risk                                      ?  Degree to which this is a problem: 4  Degree to which goal is met: 1  Date of Review:  10/21/2019-01/21/2020        Plan:   Other:  PTSD  Goal: Decrease average impact of PTSD symptoms from 4 to 1                        Strategies:                              ?[x]? Forge a therapeutic alliance built on trust in order to allow for further processing of trauma experience.                             ?[x]? Assessment of client-acculturation process. Psycho-education about western-therapy.                             ?[x]? Exposure therapy and psycho-education to develop a sense normalization, legitimization, and description of trauma reactions.                              ?[x]? Development of trauma timeline or narrative.                             ?[x]? Cognitive Restructuring to help make sense of the trauma and to put meaning to the trauma- develop new insights and thorough understanding of behaviors during the event- modify feelings of guilt and shame.                              ?[x]? Learn and implement somatic/experiential techniques.                             ?[x]? Learn and Implement Mindfulness/Grounding techniques to decrease hyperarousal.     ?Degree to which this is a problem: 4  Degree to which goal is met: 1  Date of Review:  10/21/2019-01/21/2020        Functional Impairment:  Personal  : 4  Family : 3  Social : 4   Work/school : 4     Diagnosis:  PTSD  Major Depressive Disorder, recurrent, moderate        WHODAS 2.0 12-item version: 38 or 79%   Scores presented in qualifiers to represent level of disability.  SEVERE Problem (high, extreme, ...) 50-95%  H1= 30  H2= 8-10  H3= 20        Clinical assessments and measures completed:. MICAELA-7, PHQ-9 and CAGE-AID, C-SSRS, PHQ-15  PHQ-9 Total Score: 12  MICAELA 7 Total Score: 5  Score: __/4: 0  * PHQ-9 and MICAELA-7 scores have decreased since last treatment plan.   PHQ-15= 20  C-SSRS: No SI. Low risk     Strengths:  She receives the support of her daughter and son in law. Her son-in-law drives her to appointments. She has been referred for psychiatry and Clinic care coordination. She is open and engaged in session. Showing more affect and healthy level of activation lately.   Limitations:  Continued acculturation stressors and other basic needs that need to be met first.  Memory impairment- may have difficulty recalling what is discussed, learned and practiced in sessions.  Cultural Considerations: Patient is a Occitan female. She requires the use of a Occitan  to communicate with providers. She has fled war in [facility] and lived in refugee camps prior to resettling in the [place]. She experiences grief from the death of her  about 7 years ago. She is a single mother of 7 children, some of whom are adults, but some are young and live at home. She follows the Sikh concetta, which helps her cope.     Persons responsible for this plan: Patient and Provider. Coordinate care with PCP as needed. Coordinate care with Psychiatrist and CCC team once established with them.         Karena PARADA 10/21/2019  Psychotherapist Signature             Indra Jason 10/21/2019  Patient Signature:              Guardian Signature             Provider: Performed and documented by TAL Kruger   Date:  10/21/2019

## 2021-06-03 VITALS — WEIGHT: 144 LBS | BODY MASS INDEX: 27.66 KG/M2

## 2021-06-03 VITALS — WEIGHT: 146.8 LBS | BODY MASS INDEX: 27.97 KG/M2

## 2021-06-03 VITALS — WEIGHT: 150.31 LBS | BODY MASS INDEX: 28.87 KG/M2

## 2021-06-03 VITALS
HEIGHT: 60 IN | BODY MASS INDEX: 28.98 KG/M2 | SYSTOLIC BLOOD PRESSURE: 122 MMHG | HEART RATE: 48 BPM | WEIGHT: 147.6 LBS | DIASTOLIC BLOOD PRESSURE: 76 MMHG

## 2021-06-03 VITALS — BODY MASS INDEX: 28.15 KG/M2 | HEIGHT: 61 IN | WEIGHT: 149.13 LBS

## 2021-06-03 VITALS
HEIGHT: 60 IN | RESPIRATION RATE: 16 BRPM | SYSTOLIC BLOOD PRESSURE: 124 MMHG | BODY MASS INDEX: 29.45 KG/M2 | DIASTOLIC BLOOD PRESSURE: 76 MMHG | WEIGHT: 150 LBS | HEART RATE: 80 BPM

## 2021-06-03 VITALS — BODY MASS INDEX: 27.75 KG/M2 | WEIGHT: 147 LBS | HEIGHT: 61 IN

## 2021-06-03 NOTE — PROGRESS NOTES
11-5-19  Maintenance Follow up     Met with patient and patient's daughter and granddaughter.  Joint visit with TAL Krugeri : Gema Bassett    Patient requested to see [facility] SW regarding travel loan.  Patient received SSI benefit.   Scheduled appt with [facility] MICHELLE for Re Assessment 11-22-19 at 1pm     Met with patient's son and provided the letter from [facility] requesting financial document and other document needed to complete the citizenship application.  Son will have mother/patient sign the form for [facility] and bring document to appt on 11-22-19.  Son will call the clinic or CHW to help schedule follow up appt with PCP.    Plan:  [facility] SW RE Assessment 11-22-19 at 1pm  CHW follow up 12-23-19

## 2021-06-03 NOTE — TELEPHONE ENCOUNTER
Unable to pend medication for provider review in signed encounter. See refill encounter created 11/5/2019.     Marleny Cameron RN [facility] Triage

## 2021-06-03 NOTE — TELEPHONE ENCOUNTER
Called and spoke with Indra Jason 's son in law, Message was given, Indra Jason 's Son in law understood, no further questions.    Use  line to contact :Jere ID:[id number]

## 2021-06-03 NOTE — PROGRESS NOTES
Clinic Care Coordination Contact    Follow Up Progress Note      Assessment: MICHELLE met with Indra, daughter, and . Indra was scheduled for a re-assessment but due to SW running late, it was unable to be completed. Indra had concerns because her travel loan had been deferred but they received a statement. MICHELLE contacted the loan company and they reported the deferment  and supporting documents would need to be sent in again. The representative asked if Indra will be able to work or is severely ill. SW explained that she continues to be unable to work. Representative said they may be able to get her loan forgiven if certain documents are provided. She emailed list to SW. SW provided list to Indra and her daughter. They are going to gather the documents and we will send them at follow up appointment     They reported they submitted the necessary information to [facility]    They are wondering if they can change their PMAP from [employer] to [employer]. MICHELLE will ask for FRW support.    Goals addressed this encounter:   Goals Addressed                 This Visit's Progress      Financial Wellbeing (pt-stated)        Goal Statement- I want to defer my travel loan as soon as possible    Action steps to achieve this goal  1.  I will gather the necessary documents the MICHELLE provided me   2.  I will bring the necessary documents to my follow up appt. With CCC SW       Date goal set:  2019 BC                 Intervention/Education provided during outreach: Contacted travel loan           Plan:   FRW referral sent, Follow up scheduled

## 2021-06-03 NOTE — PROGRESS NOTES
Did not bring meds  Does not know them  Refilled yesterday    Pt states cough.  Chest and head hurts.  Tired.  Five days.  No fever.  Has difficulty breathing.  Shortness of breath    Productive cough.   Mixed.  Thick.  No blood.   Neg cig.   Denies night sweats    On tb med for latent tb    On a stomach med which helps    Notes state the skilled nursing which sets up med is not covered.   Is to meet with mtm for assist.      OBJECTIVE:   Vitals:    11/20/19 1150   BP: 130/78   Pulse: 80   Resp: 20   Temp: 99  F (37.2  C)      Eyes: non icteric, noninflamed  Lungs: no resp distress    mild coughing into her napkin  Decreased air exchange.  Diffuse wheeze ant and post bilateral. No rales or rhonchi  Heart: regular  Ankles: no edema  Muscles: nontender  Mental status: euthymic  Neuro: nonfocal    Body mass index is 28.29 kg/m .     prob list   Mood disorder and somatization    Chart review elevated ferritin    Liver scan consistent with iron excess    Neg hemochromatosis test.    ASSESSMENT/PLAN:    Additional diagnoses and related orders:  1. Bronchospasm  albuterol (PROAIR HFA) 90 mcg/actuation inhaler   2. PTSD (post-traumatic stress disorder)  Ambulatory referral to Medication Management   3. TB lung, latent     4. Elevated ferritin level       Multiple issues  Med compliance issues and [employer] declining skilled services for med management.  New finding of bronchospasm.  complains of cough. / betaadrenergic   Pt instructed on use of mdi device.   Asked pharm to repeat instruction.  follow up pcp one week  Will set up with mtm  Encouraged follow up pcp and others for ongoing eval

## 2021-06-03 NOTE — PROGRESS NOTES
[facility] EXAM NOTE      Chief Complaint   Patient presents with     Follow-up     Leg Pain     left leg pain for 3 days       Due to language barrier, an  was present during the history-taking and subsequent discussion (and for part of the physical exam) with this patient.      ASSESSMENT & PLAN    Indra was seen today for follow-up and leg pain.    Diagnoses and all orders for this visit:    Elevated ferritin level:  F/u scheduled with hematology. Will check in on labs.   -     Ferritin  -     Iron and Transferrin Iron Binding Capacity    Foot cramps:  Unclear etiology. Stressed importance of avoiding dehydration. No red flag s/s- patient has mentioned numbness tingling of her body in the past but seems related to depression. However could consider EMG if no improvement at at follow up visit.   -     capsaicin (ZOSTRIX) 0.025 % cream; Apply to affected area as needed.    Left foot pain:  As above, trial capsaicin cream and continue tylenol. Increase fluids. Massage.   -     capsaicin (ZOSTRIX) 0.025 % cream; Apply to affected area as needed.    Encounter for hepatitis C screening test for low risk patient  -     Hepatitis C Antibody (Anti-HCV)    Screening for HIV (human immunodeficiency virus)  -     HIV Antigen/Antibody Screening Cascade    Cough:  Improved per patient. Recommended humidifier. LCTAB. No cough during exam noted.     F/U with med management. Will contact Aun about transportation.       HISTORY    Indra Jason is a 54 y.o.  female who presents for the following issues:    1. Cough: better but still there. Didn't  albuterol. Pharmacy never called. No fevers still. Feels like its improving and doesn't need albuterol. Does not have humidifier.     2. Has not followed up with hematology regarding elevated Ferritin. Is out of medication.     3. Needs to see med management as med set up home RN services no longer covered by insurance. Has appt set up next week.     4. Needs  help with transportation.     5. Left foot pain/cramping: started 3 days ago but has a hx of this. Reports leg pain but then cramping of the foot itself. Kind of changes where the pain is throughout our conversation but appears to be at the top of the foot and then associated with cramping of the foot and toes. States she drinks lots of water and that can't be the cause. Tylenol helps. No swelling. States legs feel numb all the time.      MEDICATIONS    Current Outpatient Medications on File Prior to Visit   Medication Sig Dispense Refill     acetaminophen (Q-PAP EXTRA STRENGTH) 500 MG tablet TAKE 1-2 TABLETS (500-1,000 MG TOTAL) BY MOUTH 3 TIMES A DAY AS NEEDED FOR PAIN. 100 tablet 5     ascorbic acid, vitamin C, (ASCORBIC ACID WITH JEN HIPS) 500 MG tablet TAKE 1 TABLET (500 MG TOTAL) BY MOUTH 2 TIMES A DAY WITH IRON TABLETS 180 tablet 3     cetirizine (ZYRTEC) 10 MG tablet Take 1 tablet (10 mg total) by mouth daily. 90 tablet 2     cholecalciferol, vitamin D3, (VITAMIN D3) 1,000 unit capsule Take 1 capsule (1,000 Units total) by mouth daily. 90 capsule 3     deferasirox (EXJADE) 250 MG disintegrating tablet Dispense 5 tabs (1,250mg) in an appropriate amount of water, orange or apply juice and take po daily before breakfast 150 tablet 0     FLUoxetine (PROZAC) 20 MG capsule Take 40 mg by mouth daily.        fluticasone (FLONASE ALLERGY RELIEF) 50 mcg/actuation nasal spray 1-2 sprays a day as needed. 16 g 5     meclizine (ANTIVERT) 25 mg tablet Take 1 tablet (25 mg total) by mouth 3 (three) times a day as needed for dizziness or nausea. 30 tablet 1     QUEtiapine (SEROQUEL) 50 MG tablet Take 50 mg by mouth at bedtime.       albuterol (PROAIR HFA) 90 mcg/actuation inhaler Inhale 2 puffs 4 (four) times a day. 1 Inhaler 6     cyanocobalamin 1000 MCG tablet Take 1,000 mcg by mouth daily. Indications: Prevention of Vitamin B12 Deficiency       SEA SOFT NASAL MIST 0.65 % nasal spray USE 1 SPRAY INTO EACH NOSTRIL AS  "NEEDED FOR CONGESTION. 45 mL 5     No current facility-administered medications on file prior to visit.        Pertinent past medical, surgical, social and family history reviewed and updated in Epic.    Social History     Socioeconomic History     Marital status:      Spouse name: Not on file     Number of children: 8     Years of education: Not on file     Highest education level: Not on file   Occupational History     Not on file   Social Needs     Financial resource strain: Not on file     Food insecurity:     Worry: Not on file     Inability: Not on file     Transportation needs:     Medical: Not on file     Non-medical: Not on file   Tobacco Use     Smoking status: Former Smoker     Years: 15.00     Last attempt to quit: 3/21/2004     Years since quitting: 15.7     Smokeless tobacco: Never Used     Tobacco comment: no passive exposure in home.   Substance and Sexual Activity     Alcohol use: No     Drug use: No     Sexual activity: Never   Lifestyle     Physical activity:     Days per week: Not on file     Minutes per session: Not on file     Stress: Not on file   Relationships     Social connections:     Talks on phone: Not on file     Gets together: Not on file     Attends Anabaptism service: Not on file     Active member of club or organization: Not on file     Attends meetings of clubs or organizations: Not on file     Relationship status: Not on file     Intimate partner violence:     Fear of current or ex partner: Not on file     Emotionally abused: Not on file     Physically abused: Not on file     Forced sexual activity: Not on file   Other Topics Concern     Not on file   Social History Narrative     Not on file         REVIEW OF SYSTEMS    ROS: 10 pt review of systems preformed and all negative except noted in HPI.     PHYSICAL EXAM    /82 (Patient Site: Left Arm, Patient Position: Sitting, Cuff Size: Adult Regular)   Pulse 74   Ht 5' 0.24\" (1.53 m)   Wt 148 lb (67.1 kg)   LMP " 12/01/2014 (Exact Date)   BMI 28.68 kg/m    GEN:  54 y.o. female sitting comfortably in no apparent distress.   HEENT: EOMI, no scleral icterus, buccal mucosa moist  Neck: Supple without lymphadenopathy or thyromegally   CHEST/LUNG: No respiratory distress, good air flow to bases, CTAB   CV: RRR, S1, S2 normal; no murmurs, rubs or gallops. No edema.   ABD:  Soft, non-tender, non distended, no guarding,  No masses  MSK:  Strength grossly normal  EXTR:  No cyanosis, no swelling or ecchymosis of the LE/foot. LE strength intact. Reports numbness. No specific TTP on exam- just a general soreness of the foot. Feels warm and well perfused.   SKIN: warm, dry, no rashes or lesions  NEURO: Gait normal, coordination intact  PSYCH:  Mood and affect appropriate      At the end of the encounter, I discussed results, diagnosis, medications. Discussed red flags for immediate return to clinic/ER, as well as indications for follow up if no improvement. Patient and/or caregiver understood and agreed to plan. Patient was stable for discharge.      Heidi Hernandez

## 2021-06-03 NOTE — PROGRESS NOTES
Programs Applying For: Change PMAP   County: [place]   Case #:  Mnsure #:           Outreach:   11/25/19: FRW called pt regarding referral, scheduled phone visit on 12/4 at 830am to call [facility] Emil to change health plans.     Health Insurance Information:   [place]: Medical Assistance.  Elig Type AA: Parent of a dependent child  Eligibility Begin Date: 09/01/2018  Eligibility End Date: --/--/----    Referral:   Are you able to assist patient in switching PMAP from [facility] to [facility]?

## 2021-06-03 NOTE — TELEPHONE ENCOUNTER
RN cannot approve Refill Request    RN can NOT refill this medication med is not covered by policy/route to provider.     Medication last prescribed by Hematology/Oncology. Please forward as appropriate.     Last office visit: 10/11/2019 Heidi Hernandez DO Karilyn K Sheck, [facility] Triage/Med Refill 11/5/2019    Requested Prescriptions   Pending Prescriptions Disp Refills     deferasirox (EXJADE) 250 MG disintegrating tablet 150 tablet 0     Sig: Dispense 5 tabs (1,250mg) in an appropriate amount of water, orange or apply juice and take po daily before breakfast       There is no refill protocol information for this order

## 2021-06-03 NOTE — PROGRESS NOTES
Mental Health Visit Note     11/19/2019    Start time: 9:06am    Stop Time: 9:29am   Session # 22    Session Type: Patient is presenting for an Individual session.    Indra Jason is a 54 y.o. female is being seen today for    Chief Complaint   Patient presents with      Follow Up     coping with pain and fatigue in body and heavy heart         New symptoms or complaints: None       Functional Impairment:   Personal: 4  Family: 3  Work: 4  Social:4    Clinical assessment of mental status:Reviewed. MSE is current effective 11/19/19  Grooming: Well groomed  Attire: Appropriate  Age: Appears Stated  Behavior Towards Examiner: disengaged  Motor Activity: Within normal. Cane for mobility. Slow.   Eye Contact: within normal, downcast on occasion  Mood: Depressed  Affect: Congruent w/content of speech, Flat and Depressed.  Speech/Language: Within normal.  Attention: Distractible   Concentration: Brief  Thought Process: Within normal  Thought Content: Hallucinations: Within noraml  Delusions: Within normal  Orientation: X 3  Memory: Impairment  Judgement: No Evidence of Impairment  Estimated Intelligence: Below Average  Demonstrated Insight: Diminished  Fund of Knowledge: adequate      Suicidal/Homicidal Ideation present: None Reported This Session.       Patient's impression of current status: She endorses the following symptoms: Cough, chest pain, mucus, nausea, headache, dizziness.  Denies vomiting denies fever.  Had home nurse visit yesterday.  Patient believes chest pain is from coughing so much.  Coughing is affecting her sleep.  Not taking any coughing medications yet.  Wants to be seen by a provider regarding her cough today.  Mood is okay, no changes or improvement since last session.    Therapist impression of patients current state: Patient presented for follow up individual psychotherapy visit. A female [facility] , Gema KIM, was present for the visit due to the language complexity.  Her son in law is  present for the visit.  Patient struggled to engage in session today due to frequent coughing.  She was having cough attacks and appeared to be in a lot of physical discomfort.  Provided positive reinforcement for patient's ability to verbalize her needs.  Addressing this cough will help improve her sleep and energy in turn will likely help improve her mood.  He frequently presents with severe somatic symptoms.  Therapist and patient had plans to start working on narrative exposure therapy to help address trauma symptoms and the somatic symptoms that are presenting due to trauma and depression diagnoses.  She was assisted with getting scheduled for an appointment with medical provider tomorrow.  She was encouraged to follow up with urgent care today if needing to be seen sooner.      Clinical Global Impressions Scale Ratings:    1. Considering your total clinical experience with this particular population, how mentally ill is the patient at this time? (which is rated on the following seven-point scale: 1=normal, not at all ill; 2=borderline mentally ill; 3=mildly ill; 4=moderately ill; 5=markedly ill; 6=severely ill; 7=among the most extremely ill patients:) score of 6 today    2. Compared to the patient's condition at admission to the program, currently this patient's condition is: (1=very much improved since the initiation of treatment; 2=much improved; 3=minimally improved; 4=no change from baseline (the initiation of treatment); 5=minimally worse; 6= much worse; 7=very much worse since the initiation of treatment:) score of 3 today      Type of psychotherapeutic technique provided: Insight oriented, Client centered and Solution-focused    Progress toward short term goals:Poor progress, continues to struggle with somatic problems and other physical complaints. These are making it difficult for her to be actively involved in therapy session.    Prior to next session, attend MD to address physical concerns.      Review of long term goals: Not done at today's visit. Effective 10/21/2019-01/21/2020.    Diagnosis:   1. PTSD (post-traumatic stress disorder)    2. Moderate episode of recurrent major depressive disorder (H)    F89- Unspecified Neurodevelopmental Disorder per Irasema Norris LP      Plan and Follow up:   Patient is scheduled for follow up psychotherapy on 12/10/2019      Discharge Criteria/Planning: Client has chronic symptoms and ongoing therapy for maintenance stability recommended.    Karena Ashton, STEVESW 11/19/2019

## 2021-06-03 NOTE — PROGRESS NOTES
MTM Initial Encounter  Assessment & Plan                                                     Medication Adherence/Access: Unexplainable excess of Fluoxetine and Isoniazid (see below). Will have pt return with med box and all bottles for review of med education/set up.     Pain: Stable - pt reports pain well controlled with current regimen. Given that pt has not been using/needing lidocaine will discontinue.  -Discontinue Lidocaine.     Mood/Sleep: Needs improvement- Last PHQ9 above goal <5. Patient reports symptoms have improved. However, based on fill dates and quantity on hand, over the last 2 months, patient has missed nearly 50% of doses. Would likely get continued improvement with closer adherence to therapy. Will monitor adherence going forward. [employer] message sent to psych provider.   -Pt to take Fluoxetine 40 mg daily, as prescribed.       LTBI: Needs improvement - Given that patient likely started treatment in January and still has nearly 2 months of medication remaining, not on track to complete 270 doses within the 12 month period, thus a treatment failure. Will likely need to be re-treated. Can consider 3 month course of Isoniazid + Rifapentine. Will work with [facility] to look into retreatment recommendations. For now, given that pt has failed the treatment, will have her stop Isoniazid.   -Stop INH   -PharmD to contact [facility] for re-treatment recommendations     Elevated Ferritin Level: Unimproved -not discussed today due to time. Will review with pt at follow-up. Per discussion with PCP after visit, pt needs to have follow-up with Heme/Onc.       Bronchospasm/Allergies/Cough: Unimproved - continues to have nasal symptoms, unclear which nasal spray pt is using. Will have pt return to clinic with nasal sprays before making therapy adjustments.   -Continue current therapy       GERD: Needs improvement - HHN note indicates pantoprazole on hold so this was not being set up in pt's med box. Given that pt is having  continued symptoms, may benefit from increased therapy, but instead of restarting PPI, will increase H2RA for now. If this is not effective, can revisit PPI. For now will discontinue.   -Stop Pantoprazole   -Increase Ranitidine to two times a day.         Supplements: Improved - appropriately using Vitamin D and Vitamin B supplements for previously low levels. Likely does not need to continue Vitamin C, but this was not discussed at today's visit. Will review at follow-up.   -Continue current therapy   -Future consideration: Discontinue Vitamin C       Follow Up  Return in about 1 week (around 12/11/2019) for Medication Management Pharmacist.      Subjective & Objective                                                     Indra Jason is a 54 y.o. female coming in for an initial visit for Medication Therapy Management. She was referred to me from home health nurse. Pt presents with daughter and professional [facility] .     Chief Complaint: Medication Management - no questions/concerns noted by pt.     Medication Adherence/Access: Pt previously had HHN for med set up, however insurance will no longer pay for this given no skilled need. Med box was previously being set up as:     Nurse did write down set up instructions for patient and daughter is managing this in the home. Brought medications to visit, but no pill box.     Instruction as follows:   APAP 500 mg 2 tabs two times a day   Vitamin C 500 mg two times a day   Cetirizine 10 mg AM   Vit D 1 tab AM   Isoniazid 1 tab AM   Quetiapine 50 mg PM   Ranitidine 150 mg PM   Vitamin B12 1 tab AM   Fluoxetine 40 mg AM       Pain: Prescribed Acetaminophen 500 mg 1-2 tabs three times a day as needed, capsaicin 0.025% cream as needed, and lidocaine ointment. Nurse has been setting up Acetaminophen 2 tabs two times a day.. No longer has lidocaine cream.. Feels like pain is pretty well controlled at this time.     Mood/Sleep: Prescribed Fluoxetine 40 mg daily and  quetiapine 25 mg daily. Reports mood is better. Sleep is good. 2 bottles of Fluoxetine - 1 filled 10/21 with 11 caps and another prescribed 11/18 with 19 caps remaining.     No active plans to harm self, but does feel like she would have been better off dying.     PHQ-9 Total Score: 10 (12/4/2019  9:00 AM)    LTBI: Prescribed Isoniazied 300 mg daily x 9 months. Treatment was initially prescribed in January. Pt has two bottles - one from 10/21 12 tabs remain, 11/18 - 30 tabs remain. Also has some set up in pill box at home.     Lab Results   Component Value Date    ALT 17 10/11/2019    AST 18 10/11/2019    ALKPHOS 78 10/11/2019    BILITOT 0.5 10/11/2019       Elevated Ferritin Level: Prescribed Exjade 250 mg - currently on hold - Pt was to follow -up with Heme/Onc for further discussion about continued plan for treatment. Did not go to this visit.     Bronchospasm/Allergies/Cough: Prescribed Albuterol 90 mcg HFA 2 puffs Q4H as needed. Just received two days ago - has not used. Also prescribed cetirizine 10 mg daily, Fluticasone 50 mcg nasal spray and saline nasal spray - only has one nasal spray.  Not entirely sure which it is, but based on images, thinks it's Fluticasone. Using 1 spray in each nostril twice daily. Continues to have runny nose/sneezing/itching.        GERD: Prescribed Pantoprazole 20 mg daily and Ranitidine 150 mg daily as needed. No PPI at visit today. Continues to have heartburn symptoms - this tends to worsen with runny nose/cough symptoms.     Supplements: Prescribed Vitamin C 500 mg daily, Vitamin B 12 1000 mcg daily, and Vitamin D3 1000 unit daily.     Vitamin B-12   Date Value Ref Range Status   06/03/2019 987 (H) 213 - 816 pg/mL Final     Vitamin D, Total (25-Hydroxy)   Date Value Ref Range Status   10/18/2018 16.0 (L) 30.0 - 80.0 ng/mL Final             PMH: reviewed in EPIC   Allergies/ADRs: reviewed in EPIC   Alcohol:   Social History     Substance and Sexual Activity   Alcohol Use No         Tobacco:   Social History     Tobacco Use   Smoking Status Former Smoker     Years: 15.00     Last attempt to quit: 3/21/2004     Years since quitting: 15.7   Smokeless Tobacco Never Used   Tobacco Comment    no passive exposure in home.     Today's Vitals: There were no vitals filed for this visit.  ----------------    The patient declined an after visit summary    I spent 60 minutes with this patient today.   All changes were made via collaborative practice agreement with Heidi Hernandez DO. A copy of the visit note was provided to the patient's provider.     Francisco Zafar, TabithaD  Medication Therapy Management (MTM) Pharmacist  [facility]         Current Outpatient Medications   Medication Sig Dispense Refill     acetaminophen (Q-PAP EXTRA STRENGTH) 500 MG tablet TAKE 1-2 TABLETS (500-1,000 MG TOTAL) BY MOUTH 3 TIMES A DAY AS NEEDED FOR PAIN. 100 tablet 5     ascorbic acid, vitamin C, (ASCORBIC ACID WITH JEN HIPS) 500 MG tablet TAKE 1 TABLET (500 MG TOTAL) BY MOUTH 2 TIMES A DAY WITH IRON TABLETS 180 tablet 3     cetirizine (ZYRTEC) 10 MG tablet Take 1 tablet (10 mg total) by mouth daily. 90 tablet 2     cholecalciferol, vitamin D3, (VITAMIN D3) 1,000 unit capsule Take 1 capsule (1,000 Units total) by mouth daily. 90 capsule 3     cyanocobalamin 1000 MCG tablet Take 1,000 mcg by mouth daily. Indications: Prevention of Vitamin B12 Deficiency       FLUoxetine (PROZAC) 20 MG capsule Take 40 mg by mouth daily.        meclizine (ANTIVERT) 25 mg tablet Take 1 tablet (25 mg total) by mouth 3 (three) times a day as needed for dizziness or nausea. 30 tablet 1     QUEtiapine (SEROQUEL) 50 MG tablet Take 50 mg by mouth at bedtime.       ranitidine (ZANTAC) 150 MG tablet Take 1 tablet (150 mg total) by mouth 2 (two) times a day. TAKE ONE TABLET BY MOUTH DAILY AS NEEDED FOR HEARTBURN 60 tablet 3     albuterol (PROAIR HFA) 90 mcg/actuation inhaler Inhale 2 puffs 4 (four) times a day. 1 Inhaler 6      capsaicin (ZOSTRIX) 0.025 % cream Apply to affected area as needed. 60 g 1     deferasirox (EXJADE) 250 MG disintegrating tablet Dispense 5 tabs (1,250mg) in an appropriate amount of water, orange or apply juice and take po daily before breakfast 150 tablet 0     fluticasone (FLONASE ALLERGY RELIEF) 50 mcg/actuation nasal spray 1-2 sprays a day as needed. 16 g 5     SEA SOFT NASAL MIST 0.65 % nasal spray USE 1 SPRAY INTO EACH NOSTRIL AS NEEDED FOR CONGESTION. 45 mL 5     No current facility-administered medications for this visit.

## 2021-06-03 NOTE — TELEPHONE ENCOUNTER
Patient requesting medication refill:     deferasirox (EXJADE) 250 MG disintegrating tablet 0 6/10/2019    Sig: Dispense 5 tabs (1,250mg) in an appropriate amount of water, orange or apply juice and take po daily before breakfast   Class: Phone In     Per [facility] visit on 11/4/19,  From: Luz Maria Lepe LPN On: 11/04/2019 05:59 PM   To: Mariah Patel, RN   Priority: Routine   Routing Comments:   deferasirox (exjade) 250mg 5 tabs (1,250mg) qd - am  - NO SUPPLY IN HOME per pharmacy pt needs to be seen for more refills, pt and daughter aware of this and will make an appointment soon. Have not as of 11/4/19 CASSIDY     Thank you - Luz Maria           Patient has been encouraged to schedule follow up with PCP and plans to make appointment while in the clinic today.

## 2021-06-03 NOTE — PROGRESS NOTES
Mental Health Visit Note     11/5/2019    Start time: 9:05am    Stop Time: 9:56am   Session # 21    Session Type: Patient is presenting for an Individual session.    Indra Jason is a 54 y.o. female is being seen today for    Chief Complaint   Patient presents with      Follow Up   coping with chronic pain and depression      New symptoms or complaints: None       Functional Impairment:   Personal: 4  Family: 3  Work: 4  Social:4    Clinical assessment of mental status:Reviewed. MSE is current effective 11/6/19  Grooming: Well groomed  Attire: Appropriate  Age: Appears Stated  Behavior Towards Examiner: Cooperative  Motor Activity: Within normal. Cane for mobility.   Eye Contact: within normal, downcast on occasion  Mood: Depressed  Affect: Congruent w/content of speech, Flat and Depressed.  Speech/Language: Within normal.  Attention: Distractible   Concentration: Brief  Thought Process: Within normal  Thought Content: Hallucinations: Within noraml  Delusions: Within normal  Orientation: X 3  Memory: Impairment  Judgement: No Evidence of Impairment  Estimated Intelligence: Below Average  Demonstrated Insight: Diminished  Fund of Knowledge: adequate      Suicidal/Homicidal Ideation present: None Reported This Session.       Patient's impression of current status: She endorses severe pain in her chest and her right shoulder.  She feels the pain is radiating from her shoulder into her chest and not specifically related to chest/heart pain.  She finds it hard to bend and move it, sleep, breathe due to the chest pain.  She reports being referred to a specialist that is unclear that she never followed up with.  She continues to have some family concerns about extended relatives health.    Therapist impression of patients current state: Patient presented for follow up individual psychotherapy visit. A female [facility] , Gema KIM, was present for the visit due to the language complexity.  Her daughter is present  for the visit.  Patient appeared really depressed and heavy in her body today with flat affect and her head was down.  Her engagement was limited today and her concentration was brief.  She appears to be distracted by her physical pain concerns.  Therapist reviewed most recent primary care physician workup which included an EKG and labs, which were all normal and did not show any signs of concern in regards to the chest pain that patient has been reporting.  At this point it appears it is stemming from her shoulder pain and going down into the chest.  Therapist also consulted with primary care physician at the time of visit to see if patient should be further assessed medically because of reported chest pain today.  It was determined that based on most recent workup and patient history that patient does not need an urgent follow-up.  This allowed session to continue to further address coping with the pain.  Consultation with PCP also determines that some of the pain is likely his somatic manifestation of mental health symptoms and trauma she has experienced, which this provider also believes.  Therapist introduced the concept of NET with patient today and explored willingness to engage in this treatment.  At this time patient sounds open to trauma treatment, which could help alleviate depression and somatic symptoms as we further treat the underlying trauma.      Clinical Global Impressions Scale Ratings:    1. Considering your total clinical experience with this particular population, how mentally ill is the patient at this time? (which is rated on the following seven-point scale: 1=normal, not at all ill; 2=borderline mentally ill; 3=mildly ill; 4=moderately ill; 5=markedly ill; 6=severely ill; 7=among the most extremely ill patients:) score of 6 today    2. Compared to the patient's condition at admission to the program, currently this patient's condition is: (1=very much improved since the initiation of  treatment; 2=much improved; 3=minimally improved; 4=no change from baseline (the initiation of treatment); 5=minimally worse; 6= much worse; 7=very much worse since the initiation of treatment:) score of 3 today      Type of psychotherapeutic technique provided: Insight oriented, Client centered and Solution-focused    Progress toward short term goals:Progress as expected, poor follow up with specialist provider re: pain. She has been encouraged to follow back up wiht PCP about this concern and get an appointment schedule with the specialist PCP referred her to. We continue to work on coping with pain and the mind-body connection. Increased pain causes worsening depression and vice-versa.     Review of long term goals: Long-term goals reviewed , signed and scanned into EHR. Effective 10/21/2019-01/21/2020.    Diagnosis:   1. PTSD (post-traumatic stress disorder)    2. Moderate episode of recurrent major depressive disorder (H)    F89- Unspecified Neurodevelopmental Disorder per Irasema Norris LP    Plan and Follow up:   Prior to next session, schedule follow up with PCP. Meet with CHW to follow up and schedule appointment [facility] .   Patient is scheduled for follow up psychotherapy on 11/19/2019      Discharge Criteria/Planning: Client has chronic symptoms and ongoing therapy for maintenance stability recommended.    TAL Kruger 11/5/2019

## 2021-06-03 NOTE — PROGRESS NOTES
Programs Applying For: Change PMAP   County: [place]   Case #:  Emil #:       Outreach:   12/19/19: FRW and pt called [facility] emil, on hold for 50 minutes. FRW advised that pt go to the county today, tomorrow, or Monday (last day 12/23) to change her PMAP. Pt understands and will go if she wants to change her plan.  12/17/19: FRW called pt and scheduled phone visit on 12/19 at 830am  12/11/19: FRW called pt and left VM. FRW will call again within one week. Attempt x 2.   12/6/19: FRW called pt and left VM. FRW will call again within one week. Attempt x 1.  12/4/19: FRW called [facility], call was dropped due to high call volume. FRW rescheduled visit for 12/6 at 9am.  Closed Encounter  11/25/19: FRW called pt regarding referral, scheduled phone visit on 12/4 at 830am to call [facility] Emil to change health plans.     Health Insurance Information:   [place]: Medical [place].  Elig Type AA: Parent of a dependent child  Eligibility Begin Date: 09/01/2018  Eligibility End Date: --/--/----    Referral:   Are you able to assist patient in switching PMAP from [facility] to [facility]?

## 2021-06-04 VITALS
HEART RATE: 74 BPM | DIASTOLIC BLOOD PRESSURE: 82 MMHG | SYSTOLIC BLOOD PRESSURE: 120 MMHG | HEIGHT: 60 IN | BODY MASS INDEX: 29.06 KG/M2 | WEIGHT: 148 LBS

## 2021-06-04 VITALS — BODY MASS INDEX: 29.01 KG/M2 | TEMPERATURE: 98.4 F | WEIGHT: 149.7 LBS

## 2021-06-04 VITALS
BODY MASS INDEX: 28.29 KG/M2 | SYSTOLIC BLOOD PRESSURE: 130 MMHG | TEMPERATURE: 99 F | RESPIRATION RATE: 20 BRPM | HEART RATE: 80 BPM | DIASTOLIC BLOOD PRESSURE: 78 MMHG | WEIGHT: 146 LBS

## 2021-06-04 NOTE — PROGRESS NOTES
"  Mental Health Visit Note     12/20/2019    Start time: 10:15am    Stop Time: 10:57am   Session # 24    Session Type: Patient is presenting for an Individual session.    Indra Jason is a 54 y.o. female is being seen today for    Chief Complaint   Patient presents with     MH Follow Up     coping with stress of managing pain and chronic health conditions, inability to sleep, and feeling down and fatigued       New symptoms or complaints: None       Functional Impairment:   Personal: 4  Family: 3  Work: 4  Social:4    Clinical assessment of mental status:Reviewed. MSE is current effective 12/20/19  Grooming: Well groomed  Attire: Appropriate  Age: Appears Stated  Behavior Towards Examiner: Cooperative  Motor Activity: Within normal- Slowed. Cane for mobility.  Eye Contact: within normal  Mood: Depressed  Affect: Congruent w/content of speech, Flat and Anxious.  Speech/Language: Within normal.  Attention: Within normal   Concentration: Within normal  Thought Process: Within normal  Thought Content: Hallucinations: Within noraml  Delusions: Within normal  Orientation: X 3  Memory: Impairment  Judgement: No Evidence of Impairment  Estimated Intelligence: Below Average  Demonstrated Insight: Diminished  Fund of Knowledge: adequate      Suicidal/Homicidal Ideation present: None Reported This Session.       Patient's impression of current status:   Confusion with TB medications. She is wondering about the home-care nurse that had to stop because of insurance concerns. Will she be able to get that service again? (Will refer patient to CCC team and PCP about this). Patient reports having an argument with her daughter this morning about her medications.   Appointment on Monday at [facility] re: benefits. Son in law plans to attend with her and [facility] stated they will get a [facility]  to be present.   Pain: headache, left shoulder  Energy: \"not good.\"   Sleep: waking 2-3x/night to use restrooms, pain and " worries.  Worries: no income, kids need things and feel bad she is unable to provide those things.   Depressed mood daily.  Denies nightmares. Feels this has improved over time.        Therapist impression of patients current state: Patient presented for follow up individual psychotherapy visit. A female Hungarian , Gema KIM, was present for the visit due to the language complexity. Patient's Son- in-law was present for the visit. Unable to complete NET Lifeline today as planned due to other concerns patient reports. We did review NET and patient signed consent form for NET.    She continues to struggle to manage her medications independently and has been using oversight of daughter. However, she shows a desire to try to take her medications independently and if they aren't set up, errors occur, such as today with her TB medications. We were able to solve the concern by connecting patient with [facility] today while in clinic for further verification. She will be following up with him in a week. She appeared very upset, frustrated/overwhelmed and almost tearful when talking about the stress of managing her medications. She denied these feelings. Expression from  seemed to indicate patient was not being honest in her report and  could notice this as well. Therapist normalized feeling overwhelmed with the different instructions of medications and different providers she is seeing. Reinforced patient's good attendance to all her providers and attempts to manage all of her health conditions, which is a lot of work. Reassured patient she is doing well and to continue what she is doing as well as utilize supports and communicate feelings when she is getting overwhelmed.       Clinical Global Impressions Scale Ratings:    1. Considering your total clinical experience with this particular population, how mentally ill is the patient at this time? (which is rated on the following seven-point scale:  1=normal, not at all ill; 2=borderline mentally ill; 3=mildly ill; 4=moderately ill; 5=markedly ill; 6=severely ill; 7=among the most extremely ill patients:) score of 6 today- no change    2. Compared to the patient's condition at admission to the program, currently this patient's condition is: (1=very much improved since the initiation of treatment; 2=much improved; 3=minimally improved; 4=no change from baseline (the initiation of treatment); 5=minimally worse; 6= much worse; 7=very much worse since the initiation of treatment:) score of 3 today- no change      Type of psychotherapeutic technique provided: Insight oriented, Client centered and Solution-focused    Progress toward short term goals:Progress as expected, continuing to work with MTM to better understand medications and also start new TB treatment since she failed the previous treatment. Continues to utilize help of children. Attending specialty appointments.      Review of long term goals: Not done at today's visit. Effective 10/21/2019-01/21/2020.    Diagnosis:   1. PTSD (post-traumatic stress disorder)    2. Moderate episode of recurrent major depressive disorder (H)    F89- Unspecified Neurodevelopmental Disorder per Irasema Norris LP      Plan and Follow up:   Patient is scheduled for follow up psychotherapy on 1/8/2020. We plan to complete NET Lifeline at this appointment.  She was provided a print out of all upcoming appointments that are listed in the system.       Discharge Criteria/Planning: Client has chronic symptoms and ongoing therapy for maintenance stability recommended.    TAL Kruger 12/20/2019

## 2021-06-04 NOTE — PROGRESS NOTES
12-23-19  Patient has an appt with [facility] next Friday 12-27-19.  Will coordinate with Rehana Singh and LOCO Baker RN

## 2021-06-04 NOTE — TELEPHONE ENCOUNTER
Attempt call 2. Left message with family member Amandeep ANGELITA to return call via language line  ID [phone]. [place] Okay to relay message upon call back.

## 2021-06-04 NOTE — TELEPHONE ENCOUNTER
12-23-19  Called [facility]  [phone]  Re: Status of Skilled Nursing services    Spoke to Megan and she stated patient was discharge from [facility] because [employer] MA won't cover just for medication set up and was refer to see MTM in the clinic for medication set up.  Thanked Megan for the clarifications and updated info.  Patient insurance recently switched to [employer].     Checked [place] to verify insurance plan.  Indicated PMAP through [employer]    SUBSCRIBER INFORMATION   Date of Service Subscriber ID Subscriber Name Birthdate Age Gender    12/23/2019 [id number] MAISHA MOTA  1965 54 FEMALE       Address    [address] [place] [id number]        PROVIDER INFORMATION   Provider ID Submitter Transaction ID Provider Name Taxonomy Code Qualifier Taxonomy Code    [id number] [facility]             Major Programs   This subscriber has eligibility for MA: Medical Assistance.   Elig Type AA: Parent of a dependent child   Eligibility Begin Date: 09/01/2018   Eligibility End Date: --/--/----   This subscriber is eligible for the following service types: Medical Care , Chiropractic , Dental Care , Hospital , Hospital - Inpatient , Hospital - Outpatient , Emergency Services , Pharmacy , Professional (Physician) Visit - Office , Vision (Optometry) , Mental Health , Urgent Care    Prepaid Health Plan   This subscriber receives MA12 - Prepaid Medical Assistance Program (PMAP) delivered through [employer]. The phone numbers is [phone] ().

## 2021-06-04 NOTE — PROGRESS NOTES
[facility] Hematology and Oncology Progress Note    Patient: Indra Jason  MRN: [id number]  Date of Service: 12/19/2019        Reason for Visit    Chief Complaint   Patient presents with     Benign Hematology     Elevated ferritin level       Assessment and Plan      Elevated ferritin, possible hemochromatosis  Iron overload in the liver and heart  Microcytic red cells with normal hemoglobin electrophoresis    Patient lost to follow-up since August.  Reports that she was on Exjade through October for a total of 3 to 4 months.  Tolerated medication fine.  Unclear why this was stopped.    Recent labs shows further increase in ferritin and elevated iron saturation consistent with hemochromatosis and iron overload.    We will plan to restart Exjade at a dose of 1250 mg p.o. daily.  Reviewed potential side effects and benefits.  Anticipate that she will restart in about a week.  We will recheck labs in 6 weeks and again with a follow-up in 3 months.    Asked her to stop the medication and call if she has any signs or symptoms of GI bleeding.    Plan: Restart Exjade at 1250 mg p.o. daily  Recheck labs in 6 weeks and with follow-up in 3 months        Measurable disease: Ferritin, iron saturation    Current therapy: Exjade 1250 mg p.o. daily since December 26, 2019  Previously on 1000 mg p.o. daily for 3 to 4 months between June and October 2019        ECOG Performance   ECOG Performance Status: 1    Distress Assessment  Distress Assessment Score: No distress    Pain         Problem List    1. Elevated ferritin level  HM1(CBC and Differential)    Comprehensive Metabolic Panel    Ferritin    deferasirox (EXJADE) 250 MG disintegrating tablet   2. Iron overload syndrome  HM1(CBC and Differential)    Comprehensive Metabolic Panel    Ferritin        CC: Heidi Hernandez,     ______________________________________________________________________________    History of Present Illness    Ms. Indra Jason returns for follow-up.  Was  seen back in August.  Lost to follow-up after that.  Did have cardiac and liver MRI done.  Continued Exjade through October but stopped after that.  Tolerated the medication fine.  Describes some joint symptoms in the knee and back.  Also some headaches.  No other new symptoms or problems.  ECOG status is 0.      Pain Status  Currently in Pain: Yes    Review of Systems    12 point review of systems completed.     Patient Coping  Patient Coping: Accepting  Distress Assessment  Distress Assessment Score: No distress  Accompanied by  Accompanied by: Family Member    Past History  Past Medical History:   Diagnosis Date     Abdominal pain     Created by Conversion   Overview:  Normal colonoscopy 05/02/2013     Allergic rhinitis 10/7/2016     Anxiety      Cervical Polyps     Created by Conversion   Overview:  Overview:  Created by Conversion     Chronic lower back pain 2/23/2016     Depression      Eosinophilia 2/6/2012     H. pylori infection      Nonspecific abnormal finding of lung field 2/27/2019    Overview:  Refer to clinic note Dr Jones 2/27/2013     Pulmonary tuberculosis 5/19/2016    Overview:  History of pulmonary tuberculosis treated in 1994.  AFB smears/culture September 13-15, 2011 negative.     Screen for colon cancer 5/2/2013    Overview:  Normal colonoscopy 05/02/2013         Past Surgical History:   Procedure Laterality Date     ABDOMINAL SURGERY N/A        Physical Exam    Recent Vitals 12/19/2019   Height -   Weight 149 lbs 11 oz   BSA (m2) 1.7 m2   BP -   Pulse -   Temp 98.4   Temp src 1   SpO2 -   Some recent data might be hidden       GENERAL: Alert and oriented to time place and person. Seated comfortably. In no distress.    HEAD: Atraumatic and normocephalic.    EYES: JEANETTE, EOMI.  No pallor.  No icterus.    Oral cavity: no mucosal lesion or tonsillar enlargement.    NECK: supple. JVP normal.  No thyroid enlargement.    LYMPH NODES: No palpable, cervical, axillary or inguinal  lymphadenopathy.    CHEST: clear to auscultation bilaterally.  Resonant to percussion throughout bilaterally.  Symmetrical breath movements bilaterally.    CVS: S1 and S2 are heard. Regular rate and rhythm.  No murmur or gallop or rub heard.  No peripheral edema.    ABDOMEN: Soft. Not tender. Not distended.  No palpable hepatomegaly or splenomegaly.  No other mass palpable.  Bowel sounds heard.    EXTREMITIES: Warm.    SKIN: no rash, or bruising or purpura.  Has a full head of hair.    CNS: Nonfocal      Lab Results    Recent ferritin is 625.  Iron saturation 53%.  Normal CBC and CMP.  Imaging    Liver and cardiac MRI reviewed.    Signed by: Steve Pavon MD

## 2021-06-04 NOTE — TELEPHONE ENCOUNTER
Medication Question or Clarification    Who is calling: Pharmacy: [phone]     What medication are you calling about?    (include dose and sig) isoniazid (NYDRAZID) 300 MG tablet    cholecalciferol, vitamin D3, (VITAMIN D3) 1,000 unit capsule     Who prescribed the medication?:   MAGALIE Silva MD and BRENDEN Hernandez    What is your question/concern?:   Francisco called marci to reverse the Rx refills however pharmacy states patient  Rx already and is unable to reverse.    Pharmacy:[facility] - [place], [place] - [address] [place] [address] to leave a detailed message?: Yes    Site CMT - Please call the pharmacy to obtain any additional needed information.

## 2021-06-04 NOTE — PROGRESS NOTES
Please call patient and inform:  Labs still elevated- please be sure to go to your appointment with hematology on the 19th!  The other screening labs were normal.

## 2021-06-04 NOTE — TELEPHONE ENCOUNTER
[facility]  for [facility] follow up of LTBI treatment.  No answer. M for pt to call back.       Patient is prescribed (Isoniazid 300 mg 3 tabs and Rifapentine 150 mg 6 tabs) once weekly x 12 weeks. Treatment was started on 12/20/2019. Due for 3rd dose today.

## 2021-06-04 NOTE — PROGRESS NOTES
Wes,   12/19/2019 12:45 PM Steve Pavon MD; CHANTEL PAV[facility] NURSE [facility]     They are to go to [facility].

## 2021-06-04 NOTE — PROGRESS NOTES
Patient's insurance switch from [employer] to [employer] so everything has to be transition to [employer].  We will have to let the  Home Care Skilled Nurse know that her MA is active it is switch to [employer].   Where is she going for Hematology? I looked at the chart and can't find what clinic or address to assist with transportation.

## 2021-06-04 NOTE — PROGRESS NOTES
MTM Initial Encounter  Assessment & Plan                                                     Medication Adherence/Access: Improved - set up medications x 1 week. Provided printed med schedule. Daughter will set up 1 week med box and bring to next MTM visit for confirmation. Set up as follows:     AM   Acetaminophen 2 tabs   Cetirizine   Vitamin D3   Vitamin B12   Famotidine  Fluoxetine     PM  Acetaminophen 2 tabs   Famotidine   Quetiapine    Meclizine is PRN     Pain: Stable - pt reports pain well controlled with current regimen. -Sent updated Rx to reflect scheduled Acetaminophen instructions.       Mood/Sleep: Improved - PHQ9 still above goal<5, however patient has been appropriatley using Fluoxetine daily. Will continue to monitor for improvement in symptoms.   -Continue current therapy       LTBI: Needs improvement - Confirmed treatment failure with [facility]. Okay to start either 3HP (rifapentine + isoniazid) or 4R (rifampin). Patient preference would be to do weekly 3HP. Already has more than enough Isoniazid. Given backorder of Priftin, will send to [facility] and have this mailed to patient. Patient to come in 1 week, will review instructions and administer first dose in clinic. Will plan for weekly phone check ins until pt gets established with HHN who can then verify dose administration.    -Sent order for Priftin 150 mg 6 tabs weekly- pt to hold off on starting until next MTM visit.       Elevated Ferritin Level: Improved -scheduled for Heme/Onc follow-up on 12/19. Will await further recommendation.         GERD: Improved - symptoms well controlled with two times a day H2RA.   -Continue current therapy       Supplements: Improved - appropriately using Vitamin D and Vitamin B supplements for previously low levels. Likely does not need to continue Vitamin C. Okay to stop today.   -Discontinue Vitamin C       Follow Up  Return in about 1 week (around 12/17/2019) for Medication Management Pharmacist.      Subjective  & Objective                                                     Indra Jason is a 54 y.o. female coming in for an initial visit for Medication Therapy Management. She was referred to me from home health nurse. Pt presents with daughter and professional [facility] .     Chief Complaint: Medication Management - no questions/concerns noted by pt.     Medication Adherence/Access: Pt previously had N for med set up, however insurance will no longer pay for this given no skilled need. They will be switching to [facility] in January and will have HHN services again. Requesting medication refills today.       Pain: Prescribed Acetaminophen 500 mg 1-2 tabs three times a day as needed, capsaicin 0.025% cream as needed, and lidocaine ointment. Nurse has been setting up Acetaminophen 2 tabs two times a day.. No longer has lidocaine cream.. Feels like pain is pretty well controlled at this time.     Mood/Sleep: Prescribed Fluoxetine 40 mg daily and quetiapine 25 mg daily. Reports mood is better. Sleep is good. 2 bottles of Fluoxetine - 1 filled 10/21 with 11 caps and another prescribed 11/18 with 19 caps remaining.     No active plans to harm self, but does feel like she would have been better off dying.     PHQ-9 Total Score: 10 (12/4/2019  9:00 AM)    LTBI: Prescribed Isoniazied 300 mg daily x 9 months. Per discussion from last MTM visit, pt was not on track to complete treatment. Today discussed options for retreatment with 4R or 3HP. Pt preference is for weekly 3HP.     51 tablets of INH remain.     Lab Results   Component Value Date    ALT 17 10/11/2019    AST 18 10/11/2019    ALKPHOS 78 10/11/2019    BILITOT 0.5 10/11/2019       Elevated Ferritin Level: Prescribed Exjade 250 mg - currently on hold - Scheduled to follow up with Heme/Onc on 12/19.       GERD: Prescribed Famotidine 20 mg daily two times a day. Over the last week was using Ranitidine two times a day. Due to recall this was switched to Famotidine. Did  notice improvement with two times a day H2RA dosing.       Supplements: Prescribed Vitamin C 500 mg daily, Vitamin B 12 1000 mcg daily, and Vitamin D3 1000 unit daily.     Vitamin B-12   Date Value Ref Range Status   06/03/2019 987 (H) 213 - 816 pg/mL Final     Vitamin D, Total (25-Hydroxy)   Date Value Ref Range Status   10/18/2018 16.0 (L) 30.0 - 80.0 ng/mL Final             PMH: reviewed in EPIC   Allergies/ADRs: reviewed in EPIC   Alcohol:   Social History     Substance and Sexual Activity   Alcohol Use No        Tobacco:   Social History     Tobacco Use   Smoking Status Former Smoker     Years: 15.00     Last attempt to quit: 3/21/2004     Years since quitting: 15.7   Smokeless Tobacco Never Used   Tobacco Comment    no passive exposure in home.     Today's Vitals: There were no vitals filed for this visit.  ----------------    The patient declined an after visit summary    I spent 30 minutes with this patient today.   All changes were made via collaborative practice agreement with Heidi Hernandez DO. A copy of the visit note was provided to the patient's provider.     Francisco Zafar, TabithaD  Medication Therapy Management (MTM) Pharmacist  [facility]         Current Outpatient Medications   Medication Sig Dispense Refill     acetaminophen (Q-PAP EXTRA STRENGTH) 500 MG tablet Take 2 tablets (1,000 mg total) by mouth 2 (two) times a day. 100 tablet 5     cetirizine (ZYRTEC) 10 MG tablet Take 1 tablet (10 mg total) by mouth daily. 90 tablet 3     cholecalciferol, vitamin D3, (VITAMIN D3) 25 mcg (1,000 unit) capsule Take 1 capsule (1,000 Units total) by mouth daily. 90 capsule 3     cyanocobalamin 1000 MCG tablet Take 1 tablet (1,000 mcg total) by mouth daily. 90 tablet 3     famotidine (PEPCID) 20 MG tablet Take 1 tablet (20 mg total) by mouth 2 (two) times a day. 60 tablet 1     FLUoxetine (PROZAC) 40 MG capsule Take 40 mg by mouth daily.        meclizine (ANTIVERT) 25 mg tablet Take 1 tablet  (25 mg total) by mouth 3 (three) times a day as needed for dizziness or nausea. 30 tablet 1     QUEtiapine (SEROQUEL) 50 MG tablet Take 50 mg by mouth at bedtime.       albuterol (PROAIR HFA) 90 mcg/actuation inhaler Inhale 2 puffs 4 (four) times a day. 1 Inhaler 6     capsaicin (ZOSTRIX) 0.025 % cream Apply to affected area as needed. 60 g 1     deferasirox (EXJADE) 250 MG disintegrating tablet Dispense 5 tabs (1,250mg) in an appropriate amount of water, orange or apply juice and take po daily before breakfast 150 tablet 0     fluticasone (FLONASE ALLERGY RELIEF) 50 mcg/actuation nasal spray 1-2 sprays a day as needed. 16 g 5     rifapentine (PRIFTIN) 150 mg Tab Take 6 tablets by mouth once a week. 72 each 0     SEA SOFT NASAL MIST 0.65 % nasal spray USE 1 SPRAY INTO EACH NOSTRIL AS NEEDED FOR CONGESTION. 45 mL 5     No current facility-administered medications for this visit.

## 2021-06-04 NOTE — PROGRESS NOTES
MTM Follow Up Encounter  Assessment & Plan                                                     Medication Adherence/Access: Improved - Med box mostly set up correctly today. Daughter to continue med set up.     LTBI: Improved - pt appropriately completed dose last week and took today's dose in clinic. Is having some potential side effects, but was willing to continue for another week to see if this improves. If not, may need to consider switching to 4 months of rifampin.   -Continue current therapy. Dose taken in clinic today.       Mood/Sleep: Unimproved- PHQ9 still above goal<5. Pt reports mood, sleep, and stress have all gotten better, but affect was flatter today. Less engaged. Pt states this is because of headache. Will continue to monitor.   -Continue current therapy       Elevated Ferritin Level: Partially improved - Provider note and prescription indicate different dosing, will contact provider to clarify.   -PharmD to clarify Exjade dosing with Hematology   12/30 addendum: Per Dr. Pavon, dose should be 1250 mg daily. Called [facility] - medication has not been delivered yet. Sent updated prescription. They will contact pt to schedule delivery.     Follow Up  Return in about 1 week (around 1/3/2020) for Medication Management Pharmacist, Via Phone.      Subjective & Objective                                                     Indra Jason is a 54 y.o. female coming in for a follow up visit for Medication Therapy Management. She was referred to me from home health nurse. Pt presents with daughter and professional Slovak .     Chief Complaint: Medication Management - Having dizziness since starting new TB meds last week.       Medication Adherence/Access: Pt previously had HHN for med set up, however insurance will no longer pay for this given no skilled need. Previously reported they would be switching to [facility] and would be exploring home nursing visits, however, CHW notes indicate pt  will still be active with [facility] come January. Daughter continues to set up med boxes.     From 12/20 Therapist visit:   She continues to struggle to manage her medications independently and has been using oversight of daughter. However, she shows a desire to try to take her medications independently and if they aren't set up, errors occur, such as today with her TB medications. We were able to solve the concern by connecting patient with MTM PharmD today while in clinic for further verification. She will be following up with him in a week. She appeared very upset, frustrated/overwhelmed and almost tearful when talking about the stress of managing her medications. She denied these feelings. Expression from  seemed to indicate patient was not being honest in her report and  could notice this as well.        LTBI: Prescribed (Rifapentine 150 mg 6 tabs weekly and Isoniazid 300 mg 3 tabs weekly) x 12 weeks. Still has Isoniazid from previously failed treatment.     Last week, met with Karena and informed her that she had taken 2 of the Rifapentine tablets. PharmD consulted at that time, and pt was instructed to go home and take the remaining 4 rifapentine and 3 isoniazid tablets to complete the dose.     Pt went home and completed this. Appropriate number of tabs taken from [facility] blister pack.     Patient called for weekly follow up of LTBI treatment.      They have missed 0 doses of medication to date. No appetite changes. No nausea/vomiting. Does have some abdominal pain. Did note change in urine color. Having dizziness all day.       Lab Results   Component Value Date    ALT 17 10/11/2019    AST 18 10/11/2019    ALKPHOS 78 10/11/2019    BILITOT 0.5 10/11/2019         Mood/Sleep: Prescribed Fluoxetine 40 mg daily and quetiapine 25 mg daily. Reports mood and sleep have been better. States no stress. Not as engaged today, states she has a headache.       Elevated Ferritin Level: Prescribed  Exjade 250 mg which was restarted at 12/10 Hematology visit. Has not received yet. Provider note indicates plan to restart at 1250 mg daily, but Rx was sent for 1000 mg daily.           PMH: reviewed in EPIC   Allergies/ADRs: reviewed in EPIC   Alcohol:   Social History     Substance and Sexual Activity   Alcohol Use No        Tobacco:   Social History     Tobacco Use   Smoking Status Former Smoker     Years: 15.00     Last attempt to quit: 3/21/2004     Years since quitting: 15.7   Smokeless Tobacco Never Used   Tobacco Comment    no passive exposure in home.     Today's Vitals: There were no vitals filed for this visit.  ----------------    The patient declined an after visit summary    I spent 30 minutes with this patient today.   All changes were made via collaborative practice agreement with Heidi Hernandez DO. A copy of the visit note was provided to the patient's provider.     Francisco Zafar, TabithaD  Medication Therapy Management (MTM) Pharmacist  [facility]         Current Outpatient Medications   Medication Sig Dispense Refill     acetaminophen (Q-PAP EXTRA STRENGTH) 500 MG tablet Take 2 tablets (1,000 mg total) by mouth 2 (two) times a day. 100 tablet 5     albuterol (PROAIR HFA) 90 mcg/actuation inhaler Inhale 2 puffs 4 (four) times a day. 1 Inhaler 6     capsaicin (ZOSTRIX) 0.025 % cream Apply to affected area as needed. 60 g 1     cetirizine (ZYRTEC) 10 MG tablet Take 1 tablet (10 mg total) by mouth daily. 90 tablet 3     cholecalciferol, vitamin D3, (VITAMIN D3) 25 mcg (1,000 unit) capsule Take 1 capsule (1,000 Units total) by mouth daily. 90 capsule 3     cyanocobalamin 1000 MCG tablet Take 1 tablet (1,000 mcg total) by mouth daily. 90 tablet 3     famotidine (PEPCID) 20 MG tablet Take 1 tablet (20 mg total) by mouth 2 (two) times a day. 60 tablet 1     FLUoxetine (PROZAC) 40 MG capsule Take 40 mg by mouth daily.        fluticasone (FLONASE ALLERGY RELIEF) 50 mcg/actuation nasal  spray 1-2 sprays a day as needed. 16 g 5     isoniazid (NYDRAZID) 300 MG tablet Take 900 mg by mouth once a week.       meclizine (ANTIVERT) 25 mg tablet Take 1 tablet (25 mg total) by mouth 3 (three) times a day as needed for dizziness or nausea. 30 tablet 1     QUEtiapine (SEROQUEL) 50 MG tablet Take 25 mg by mouth at bedtime.        rifapentine (PRIFTIN) 150 mg Tab Take 6 tablets by mouth once a week. 72 each 0     deferasirox (EXJADE) 250 MG disintegrating tablet Dispense 4 tabs (1,00mg) in an appropriate amount of water, orange or apply juice and take po daily before breakfast 120 tablet 0     No current facility-administered medications for this visit.

## 2021-06-04 NOTE — PROGRESS NOTES
"  Mental Health Visit Note     12/10/2019    Start time: 10:15am    Stop Time: 10:52am   Session # 23    Session Type: Patient is presenting for an Individual session.    Indra Jason is a 54 y.o. female is being seen today for    Chief Complaint   Patient presents with      Follow Up     coping with chronic pain and occasional depressed mood         New symptoms or complaints: None       Functional Impairment:   Personal: 4  Family: 3  Work: 4  Social:4    Clinical assessment of mental status:Reviewed. MSE is current effective 12/10/19  Grooming: Well groomed  Attire: Appropriate  Age: Appears Stated  Behavior Towards Examiner: Cooperative  Motor Activity: Within normal. Cane for mobility.  Eye Contact: within normal  Mood: Depressed  Affect: Congruent w/content of speech.  Speech/Language: Within normal.  Attention: Within normal   Concentration: Within normal  Thought Process: Within normal  Thought Content: Hallucinations: Within noraml  Delusions: Within normal  Orientation: X 3  Memory: Impairment  Judgement: No Evidence of Impairment  Estimated Intelligence: Below Average  Demonstrated Insight: Diminished  Fund of Knowledge: adequate      Suicidal/Homicidal Ideation present: None Reported This Session.       Patient's impression of current status: Headaches are \"a little better.\"  Cough has improved.  Continues to notice leg weakness, numb, tingling, worries about falling.  Sleep is \"okay/better than before.\"  Denies any nightmares.  Does not feel the need for any sleep medications.  Sleeping fine without them.  When asked about depressed mood she states \"not much.\"  She believes she is isolating less.  Her favorite interaction with family is to be around her grandkids and play with them and watch things with them.  She continues to be impacted when there are loud noises or people are escalated.  This makes her feel upset and she notices shaky hearts and feelings of fear and worry and increased " alertness.    Therapist impression of patients current state: Patient presented for follow up individual psychotherapy visit. A female Romanian , Gema KIM, was present for the visit due to the language complexity.  Patient's daughter is also present for the visit.   requested to end session early due to conflicting appointment.  Patient appears in better health then the last few times this provider has seen patient.  She was more alert and was not tuning to any somatic complaints today.  She had good posture, good eye contact and was more engaged in session.  Meeting with the medical doctors has helped to manage her physical complaints which will allow us to address more of her mental health symptoms in session.  We continue to work on coping with chronic pain and chronic conditions.  Therapist would like to start NET with patient at next session.  This was put on hold during the last few sessions due to increased physical complaints and sickness that she was experiencing which impacted her ability to engage in session.  Therapist noticed improvements in patient's depression symptoms and sleep.  She also seems to more energy and interest in spending time with family such as playing with her grandchildren.      Clinical Global Impressions Scale Ratings:    1. Considering your total clinical experience with this particular population, how mentally ill is the patient at this time? (which is rated on the following seven-point scale: 1=normal, not at all ill; 2=borderline mentally ill; 3=mildly ill; 4=moderately ill; 5=markedly ill; 6=severely ill; 7=among the most extremely ill patients:) score of 6 today (same)    2. Compared to the patient's condition at admission to the program, currently this patient's condition is: (1=very much improved since the initiation of treatment; 2=much improved; 3=minimally improved; 4=no change from baseline (the initiation of treatment); 5=minimally worse; 6= much  worse; 7=very much worse since the initiation of treatment:) score of 3 today (same)      Type of psychotherapeutic technique provided: Insight oriented, Client centered and Solution-focused    Progress toward short term goals:Progress as expected, attended appointments with MDs for headaches, leg pain. Also met with PharmD MTM.   Prior to next session, meet with MTM today. Increase medication adherence. Continue to interact daily with grandchildren.     Review of long term goals: Not done at today's visit. Effective 10/21/2019-01/21/2020.    Diagnosis:   1. PTSD (post-traumatic stress disorder)    2. Moderate episode of recurrent major depressive disorder (H)    F89- Unspecified Neurodevelopmental Disorder per [facility]ras[facility]      Plan and Follow up:   Patient is scheduled for follow up psychotherapy on 12/20/2019      Discharge Criteria/Planning: Client has chronic symptoms and ongoing therapy for maintenance stability recommended.    TAL Kruger 12/10/2019

## 2021-06-04 NOTE — TELEPHONE ENCOUNTER
Medication Question or Clarification  Who is calling: [facility]  What medication are you calling about?: ranitidine  What dose do you take?: 150 mg  How often are you taking the medication?:  2 times daily  Who prescribed the medication?: David  What is your question/concern?: recalled- please sen new rx with alternative  [facility]: [facility]  Okay to leave a detailed message?: Yes  Site CMT - Please call the pharmacy to obtain any additional needed information.

## 2021-06-04 NOTE — TELEPHONE ENCOUNTER
Called patient with  [facility] ID# [id number], relayed below message to patient. Patient expressed understanding and had no questions.

## 2021-06-04 NOTE — TELEPHONE ENCOUNTER
"Called patient with  Jere ID# [id number], someone answered the phone and gave another number to call [phone]. Called that number and received a voicemail in [place]. No message was left, will try again later.  \"Okay to relay message\"  "

## 2021-06-04 NOTE — TELEPHONE ENCOUNTER
----- Message from Heidi Hernandez DO sent at 12/4/2019 12:20 PM CST -----  Please call patient and inform:  Labs still elevated- please be sure to go to your appointment with hematology on the 19th!  The other screening labs were normal.

## 2021-06-04 NOTE — PROGRESS NOTES
12-24-19  Scheduled Follow Up Call: Attempt 1 Community Health Worker called patient. No answer.   If the patient is returning my call, please transfer the patient to [phone].  Upcoming appt in clinic 12-27-19 with Rehana,  1-8-20    Plan:  CHW off next week due to holidays unable to meet on 12-27-19 with PharmGLENN .  Will follow up in clinic on 1-8-20

## 2021-06-04 NOTE — PROGRESS NOTES
MTM Initial Encounter  Assessment & Plan                                                     Medication Adherence/Access: Improved - Med box mostly set up correctly with the exception of quetiapine and Vit D. Vit D was corrected during visit. Daughter to add quetiapine when she picks up refills from pharmacy.     Pain: Stable - pt reports pain well controlled with current regimen. -Continue current therapy     Mood/Sleep: Improved - PHQ9 still above goal<5, however patient has been appropriatley using Fluoxetine daily. Will continue to monitor for improvement in symptoms.   -Continue current therapy       LTBI: Needs improvement - Pt did not receive rifapentine and thus unable to start 12 week treatment course. Called [facility] during the visit, they were waiting on confirmation of payment method. Daughter provided this over the phone.   -Called [facility] re: Priftin. This will be mailed to pt tomorrow.     Elevated Ferritin Level: Improved -scheduled for Heme/Onc follow-up on 12/19. Will await further recommendation.     Supplements: Needs improvemeent- Vitamin D was added back to med box. PharmD to call pharmacy to discontinue Vitamin C.       Follow Up  Return in about 1 week (around 12/24/2019) for Medication Management Pharmacist.      Subjective & Objective                                                     Indra Jason is a 54 y.o. female coming in for an initial visit for Medication Therapy Management. She was referred to me from home health nurse. Pt presents with daughter and professional [facility] .     Chief Complaint: Medication Management - no questions/concerns noted by pt.     Medication Adherence/Access: Pt previously had [facility] for med set up, however insurance will no longer pay for this given no skilled need. They will be switching to [facility] in January and will have HHN services again.    Daughter set up medication box. Quetiapine was missing but they noted that it was out of refills.  Pt was [facility] at [facility] today and refills are being sent to pharmacy.     Vitamin D was also missing. Did have bottle. Daughter reported some confusion about vitamins because Vit C was discontinued but it still got delivered.       Pain: Prescribed Acetaminophen 500 mg 1-2 tabs three times a day as needed, capsaicin 0.025% cream as needed.  Nurse has been setting up Acetaminophen 2 tabs two times a day. Feels like pain is pretty well controlled at this time.     Mood/Sleep: Prescribed Fluoxetine 40 mg daily and quetiapine 25 mg daily. Reports mood is better. Had a follow up with psychiatry today. Notes no medication changes. Quetiapine was refilled for pt.   PHQ-9 Total Score: 10 (12/4/2019  9:00 AM)      LTBI: At last MTM visit was prescribed (Rifapentine 150 mg 6 tabs weekly and Isoniazid 300 mg 3 tabs weekly) x 12 weeks. Still has Isoniazid from previously failed treatment. Did not receive Rifapentine from [facility].     51 tablets of INH remain.     Lab Results   Component Value Date    ALT 17 10/11/2019    AST 18 10/11/2019    ALKPHOS 78 10/11/2019    BILITOT 0.5 10/11/2019       Elevated Ferritin Level: Prescribed Exjade 250 mg - currently on hold - Scheduled to follow up with Heme/Onc on 12/19.       Supplements: Prescribed Vitamin B 12 1000 mcg daily, and Vitamin D3 1000 unit daily. Vitamin C was discontinued at last visit, but pharmacy refilled it.     Vitamin B-12   Date Value Ref Range Status   06/03/2019 987 (H) 213 - 816 pg/mL Final     Vitamin D, Total (25-Hydroxy)   Date Value Ref Range Status   10/18/2018 16.0 (L) 30.0 - 80.0 ng/mL Final             PMH: reviewed in EPIC   Allergies/ADRs: reviewed in EPIC   Alcohol:   Social History     Substance and Sexual Activity   Alcohol Use No        Tobacco:   Social History     Tobacco Use   Smoking Status Former Smoker     Years: 15.00     Last attempt to quit: 3/21/2004     Years since quitting: 15.7   Smokeless Tobacco Never Used   Tobacco Comment     no passive exposure in home.     Today's Vitals: There were no vitals filed for this visit.  ----------------    The patient declined an after visit summary    I spent 30 minutes with this patient today.   All changes were made via collaborative practice agreement with Heidi Hernandez DO. A copy of the visit note was provided to the patient's provider.     Francisco Zafar PharmD  Medication Therapy Management (MTM) Pharmacist  [facility]         Current Outpatient Medications   Medication Sig Dispense Refill     acetaminophen (Q-PAP EXTRA STRENGTH) 500 MG tablet Take 2 tablets (1,000 mg total) by mouth 2 (two) times a day. 100 tablet 5     albuterol (PROAIR HFA) 90 mcg/actuation inhaler Inhale 2 puffs 4 (four) times a day. 1 Inhaler 6     capsaicin (ZOSTRIX) 0.025 % cream Apply to affected area as needed. 60 g 1     cetirizine (ZYRTEC) 10 MG tablet Take 1 tablet (10 mg total) by mouth daily. 90 tablet 3     cholecalciferol, vitamin D3, (VITAMIN D3) 25 mcg (1,000 unit) capsule Take 1 capsule (1,000 Units total) by mouth daily. 90 capsule 3     cyanocobalamin 1000 MCG tablet Take 1 tablet (1,000 mcg total) by mouth daily. 90 tablet 3     famotidine (PEPCID) 20 MG tablet Take 1 tablet (20 mg total) by mouth 2 (two) times a day. 60 tablet 1     FLUoxetine (PROZAC) 40 MG capsule Take 40 mg by mouth daily.        fluticasone (FLONASE ALLERGY RELIEF) 50 mcg/actuation nasal spray 1-2 sprays a day as needed. 16 g 5     meclizine (ANTIVERT) 25 mg tablet Take 1 tablet (25 mg total) by mouth 3 (three) times a day as needed for dizziness or nausea. 30 tablet 1     deferasirox (EXJADE) 250 MG disintegrating tablet Dispense 5 tabs (1,250mg) in an appropriate amount of water, orange or apply juice and take po daily before breakfast 150 tablet 0     isoniazid (NYDRAZID) 300 MG tablet Take 900 mg by mouth once a week.       QUEtiapine (SEROQUEL) 50 MG tablet Take 50 mg by mouth at bedtime.       rifapentine  (PRIFTIN) 150 mg Tab Take 6 tablets by mouth once a week. 72 each 0     No current facility-administered medications for this visit.

## 2021-06-05 VITALS
SYSTOLIC BLOOD PRESSURE: 121 MMHG | TEMPERATURE: 97.4 F | HEART RATE: 71 BPM | WEIGHT: 150 LBS | BODY MASS INDEX: 28.32 KG/M2 | RESPIRATION RATE: 18 BRPM | DIASTOLIC BLOOD PRESSURE: 76 MMHG | HEIGHT: 61 IN

## 2021-06-05 VITALS
WEIGHT: 136.5 LBS | BODY MASS INDEX: 26.66 KG/M2 | SYSTOLIC BLOOD PRESSURE: 98 MMHG | DIASTOLIC BLOOD PRESSURE: 68 MMHG | HEART RATE: 68 BPM | RESPIRATION RATE: 16 BRPM

## 2021-06-05 VITALS
WEIGHT: 122 LBS | TEMPERATURE: 97.8 F | HEART RATE: 79 BPM | RESPIRATION RATE: 20 BRPM | DIASTOLIC BLOOD PRESSURE: 84 MMHG | BODY MASS INDEX: 23.83 KG/M2 | SYSTOLIC BLOOD PRESSURE: 127 MMHG

## 2021-06-05 VITALS
SYSTOLIC BLOOD PRESSURE: 127 MMHG | HEIGHT: 60 IN | HEART RATE: 78 BPM | OXYGEN SATURATION: 97 % | DIASTOLIC BLOOD PRESSURE: 74 MMHG | BODY MASS INDEX: 29.29 KG/M2 | WEIGHT: 149.2 LBS

## 2021-06-05 NOTE — TELEPHONE ENCOUNTER
RN cannot approve Refill Request    RN can NOT refill this medication med is not covered by policy/route to provider and historical medication requested. Last office visit: 5/21/2019 Hanane Woo MD Last Physical: Visit date not found Last MTM visit: Visit date not found Last visit same specialty: 11/29/2019 Heidi Hernandez DO.  Next visit within 3 mo: Visit date not found  Next physical within 3 mo: Visit date not found      Mary Andrew, [facility] Triage/Med Refill 1/25/2020    Requested Prescriptions   Pending Prescriptions Disp Refills     isoniazid (NYDRAZID) 300 MG tablet [Pharmacy Med Name: ISONIAZID 300 MG TABLET] 30 tablet 0     Sig: TAKE ONE TABLET BY MOUTH ONCE DAILY       There is no refill protocol information for this order

## 2021-06-05 NOTE — PROGRESS NOTES
Pt was in clinic for therapy appointment.     Briefly met with patient, daughter, and  to follow up on LTBI treatment as pt has been unreachable by phone.     Patient is prescribed (Isoniazid 300 mg 3 tabs and Rifapentine 150 mg 6 tabs) once weekly x 12 weeks. Treatment was started on 12/20/2019.     They have missed 1 tablet of Rifapentine to date. No appetite changes. No nausea/vomiting. No abdominal pain, fatigue, or weakness. No s/s jaundice. No rash or itching. No s/s of neuropathy. Does have some dizziness, but not enough that pt feels she needs to stop or change treatment.     Pt is due for next dose on Friday, 1/17, but has not called for additional Priftin refills. Pharmacy was called with the patient today and they will fill the medication for  today. Daughter will drive pt to pharmacy.     PharmD to check in on Friday to ensure dose completion.     Francisco Zafar PharmD  Medication Therapy Management (MTM) Pharmacist  [facility]

## 2021-06-05 NOTE — PROGRESS NOTES
Mental Health Visit Note     1/22/2020    Start time: 9:20am    Stop Time: 9:56am   Session # 3    Session Type: Patient is presenting for an Individual session.    Indra Jason is a 55 y.o. female is being seen today for    Chief Complaint   Patient presents with      Follow Up     coping with heavy heart, pain and fatigue     New symptoms or complaints: None       Functional Impairment:   Personal: 4  Family: 3  Work: 4  Social:4    Clinical assessment of mental status:Reviewed. MSE is current effective 1/22/20  Grooming: Well groomed  Attire: Appropriate  Age: Appears Stated  Behavior Towards Examiner: Cooperative  Motor Activity: Within normal- Slowed. Cane for mobility.  Eye Contact: within normal  Mood: Depressed  Affect: Congruent w/content of speech  Speech/Language: Within normal.  Attention: Within normal   Concentration: Within normal  Thought Process: Within normal  Thought Content: Hallucinations: Within noraml  Delusions: Within normal  Orientation: X 3  Memory: Impairment  Judgement: No Evidence of Impairment  Estimated Intelligence: Below Average  Demonstrated Insight: Diminished  Fund of Knowledge: adequate      Suicidal/Homicidal Ideation present: None Reported This Session.       Patient's impression of current status:   Limited activities. Feeling down/heavy. Tired.   Pain makes it hard to do things- feel bad. Rely on help from adult children a lot.     Therapist impression of patients current state: Patient presented for follow up individual psychotherapy visit. A female Kinyarwanda , Gema KIM, was present for the visit due to the language complexity. Patient arrived late for session. She continues to present with symptoms of depression and pain that is difficult to manage. Symptoms continue to be severe. Increased socialization and engagement may help her mood. She lacks motivation to make changes and has a tendency to isolate. Continue to assess motivation for change and work on  action steps she can take.       Clinical Global Impressions Scale Ratings:    1. Considering your total clinical experience with this particular population, how mentally ill is the patient at this time? (which is rated on the following seven-point scale: 1=normal, not at all ill; 2=borderline mentally ill; 3=mildly ill; 4=moderately ill; 5=markedly ill; 6=severely ill; 7=among the most extremely ill patients:) score of 6 today-  1/22/20    2. Compared to the patient's condition at admission to the program, currently this patient's condition is: (1=very much improved since the initiation of treatment; 2=much improved; 3=minimally improved; 4=no change from baseline (the initiation of treatment); 5=minimally worse; 6= much worse; 7=very much worse since the initiation of treatment:) score of 3 today- 1/22/20      Type of psychotherapeutic technique provided: Insight oriented and Client centered, motivational interviewing    Progress toward short term goals:Progress as expected, continue to engage in more socialization and activities outside of session.     Review of long term goals: Not done at today's visit. Effective 10/21/2019-01/21/2020. Need to update at next session.    Diagnosis:   1. PTSD (post-traumatic stress disorder)    2. Moderate episode of recurrent major depressive disorder (H)    F89- Unspecified Neurodevelopmental Disorder per Irasema Norris LP      Plan and Follow up:   Patient to follow up for therapy in 1-2 weeks- needs to get scheduled up front.       Discharge Criteria/Planning: Client has chronic symptoms and ongoing therapy for maintenance stability recommended.    TAL Kruger 1/22/2020

## 2021-06-05 NOTE — TELEPHONE ENCOUNTER
1-8-20  Patient and daughter has questions about the new medication she received.  She would like to talk to someone about it.  Patient could not tell the name of the medication.    Please call patient or daughter Victor M/PCA

## 2021-06-05 NOTE — PROGRESS NOTES
MTM Consult Encounter  Assessment & Plan                                                     Medication Adherence/Access: Empty med box - will have daughter set up meds and come in for [facility] RN visit to assess med set up and adherence.       LTBI: Improved - pt reports she has not missed any doses. Remaining qty on hand seems appropriate. Advised to call for 3 month refill now so they have enough time to receive in the mail.   -Continue current therapy     Mood/Sleep: Unimproved- PHQ9 still above goal<5. Unclear if pt was to stop Fluoxetine or if she is cross tapering. Unclear if dizziness is related to Duloxetine, but pt has had persistent concerns with dizziness. Will refill Meclizine today.   -PharmD to request records from [facility] to clarify med regimen   -Refill Meclizine     Elevated Ferritin Level: Improved -Pt using appropriately without adverse effects. Scheduled for heme/onc follow-up in March.   -Continue current therapy     Follow Up  No follow-ups on file.  Schedule with PharmD as needed     Subjective & Objective                                                     Indra Jason is a 55 y.o. female coming in for a follow up visit for Medication Therapy Management. She was referred to me from home health nurse. Pt presents with daughter and professional [facility] .     Chief Complaint: Medication Management -     Medication Adherence/Access: Pt previously had HHN for med set up, however insurance will no longer pay for this given no skilled need. Previously reported they would be switching to [facility] and would be exploring home nursing visits, however, CHW notes indicate pt will still be active with [employer] come January. Daughter continues to set up med boxes.       LTBI: Prescribed (Rifapentine 150 mg 6 tabs weekly and Isoniazid 300 mg 3 tabs weekly) x 12 weeks. Still has Isoniazid from previously failed treatment.     PharmD has been unable to get a hold of pt for several weeks for TB med  reminders. Pt reports she has been taking her meds regularly.     Denies adverse effects - no stomach pain, nausea, vomiting. Does note some tingling in the hands and legs, but this is not new and unchanged.     Denies changes in urine color but has not been paying attention.     Lab Results   Component Value Date    ALT 17 10/11/2019    AST 18 10/11/2019    ALKPHOS 78 10/11/2019    BILITOT 0.5 10/11/2019         Mood/Sleep: Previously prescribed Fluoxetine 40 mg daily and quetiapine 25 mg daily. Today has a bottle of Duloxetine 30 mg and Quetiapine 25 from [facility]. Bottles filled 1/16/20. Pt thought they increased the dose of her previous medication.     Sometimes feels better, sometimes feeling dizzy. This has been going on for about 3 days.     Sleep has been okay. Does have daytime drowsiness. Taking Duloxetine at bedtime.        Dizziness: Prescribed Meclizine 25 mg three times a day as needed. Had been using recently and found it helpful for the dizziness but ran out. Didn't call the pharmacy for refills.       Elevated Ferritin Level: Prescribed Exjade 250 mg 5 tablets daily which was restarted at 12/10 Hematology visit. Has been using for about month. Denies side effects.         PMH: reviewed in EPIC   Allergies/ADRs: reviewed in EPIC   Alcohol:   Social History     Substance and Sexual Activity   Alcohol Use No        Tobacco:   Social History     Tobacco Use   Smoking Status Former Smoker     Years: 15.00     Last attempt to quit: 3/21/2004     Years since quitting: 15.8   Smokeless Tobacco Never Used   Tobacco Comment    no passive exposure in home.     Today's Vitals: There were no vitals filed for this visit.  ----------------    The patient declined an after visit summary    I spent 15 minutes with this patient today.   All changes were made via collaborative practice agreement with Heidi Hernandez DO. A copy of the visit note was provided to the patient's provider.     Francisco Zafar,  PharmD  Medication Therapy Management (MTM) Pharmacist  [facility]         Current Outpatient Medications   Medication Sig Dispense Refill     acetaminophen (Q-PAP EXTRA STRENGTH) 500 MG tablet Take 2 tablets (1,000 mg total) by mouth 2 (two) times a day. 100 tablet 5     albuterol (PROAIR HFA) 90 mcg/actuation inhaler Inhale 2 puffs 4 (four) times a day. 1 Inhaler 6     capsaicin (ZOSTRIX) 0.025 % cream Apply to affected area as needed. 60 g 1     cetirizine (ZYRTEC) 10 MG tablet Take 1 tablet (10 mg total) by mouth daily. 90 tablet 3     cholecalciferol, vitamin D3, (VITAMIN D3) 25 mcg (1,000 unit) capsule Take 1 capsule (1,000 Units total) by mouth daily. 90 capsule 3     cyanocobalamin 1000 MCG tablet Take 1 tablet (1,000 mcg total) by mouth daily. 90 tablet 3     deferasirox (EXJADE) 250 MG disintegrating tablet Dispense 5 tabs (1250mg) in an appropriate amount of water, orange or apply juice and take po daily before breakfast 150 tablet 0     famotidine (PEPCID) 20 MG tablet Take 1 tablet (20 mg total) by mouth 2 (two) times a day. 60 tablet 1     FLUoxetine (PROZAC) 40 MG capsule Take 40 mg by mouth daily.        fluticasone (FLONASE ALLERGY RELIEF) 50 mcg/actuation nasal spray 1-2 sprays a day as needed. 16 g 5     isoniazid (NYDRAZID) 300 MG tablet Take 900 mg by mouth once a week.       meclizine (ANTIVERT) 25 mg tablet Take 1 tablet (25 mg total) by mouth 3 (three) times a day as needed for dizziness or nausea. 30 tablet 1     QUEtiapine (SEROQUEL) 50 MG tablet Take 25 mg by mouth at bedtime.        rifapentine (PRIFTIN) 150 mg Tab Take 6 tablets by mouth once a week. 72 each 0     No current facility-administered medications for this visit.

## 2021-06-05 NOTE — TELEPHONE ENCOUNTER
Patient will be in clinic tomorrow 1-15-20 with Karena.  I can meet with patient tomorrow to reschedule with Francisco but need to keep the appt with LOCO Craven at 11am

## 2021-06-05 NOTE — PROGRESS NOTES
Clinic Care Coordination Contact    Follow Up Progress Note      Assessment: SW met with Indra, family member, and  to discuss concerns about home nursing services.    Reports indicate that patient needed to switch back to [facility] to resume their nursing services in home. MICHELLE completed a deeper chart review and found that patient was receiving [facility] nursing services.     MICHELLE contacted the SW Samantha with  Home Care to discuss concerns. She suggested SW to contact the billing department. SW contacted the  Homecare Billing Department to discuss the concerns. Patient had had [employer] [place] since 9/2018. The  explained that Medical Assistance is becoming more strict on what they will cover and determine as medically necessary. He reported they reference [place] Statute 9505.0175. Medication setups are now being determined as not medically necessary and will not continue if there is someone unavailable to teach the services to. He reported this appears to be happening with all Medical Assistance plans therefore it does not appear filing an appeal to switch to [facility] would be beneficial at this time. The  reported they have submitted an appeal to [employer] and they were denied.    MICHELLE explained this to patient and suggested we consult with PharmD to see how often they can assist patient with med setup. [employer] should cover transportation to see PharmD.     SW checked on status of travel loan deferment. She reported she received a letter, but did not have it with her. SW reported she can bring it in and CCC team can review it.     Goals addressed this encounter:   Goals Addressed                 This Visit's Progress      Financial Wellbeing (pt-stated)        Goal Statement- I want to defer my travel loan as soon as possible    Action steps to achieve this goal  1  I will drop off the letter I received explaining my loan      Updated: 1/17/2020 BC  Updated: 1-8-20  AL  Date goal set:  11/22/2019 BC    Updated: 12/9/2019 BC          COMPLETED: Medical (pt-stated)        I'm unable to get homecare nursing at this time          Medication 1 (pt-stated)        Goal Statement- I want to have support to setup my medications as soon as possible    Action steps to achieve this goal  1.  I will work with the PharmD and CCC RN to setup my medications        Date goal set:  1/17/2020 BC                 Intervention/Education provided during outreach: See above          Plan:   SW to message PharmD to see how often they can assist with med setup

## 2021-06-05 NOTE — PROGRESS NOTES
2-4-20  Patient met with Rehana Singh for med set up   Met with patient and daughter.  [place] : Iris Carnes-MALIK  Joint visit with Francisco Zafar PharmD   Follow up on goals and action steps.  Daughter reported:  -will drop off the travel loan paperwork at the clinic to give to     Assisted and supported patient to schedule follow up with CCC RN for MEV per PharmD  Scheduled MEV 2-18-20 at 10 am check med box.      Plan:  Appt with CCC RN 2-18-20 at 10am   CHW follow up 2-25-20

## 2021-06-05 NOTE — PROGRESS NOTES
1-24-20  FYI  Patient has a follow up appt with Rehana Singh on 1-29-20.  Francisco will let us know when you want me to schedule with LOCO Baker RN for follow up since there is a limited MTM visits.  Thanks  Aun

## 2021-06-05 NOTE — PROGRESS NOTES
1-8-20  Met with patient and daughter Victor M/PCA in clinic and follow up on goals  [facility] : Gema Schultz.  Patient reported:  -did not received anything from [employer] about the travel loan.  -she received letter from [facility]. Read letter and explained to patient and daughter that [facility] denied change of insurance plan due to late file and enrolled closed 12-15-19 and received request on 12-23-19.  Stated she wants to switch to [facility] because she wants to see the same nurse that comes out to her home for medication set up.  Explained the right to appeal decision.  Patient would like to appeal and needs help.  Scheduled patient to see LOCO Guevara on 1-17-20 at 11am    Daughter stated she has question about the new medication does not know the name of the med.  Sent telephone message to PCP [place] care team to address question of new medication.  Discussed with patient temporarily medication set up in the clinic with CCC RN.  Scheduled appt with Francisco Zafar PharmD on 1-17-20 at 10:30am for medication review and then follow up with CCC RN.    Patient's son in law will bring patient to appt 1-17-20 at 10:30am no transportation needed for this appt.    Routed to CCC SW af FYI    Plan:  appt with CCC SW 1-17-20 at 11am  CHW follow up 2-17-20

## 2021-06-05 NOTE — PROGRESS NOTES
1-15-20  Met with patient and daughter.  Antoine -Gema from [place]    Patient met with Rehana Singh today for medication set up and later meet with [facility] RN for medication set up.    Patient requested to have the same nurse come to the home as her .  Reminded that she has appt with LOCO MARTINEZ on 1-17-20 at 11am for support to appeal to switch to [facility].  Will bring the denial letter to appt.     Patient expressed she wants a skilled nurse to come to the home instead of coming to the clinic for medication set up.    Plan:  appt with CCC SW 1-17-20 at 11am  CHW follow up 2-19-20

## 2021-06-05 NOTE — PROGRESS NOTES
20  Called and spoke to patient's son Amandeep through [facility]  Jere to reschedule MTM/MEV appt missed on 20 for medication set up.  Rescheduled to see Rehana Singh tomorrow 20 at 9:30am.  Reminded to bring all medication bottles.     Conference call with patient's son to connect with [facility], [facility] to schedule appt to complete citizenship application asap due to medical waiver will  soon.  Son connected with [facility] and son schedule appt on 2-3-20 at 10 am.  Reminded to bring green cards, SS card.  [facility] email list of document needed to bring to appt 2-3-20 at 10am.    Updated goals.  Follow up on loan paper.    Plan:  CHW meet in clinic tomorrow 20 to give the list of document needed to bring to appt on 2-3-20 with [facility]

## 2021-06-05 NOTE — PROGRESS NOTES
2-3-20  Met with patient and daughter in clinic and follow up on goals and action steps.  [facility] : Gema Mirza-KTTS  Introduced  Anastasiia Bush at [facility] to patient for support with citizenship process.  Patient reported;  -has interview on 2-25-20 at 12pm for citizenship.  Stated she had someone at [facility] help with citizenship.  Have  2 medical waiver forms done at [facility] and with Jose A Sharma.  Patient will request support from [facility] if she gets denied on 2-25-20.     Rescheduled appt with Rehana Singh for med set up she missed on 1-31-20.  Patient stated she has appt with PharmGLENN tomorrow.  Printed out upcoming appts.  Confirmed with patient that she has appt with Francisco Zafar PharmD tomorrow at 8:30am and reminded daughter and patient to bring all medications to appt.    Plan:  CHW follow up 3-5-20

## 2021-06-05 NOTE — PROGRESS NOTES
1-31-20  Called and spoke to patient's family. Stated they are on the way to appt.    Patient no show to today's appt with PharmD.    Plan:  CHW follow up in clinic 2-3-20 at 10am to reschedule appt with PharmD or with CCC RN for meds set up

## 2021-06-05 NOTE — PROGRESS NOTES
Mental Health Visit Note     1/8/2020    Start time: 9:10am    Stop Time: 9:56am   Session # 1    Session Type: Patient is presenting for an Individual session.    Indra Jason is a 55 y.o. female is being seen today for    Chief Complaint   Patient presents with      Follow Up     coping with nightmares and heavy heart       New symptoms or complaints: None       Functional Impairment:   Personal: 4  Family: 3  Work: 4  Social:4    Clinical assessment of mental status:Reviewed. MSE is current effective 1/8/20  Grooming: Well groomed  Attire: Appropriate  Age: Appears Stated  Behavior Towards Examiner: Cooperative  Motor Activity: Within normal- Slowed. Cane for mobility.  Eye Contact: within normal  Mood: Depressed  Affect: Congruent w/content of speech  Speech/Language: Within normal.  Attention: Within normal   Concentration: Within normal  Thought Process: Within normal  Thought Content: Hallucinations: Within noraml  Delusions: Within normal  Orientation: X 3  Memory: Impairment  Judgement: No Evidence of Impairment  Estimated Intelligence: Below Average  Demonstrated Insight: Diminished  Fund of Knowledge: adequate      Suicidal/Homicidal Ideation present: None Reported This Session.       Patient's impression of current status:   Heavy heart and worries most days. Low energy.   Pain impacts daily tasks. Help from family.       Therapist impression of patients current state: Patient presented for follow up individual psychotherapy visit. A female Croatian , Gema KIM, was present for the visit due to the language complexity. Patient's son-in-law was present for the visit. Patient presented with depressed mood and flat affect. She appears in pain today as evidence by her body language. Completed [employer]- patient actively participated in the placement. Patient was engaged in session. She showed good recall of memories.       Clinical Global Impressions Scale Ratings:    1. Considering your total  clinical experience with this particular population, how mentally ill is the patient at this time? (which is rated on the following seven-point scale: 1=normal, not at all ill; 2=borderline mentally ill; 3=mildly ill; 4=moderately ill; 5=markedly ill; 6=severely ill; 7=among the most extremely ill patients:) score of 6 today-  1/8/20    2. Compared to the patient's condition at admission to the program, currently this patient's condition is: (1=very much improved since the initiation of treatment; 2=much improved; 3=minimally improved; 4=no change from baseline (the initiation of treatment); 5=minimally worse; 6= much worse; 7=very much worse since the initiation of treatment:) score of 3 today- 1/8/20      Type of psychotherapeutic technique provided: Insight oriented and Client centered, Narrative Exposure Therapy    Progress toward short term goals:Progress as expected, improved medication adherence. Symptoms continue to remain stable- not worsening as we address trauma history- coping well with this intervention.     Review of long term goals: Not done at today's visit. Effective 10/21/2019-01/21/2020.    Diagnosis:   1. PTSD (post-traumatic stress disorder)    2. Moderate episode of recurrent major depressive disorder (H)    F89- Unspecified Neurodevelopmental Disorder per Irasema Norris LP      Plan and Follow up:   Patient is scheduled for follow up psychotherapy on 1/15/2020  She was provided a print out of all upcoming appointments that are listed in the system.       Discharge Criteria/Planning: Client has chronic symptoms and ongoing therapy for maintenance stability recommended.    TAL Kruger 1/8/2020

## 2021-06-05 NOTE — TELEPHONE ENCOUNTER
Pt switched from 9 month INH therapy to 12 week course of Rifapentin + Isoniazid. Has enough supply from previous refills to complete the 12 week course.

## 2021-06-05 NOTE — TELEPHONE ENCOUNTER
Called patient with  Felipa ID# [id number], patient had a question about the new medication Deferasirox. Patient stated she was on this medication in the past and wanted to verify how she is to take it and if she needs a follow up appointment. Read medication instructions to patient and she expressed understanding, advised patient that the medication was started by hematology per MTM note. Patient stated she would call their office to see if she needs a follow up. Patient had no additional questions.     TC
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
## 2021-06-05 NOTE — TELEPHONE ENCOUNTER
Covering for Francisco Zafar, TabithaD. Patient contacted for weekly follow up of LTBI treatment.       Patient is prescribed (Isoniazid 300 mg 3 tabs and Rifapentine 150 mg 6 tabs) once weekly x 12 weeks. Treatment was started on 12/20/2019.  They have missed 1 tablet of Rifapentine to date. No appetite changes. No nausea/vomiting. No abdominal pain, fatigue, or weakness. No s/s jaundice. No rash or itching. No s/s of neuropathy. Does have some dizziness, but not enough that pt feels she needs to stop or change treatment.     Confirms that medication was received from the pharmacy and dose was taken today.     Plan:  1. Continue current treatment     Follow-Up  1 week via phone with MTM Pharmacist     Megan Osborne ([facility] PharmD  Medication Therapy Management Pharmacist  [facility]

## 2021-06-06 NOTE — PROGRESS NOTES
appt scheduled 3-17-20 at 11am telephone visit per [facility] RN

## 2021-06-06 NOTE — TELEPHONE ENCOUNTER
Refill Approved    Rx renewed per Medication Renewal Policy. Medication was last renewed on 12/26/for 60/1  Last OV 2/4/2020 MTM  Last OV with PCP 11/29/2019     RTC about 12/27/2019    Jade Arciniega, [facility] Triage/Med Refill 2/17/2020     Requested Prescriptions   Pending Prescriptions Disp Refills     famotidine (PEPCID) 20 MG tablet 60 tablet 1     Sig: Take 1 tablet (20 mg total) by mouth 2 (two) times a day.       GI Medications Refill Protocol Passed - 2/14/2020 11:08 AM        Passed - PCP or prescribing provider visit in last 12 or next 3 months.     Last office visit with prescriber/PCP: 11/29/2019 Heidi Hernandez DO OR same dept: 11/29/2019 Heidi Hernandez DO OR same specialty: 11/29/2019 Heidi Hernandez DO  Last physical: 10/17/2018 Last MTM visit: Visit date not found   Next visit within 3 mo: Visit date not found  Next physical within 3 mo: Visit date not found  Prescriber OR PCP: Heidi Hernandez DO  Last diagnosis associated with med order: 1. Epigastric pain  - famotidine (PEPCID) 20 MG tablet; Take 1 tablet (20 mg total) by mouth 2 (two) times a day.  Dispense: 60 tablet; Refill: 1    If protocol passes may refill for 12 months if within 3 months of last provider visit (or a total of 15 months).

## 2021-06-06 NOTE — PROGRESS NOTES
3/12/2020    Clinic Care Coordination Contact    Follow Up Progress Note      Assessment:     Goals addressed this encounter:   Goals Addressed                 This Visit's Progress       Patient Stated      Medication 1 (pt-stated)        Goal Statement- I want to have support to setup my medications within 2 months     Action steps to achieve this goal  1.  Son in law (Amandeep) and daughter Victor M will attend Telephone visit with Olivia Palm CCC RN on 3-17-20 at 11am for MEV.    Updated: 3-12-20 AL  Date goal set:  1/17/2020 BC      2-18-20  Pt and daughter attended visit with RN CCC. Review of MTM visit from 2-4, noted a hold on Duloxetine to have update from prescrining provider review, with no reply  RN CCC out reach to provider as well and request review, JAIME on file. Await reply, medication set up as MTM recommend until clarification from provider.              Intervention/Education provided during outreach:   Called and spoke to patient's son in law Amandeep Bhavin and schedule 2 weeks follow up for MEV.  Due to CCC RN's scheduled is fulled   Consulted with Olivia Palm CCC RN regarding scheduling MEV next week.  Per CCC RN to schedule telephone visit with patient's daughter to help set up the medications over the phone.  Okay to schedule on 3-17-20 at 11am or 3pm telephone.    Called and spoke to patient's son in law explained MEV will be a telephone visit.  Stated him and his wife/patient's daughter Victor M will be home.   Scheduled Telephone Visit for MEV on 3-17-20 at 11am.   Son in law confirmed appt and information.            Plan:   Telephone Visit with CCC RN on 3-17-20 at 11am for MEV  Care Coordinator will follow up in a month 4-17-20

## 2021-06-06 NOTE — PROGRESS NOTES
Clinic Care Coordination Contact    Follow Up Progress Note      Assessment: RN CCC met with patient to review concern about medication  Patient attended with daughter,  Review of chart noted that MTM had outreach for clarification on dosing fro Duloxetine, no reply noted    RN CC spoke with MTM who recommended to continue to hold until clarification received    RN LOCO outreach to Dr Fam, JAIME on file, left message with RN who noted that prescribing provider noted different dosing direction and message was sent to get additional information.  RN CCC requested updated to medication be faxed to PCP and MTM for review, CHW fax # given.       Goals addressed this encounter:   Goals Addressed                 This Visit's Progress       Patient Stated      Financial Wellbeing (pt-stated)        Goal Statement- I want to defer my travel loan as soon as possible    Action steps to achieve this goal  1. Daughter/PCA will wait to hear from CCC SW for support.  2-13-20    Updated: 2-13-20 AL  Updated: 2-4-20 AL  Updated: 2-3-20- AL  Updated: 1/17/2020 BC  Updated: 1-8-20 AL  Date goal set:  11/22/2019 BC    Updated: 12/9/2019 BC    02/19/20  Not addressed at RN CCC visit         Medication 1 (pt-stated)        Goal Statement- I want to have support to setup my medications within 2 months     Action steps to achieve this goal  1.  Daughter will meet with Olivia Palm CCC RN on 2-18-20 at 10 am to review med box if set up correctly and bring all medications. 2-13-20    Updated: 2-13-20 AL  Updated: 2-4-20 AL  Updated: 2-3-20 AL  Date goal set:  1/17/2020 BC      2-18-20  Pt and daughter attended visit with RN CCC. Review of MTM visit from 2-4, noted a hold on Duloxetine to have update from prescrining provider review, with no reply  RN CCC out reach to provider as well and request review, JAIME on file. Await reply, medication set up as MTM recommend until clarification from provider.            Plan:   Pt to follow  medication therapy dosing recommendations and take medication as set up in medication pill box    Pt to attend follow up visit with RN CCC in two weeks to review medication compliance and pill box education set up if needed.

## 2021-06-06 NOTE — PROGRESS NOTES
2-13-20  Met with patient's daughter Victor M and son in law Amandeep  Drop off the travel loan statement as requested by [facility].  Made copy of the travel loan statement and gave original statement back to daughter  Copy in [facility]'s mail box    Reminded daughter of appt with LOCO RN on 2-18-20 at 10am and bring all medications and pill box.    Daughter confirmed appt and information.    Plan:  appt with CCC RN 2-18-20 at 10am  CHW follow up 3-18-20

## 2021-06-06 NOTE — PROGRESS NOTES
Clinic Care Coordination Contact  Care Team Conversations     Clinic Care Coordination Medication Education FOLLOW UP Visit    Patient presents for:  Medication education, Pill box teaching, Compliance monitoring and Medication reconciliation    Language: German    Communication: via     Accompanied by: male     Patient Living Situation:  Dependent with family    Primary Care Provider:  Heidi Hernandez DO    Barriers:  Financial, Language, Cultural and Non-compliance of medications    Medication List (see cited below): Patient presents with all ordered medications    Current Outpatient Medications   Medication Sig     acetaminophen (Q-PAP EXTRA STRENGTH) 500 MG tablet Take 2 tablets (1,000 mg total) by mouth 2 (two) times a day.     albuterol (PROAIR HFA) 90 mcg/actuation inhaler Inhale 2 puffs 4 (four) times a day.     cetirizine (ZYRTEC) 10 MG tablet Take 1 tablet (10 mg total) by mouth daily.     cholecalciferol, vitamin D3, (VITAMIN D3) 25 mcg (1,000 unit) capsule Take 1 capsule (1,000 Units total) by mouth daily.     cyanocobalamin 1000 MCG tablet Take 1 tablet (1,000 mcg total) by mouth daily.     DULoxetine (CYMBALTA) 30 MG capsule Take 30 mg by mouth daily.     famotidine (PEPCID) 20 MG tablet Take 1 tablet (20 mg total) by mouth 2 (two) times a day. You are overdue to see Dr Hernandez; call 24/7 to make appointment     FLUoxetine (PROZAC) 40 MG capsule Take 40 mg by mouth daily.     isoniazid (NYDRAZID) 300 MG tablet Take 900 mg by mouth once a week.     QUEtiapine (SEROQUEL) 25 MG tablet Take 25 mg by mouth at bedtime.      rifapentine (PRIFTIN) 150 mg Tab Take 6 tablets by mouth once a week.     capsaicin (ZOSTRIX) 0.025 % cream Apply to affected area as needed.     fluticasone (FLONASE ALLERGY RELIEF) 50 mcg/actuation nasal spray 1-2 sprays a day as needed.     meclizine (ANTIVERT) 25 mg tablet Take 1 tablet (25 mg total) by mouth 3 (three) times a day as needed for dizziness or nausea.        Equipment: Basic pill box- twice daily dosing    Pill Box was set up by RN at visit  3/3/20    Medication Set Up: Dependent  Medication Administration:  Independent    Compliance: unknown.  History of non-compliance    2 wk MSU completed as follows:    AM-2 tabs acetaminophen, cetirizine, vitamin D, cyanocobalamin, duloxetine, pepcid  PM-2 tabs acetaminophen,pepcid, quetiapine    Takes on own-isoniazid,rifapentine    Fax received from [facility] confirming dosing of Quetiapine, Fluoxetine and Duloxetine.  Med Rec completed.  Next [facility] appointment 3/24/20    Discussed Bubble Packs from [facility] with patient.  She declined this option.  [facility] RN to teach MSU with patient at next appointment.      Future Appointments   Date Time Provider Department Center   3/19/2020 11:00 AM [facility]   3/19/2020 11:15 AM Steve Pavon MD [facility]       Action Plan  RN Will:  See patient in 2 weeks for MSU and teaching for MSU    Care Guide Will:  Care Guide Delegation-Please reach out to patient to schedule a f/u MEV appointment.  Writer called and left a VM for patient at [phone].  (number given to writer by family member)      Nursing Notes:  Nothing further to add at this time, please see documentation within visit notes

## 2021-06-07 NOTE — TELEPHONE ENCOUNTER
Called and spoke with Indra Jason 's son in law Joel on CTC form, Message was given, Indra Jason's son in law understood, no further questions.  Use  line to contact :Melquiades ID:[id number]

## 2021-06-07 NOTE — PROGRESS NOTES
Daughter Victor M/PCA is only available at 11am.  She would like talk to you tomorrow 11am appt  I can't schedule over the appt because it is AWTC meeting.  Will this work for you or if not I will have to call again to reschedule.

## 2021-06-07 NOTE — PROGRESS NOTES
[facility] Hematology and Oncology Progress Note    Patient: Indra Jason  MRN: [id number]  Date of Service: 03/20/2020        Reason for Visit    Chief Complaint   Patient presents with     Benign Hematology     Elevated ferritin level       Assessment and Plan      Elevated ferritin, possible hemochromatosis  Iron overload in the liver and heart  Microcytic red cells with normal hemoglobin electrophoresis    Patient is again out of the Exjade.  Did take it for about a month.  Tolerating it fine when taken.  Lab work shows some decrease in the ferritin and the rest of the labs are stable.    Will work-up with pharmacy so that medication can be delivered regularly on a monthly basis.  Patient should continue the Exjade 1250 mg p.o. daily.  We will plan to recheck labs again in 6 weeks and also a day before follow-up in 3 months.  Should have repeat MRI of the liver in October 2020.    Plan: As above      Measurable disease: Ferritin, iron saturation    Current therapy: Exjade 1250 mg p.o. daily since December 26, 2019  Previously on 1000 mg p.o. daily for 3 to 4 months between June and October 2019        ECOG Performance   ECOG Performance Status: 2    Distress Assessment  Distress Assessment Score: No distress    Pain         Problem List    1. Iron overload syndrome  HM1(CBC and Differential)    Comprehensive Metabolic Panel    Ferritin        CC: Heidi Hernandez DO    ______________________________________________________________________________    History of Present Illness    Ms. Indra Jason was spoken to with help of an .  Seen 3 months ago.  Did restart Exjade for about a month but unfortunately has not after that.  Due to language barrier patient and family have not been able to request refill of the medication.  No new symptoms or problems.  Pain Status  Currently in Pain: No/denies    Review of Systems    12 point review of systems completed.  Integumentary (WDL): All integumentary elements are  within defined limits  Patient Coping  Patient Coping: Accepting  Distress Assessment  Distress Assessment Score: No distress  Accompanied by       Past History  Past Medical History:   Diagnosis Date     Abdominal pain     Created by Conversion   Overview:  Normal colonoscopy 05/02/2013     Allergic rhinitis 10/7/2016     Anxiety      Cervical Polyps     Created by Conversion   Overview:  Overview:  Created by Conversion     Chronic lower back pain 2/23/2016     Depression      Eosinophilia 2/6/2012     H. pylori infection      Nonspecific abnormal finding of lung field 2/27/2019    Overview:  Refer to clinic note Dr Jones 2/27/2013     Pulmonary tuberculosis 5/19/2016    Overview:  History of pulmonary tuberculosis treated in 1994.  AFB smears/culture September 13-15, 2011 negative.     Screen for colon cancer 5/2/2013    Overview:  Normal colonoscopy 05/02/2013         Past Surgical History:   Procedure Laterality Date     ABDOMINAL SURGERY N/A        Physical Exam    Recent Vitals 12/19/2019   Height -   Weight 149 lbs 11 oz   BSA (m2) 1.7 m2   BP -   Pulse -   Temp 98.4   Temp src 1   SpO2 -   Some recent data might be hidden       No physical exam done.        Lab Results    Recent ferritin is 625.  Iron saturation 53%.  Normal CBC and CMP.  Imaging    Liver and cardiac MRI reviewed.    Signed by: Steve Pavon MD

## 2021-06-07 NOTE — PROGRESS NOTES
Clinic Care Coordination Contact    Follow Up Progress Note      Assessment: RN CC outreach and spoke with daughter with the support of language line.  RN CCC attempted to assess medication compliance. It was difficult as daughter has small child near phone and she was unable to review medications. Daughter did state that she does not feel that her mother completed the recommended course of Tb treatment and states that it had previously been delivered to the home, but that with the increase risk of covid in the community the deliver has stopped and she does not know when the last time she took and of the medications - nor who was delivering the medications      Daughter also reports that mother has not been feeling well and that she does not get out of bed very much.It was very difficult to get an accurate assessment with language and background distractions in patient home.  RN CC recommended a follow up with PCP to assess acute status. Shared clinic contact information to set up call.     Goals addressed this encounter:   Goals Addressed                 This Visit's Progress       Patient Stated      Financial Wellbeing (pt-stated)   On Hold     Goal Statement- I want to defer my travel loan as soon as possible    Action steps to achieve this goal  1. On hold due to COVID-19. Daughter/PCA will wait to meet with CCC SW in the clinic for support.      Updated: 4-20-20 AL  02/19/20  Not addressed at RN CCC visit         Medication 1 (pt-stated)   On track     Goal Statement- I want to have support to setup my medications within 2 months     Action steps to achieve this goal  1.  Daughter Victor M will attend Telephone visit with Olivia Palm CCC RN on 4-21-20 at 11am for MEV.    Updated: 4-20-20 AL  Date goal set:  1/17/2020 BC      2-18-20  Pt and daughter attended visit with RN CCC. Review of MTM visit from 2-4, noted a hold on Duloxetine to have update from prescrining provider review, with no reply  RN CCC out  reach to provider as well and request review, JAIME on file. Await reply, medication set up as MTM recommend until clarification from provider.       04/21/20

## 2021-06-07 NOTE — PROGRESS NOTES
4/17/2020  Clinic Care Coordination Contact  [facility]/Voicemail       Clinical Data: Care Coordinator Outreach  Outreach attempted x 1.  Left message on patient's daughter Victor M voicemail with call back information and requested return call. To reschedule  MEV follow up     Plan: Care Coordinator follow up 4-23-20

## 2021-06-07 NOTE — TELEPHONE ENCOUNTER
Patient's vit C was discontinued as well as her iron tablets. Please call pharmacy and tell them to Discontinue this medication.

## 2021-06-07 NOTE — PROGRESS NOTES
"Indra Jason is a 55 y.o. female who is being evaluated via a billable telephone visit.      The patient has been notified of following:     \"This telephone visit will be conducted via a call between you and your physician/provider. We have found that certain health care needs can be provided without the need for a physical exam.  This service lets us provide the care you need with a short phone conversation.  If a prescription is necessary we can send it directly to your pharmacy.  If lab work is needed we can place an order for that and you can then stop by our lab to have the test done at a later time.    If during the course of the call the physician/provider feels a telephone visit is not appropriate, you will not be charged for this service.\"     Phone call duration: 9 minutes    Carmen Chaney RN    "

## 2021-06-07 NOTE — PROGRESS NOTES
4/20/2020  Clinic Care Coordination Contact    Follow Up Progress Note      Assessment:     Goals addressed this encounter:   Goals Addressed                 This Visit's Progress       Patient Stated      Financial Wellbeing (pt-stated)   On Hold     Goal Statement- I want to defer my travel loan as soon as possible    Action steps to achieve this goal  1. On hold due to COVID-19. Daughter/PCA will wait to meet with CCC SW in the clinic for support.      Updated: 4-20-20 AL  02/19/20  Not addressed at RN CCC visit         Medication 1 (pt-stated)   On track     Goal Statement- I want to have support to setup my medications within 2 months     Action steps to achieve this goal  1.  Daughter Victor M will attend Telephone visit with Olivia Palm CCC RN on 4-21-20 at 11am for MEV.    Updated: 4-20-20 AL  Date goal set:  1/17/2020 BC      2-18-20  Pt and daughter attended visit with RN CCC. Review of MTM visit from 2-4, noted a hold on Duloxetine to have update from prescrining provider review, with no reply  RN CCC out reach to provider as well and request review, JAIME on file. Await reply, medication set up as MTM recommend until clarification from provider.              Intervention/Education provided during outreach:  Called and spoke to patient's daughter Victor M and follow up on goals.  Daughter reschedule telephone visit with LOCO RN for MEV on 4-21-20 at 11am.  Wait to meet with CCC SW in the clinic.    Plan:   consult with CCC SW travel loan status  Telephone visit CCC RN on 4-21-20 at 11am  CHW follow up 5-20-20

## 2021-06-07 NOTE — TELEPHONE ENCOUNTER
Received a Fax from [facility] requesting for vitamin C 500mg tablet, qty:60 tab, sig: Take 1 tablet by mouth two times a day with Iron tablets. Medication is not on current medication list are you willing to fill for patient?

## 2021-06-08 NOTE — PROGRESS NOTES
5/8/2020   Supported [facility] RN to connect with clinic to schedule appt. with PCP  Persian  [place] [id number]  Daughter reported of fever and headache requested to see PCP.    Conference call to clinicto schedule appt with PCP. Spoke to registrar Olivia stated she is not to be help at this time to call back later.   Conference call to Triage RN Line to assess and connected patient's daughter and Persian  with Amalia Triage RN.  See triage rn's note for more details  Lost connection with patient and .  Attempted to reconnect with patient's daughter and  but was unsuccessful  Amalia stated she will call patient's daughter with .  Informed Amalia that CHW will connect with [facility] to scheduled for 2pm appt with Dr Hernandez.    Called and spoke to registrar Olivia and was able to schedule telephone visit with Dr Hernandez.

## 2021-06-08 NOTE — PROGRESS NOTES
".Indra Jason is a 55 y.o. female who is being evaluated via a billable telephone visit regarding Elevated ferritin level.    The patient has been notified of following:     \"This telephone visit will be conducted via a call between you and your physician/provider. We have found that certain health care needs can be provided without the need for a physical exam.  This service lets us provide the care you need with a short phone conversation.  If a prescription is necessary we can send it directly to your pharmacy.  If lab work is needed we can place an order for that and you can then stop by our lab to have the test done at a later time.    Telephone visits are billed at different rates depending on your insurance coverage. During this emergency period, for some insurers they may be billed the same as an in-person visit.  Please reach out to your insurance provider with any questions.    If during the course of the call the physician/provider feels a telephone visit is not appropriate, you will not be charged for this service.\"    Patient has given verbal consent to a Telephone visit? Yes      Phone call duration: 10 minutes    Mercedes Hummel CMA   "

## 2021-06-08 NOTE — PROGRESS NOTES
Clinic Care Coordination Contact    Follow Up Progress Note      Assessment: RN CC outreach and spoke with daughter via interpretive services  Daughter has been able to support patient in support of plan of care      Goals addressed this encounter:   Goals Addressed                 This Visit's Progress      Medication 1        Goal Statement- I want to have support to setup my medications within 2 months     Action steps to achieve this goal  1. Daughter Victor M will attend Telephone visit with Dr Hernandez 5-26-20 at 8:30am follow up from 5-8-20.  2. Daughter Victor M will attend Telephone visit with Olivia Palm CCC RN on 6-2-20 at 10 am for MEV.    Updated: 5-20-20 AL  Date goal set:  1/17/2020 BC      2-18-20  Pt and daughter attended visit with RN CCC. Review of MTM visit from 2-4, noted a hold on Duloxetine to have update from prescrining provider review, with no reply  RN CCC out reach to provider as well and request review, JAIME on file. Await reply, medication set up as MTM recommend until clarification from provider.       06/02/20  Daughter reports that she is assisting mother with set up following recommendations from provider  Daughter states that she has an excess amount of the Cymbata   RN CC request daughter to bring bottle to pharmacy next time she get refill and make sure that it the correct strength and dosing  If question or concern will discuss with PCP at next visit.                Plan:     Daughter will continue to support medication adherence, will review refill needs with pharmacy and discuss at next PCP visit if concern or question  .Community Health Worker to outreach per standard work and updated on goal progression    RN CC will check in 6 weeks, will be available sooner if needed

## 2021-06-08 NOTE — PROGRESS NOTES
Per Jonah Ulloa, [facility] patient moved to [place] to live with her sons.  [facility]  Phillip Snowden verified that patient did moved to [place] to live her sons.    Patient has transferred to a non-[facility] clinic and no further outreach will be done.   I have notify patient's PCP and have removed patient from [facility] enrolled patient list.

## 2021-06-08 NOTE — PROGRESS NOTES
"Indra Jason is a 55 y.o. female who is being evaluated via a billable telephone visit.      The patient has been notified of following:     \"This telephone visit will be conducted via a call between you and your physician/provider. We have found that certain health care needs can be provided without the need for a physical exam.  This service lets us provide the care you need with a short phone conversation.  If a prescription is necessary we can send it directly to your pharmacy.  If lab work is needed we can place an order for that and you can then stop by our lab to have the test done at a later time.    Telephone visits are billed at different rates depending on your insurance coverage. During this emergency period, for some insurers they may be billed the same as an in-person visit.  Please reach out to your insurance provider with any questions.    If during the course of the call the physician/provider feels a telephone visit is not appropriate, you will not be charged for this service.\"    Patient has given verbal consent to a Telephone visit? No    What phone number would you like to be contacted at? [phone]    Patient would like to receive their AVS by AVS Preference: Mail a copy.    Additional provider notes:   Hospital Follow-up Visit:    Hospital/Nursing Home/IP Rehab Facility: [facility]  Date of Admission: 6/5/20  Date of Discharge: 6/9/20  Reason(s) for Admission: Cellulitis and abscess of trunk, suicidal ideations    Was your hospitalization related to COVID-19? No  Problems taking medications regularly:  None  Medication changes since discharge: None  Problems adhering to non-medication therapy:  None    Summary of hospitalization:  [facility] hospital discharge summary reviewed  Diagnostic Tests/Treatments reviewed.  Follow up needed: none  Other Healthcare Providers Involved in Patient s Care:         Homecare  Update since discharge: improved.  Patient states " "that the infection has greatly improved.  She continues on her Keflex is almost gone.  She denies any fevers, redness, pain in this area.  Regarding her mood, depression and anxiety, suicidal ideation she states that she \"I feel good today \".  No concerns.  No suicidal ideations.  She has no concerns regarding this.    Post Discharge Medication Reconciliation: discharge medications reconciled, continue medications without change.  Plan of care communicated with patient              Assessment/Plan:  1. Hospital discharge follow-up  Stable.  Follow-up 2 months on chronic issues.  Reviewed signs and symptoms of when to call or return to emergency room.    2. Cellulitis and abscess of trunk  Resolving.  Finish Keflex to completion.  No signs or symptoms of return    3. Moderate episode of recurrent major depressive disorder (H)  Stable per patient.  Continue follow-up with psychiatry/therapist.  Continue Cymbalta, Prozac. continue Seroquel at nighttime.    4. Suicidal ideation  Denies suicidal ideations.    5. Iron overload syndrome  Just had follow-up with heme-onc.  Continue on Exjade although dose was decreased to thousand milligrams daily as ferritin level was decreasing appropriately.      Phone call duration:  9 minutes    Heidi Hernandez, DO    "

## 2021-06-08 NOTE — PROGRESS NOTES
[facility] Hematology and Oncology Progress Note    Patient: Indra Jason  MRN: [id number]  Date of Service: 06/11/2020  Telephone visit done today due to risk of Covid19 infection. Pt consented to doing telephone visit instead of coming to clinic. Pt refused video visit attempt        Reason for Visit    Chief Complaint   Patient presents with     Benign Hematology     Elevated ferritin level        Assessment and Plan    1. Elevated Ferritin, possible hemochromatosis with iron overload in liver/heart: pt is taking exjade intermittently. She usually takes it for a month and then states she cannot get from specialty pharmacy due to varying reasons. She states they told her there were not refills on the order so she thought the doctor did not want her to take any longer. I encouraged her to take it consistently and sent a message to our oral medication coordinator to monitor. I will have her take a slightly lower dose since her ferritin actually is better so she will take 1000mg daily. She will return in 2 months for labs and then again in 4 months for labs, MRI of liver.       ECOG Performance   ECOG Performance Status: 0     Distress Assessment  Distress Assessment Score: 5(irritated easily)    Pain  Currently in Pain: No/denies        Problem List    1. Elevated ferritin level  MR Liver With Without Contrast    HM1(CBC and Differential)    Ferritin    Comprehensive Metabolic Panel   2. Iron overload syndrome  MR Liver With Without Contrast    HM1(CBC and Differential)    Ferritin    Comprehensive Metabolic Panel      ______________________________________________________________________________    History of Present Illness    Measurable disease: Ferritin, iron saturation     Current therapy: Exjade 1250 mg p.o. daily since December 26, 2019. Seems to only take intermittnetly  Previously on 1000 mg p.o. daily for 3 to 4 months between June and October 2019    Interim History: pt is due today for 3 month follow up  visit. She is doing ok. States she hasn't been taking exjade again. Was recently in hospital for cellulitis/abscess. All of that is getting better. Mild neuropathy in feet. Mild weakness    Pain Status  Currently in Pain: No/denies    Review of Systems    Oncology Nurse Assessment/CMA Intake: Constitutional  Constitutional (WDL): All constitutional elements are within defined limits  Neurosensory  Neurosensory (WDL): Exceptions to WDL  Peripheral Motor Neuropathy: Concerns(feet)  Eye   Eye Disorder (WDL): All eye disorder elements are within defined limits  Ear  Ear Disorder (WDL): All ear disorder elements are within defined limits  Cardiovascular  Cardiovascular (WDL): All cardiovascular elements are within defined limits  Pulmonary  Respiratory (WDL): Within Defined Limits  Gastrointestinal  Gastrointestinal (WDL): All gastrointestinal elements are within defined limits  Genitourinary  Genitourinary (WDL): Assessment not pertinent to visit  Lymphatic  Lymph (WDL): Assessment not pertinent to visit  Musculoskeletal and Connective Tissue  Musculoskeletal and Connetive Tissue Disorders (WDL): Exceptions to WDL  Muscle Weakness : Concerns  Integumentary  Integumentary (WDL): All integumentary elements are within defined limits  Patient Coping  Patient Coping: Accepting;Open/discussion  Accompanied by  Accompanied by: Family Member(daughter)  Oral Chemo Adherence         Past History  Past Medical History:   Diagnosis Date     Abdominal pain     Created by Conversion   Overview:  Normal colonoscopy 05/02/2013     Allergic rhinitis 10/7/2016     Anxiety      Cervical Polyps     Created by Conversion   Overview:  Overview:  Created by Conversion     Chronic lower back pain 2/23/2016     Depression      Eosinophilia 2/6/2012     H. pylori infection      Nonspecific abnormal finding of lung field 2/27/2019    Overview:  Refer to clinic note Dr Jones 2/27/2013     Pulmonary tuberculosis 5/19/2016    Overview:  History of  pulmonary tuberculosis treated in 1994.  AFB smears/culture September 13-15, 2011 negative.     Screen for colon cancer 5/2/2013    Overview:  Normal colonoscopy 05/02/2013       Physical Exam    Deferred    Lab Results    Admission on 06/05/2020, Discharged on 06/09/2020   Component Date Value Ref Range Status     Alcohol, Blood 06/05/2020 <10  None detected mg/dL Final    None Detected     Amphetamines 06/05/2020 Screen Negative  Screen Negative Final     Benzodiazepines 06/05/2020 Screen Negative  Screen Negative Final     Opiates 06/05/2020 Screen Negative  Screen Negative Final     Phencyclidine 06/05/2020 Screen Negative  Screen Negative Final     THC 06/05/2020 Screen Negative  Screen Negative Final     Barbiturates 06/05/2020 Screen Negative  Screen Negative Final     Cocaine Metabolite 06/05/2020 Screen Negative  Screen Negative Final     Methadone 06/05/2020 Screen Negative  Screen Negative Final     Oxycodone 06/05/2020 Screen Negative  Screen Negative Final     Creatinine, Urine 06/05/2020 40.9  mg/dL Final     Sodium 06/05/2020 142  136 - 145 mmol/L Final     Potassium 06/05/2020 3.2* 3.5 - 5.0 mmol/L Final     Chloride 06/05/2020 106  98 - 107 mmol/L Final     CO2 06/05/2020 28  22 - 31 mmol/L Final     Anion Gap, Calculation 06/05/2020 8  5 - 18 mmol/L Final     Glucose 06/05/2020 115  70 - 125 mg/dL Final     Calcium 06/05/2020 8.9  8.5 - 10.5 mg/dL Final     BUN 06/05/2020 9  8 - 22 mg/dL Final     Creatinine 06/05/2020 0.75  0.60 - 1.10 mg/dL Final     GFR MDRD Af Amer 06/05/2020 >60  >60 mL/min/1.73m2 Final     GFR MDRD Non Af Amer 06/05/2020 >60  >60 mL/min/1.73m2 Final     Anaerobic Blood Culture Bottle 06/05/2020 No Growth  No Growth, No organisms seen, bottle returned to instrument, Specimen not received, No Growth at 24 hours, No Growth at 48 hours, No Growth at 72 hours, No Growth at 96 hours, No Growth at 120 hours Final     Aerobic Blood Culture Bottle 06/05/2020 No Growth  No Growth, No  organisms seen, bottle returned to instrument, Specimen not received, No Growth at 24 hours, No Growth at 120 hours, No Growth at 48 hours, No Growth at 72 hours, No Growth at 96 hours Final     Anaerobic Blood Culture Bottle 06/05/2020 Positive after less than 24 hours incubation* No Growth, No organisms seen, bottle returned to instrument, Specimen not received, No Growth at 24 hours, No Growth at 48 hours, No Growth at 72 hours, No Growth at 96 hours, No Growth at 120 hours Preliminary     Culture 06/05/2020 4+ Staphylococcus aureus*  Final     Gram Stain Result 06/05/2020 No polymorphonuclear leukocytes seen   Final     Gram Stain Result 06/05/2020 3+ Gram positive cocci in pairs   Final     Acetaminophen 06/05/2020 <3.0* 10.0 - 20.0 ug/mL Final     INR 06/05/2020 1.17* 0.90 - 1.10 Final     Bilirubin, Total 06/05/2020 0.8  0.0 - 1.0 mg/dL Final     Bilirubin, Direct 06/05/2020 0.3  <=0.5 mg/dL Final     Protein, Total 06/05/2020 7.8  6.0 - 8.0 g/dL Final     Albumin 06/05/2020 3.3* 3.5 - 5.0 g/dL Final     Alkaline Phosphatase 06/05/2020 81  45 - 120 U/L Final     AST 06/05/2020 14  0 - 40 U/L Final     ALT 06/05/2020 11  0 - 45 U/L Final     WBC 06/05/2020 16.6* 4.0 - 11.0 thou/uL Final     RBC 06/05/2020 4.95  3.80 - 5.40 mill/uL Final     Hemoglobin 06/05/2020 11.2* 12.0 - 16.0 g/dL Final     Hematocrit 06/05/2020 37.1  35.0 - 47.0 % Final     MCV 06/05/2020 75* 80 - 100 fL Final     MCH 06/05/2020 22.6* 27.0 - 34.0 pg Final     MCHC 06/05/2020 30.2* 32.0 - 36.0 g/dL Final     RDW 06/05/2020 14.3  11.0 - 14.5 % Final     Platelets 06/05/2020 217  140 - 440 thou/uL Final     MPV 06/05/2020 12.0  8.5 - 12.5 fL Final     Neutrophils % 06/05/2020 79* 50 - 70 % Final     Lymphocytes % 06/05/2020 13* 20 - 40 % Final     Monocytes % 06/05/2020 6  2 - 10 % Final     Eosinophils % 06/05/2020 1  0 - 6 % Final     Basophils % 06/05/2020 0  0 - 2 % Final     Neutrophils Absolute 06/05/2020 13.1* 2.0 - 7.7 thou/uL Final      Lymphocytes Absolute 06/05/2020 2.2  0.8 - 4.4 thou/uL Final     Monocytes Absolute 06/05/2020 1.1* 0.0 - 0.9 thou/uL Final     Eosinophils Absolute 06/05/2020 0.1  0.0 - 0.4 thou/uL Final     Basophils Absolute 06/05/2020 0.0  0.0 - 0.2 thou/uL Final     Color, UA 06/05/2020 Straw  Colorless, Yellow, Straw, Light Yellow Final     Clarity, UA 06/05/2020 Clear  Clear Final     Glucose, UA 06/05/2020 Negative  Negative Final     Bilirubin, UA 06/05/2020 Negative  Negative Final     Ketones, UA 06/05/2020 Negative  Negative Final     Specific Gravity, UA 06/05/2020 1.006  1.001 - 1.030 Final     Blood, UA 06/05/2020 Trace* Negative Final     pH, UA 06/05/2020 6.5  4.5 - 8.0 Final     Protein, UA 06/05/2020 Negative  Negative mg/dL Final     Urobilinogen, UA 06/05/2020 <2.0 E.U./dL  <2.0 E.U./dL, 2.0 E.U./dL Final     Nitrite, UA 06/05/2020 Negative  Negative Final     Leukocytes, UA 06/05/2020 Negative  Negative Final     Bacteria, UA 06/05/2020 None Seen  None Seen hpf Final     RBC, UA 06/05/2020 0-2  None Seen, 0-2 hpf Final     WBC, UA 06/05/2020 0-5  None Seen, 0-5 hpf Final     Squam Epithel, UA 06/05/2020 10-25* None Seen, 0-5 lpf Final     Mucus, UA 06/05/2020 Few* None Seen lpf Final     COVID-19 VIRUS SPECIMEN SOURCE 06/05/2020 Nasopharyngeal   Final     2019-nCOV 06/05/2020 Test received-See reflex to IDDL test SARS CoV2 (COVID-19) Virus RT-PCR   Final      Performed and/or entered by:  [address] [id number]      Platelets 06/06/2020 196  140 - 440 thou/uL Final     Magnesium 06/05/2020 2.0  1.8 - 2.6 mg/dL Final     Potassium 06/06/2020 4.0  3.5 - 5.0 mmol/L Final     Magnesium 06/06/2020 1.9  1.8 - 2.6 mg/dL Final     Lactic Acid 06/06/2020 0.5  0.5 - 2.2 mmol/L Final     SARS-CoV-2 Virus Specimen Source 06/05/2020 Nasopharyngeal   Final     SARS-CoV-2 PCR Result 06/05/2020 NEGATIVE   Final    SARS-CoV2 (COVID-19) RNA not detected, presumed  negative.     SARS-COV-2 PCR COMMENT 06/05/2020 The [employer]plex[employer] COVID-19 direct PCR assay by "[employer] on the [employer] instrument   Final    Comment: has been given Emergency Use Authorization (EUA) for the in vitro qualitative  detection of RNA from the SARS-CoV2 virus in nasopharyngeal swabs in viral  transport medium from patients with signs and symptoms of infection who are  suspected of COVID-19. Performance is unknown in asymptomatic patients.  Nasopharyngeal specimen is the preferred choice for swab-based SARS-CoV2  testing. Sample types other than nasopharyngeal swabs were not included in the  EUA. The test result from other sample types must be integrated into clinical  context for interpretation.  A negative result does not rule out the presence of real-time PCR inhibitors in  the specimen or COVID-19 RNA in concentration below the limit of detection of  the assay. The possibility of a false negative should be considered if the  patients recent exposure or clinical presentation suggests COVID-19. Additional  testing or repeat testing requires consultation with the laboratory.  Positive results should also be reported in                            accordance with local, state, and  federal regulations. This test was validated by [facility]. This laboratory is certified under the Clinical  Laboratory Improvement Amendments of 1988 (CLIA-88) as qualified to perform  high complexity clinical laboratory testing.    Performed and/or entered by:  [facility]  [address] [place] [address]     Culture 06/05/2020 Staphylococcus aureus*  Preliminary     Gram Stain Result 06/05/2020 Gram positive cocci   Preliminary     Platelets 06/07/2020 197  140 - 440 thou/uL Final     Vancomycin 06/07/2020 11.1  <=25.0 ug/mL Final     Sodium 06/09/2020 140  136 - 145 mmol/L Final     Potassium 06/09/2020 3.7  3.5 - 5.0 mmol/L Final      Chloride 06/09/2020 105  98 - 107 mmol/L Final     CO2 06/09/2020 27  22 - 31 mmol/L Final     Anion Gap, Calculation 06/09/2020 8  5 - 18 mmol/L Final     Glucose 06/09/2020 95  70 - 125 mg/dL Final     Calcium 06/09/2020 8.8  8.5 - 10.5 mg/dL Final     BUN 06/09/2020 8  8 - 22 mg/dL Final     Creatinine 06/09/2020 0.70  0.60 - 1.10 mg/dL Final     GFR MDRD Af Amer 06/09/2020 >60  >60 mL/min/1.73m2 Final     GFR MDRD Non Af Amer 06/09/2020 >60  >60 mL/min/1.73m2 Final     WBC 06/09/2020 7.7  4.0 - 11.0 thou/uL Final     RBC 06/09/2020 4.84  3.80 - 5.40 mill/uL Final     Hemoglobin 06/09/2020 11.1* 12.0 - 16.0 g/dL Final     Hematocrit 06/09/2020 37.7  35.0 - 47.0 % Final     MCV 06/09/2020 78* 80 - 100 fL Final     MCH 06/09/2020 22.9* 27.0 - 34.0 pg Final     MCHC 06/09/2020 29.4* 32.0 - 36.0 g/dL Final     RDW 06/09/2020 14.1  11.0 - 14.5 % Final     Platelets 06/09/2020 254  140 - 440 thou/uL Final     MPV 06/09/2020 11.7  8.5 - 12.5 fL Final     Neutrophils % 06/09/2020 56  50 - 70 % Final     Lymphocytes % 06/09/2020 32  20 - 40 % Final     Monocytes % 06/09/2020 8  2 - 10 % Final     Eosinophils % 06/09/2020 3  0 - 6 % Final     Basophils % 06/09/2020 1  0 - 2 % Final     Neutrophils Absolute 06/09/2020 4.3  2.0 - 7.7 thou/uL Final     Lymphocytes Absolute 06/09/2020 2.4  0.8 - 4.4 thou/uL Final     Monocytes Absolute 06/09/2020 0.6  0.0 - 0.9 thou/uL Final     Eosinophils Absolute 06/09/2020 0.3  0.0 - 0.4 thou/uL Final     Basophils Absolute 06/09/2020 0.1  0.0 - 0.2 thou/uL Final     Ferritin 06/05/2020 487* 10 - 130 ng/mL Final   ]  Lab Results   Component Value Date    FERRITIN 487 (H) 06/05/2020    FERRITIN 507 (H) 03/19/2020    FERRITIN 538 (H) 01/30/2020    FERRITIN 625 (H) 11/29/2019    FERRITIN 579 (H) 10/11/2019    FERRITIN 549 (H) 09/13/2019    FERRITIN 791 (H) 08/09/2019    FERRITIN 957 (H) 06/03/2019    FERRITIN 1,367 (H) 03/13/2019    FERRITIN 1,217 (H) 01/11/2019   ]    Imaging    Xr Chest 1  View Portable    Result Date: 6/5/2020  EXAM: XR CHEST 1 VIEW PORTABLE LOCATION: [facility] DATE/TIME: 6/5/2020 9:08 PM INDICATION: fever.  left chest wall abscess by shoulder i just completed I&D. COMPARISON: 11/27/2018 and 5/19/2016     Stable old fibrotic change in the right upper lobe, left upper lobe and right lung medially all stable. No acute new findings. Chest otherwise negative.    Xr Shoulder Left 2 Or More Vws    Result Date: 6/5/2020  EXAM: XR SHOULDER LEFT 2 OR MORE VWS LOCATION: [facility] DATE/TIME: 6/5/2020 10:44 PM INDICATION: Pain. COMPARISON: Chest x-ray from 05/19/2016.     Left shoulder is negative for fracture or dislocation. Stable chronic linear parenchymal scarring in the left upper lobe.        present and was involved in entire conversation    Total time spent with patient was 15 minutes.  All of that was in counseling and care coordination.    Signed by: Delaney Treviño, CNP

## 2021-06-08 NOTE — TELEPHONE ENCOUNTER
Daughter call to [facility] to  report mom has  fever & HA  Did not know actual T, no thermometer available  Rated HA a 2 on pain scale of 0-10  10= severe   SX started this morning  Call from Olivia at [facility] for assistance in helping schedule appt for today  Pt did not require triage as clinic was going to see her,assistance was needed due to frequent call drops with the schedulers & communication barriers.  Pt does have phone visit today at 2 pm  Family zia Casanova is interpretor    Amalia Galvez RN  [facility] Nurse Advisor

## 2021-06-08 NOTE — PROGRESS NOTES
5/20/2020   Clinic Care Coordination Contact    Community Health Worker Follow Up    Goals:   Goals Addressed                 This Visit's Progress       Patient Stated      Financial Wellbeing: On Hold COVID19 (pt-stated)   On Hold     Goal Statement- I want to defer my travel loan as soon as possible    Action steps to achieve this goal  1. On hold due to COVID-19. Daughter/PCA will wait to meet with CCC SW in the clinic for support.      Updated: 5-20-20  AL  02/19/20  Not addressed at RN CCC visit         Medication 1 (pt-stated)   On track     Goal Statement- I want to have support to setup my medications within 2 months     Action steps to achieve this goal  1. Daughter Victor M will attend Telephone visit with Dr Hernandez 5-26-20 at 8:30am follow up from 5-8-20.  2. Daughter Victor M will attend Telephone visit with Olivia Palm CCC RN on 6-2-20 at 10 am for MEV.    Updated: 5-20-20 AL  Date goal set:  1/17/2020 BC      2-18-20  Pt and daughter attended visit with RN CCC. Review of MTM visit from 2-4, noted a hold on Duloxetine to have update from prescrining provider review, with no reply  RN CCC out reach to provider as well and request review, JAIME on file. Await reply, medication set up as MTM recommend until clarification from provider.       04/21/20            Called and spoke to patient's daughter Victor M through [facility]  [facility] #[id number] and follow up on goals.  Patient's daughter reported:   -mother/patient is doing better since the doctor prescribed medication  Assisted to schedule 2 weeks follow up with PCP.    Coordinated with clinic  and connected with Kellie Hill to support daughter to schedule 2 weeks follow up.  Appt with PCP 5-26-20 at 8:30am   Schedule MEV follow up with GENE Maxwell RN 6-2-20 at 10am   Daughter confirmed appts.    CHW Next Follow-up: 6-23-20

## 2021-06-08 NOTE — PROGRESS NOTES
The patient arrived with her son and daughter-in-law from [place] at about 2 PM's afternoon and asked the  for her medical records, as well as wanted to leave form her doctor or myself to complete.  The form was about designating her with a disability and providing information to  in the [facility] about the extent of disability she may have.  I consulted with Dr. Gonzalez who had declined to complete the form, but encouraged family to sign the consent for release of information to provide the medical record to the new provider in [place].  The patient's son explained that they've been put on a waiting list for a new provider and that several were not accepting new patients.  I offered to do an Internet search for psychotherapy providers in the area of her town.  This search produced 5 provider phone numbers within a short distance of the town or in the town itself.  I will generate a discharge summary for the short amount of therapy which was just begun.  I encouraged the family to take 2 blank consent for release of information forms along with them for their new provider of psychotherapy to receive whatever information the new provider wanted or the social service agency wanted and what the patient and family agreed was appropriate to release from [facility].

## 2021-06-08 NOTE — TELEPHONE ENCOUNTER
Orders being requested: Skilled nursing to eval and treat.  Reason service is needed/diagnosis: follow up recent hospitalization.  When are orders needed by: asap  Where to send Orders: Phone:  [facility] at [phone] ext. # [phone]  Okay to leave detailed message?  Yes

## 2021-06-08 NOTE — PROGRESS NOTES
"Indra Jason is a 55 y.o. female who is being evaluated via a billable telephone visit.  Arabic interpretor used    The patient has been notified of following:     \"This telephone visit will be conducted via a call between you and your physician/provider. We have found that certain health care needs can be provided without the need for a physical exam.  This service lets us provide the care you need with a short phone conversation.  If a prescription is necessary we can send it directly to your pharmacy.  If lab work is needed we can place an order for that and you can then stop by our lab to have the test done at a later time.    Telephone visits are billed at different rates depending on your insurance coverage. During this emergency period, for some insurers they may be billed the same as an in-person visit.  Please reach out to your insurance provider with any questions.    If during the course of the call the physician/provider feels a telephone visit is not appropriate, you will not be charged for this service.\"    Patient has given verbal consent to a Telephone visit? Yes    What phone number would you like to be contacted at? [phone]    Patient would like to receive their AVS by AVS Preference: Mail a copy.    Additional provider notes:   Follow up from 5/8  \"i'm good\"   Feeling better from last visit  Tylenol is helping a lot  HAs, fever and hand cramping all improved with the tylenol.  \"Everything is fine. No further questions are concerns today\"  Currently doesn't even need the tylenol. Only taking as needed. Feels fine without it now    Last saw Heme/onc in March: per their note:Will work-up with pharmacy so that medication can be delivered regularly on a monthly basis.  Patient should continue the Exjade 1250 mg p.o. daily.  We will plan to recheck labs again in 6 weeks and also a day before follow-up in 3 months.  Should have repeat MRI of the liver in October 2020.    Indra was seen today for " follow-up.    Diagnoses and all orders for this visit:    Tension headache, Cramping of hands, Fever, unspecified fever cause:  All improved with tylenol. Doing okay. Declining further treatment at this time.     Iron overload syndrome:  F/u in June with heme/onc. Continue Exjade.        Phone call duration:  8 minutes    Heidi Hernandez DO

## 2021-06-08 NOTE — TELEPHONE ENCOUNTER
FYI--    This patient was referred to [facility] by [facility]. Due to staff availbility, patient was re-referred to [facility] for home care services ordered. If you have questions, please contact us at [phone].    Thank you,    [facility] Liaison

## 2021-06-08 NOTE — PROGRESS NOTES
PSYCHOTHERAPY DISCHARGE SUMMARY     Dates of service  09/30/2016  to 11/30/2016 # Sessions completed: 4    Diagnosis  at Intake:    1. Major depressive disorder, single episode, mild    :     Complicated bereavement:             Rule outs include:     Anxiety disorder      WHODAS 2.0 12-item version (score =45.83%, moderate problem).   H1= 30, H2= 5, H3= 25  Scores presented in qualifiers to represent level of disability.  NO problem - (none, absent, negligible,  ) - 0-4 %   MILD problem - (slight, low, ) - 5-24 %   MODERATE problem - (medium, fair,...) - 25-49 %   SEVERE problem - (high, extreme,  ) - 50-95 %   COMPLETE problem - (total, ) -  %    Level of Functioning  Personal: 3  Social:   2   Family:  2  Work/School:   4      Diagnosis at Discharge:   1. Major depressive disorder, single episode, mild    :     Complicated bereavement:             Rule outs include:     Anxiety disorder        Progress toward goal 1: Decrease average depression level from 4 to 1.              Partially met ? ?     The patient's suicidal thinking was reduced significantly.  She became more aware of and assertive for her goal to be around her family in closer proximity and with more interaction thereby decreasing social isolation.    Progress toward goal 2: Decrease average pain level from 4 to 2.   Increase % acceptance of pain from 10 to 80.      Partially met?  The patient's pain did go decreased slightly when she began drinking more sufficient quantities of water daily.      Additional comments: She did not come to therapy as often as she planned to schedule, therefore, it is difficult to assess whether she would've had even further improvements at the 2 month chandrika the number of sessions have been 8 instead of 4 by the time she discharged.  She did mention that she wanted to practice more of the skills learned in the office during the session time, and she did forget to practice at home (recall that she lost her physical  therapy home exercise program).  During the course of therapy she did appear to clarify her values about wanting to be around her children and in closer proximity and she did accomplish this by moving to [place] with her son.  Suicidal ideation was not present on the later sessions.    Reason for discharge: Other: ?  Patient's family moved and transferred care to [place] in closer proximity to her other son and family.    Prognosis at discharge:            Guarded  ?      Recommendations and referrals at discharge: Improved adherence to psychotherapy treatment plan with regular and frequent attendance in the beginning, to ensure skills acquisition and practice and retention.  Practice in the office should include practice techniques with the patient reporting what she had been doing for homework from the previous session.  Demonstrations appear to be key to the patient's understanding and retention of the practiced skills.  Records will be faxed to a new provider once one is obtained.    Provider Signature: _________________________     Date: ___________________

## 2021-06-08 NOTE — TELEPHONE ENCOUNTER
Patient Returning Call  Reason for call:  Call back  Information relayed to patient:  Writer relayed message that was in Pt's chart: FYI--     This patient was referred to [facility] by [facility]. Due to staff availbility, patient was re-referred to [facility] for home care services ordered. If you have questions, please contact us at [phone].     Thank you,     [facility] Care Liaison  Home [facility] Understands, no additional questions.  Patient has additional questions:  No  If YES, what are your questions/concerns:  N/A  Okay to leave a detailed message?: No call back needed

## 2021-06-08 NOTE — PROGRESS NOTES
"Indra Jason is a 55 y.o. female who is being evaluated via a billable telephone visit.  With English interpretor    The patient has been notified of following:     \"This telephone visit will be conducted via a call between you and your physician/provider. We have found that certain health care needs can be provided without the need for a physical exam.  This service lets us provide the care you need with a short phone conversation.  If a prescription is necessary we can send it directly to your pharmacy.  If lab work is needed we can place an order for that and you can then stop by our lab to have the test done at a later time.    Telephone visits are billed at different rates depending on your insurance coverage. During this emergency period, for some insurers they may be billed the same as an in-person visit.  Please reach out to your insurance provider with any questions.    If during the course of the call the physician/provider feels a telephone visit is not appropriate, you will not be charged for this service.\"    Patient has given verbal consent to a Telephone visit? Yes    What phone number would you like to be contacted at? [phone]    Patient would like to receive their AVS by AVS Preference: Mail a copy.    Additional provider notes:   Headache, fever and hand cramping.   Just started this morning.   yesterday feeling fine. Not sure what happened.  Didn't take temperature. Just feeling hot.   No one else sick at home.  Took Tylenol this morning-> helping  HAs are same as previous chronic HAs,   Symptoms of getting warm are not new  Hand cramping is also not new   Last HA two months ago.   She thinks tylneol will be helpful for her.   Last dose- 11AM  Reports pain 2-3/10    ROS: no cough, shortness of breath, chest pressure, N/V/D, anosmia      Assessment/Plan:  1. Tension headache  Sounds like chronic tension HA exacerbation. Mild pain. She feels tylenol will be helpful and asking for prescription. " Reports often feels warm with HA so not clear if actually having a fever.  Discussed since this sounds like her chronic issue unlikely covid but if symptoms change, worsen or do not get better with tylenol as expected to call and consider testing for COVID-19. She states understanding. Also recommended obtaining a thermometer.   - acetaminophen (TYLENOL EXTRA STRENGTH) 500 MG tablet; Take 1 tablet (500 mg total) by mouth every 4 (four) hours as needed for pain.  Dispense: 100 tablet; Refill: 2    2. Foot cramps: and hand cramps. Chronic issue. Try tylenol and call back if not helping.   - acetaminophen (TYLENOL EXTRA STRENGTH) 500 MG tablet; Take 1 tablet (500 mg total) by mouth every 4 (four) hours as needed for pain.  Dispense: 100 tablet; Refill: 2    3. Cramping of hands  Same as above        Phone call duration:  15 minutes    Heidi Hernandez, DO

## 2021-06-09 ENCOUNTER — OFFICE VISIT - HEALTHEAST (OUTPATIENT)
Dept: FAMILY MEDICINE | Facility: CLINIC | Age: 56
End: 2021-06-09

## 2021-06-09 DIAGNOSIS — R06.2 WHEEZING: ICD-10-CM

## 2021-06-09 DIAGNOSIS — R32 URINARY INCONTINENCE, UNSPECIFIED TYPE: ICD-10-CM

## 2021-06-09 DIAGNOSIS — R05.9 COUGH: ICD-10-CM

## 2021-06-09 DIAGNOSIS — N39.46 MIXED STRESS AND URGE URINARY INCONTINENCE: ICD-10-CM

## 2021-06-09 NOTE — PROGRESS NOTES
6/23/2020  Clinic Care Coordination Contact    Community Health Worker Follow Up    Goals:   Goals Addressed                 This Visit's Progress       Patient Stated      Financial Wellbeing: On Hold COVID19 (pt-stated)   On Hold     Goal Statement- I want to defer my travel loan as soon as possible    Action steps to achieve this goal  1. On hold due to COVID-19. Daughter/PCA will talk to [facility] on 7-8-20 at 10am regarding the travel loan.       Updated: 6-23-20  AL  02/19/20  Not addressed at RN CCC visit          Other      Medication 1   On track     Goal Statement- I want to have support to setup my medications within 2 months     Action steps to achieve this goal  1. Daughter Victor M continues to help with medications and set up meds.  2. Daughter Victor M will attend Telephone visit with Olivia Palm CCC RN as scheduled.    Updated: 6-23-20 AL  Date goal set:  1/17/2020 BC      2-18-20  Pt and daughter attended visit with RN CCC. Review of MTM visit from 2-4, noted a hold on Duloxetine to have update from prescrining provider review, with no reply  RN CCC out reach to provider as well and request review, JAIME on file. Await reply, medication set up as MTM recommend until clarification from provider.       06/02/20  Daughter reports that she is assisting mother with set up following recommendations from provider  Daughter states that she has an excess amount of the Cymbata   RN CC request daughter to bring bottle to pharmacy next time she get refill and make sure that it the correct strength and dosing  If question or concern will discuss with PCP at next visit.           Called and spoke to patient's son in law Amandeep.  He reported:  -wife/patiet's daughter Victor M continue to help with medications and set up meds.  -would like to know status about the travel loan payment and what they need to do.  Scheduled telephone appt with [facility] 7-8-20 at 10am.  Son in law confirmed appt.      CHW Next Follow-up:  7-30-20  [facility] appt on 7-8-20 at 10am

## 2021-06-09 NOTE — PROGRESS NOTES
Clinic Care Coordination Contact    Follow Up Progress Note      Assessment: RN GENE outreach and spoke with patient daughter  Daughter states that she has been helping mother with medications but is concern about acute states- RN CC was unable to assess if covid or other system as call was disconnected.     RN CC called back and spoke with Son - in - Law - shared scheduling phone number   Sent follow up message to scheduling team at PCP office to support       Goals addressed this encounter:   Goals Addressed                 This Visit's Progress      Medication 1   50%     Goal Statement- I want to have support to setup my medications within 2 months     Action steps to achieve this goal  1. Daughter Victro M continues to help with medications and set up meds.  2. Daughter Victor M will attend Telephone visit with Olivia Palm CCC RN as scheduled.    Updated: 6-23-20 AL  Date goal set:  1/17/2020 BC      2-18-20  Pt and daughter attended visit with RN CCC. Review of MTM visit from 2-4, noted a hold on Duloxetine to have update from prescrining provider review, with no reply  RN CCC out reach to provider as well and request review, JAIME on file. Await reply, medication set up as MTM recommend until clarification from provider.       06/02/20  Daughter reports that she is assisting mother with set up following recommendations from provider  Daughter states that she has an excess amount of the Cymbata   RN CC request daughter to bring bottle to pharmacy next time she get refill and make sure that it the correct strength and dosing  If question or concern will discuss with PCP at next visit.     07/14/20  Daughter notes that she is assisting patient with medications but is concern about acute issues  RN GENE recommended patient have an evaluation with provider   RN CC assisted with patient to connect to [facility] to triage and schedule visist             Plan: Pt to schedule and attend acute visit with provider  RN GENE will reach  out within 2 weeks to support plan of care and review medications   Community Health Worker to outreach per standard work and updated on goal progression

## 2021-06-09 NOTE — PROGRESS NOTES
Clinic Care Coordination Contact    Follow Up Progress Note      Assessment: MICHELLE spoke with patients daughter using an . She reported they have not received any new information regarding the deferment of the travel loan.     MICHELLE confirmed contact information and loan account number with daughter. MICHELLE had email correspondence with the agency from November 2019. MICHELLE emailed them to ask for an update. Will update Indra and/or family once SW has a response.    Goals addressed this encounter:   Goals Addressed                 This Visit's Progress      Financial Wellbeing (pt-stated)   On track     Goal Statement- I want to defer my travel loan as soon as possible    Action steps to achieve this goal  1. MICHELLE waiting to hear from [employer] on the status of the loan    Updated: 7/8/2020                 Intervention/Education provided during outreach: See above          Plan:   MICHELLE will outreach once a response is received regarding loan status

## 2021-06-10 LAB
ALBUMIN UR-MCNC: NEGATIVE G/DL
APPEARANCE UR: CLEAR
BACTERIA #/AREA URNS HPF: ABNORMAL /[HPF]
BILIRUB UR QL STRIP: NEGATIVE
COLOR UR AUTO: YELLOW
GLUCOSE UR STRIP-MCNC: NEGATIVE MG/DL
HGB UR QL STRIP: ABNORMAL
KETONES UR STRIP-MCNC: NEGATIVE MG/DL
LEUKOCYTE ESTERASE UR QL STRIP: NEGATIVE
NITRATE UR QL: NEGATIVE
PH UR STRIP: 5.5 [PH] (ref 5–8)
RBC #/AREA URNS AUTO: ABNORMAL HPF
SP GR UR STRIP: 1.02 (ref 1–1.03)
SQUAMOUS #/AREA URNS AUTO: ABNORMAL LPF
UROBILINOGEN UR STRIP-ACNC: ABNORMAL
WBC #/AREA URNS AUTO: ABNORMAL HPF

## 2021-06-10 NOTE — PROGRESS NOTES
7/31/2020  Clinic Care Coordination Contact    Community Health Worker Follow Up    Goals:   Goals Addressed                 This Visit's Progress       Patient Stated      Financial Wellbeing (pt-stated)   10%     Goal Statement- I want to defer my travel loan as soon as possible    Action steps to achieve this goal  1.Daughter still have not heard anything from SW or travel loan.  2. Daughter will wait to hear from [facility] SW.  3. SW waiting to hear from [employer] on the status of the loan    Updated: 7/31/2020 AL             Other      Medication 1   60%     Goal Statement- I want to have support to setup my medications within 2 months     Action steps to achieve this goal  1. Daughter Victor M continues to support and set up medications.  2. Daughter Victor M will call CHW to schedule appt with Olivia Palm CCC RN if she has questions or concerns about medications.    Updated: 7-31-20 AL  Date goal set:  1/17/2020 BC      2-18-20  Pt and daughter attended visit with RN CCC. Review of MTM visit from 2-4, noted a hold on Duloxetine to have update from prescrining provider review, with no reply  RN CCC out reach to provider as well and request review, JAIME on file. Await reply, medication set up as MTM recommend until clarification from provider.       06/02/20  Daughter reports that she is assisting mother with set up following recommendations from provider  Daughter states that she has an excess amount of the Cymbata   DONNA MILLS request daughter to bring bottle to pharmacy next time she get refill and make sure that it the correct strength and dosing  If question or concern will discuss with PCP at next visit.     07/14/20  Daughter notes that she is assisting patient with medications but is concern about acute issues  RN CC recommended patient have an evaluation with provider   RN CC assisted with patient to connect to [facility] to triage and schedule visist          Called and spoke to patient's daughter Victor M and follow up on  goals.  Patient's daughter reported:  -doing good.  -daughter continue to set up meds  -did not hear from SW or travel loan.    CHW Next Follow-up: 8-31-20

## 2021-06-10 NOTE — TELEPHONE ENCOUNTER
Medication list have been fax to [place] [facility] at [phone] attn: Connie.

## 2021-06-10 NOTE — PROGRESS NOTES
Clinic Care Coordination Contact    Follow Up Progress Note      Assessment: RN CC outreach post hospital for status update  RN CC spoke with Son In law with support of interpretive servicesas pt was not available   KVNG states that patient is doing better  Chart review note provider at F/u visit recommended to be seen in 2 weeks - no visit was scheduled  KVNG states that daughter would be able to schedule and attend  RN CC shared scheduling phone number as well as CHW phone number if needing assistance   No other goals addressed during this outreach - schedule phone follow up within one month will be available sooner if needed    Goals addressed this encounter:   Goals Addressed                 This Visit's Progress       Patient Stated      Medical (pt-stated)        Goal Statement: I will schedule my follow upvisit with PCP within one month   Date Goal set: 08/18/20    Barriers: language  Strengths: family support  Date to Achieve By: September  Patient expressed understanding of goal: yes  Action steps to achieve this goal:  1. I will schedule and attend visit with PCP as recommended in my post hospital follow up visit   2. I will report to my Community Health Worker if any additional resources or support needed.               Plan: Pt to schedule and attend follow up with PCP as recommended  Community Health Worker to outreach per standard work and updated on goal progression    RN CC will outreach in 4-6 weeks to support ongoing recommendations and plan of care will be available sooner if needed.

## 2021-06-10 NOTE — TELEPHONE ENCOUNTER
Medication Request  Medication name:    Disp  Refills  Start  End     FLUoxetine (PROZAC) 40 MG capsule         Sig - Route: Take 40 mg by mouth daily. - Oral     Class: Historical Med         [facility]  Reason for request: Caller stated that she has been calling in to set up refills for couple days and was told the same thing that someone will send over refills but nothing was sent over. Caller is needing this sent as soon as possible.   When did you use medication last?:    Patient offered appointment:  patient declined  Okay to leave a detailed message: yes

## 2021-06-10 NOTE — PROGRESS NOTES
"Indra Jason is a 55 y.o. female who is being evaluated via a billable telephone visit.      The patient has been notified of following:     \"This telephone visit will be conducted via a call between you and your physician/provider. We have found that certain health care needs can be provided without the need for a physical exam.  This service lets us provide the care you need with a short phone conversation.  If a prescription is necessary we can send it directly to your pharmacy.  If lab work is needed we can place an order for that and you can then stop by our lab to have the test done at a later time.    Telephone visits are billed at different rates depending on your insurance coverage. During this emergency period, for some insurers they may be billed the same as an in-person visit.  Please reach out to your insurance provider with any questions.    If during the course of the call the physician/provider feels a telephone visit is not appropriate, you will not be charged for this service.\"    Patient has given verbal consent to a Telephone visit? Yes    What phone number would you like to be contacted at?[phone]    Patient would like to receive their AVS by AVS Preference: Mail a copy.    Additional provider notes:     Subjective  [age] year old female calls for follow up.   Telephone visit completed due to current pandemic of covid 19.   She was seen and evaluated in the ED yesterday.   She had sudden onset of nausea, vomiting for two days.   She is still nauseated today. She has vomiting twice. She says she is not eating well, but drinking. Has normal urine and stool. There are no sick contacts. She can not recall any questionable food she may have ingested. Denies allergy.   Blood work was done. CT abdomen was done. Urinalysis was done. Culture not noted. She has mild hypokalemia, increase in creatinine.   She has not taken any medication for this. She says she was given medication in the ED, " but not for home use.   We reviewed her ED visit. We discussed need to follow up in person if still unwell.       ROS: 12 systems reviewed, all negative except for what is mentioned in HPI.   Past Medical History:   Diagnosis Date     Abdominal pain     Created by Conversion   Overview:  Normal colonoscopy 05/02/2013     Allergic rhinitis 10/7/2016     Anxiety      Cervical Polyps     Created by Conversion   Overview:  Overview:  Created by Conversion     Chronic lower back pain 2/23/2016     Depression      Eosinophilia 2/6/2012     Epigastric pain 2/9/2019     H. pylori infection      Nonspecific abnormal finding of lung field 2/27/2019    Overview:  Refer to clinic note Dr Jones 2/27/2013     Pulmonary tuberculosis 5/19/2016    Overview:  History of pulmonary tuberculosis treated in 1994.  AFB smears/culture September 13-15, 2011 negative.     RBC microcytosis 3/21/2019     Screen for colon cancer 5/2/2013    Overview:  Normal colonoscopy 05/02/2013     Suicidal ideation      Patient Active Problem List   Diagnosis     Fatigue     Vitamin D deficiency     Tension headache     Chronic pain     PTSD (post-traumatic stress disorder)     Moderate episode of recurrent major depressive disorder (H)     Elevated ferritin level     Dizziness     TB lung, latent     Leg swelling     Iron overload syndrome     Cellulitis and abscess of trunk     Chest wall abscess     Anxiety and depression     Past Surgical History:   Procedure Laterality Date     ABDOMINAL SURGERY N/A      No family history on file.  Social History     Socioeconomic History     Marital status:      Spouse name: Not on file     Number of children: 8     Years of education: Not on file     Highest education level: Not on file   Occupational History     Not on file   Social Needs     Financial resource strain: Not on file     Food insecurity     Worry: Not on file     Inability: Not on file     Transportation needs     Medical: Not on file      Non-medical: Not on file   Tobacco Use     Smoking status: Former Smoker     Years: 15.00     Last attempt to quit: 3/21/2004     Years since quittin.4     Smokeless tobacco: Never Used     Tobacco comment: no passive exposure in home.   Substance and Sexual Activity     Alcohol use: No     Drug use: No     Sexual activity: Never   Lifestyle     Physical activity     Days per week: Not on file     Minutes per session: Not on file     Stress: Not on file   Relationships     Social connections     Talks on phone: Not on file     Gets together: Not on file     Attends Christianity service: Not on file     Active member of club or organization: Not on file     Attends meetings of clubs or organizations: Not on file     Relationship status: Not on file     Intimate partner violence     Fear of current or ex partner: Not on file     Emotionally abused: Not on file     Physically abused: Not on file     Forced sexual activity: Not on file   Other Topics Concern     Not on file   Social History Narrative     Not on file     Current Outpatient Medications on File Prior to Visit   Medication Sig Dispense Refill     acetaminophen (TYLENOL EXTRA STRENGTH) 500 MG tablet Take 1 tablet (500 mg total) by mouth every 4 (four) hours as needed for pain. 100 tablet 2     albuterol (PROAIR HFA) 90 mcg/actuation inhaler Inhale 2 puffs 4 (four) times a day. (Patient taking differently: Inhale 2 puffs every 6 (six) hours as needed. ) 1 Inhaler 6     capsaicin (ZOSTRIX) 0.025 % cream Apply to affected area as needed. (Patient taking differently: Apply 1 application topically as needed. Apply to affected area as needed.) 60 g 1     cetirizine (ZYRTEC) 10 MG tablet Take 1 tablet (10 mg total) by mouth daily. 90 tablet 3     cholecalciferol, vitamin D3, (VITAMIN D3) 25 mcg (1,000 unit) capsule Take 1 capsule (1,000 Units total) by mouth daily. 90 capsule 3     cyanocobalamin 1000 MCG tablet Take 1 tablet (1,000 mcg total) by mouth daily. 90  tablet 3     deferasirox (EXJADE) 500 MG disintegrating tablet Take 2 tablets (1,000 mg total) by mouth daily before breakfast. 60 tablet 11     DULoxetine (CYMBALTA) 30 MG capsule Take 30 mg by mouth daily.       famotidine (PEPCID) 40 MG tablet Take 1 tablet (40 mg total) by mouth daily. 20 tablet 0     fluticasone (FLONASE ALLERGY RELIEF) 50 mcg/actuation nasal spray 1-2 sprays a day as needed. 16 g 5     lidocaine (LIDODERM) 5 % Place 1 patch on the skin daily as needed. Remove & Discard patch within 12 hours or as directed by MD       ondansetron (ZOFRAN ODT) 4 MG disintegrating tablet Take 1 tablet (4 mg total) by mouth every 8 (eight) hours as needed for nausea. 10 tablet 0     ondansetron (ZOFRAN) 4 MG tablet Take 1 tablet (4 mg total) by mouth every 6 (six) hours. 12 tablet 0     QUEtiapine (SEROQUEL) 25 MG tablet Take 25 mg by mouth at bedtime.        No current facility-administered medications on file prior to visit.              Assessment/Plan:  1. Nausea and vomiting, intractability of vomiting not specified, unspecified vomiting type  2. Renal insufficiency  3. Hypokalemis  - ondansetron (ZOFRAN) 4 MG tablet; Take 1 tablet (4 mg total) by mouth every 8 (eight) hours as needed for nausea.  Dispense: 30 tablet; Refill: 0  EHR reviewed.   Discussed that patient should probably be seen face to face to repeat blood work.   She does not want to be seen in person today.   She does note unchanged nausea, vomiting today.   No red flag signs.   Trial of zofran. Discussed appropriate use. To call or be seen urgently this weekend if her symptoms do not improve.   Supportive care discussed. Will start with clear liquids today. Advance diet as tolerated.   We discussed signs and symptoms of dehydration.       Phone call duration: 12 minutes    Carlos Silva MD

## 2021-06-10 NOTE — TELEPHONE ENCOUNTER
Spoke with home care nurse. Briefly reviewed patient's current medications and answered her questions. She would like an updated medication list faxed to her.     Can someone please fax med list to:    [phone]   Attn: Connie    Thank you!

## 2021-06-10 NOTE — TELEPHONE ENCOUNTER
Who is calling:  Connie with [facility].  Reason for Call:    Connie is giving patient her medications this week.  Connie is questioning which medications are up to date.  Please reach out to Connie and advise.  Date of last appointment with primary care: 6/15/2020  Okay to leave a detailed message: Yes

## 2021-06-11 NOTE — TELEPHONE ENCOUNTER
"Called and left message for pt to return call.# 1  \" Okay to relay message\"  Use  line to contact :Charlette ID:[id number]    "
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
## 2021-06-11 NOTE — PROGRESS NOTES
8/31/2020   Clinic Care Coordination Contact    Community Health Worker Follow Up    Goals:   Goals Addressed                 This Visit's Progress       Patient Stated      Financial Wellbeing (pt-stated)   10%     Goal Statement- I want to defer my travel loan as soon as possible    Action steps to achieve this goal  1.Daughter still have not receive any letters or mail from [employer] about travel loan.  2.Daughter will wait to talk to CCC SW if she receives letter [employer] .  3. SW waiting to hear from [employer] on the status of the loan    Updated: 8/31/2020 AL            Medical (pt-stated)   40%     Goal Statement: I will schedule my follow upvisit with PCP within one month   Date Goal set: 08/18/20    Barriers: language  Strengths: family support  Date to Achieve By: September  Patient expressed understanding of goal: yes  Action steps to achieve this goal:  1. Daughter and I will attend appt on 10-9-20 at 10am at [facility] for MRI.  2. I will report to my Community Health Worker if any additional resources or support needed.      Updated: 8-31-20 AL         Other      Medication 1   70%     Goal Statement- I want to have support to setup my medications within 2 months     Action steps to achieve this goal  1. Daughter Victor M continues to support and set up medications.  2. Daughter Victor M will talk to Olivia Palm CCC RN on 9-10-20 at 9 am to update or address any concerns about medications.    Updated: 8-31-20 AL  Date goal set:  1/17/2020 BC      2-18-20  Pt and daughter attended visit with RN CCC. Review of MTM visit from 2-4, noted a hold on Duloxetine to have update from prescrining provider review, with no reply  RN CCC out reach to provider as well and request review, JAIME on file. Await reply, medication set up as MTM recommend until clarification from provider.       06/02/20  Daughter reports that she is assisting mother with set up following recommendations from provider  Daughter states that she has an excess  amount of the Cymbata   RN CC request daughter to bring bottle to pharmacy next time she get refill and make sure that it the correct strength and dosing  If question or concern will discuss with PCP at next visit.     07/14/20  Daughter notes that she is assisting patient with medications but is concern about acute issues  RN CC recommended patient have an evaluation with provider   RN CC assisted with patient to connect to [facility] to triage and schedule visist              Called and spoke to patient and patient's daughter Victor M Hinojosa : #[id number] Thuan up on goals.   Patient's daughter reported:   -mother/patient is feeling a lot better now  -did not receive any mails or letters from [employer]  -has MRI appt on 10-9-20 at 10am at [facility] Next Follow-up: 10-1-20  [facility] Plan:  appt with CCC RN 9-10-20 at 9am

## 2021-06-11 NOTE — PROGRESS NOTES
Please call patient and inform:   I have some good news- your ferritin level is normal!! This is your iron overload lab that we have been monitoring and why you have been on that special medication from the blood doctor/hematologist. You have follow up with them on 10/12. Please make sure to go to that appointment. Other good news- your kidney function is improving.   The only abnormal thing we need to follow up on is an elevated bilirubin level. Lets plan for a telephone visit in the next week or 2 and can order more lab work from there.
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
## 2021-06-11 NOTE — TELEPHONE ENCOUNTER
----- Message from Heidi Hernandez DO sent at 9/21/2020  8:26 AM CDT -----  Please call patient and inform:   I have some good news- your ferritin level is normal!! This is your iron overload lab that we have been monitoring and why you have been on that special medication from the blood doctor/hematologist. You have follow up with them on 10/12. Please make sure to go to that appointment. Other good news- your kidney function is improving.   The only abnormal thing we need to follow up on is an elevated bilirubin level. Lets plan for a telephone visit in the next week or 2 and can order more lab work from there.

## 2021-06-11 NOTE — PROGRESS NOTES
Clinic Care Coordination Contact  Care Team Conversations     SW contacted Indra with an  to follow up on her travel loan, mental health services, and update care team. Indra reported she was not home and due to traffic was having a difficult time hearing. Rescheduled for 10/12.

## 2021-06-11 NOTE — PROGRESS NOTES
Clinic Care Coordination Contact    Follow Up Progress Note      Assessment: RN CC phone outreach with support of interpretive services  Spoke with patient and family  Pt aware of follow up imaging schedule for October  Pt plan to follow up with PCP following imaging      Goals addressed this encounter:   Goals Addressed                 This Visit's Progress       Patient Stated      Financial Wellbeing (pt-stated)   10%     Goal Statement- I want to defer my travel loan as soon as possible    Action steps to achieve this goal  1.Daughter still have not receive any letters or mail from [employer] about travel loan.  2.Daughter will wait to talk to [facility] SW if she receives letter [employer] .  3. SW waiting to hear from [employer] on the status of the loan    09/11/20              Medical (pt-stated)   30%     Goal Statement: I will schedule my follow upvisit with PCP within one month   Date Goal set: 08/18/20    Barriers: language  Strengths: family support  Date to Achieve By: September  Patient expressed understanding of goal: yes  Action steps to achieve this goal:  1. Daughter and I will attend appt on 10-9-20 at 10am at [facility] for MRI.  2. I will report to my Community Health Worker if any additional resources or support needed.    09/11/20  Pt notes that she would like to wait until after imaging to follow up with PCP         Other      Medication 1   80%     Goal Statement- I want to have support to setup my medications within 2 months     Action steps to achieve this goal  1. Daughter Victor M continues to support and set up medications.    09/11/20  Pt daughter report all going well with medicaitons - will follow up with status at next outreach to ensure compliance           Plan:   Pt to follow up with imaging as expected  Pt to work with home care to review ongoing acute needs  Community Health Worker to outreach per standard work and updated on goal progression      RN CC will outreach in 4-6 weeks to support ongoing  recommendations and plan of care will be available sooner if needed.
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
## 2021-06-11 NOTE — PROGRESS NOTES
9/21/2020   FYI  Patient is scheduled with [facility] tomorrow 9-22-20 at 7:30am.  She is lives with her brother (Adryan) in [place].  New address is updated in the chart.  Stated her daughter comes to the brother's house every 2 weeks Friday to help set up medications.    Background Info:   Patient lived in [place] for awhile then due to housing issues she couldn't wait for public housing so she moved to [place] with her son's family thinking that it will be better there.  It did not work out in [place] so she moved back to [place].

## 2021-06-11 NOTE — PROGRESS NOTES
Clinic Care Coordination Contact  Care Team Conversations     SW attempted to reach patient for follow up. Patient was not available at the time of call. She was taking a shower.

## 2021-06-12 NOTE — PROGRESS NOTES
Clinic Care Coordination Contact    Follow Up Progress Note      Assessment: MICHELLE contacted Indra about her travel loan. MICHELLE has been working with her and her family on this since about November 2019. In order to request another deferment they need to submit the following:     If you are not able to pay your loan now, you can send us the information below and we may be able to postpone your payments for up to six (6) months.  Documentation needed to postpone your travel loan payment:  1) Copy of entire SSI Award Letter  2) Copy of food stamp letter or other public benefits  3) Letter from doctor stating that disabled/ill client is unable to work  4) Copy of all expenses, including rent, utilities, telephone, internet, medical, credit card bills, tuition, clothing, food, day care, bus fare, gas, car payment, car insurance, etc.  5) Recent pay stubs of all family members living in the same household  6) Letter from school, university, full-time school class schedule, or [employer]  7) Copy of last 2 months bank statements      When [facility] MICHELLE spoke with the [employer] last year, they said we could advocate on behalf of the patient to potentially get the loan settled, but we need these documents. MICHELLE explained this to Indra. This has been very challenging to support her with this being that CCC team is remote at this time. She reported she will work on getting these documents.     Goals addressed this encounter:   Goals Addressed                 This Visit's Progress      Financial Wellbeing (pt-stated)        Goal Statement- I want to defer my travel loan as soon as possible    Action steps to achieve this goal  1. I will gather the information necessary to submit a loan deferral  2. I will update CCC Team at next outreach.    Updated: 10/29/2020                   Intervention/Education provided during outreach: See above          Plan:   Standard outreach

## 2021-06-12 NOTE — PROGRESS NOTES
10/22/2020   Clinic Care Coordination Contact    Community Health Worker Follow Up    Goals:   Goals Addressed                 This Visit's Progress       Patient Stated      Financial Wellbeing (pt-stated)   70%     Goal Statement- I want to defer my travel loan as soon as possible    Action steps to achieve this goal  1. I will talk to [facility] SW on 10-29-20 at 7:30am about the letter from [employer] regarding travel loan.  2. I will update [facility] Team at next outreach.    Updated: 10-22-20 AL              Medical (pt-stated)   30%     Goal Statement: I will schedule follow up with PCP after I complete MRI scan within 1 month.  Date Goal set: 08/18/20    Barriers: language  Strengths: family support  Date to Achieve By: September  Patient expressed understanding of goal: yes  Action steps to achieve this goal:  1. I will wait to get call from the clinic to reschedule MRI appt at [facility] on 10-9-20 at 10am.  2. I will report to my Community Health Worker if any additional resources or support needed.    Updated: 10-22-20 AL    09/11/20  Pt notes that she would like to wait until after imaging to follow up with PCP         Other      COMPLETED: Medication 1   100%     Goal Statement- I have support to setup my medications with [facility].    Personal Plan:  I will continue to meet with Connie Alvarado [phone] from [employer] for medication set up  every week.              Called and spoke to patient through Counts include 234 beds at the [facility]  :   Stan ID # [id number] and follow up on goals.  Patient reported:  -has nurse that comes out every week to set up medications.  Connie Alvarado RN at [employer] [phone]   Updated in Care Teams    -did not get any letter about the travel loan.  Informed patient that DONNA Rosales notify CHW about letter from immigration about travel loan.  Patient seem to not recall or remember the letter at first but remember later on.  Scheduled appt with  MICHELLE  10-29-20 at 7:30am for support to respond to the letter.  -she attended MRI appt on 10-9-20 but in the chart she no show to appt.  Send message to PCP Care Team/ Speciality  to help reschedule MRI.      Appt with [facility] 10-29-20    CHW Next Follow-up: 11-30-20

## 2021-06-12 NOTE — TELEPHONE ENCOUNTER
"Called and left message for pt to return call.# 1 Use  line to contact :Iris ID:[id number]    \" Okay to relay message\"      ----- Message from Heidi Hernandez DO sent at 10/21/2020  8:16 AM CDT -----  Please call patient and inform:  Good news.  All your labs are back to normal.  I have placed an order to get the MRI of your liver done.  This is supposed to be done this month.  After that is completed let's at the very least set up a telephone visit with the heme-onc specialist to review all your labs and imaging and see if there is anything we should do next.    "

## 2021-06-12 NOTE — PROGRESS NOTES
Please call patient and inform:  Good news.  All your labs are back to normal.  I have placed an order to get the MRI of your liver done.  This is supposed to be done this month.  After that is completed let's at the very least set up a telephone visit with the heme-onc specialist to review all your labs and imaging and see if there is anything we should do next.

## 2021-06-12 NOTE — TELEPHONE ENCOUNTER
Called and spoke with Indra Jason , Message was given, Indra Jason  understood, no further questions.

## 2021-06-12 NOTE — PROGRESS NOTES
"Indra Jason is a 55 y.o. female who is being evaluated via a billable telephone visit.      The patient has been notified of following:     \"This telephone visit will be conducted via a call between you and your physician/provider. We have found that certain health care needs can be provided without the need for a physical exam.  This service lets us provide the care you need with a short phone conversation.  If a prescription is necessary we can send it directly to your pharmacy.  If lab work is needed we can place an order for that and you can then stop by our lab to have the test done at a later time.    Telephone visits are billed at different rates depending on your insurance coverage. During this emergency period, for some insurers they may be billed the same as an in-person visit.  Please reach out to your insurance provider with any questions.    If during the course of the call the physician/provider feels a telephone visit is not appropriate, you will not be charged for this service.\"    Patient has given verbal consent to a Telephone visit? Yes    What phone number would you like to be contacted at? [phone]    Patient would like to receive their AVS by AVS Preference: Mail a copy.    Additional provider notes:   Some days better. Other days hands and knee are painful  Reviewed labs  Nausea is a little better.   Discussed elevated bilirubin level   Need for f/u labs  Has appt with heme/onc next week    Assessment/Plan:  1. Elevated bilirubin  Question of exjade as cause? Can cause hepatic failure and given significant improvement in her ferritin level over the last few months and an increase in bilirubin in this time (previous normal) I wonder if she has been more compliant with it. Has appt with hem/onc next week which will be helpful in deciding whether we can stop this. Will have her f/u for labs in 1 week to reassess and do a little more work up. She is immune to Hepatitis B and neg Hep C last " year. I don't think it is genetic cause as new over the last few months.   - Hepatic Profile; Future  - INR; Future  - APTT(PTT); Future  - Iron and Transferrin Iron Binding Capacity; Future  - Ferritin; Future    2. Elevated ferritin level  Recheck labs below. Most recent ferritin is now normal! F/u with heme/onc and decide if can stop Exjade in setting of elevated bilirubin level above.   - Iron and Transferrin Iron Binding Capacity; Future  - Ferritin; Future      Phone call duration:  9 minutes    Heidi Hernandez, DO

## 2021-06-12 NOTE — PROGRESS NOTES
Clinic Care Coordination Contact  Care Team Conversations     10-14-20   Received voice message from patient's Skilled nurse at [facility] care   Sanjuana, RN requesting help to set up ride for patient to see PCP on 10-16-20.  Patient received a bill about  funding and need help  Call her at [phone].  Update in Care Teams      10-16-20  Called and spoke to patient's son in law to inform patient's daughter Victor M that patient need medical ride to an appt at [facility] on 10-20-20 at 3pm.  Son in law will inform daughter about the ride.      CHW Follow up: 10-22-20

## 2021-06-12 NOTE — PROGRESS NOTES
Clinic Care Coordination Contact  Care Team Conversations     SW attempted to reach patient for todays rescheduled appointment to review travel loan, care team, and mental health resources. She did not answer.

## 2021-06-12 NOTE — TELEPHONE ENCOUNTER
10-14-20   Received voice message from patient's Skilled nurse at [facility]   DONNA Wei requesting help to set up ride for patient to see PCP on 10-16-20.  Patient received a bill about  funding and need help  Call her at [phone].  Update in Care Teams

## 2021-06-12 NOTE — PROGRESS NOTES
[facility] EXAM NOTE      Chief Complaint   Patient presents with     Follow-up     mri and meds       Due to language barrier, a phone  was present during the history-taking and subsequent discussion (and the physical exam) with this patient.      ASSESSMENT & PLAN    Indra was seen today for follow-up.    Diagnoses and all orders for this visit:    Elevated bilirubin: Check labs below to further evaluate the elevated bilirubin level from last month.  Question if it is medication induced from the Exjade?  Hemochromatosis related?  Immune to hepatitis B, negative hepatitis C last year.  We will start by checking labs below.  -     Hepatic Profile  -     INR  -     APTT(PTT)  -     Iron and Transferrin Iron Binding Capacity  -     Ferritin    Elevated ferritin level: Check labs below.  She is due for an MRI of her liver this month.  We will get all these done and then plan to schedule follow-up with heme-onc.  She is having transportation issues.  Perhaps a virtual visit would be okay?  -     Iron and Transferrin Iron Binding Capacity  -     Ferritin  -     MR Liver With Without Contrast; Future    Iron overload syndrome  -     MR Liver With Without Contrast; Future    Encounter for completion of form with patient: Will complete the [facility] paperwork and fax.        HISTORY    Indra Jason is a 55 y.o.  female who presents for the following issues:    Presents with daughter today.  I had scheduled her for a lab only visit last week which she did not come to do.  Came today with daughter.  Some questions they have a fax that they want me to send to the care guide for them.  They have [facility] paperwork for me to fill out.  And then they are here to do the lab work.    Overall patient is feeling much better.  Her mood looks improved.  Nausea is improved.  Last labs were improving.  No chest pain shortness of breath.    Saw heme oncology back in June  with a virtual visit.  They had recommended repeat labs and a repeat liver MRI in 4 months which would be this month.  She missed her appointment with them last week due to transportation issues.    MEDICATIONS    Current Outpatient Medications on File Prior to Visit   Medication Sig Dispense Refill     acetaminophen (TYLENOL EXTRA STRENGTH) 500 MG tablet Take 1 tablet (500 mg total) by mouth every 4 (four) hours as needed for pain. 100 tablet 2     albuterol (PROAIR HFA) 90 mcg/actuation inhaler Inhale 2 puffs 4 (four) times a day. (Patient taking differently: Inhale 2 puffs every 6 (six) hours as needed. ) 1 Inhaler 6     capsaicin (ZOSTRIX) 0.025 % cream Apply to affected area as needed. (Patient taking differently: Apply 1 application topically as needed. Apply to affected area as needed.) 60 g 1     cetirizine (ZYRTEC) 10 MG tablet Take 1 tablet (10 mg total) by mouth daily. 90 tablet 3     cholecalciferol, vitamin D3, (VITAMIN D3) 25 mcg (1,000 unit) capsule Take 1 capsule (1,000 Units total) by mouth daily. 90 capsule 3     cyanocobalamin 1000 MCG tablet Take 1 tablet (1,000 mcg total) by mouth daily. 90 tablet 3     deferasirox (EXJADE) 250 MG disintegrating tablet        deferasirox (EXJADE) 500 MG disintegrating tablet Take 2 tablets (1,000 mg total) by mouth daily before breakfast. 60 tablet 11     DULoxetine (CYMBALTA) 30 MG capsule Take 30 mg by mouth daily.       famotidine (PEPCID) 40 MG tablet Take 1 tablet (40 mg total) by mouth daily. 20 tablet 0     FLUoxetine (PROZAC) 40 MG capsule Take 1 capsule (40 mg total) by mouth daily. 90 capsule 1     fluticasone (FLONASE ALLERGY RELIEF) 50 mcg/actuation nasal spray 1-2 sprays a day as needed. 16 g 5     lidocaine (LIDODERM) 5 % Place 1 patch on the skin daily as needed. Remove & Discard patch within 12 hours or as directed by MD       ondansetron (ZOFRAN ODT) 4 MG disintegrating tablet Take 1 tablet (4 mg total) by mouth every 8 (eight) hours as needed  for nausea. 30 tablet 0     QUEtiapine (SEROQUEL) 25 MG tablet Take 25 mg by mouth at bedtime.        No current facility-administered medications on file prior to visit.        Pertinent past medical, surgical, social and family history reviewed and updated in Epic.    Social History     Socioeconomic History     Marital status:      Spouse name: Not on file     Number of children: 8     Years of education: Not on file     Highest education level: Not on file   Occupational History     Not on file   Social Needs     Financial resource strain: Not on file     Food insecurity     Worry: Not on file     Inability: Not on file     Transportation needs     Medical: Not on file     Non-medical: Not on file   Tobacco Use     Smoking status: Former Smoker     Years: 15.00     Quit date: 3/21/2004     Years since quittin.5     Smokeless tobacco: Never Used     Tobacco comment: no passive exposure in home.   Substance and Sexual Activity     Alcohol use: No     Drug use: No     Sexual activity: Never   Lifestyle     Physical activity     Days per week: Not on file     Minutes per session: Not on file     Stress: Not on file   Relationships     Social connections     Talks on phone: Not on file     Gets together: Not on file     Attends Restorationist service: Not on file     Active member of club or organization: Not on file     Attends meetings of clubs or organizations: Not on file     Relationship status: Not on file     Intimate partner violence     Fear of current or ex partner: Not on file     Emotionally abused: Not on file     Physically abused: Not on file     Forced sexual activity: Not on file   Other Topics Concern     Not on file   Social History Narrative     Not on file         REVIEW OF SYSTEMS    ROS: 10 pt review of systems performed and all negative except noted in HPI.     PHYSICAL EXAM    BP 98/68 (Patient Site: Left Arm, Patient Position: Sitting, Cuff Size: Adult Large)   Pulse 68   Resp 16    Wt 136 lb 8 oz (61.9 kg)   LMP 12/01/2014 (Exact Date)   BMI 26.66 kg/m    GEN:  55 y.o. female sitting comfortably in no apparent distress.   HEENT: EOMI, no scleral icterus, buccal mucosa moist  Neck: Supple   CHEST/LUNG: No respiratory distress  MSK:  Strength grossly normal  SKIN: warm, dry, no rashes or lesions  NEURO: Gait normal, coordination intact  PSYCH:  Mood and affect appropriate-appears less depressed once again from previous visits.      At the end of the encounter, I discussed results, diagnosis, medications. Discussed red flags for immediate return to clinic/ER, as well as indications for follow up if no improvement. Patient and/or caregiver understood and agreed to plan. Patient was stable for discharge.      Heidi Hernandez

## 2021-06-13 NOTE — TELEPHONE ENCOUNTER
Refill Approved    Rx renewed per Medication Renewal Policy. Medication was last renewed on 11/20/19.    Amalia Galvin, [facility] Connection Triage/Med Refill 12/14/2020     Requested Prescriptions   Pending Prescriptions Disp Refills     albuterol (PROAIR HFA;PROVENTIL HFA;VENTOLIN HFA) 90 mcg/actuation inhaler [Pharmacy Med Name: ALBUTEROL SULFATE  ( 108 (90 BAS Aerosol] 18 g 2     Sig: INHALE 2 PUFFS INTO THE LUNGS FOUR TIMES A DAY       Albuterol/Levalbuterol Refill Protocol Passed - 12/14/2020  8:12 AM        Passed - PCP or prescribing provider visit in last year     Last office visit with prescriber/PCP: 11/20/2019 Alvin Galvin MD OR same dept: 10/20/2020 Heidi Hernandez DO OR same specialty: 10/20/2020 Heidi Hernandez DO Last physical: Visit date not found       Next appt within 3 mo: Visit date not found  Next physical within 3 mo: Visit date not found  Prescriber OR PCP: Alvin Galvin MD  Last diagnosis associated with med order: 1. Bronchospasm  - albuterol (PROAIR HFA;PROVENTIL HFA;VENTOLIN HFA) 90 mcg/actuation inhaler [Pharmacy Med Name: ALBUTEROL SULFATE  ( 108 (90 BAS Aerosol]; INHALE 2 PUFFS INTO THE LUNGS FOUR TIMES A DAY  Dispense: 18 g; Refill: 2    If protocol passes may refill for 6 months if within 3 months of last provider visit (or a total of 9 months). If patient requesting >1 inhaler per month refill x 6 months and have patient make appointment with provider.

## 2021-06-13 NOTE — TELEPHONE ENCOUNTER
----- Message from Heidi Hernandez DO sent at 12/18/2020 12:05 PM CST -----  Please call patient and inform:  The MRI of your liver is back to normal.  Please follow-up with heme/oncology provider at some point.  Even a telephone or virtual visit would be nice to touch base with them.

## 2021-06-13 NOTE — PROGRESS NOTES
Clinic Care Coordination Contact    Situation: Patient chart reviewed by care coordinator.    Background: RN CC chart review for status of goal progression     Assessment: SW in communication with patient on goals awaiting status update    Pt will be due for reassessment in December  Plan:  CC team to continue to support goal progression   CHW please schedule reassessment at next outreach

## 2021-06-13 NOTE — PROGRESS NOTES
12/1/2020   Clinic Care Coordination Contact    Community Health Worker Follow Up    Goals:   Goals Addressed                 This Visit's Progress       Patient Stated      Financial Wellbeing (pt-stated)   70%     Goal Statement- I want to defer my travel loan as soon as possible    Action steps to achieve this goal  1. Brother (Vicente) will help to gather the all necessary document to submit to get the loan deferral  2. I will update CCC Team at next outreach.    Updated: 12/1/2020              Medical (pt-stated)   30%     Goal Statement: I will schedule follow up with PCP after I complete MRI scan within 1 month.  Date Goal set: 08/18/20    Barriers: language  Strengths: family support  Date to Achieve By: September  Patient expressed understanding of goal: yes  Action steps to achieve this goal:  1. Brother (Vicente) will wait to get a call from the clinic to reschedule MRI appt at [facility] on 11-10-20 and 10-9-20.  2. I will report to my Community Health Worker if any additional resources or support needed.    Updated: 12-1-20 AL    09/11/20  Pt notes that she would like to wait until after imaging to follow up with PCP        Antoine :  Jose ID# [id number]    Called and spoke to patient through [employer] :    and follow up on goals.  Patient reported:   -she does not have S.S.I benefit  -no bank account  -lives brother for 5 months  -still getting travel loan bill   Scheduled follow up with [facility] SW 12-7-20 at 10am.  She will ask brother Man to help gather the document.  Listed out the document brother needs to gather.    Patient gave verbal consent to speak to her brother Man about her case and schedule appt.  Informed brother of what document is needed to address travel loan.  He agreed to help gather the document.  Brother reported she receives cash benefit in EBT card $606 cash and $300 in food stamp  Pay rent $350   Does not have bank account or S. S. I benefit    Requested brother to support  patient with attending MRI appt at [facility] if they call to reschedule MRI appt    Brother agreed to help and clinic can call him at [phone]   Brother requested clinic to send him a letter with appt and the address of [facility]     CHW sent staff message to [facility]  to call patient's brother to reschedule MRI appt and send address of [facility].      [facility] 12-7-20 at 10am reassessment     CHW Next Follow-up: 1-7-21    CHW Plan: follow up on goals

## 2021-06-13 NOTE — TELEPHONE ENCOUNTER
ID: [id number]    Called and relayed the message below to the patient. No further concerns or questions at this time. Result letter sent to patient by mail per pt's request.

## 2021-06-13 NOTE — PROGRESS NOTES
Please call patient and inform:  The MRI of your liver is back to normal.  Please follow-up with heme/oncology provider at some point.  Even a telephone or virtual visit would be nice to touch base with them.

## 2021-06-14 NOTE — PROGRESS NOTES
1/8/2021   Clinic Care Coordination Contact    Community Health Worker Follow Up    Goals:   Goals Addressed                 This Visit's Progress       Patient Stated      Financial Wellbeing (pt-stated)   80%     Goal Statement- I want to defer my travel loan as soon as possible    Action steps to achieve this goal  1. Brother (Man) and Aida talk to [facility] MICHELLE on 1-18-21 at 1pm regarding the travel loan payment and support to write a a letter not able to pay back loan due to no income.  2. I will update [facility] Team at next outreach.    Updated: 1-8-21 AL               COMPLETED: Medical (pt-stated)   100%     Goal Statement: I completed the MRI scan and connected with PCP the result.  Date Goal set: 08/18/20    Barriers: language  Strengths: family support  Date to Achieve By: September  Patient expressed understanding of goal: yes  Personal Plan:   I will schedule upcoming health maintenance as recommended by my doctor.  [facility] [address] [place] [address]  [phone]          [facility] : Melquiades ID# [id number]   Called and spoke to patient and brother Man and follow up on goals.  Patient reported:  -attended MRI scan and connected with the doctor of MRI result. Has no questions or concerns.  -received a letter from the travel loan to pay little by little $1600.  Patient handed the phone to speak to her brother Man.  Spoke to patient's brother.  He reported she gets $600 a month for food EBT but no income.  She pays rent and foods. Not enough to make the payment.    Offered to consult with [facility] MICHELLE and support to notify travel loan that patient does not have any income not able to pay at this time.    Scheduled appt with [facility] MICHELLE 1-18-21 at 1pm for support.    CHW Next Follow-up: 2-18-21     CHW Plan: follow up on goal

## 2021-06-14 NOTE — TELEPHONE ENCOUNTER
Reason for Call:  Medication or medication refill:pt changed pharmacies to [facility] and he called [facility] and they told him all the meds needs refills    Do you use a [facility]?  Name of the pharmacy and phone number for the current request: [facility] pharmacy fax [phone]    Name of the medication requested: see above kurtis    Other request: I told him to have [facility] call here he said he asked them to and they declined?    Can we leave a detailed message on this number? Yes    Phone number patient can be reached at: Home number on file [phone] (home)    Best Time: anytime    Call taken on 1/29/2021 at 2:10 PM by Stephenie Reynolds

## 2021-06-14 NOTE — TELEPHONE ENCOUNTER
RN called in from home health care [facility].  Pt has cough, body ache, chills, fatigue.  No fever.  Has difficulty breathing when she cough.  Has sputum white color.  No cough up blood.  No history of lung disease.  Has breathing issue after cough.  Pt has dizziness.  The dizziness when she walk sometimes.  In the past she took meclizine and helped her.  The disposition is to be seen today.  virtual appointment is made for the Pt for today.  Care advice given per protocol.  Patient agrees with care advice given.   Agreed to call back if he has additional symptoms or questions.  Pt also request [facility] pharmacy to be her regular pharmacy form now on.      Bennie Yee RN, [facility] Connection Triage/Med Refill 1/29/2021 2:55 PM      Reason for Disposition    Continuous (nonstop) coughing interferes with work or school and no improvement using cough treatment per Care Advice    Additional Information    Negative: Bluish (or gray) lips or face    Negative: SEVERE difficulty breathing (e.g., struggling for each breath, speaks in single words)    Negative: Rapid onset of cough and has hives    Negative: Coughing started suddenly after medicine, an allergic food or bee sting    Negative: Difficulty breathing after exposure to flames, smoke, or fumes    Negative: Sounds like a life-threatening emergency to the triager    Negative: Previous asthma attacks and this feels like asthma attack    Negative: Chest pain present when not coughing    Negative: Difficulty breathing    Negative: Passed out (i.e., fainted, collapsed and was not responding)    Negative: Patient sounds very sick or weak to the triager    Negative: Coughed up > 1 tablespoon (15 ml) blood (Exception: blood-tinged sputum)    Negative: Fever > 103 F (39.4 C)    Negative: Fever > 101 F (38.3 C) and over 60 years of age    Negative: Fever > 100.0 F (37.8 C) and has diabetes mellitus or a weak immune system (e.g., HIV positive, cancer  chemotherapy, organ transplant, splenectomy, chronic steroids)    Negative: Fever > 100.0 F (37.8 C) and bedridden (e.g., nursing home patient, stroke, chronic illness, recovering from surgery)    Negative: Increasing ankle swelling    Negative: Wheezing is present    Negative: SEVERE coughing spells (e.g., whooping sound after coughing, vomiting after coughing)    Negative: Coughing up lakeshia-colored (reddish-brown) or blood-tinged sputum    Negative: Fever present > 3 days (72 hours)    Negative: Fever returns after gone for over 24 hours and symptoms worse or not improved    Negative: Using nasal washes and pain medicine > 24 hours and sinus pain persists    Negative: Known COPD or other severe lung disease (i.e., bronchiectasis, cystic fibrosis, lung surgery) and worsening symptoms (i.e., increased sputum purulence or amount, increased breathing difficulty)    Protocols used: COUGH-A-OH

## 2021-06-14 NOTE — PROGRESS NOTES
2/3/2021  Per [facility] SW patient transition to Maintenance 2-1-21  CHW to follow up in 2 months      CHW Follow up: 2 months Maintenance  CHW Plan: Discuss graduating from [facility] if there are no other goals or needs    CHW Next Follow Up: 4-1-21

## 2021-06-14 NOTE — PROGRESS NOTES
Indra Jason is a 56 y.o. female who is being evaluated via a billable telephone visit.      What phone number would you like to be contacted at? [phone]   How would you like to obtain your AVS? AVS Preference: Mail a copy.  Assessment & Plan     Cough  Wheezing  Anxiety and depression  Patient can be heard coughing, but can speak in fluently without distress.   Reports some wheezing, using inhaler for the last week.   Start prednisone.   Start azithromycin since ongoing for four weeks.   We reviewed reasons to be seen urgently.   She denies concern for covid 19.   Her PHQ 9 was reviewed. She says she would not hurt herself in any way, she just feels sick.   Advised to see PCP for follow up in the next two weeks regarding her chronic medical problems,  sooner if any worsening of current symptoms.        BMI:   Estimated body mass index is 26.66 kg/m  as calculated from the following:    Height as of 6/6/20: 5' (1.524 m).    Weight as of 10/20/20: 136 lb 8 oz (61.9 kg).     Depression Screening Follow Up    PHQ 1/29/2021   PHQ-9 Total Score 12   Q9: Thoughts of better off dead/self-harm past 2 weeks 1     PHQ-9 DEPRESSION SCALE 1/29/2021   Little interest or pleasure in doing things 1   Feeling down, depressed, or hopeless 2   Trouble falling or staying asleep, or sleeping too much 1   Feeling tired or having little energy 3   Poor appetite or overeating 0   Feeling bad about yourself - or that you are a failure or have let yourself or your family down 1   Trouble concentrating on things, such as reading the newspaper or watching television 3   Moving or speaking so slowly that other people could have noticed. Or the opposite - being so fidgety or restless that you have been moving around a lot more than usual 0   Thoughts that you would be better off dead, or of hurting yourself in some way 1   PHQ-9 Total Score 12   If you checked off any problems, how difficult have these problems made it for you to do your  work, take care of things at home, or get along with other people? Somewhat difficult   Some recent data might be hidden           Discussed the following ways the patient can remain in a safe environment:  be around others and call for follow up.     No follow-ups on file.    Shira Gonzalez MD  [facility]    Subjective     Indra Jason is 56 y.o.calls for cough.  Telephone visit completed due to the current pandemic.   She has had a cough for four weeks.   She has been using her inhaler a three times a day for the last two days.   She has some sputum, clear.   She denies fever. She denies trouble breathing. Is not concerned about covid 19 exposure.   She is tolerating diet. No chest pain. Has not tried any medication for this. Would like a cough medication.       Objective       Vitals:  No vitals were obtained today due to virtual visit.        Phone call duration: 8 minutes

## 2021-06-14 NOTE — PROGRESS NOTES
Clinic Care Coordination Contact    Follow Up Progress Note      Assessment: MICHELLE contacted Man with an  to follow up about the travel loan balance. He reported he received a statement in the mail. He paid $500 this month and will continue to do so until it is paid off. The balance as of 1/19/2021 $1,637    MICHELLE completed goal and moved patient to maintenance     Goals addressed this encounter:   Goals Addressed                 This Visit's Progress      COMPLETED: Financial Wellbeing (pt-stated)        My family member, Man, is going to pay off my travel loan.             Intervention/Education provided during outreach: See above     Outreach Frequency: 2 months    Plan:   Standard Outreach, moved to maintenance

## 2021-06-14 NOTE — PROGRESS NOTES
Clinic Care Coordination Contact    Follow Up Progress Note      Assessment: MICHELLE contacted Indra and Vicente with an . They are still not able to get the documents needs to submit for deferral. This has been a challenge to gather documents.    Man reported he wants to know the balance of the loan because he may be able to help her pay it off.     MICHELLE emailed the loan company to ask them to send balance info.     Goals addressed this encounter:   Goals Addressed                 This Visit's Progress      Financial Wellbeing (pt-stated)        Goal Statement- I want to defer my travel loan as soon as possible    Action steps to achieve this goal  1. MICHELLE will update Man on the balance of the loan once [employer] responds    Updated: 1/18/2021                   Intervention/Education provided during outreach: See above     Outreach Frequency: monthly    Plan:   MICHELLE will outreach once the loan company responds

## 2021-06-15 NOTE — PROGRESS NOTES
"[facility] EXAM NOTE      Chief Complaint   Patient presents with     Follow-up       Due to language barrier, a phone  was present during the history-taking and subsequent discussion (and the physical exam) with this patient.      ASSESSMENT & PLAN    Indra was seen today for follow-up.    Diagnoses and all orders for this visit:    Edema, unspecified type: Not present today.  We will just check labs to get an overall sense of issues that could be causing this intermittent edema.  Rule out with labs below.  Otherwise likely dependent edema.  I also question if it is true swelling versus neuropathy and burning pain.  \"Feeling swollen \".  Discussed if she feels like she is having a really swollen day maybe calling in and being seen so we can get a look at the swelling.  She states understanding.  -     HM1(CBC and Differential)  -     BNP(B-type Natriuretic Peptide)  -     Thyroid Stimulating Hormone (TSH)    Moderate episode of recurrent major depressive disorder (H): Greatly improved since changing her living situation moving in with her brother and nephew.  She feels happy where she is.  Continue Seroquel and Cymbalta.  -     QUEtiapine (SEROQUEL) 25 MG tablet; Take 1 tablet (25 mg total) by mouth at bedtime.  -     DULoxetine (CYMBALTA) 30 MG capsule; Take 1 capsule (30 mg total) by mouth daily.    PTSD (post-traumatic stress disorder): Stable as above continue Seroquel  -     QUEtiapine (SEROQUEL) 25 MG tablet; Take 1 tablet (25 mg total) by mouth at bedtime.    Anxiety and depression: Stable as above continue Seroquel  -     QUEtiapine (SEROQUEL) 25 MG tablet; Take 1 tablet (25 mg total) by mouth at bedtime.    Dizziness: We will continue meclizine as needed.  -     meclizine (ANTIVERT) 25 mg tablet; Take 1 tablet (25 mg total) by mouth 3 (three) times a day as needed for dizziness or nausea.    Tension headache: Stable/improved.  Take Tylenol as needed  -     acetaminophen (TYLENOL EXTRA " STRENGTH) 500 MG tablet; Take 1 tablet (500 mg total) by mouth every 4 (four) hours as needed for pain.    Foot cramps: stable/improved.  Take Tylenol as needed  -     acetaminophen (TYLENOL EXTRA STRENGTH) 500 MG tablet; Take 1 tablet (500 mg total) by mouth every 4 (four) hours as needed for pain.    Need for influenza vaccination  -     Influenza, Recombinant, Inj, Quadrivalent, PF, 18+YRS    Other drug-induced neutropenia (H)  Comments:  noted on recent labs with hematology. suspect due to prednisone burst. will recheck today    She was agreeable to flu shot today.  Discussed with her that she is due for a Pap smear and she is willing to come back in 4 weeks to do a physical.  That was scheduled today.      HISTORY    Indra Jason is a 56 y.o.  female who presents for the following issues:    Here for follow-up.  Saw my colleague last month for bronchitis.  Covid test was negative.  Treated with prednisone which was helpful.  She is back to normal now.  She also had follow-up with hematology for her elevated ferritin and likely hemochromatosis.  Lab work was relatively stable.  Ferritin back up to 180.  She has not been taking the Exjade.  a little bit anemic.  And her white count was elevated by suspect this was due to the prednisone  that she had just been on it.  We will repeat CBC today to make sure    Only concern today: Swelling in arms , hands and legs. sometimes worse sometimes goes away. Sometimes feels like swollen all over. If too swollen can not stand.  Is not having any swelling today.  Achy and burning pian.  She states at 1 point she was so swollen she almost went to the emergency room.  But took her Tylenol and vitamin B?  And then felt better.  Here with brother. Lives with brother and one son.     Nausea back in the fall resolved and overall she has been feeling a lot better. No CP or shortness of breath. No cough today.     MEDICATIONS    Current Outpatient Medications on File Prior to Visit    Medication Sig Dispense Refill     albuterol (PROAIR HFA;PROVENTIL HFA;VENTOLIN HFA) 90 mcg/actuation inhaler INHALE 2 PUFFS INTO THE LUNGS FOUR TIMES A DAY 18 g 2     cetirizine (ZYRTEC) 10 MG tablet Take 1 tablet (10 mg total) by mouth daily. 90 tablet 3     cholecalciferol, vitamin D3, (VITAMIN D3) 25 mcg (1,000 unit) capsule Take 1 capsule (1,000 Units total) by mouth daily. 90 capsule 3     cyanocobalamin 1000 MCG tablet Take 1 tablet (1,000 mcg total) by mouth daily. 90 tablet 3     cyanocobalamin 1000 MCG tablet        famotidine (PEPCID) 40 MG tablet Take 1 tablet (40 mg total) by mouth daily. 20 tablet 0     FLUoxetine (PROZAC) 40 MG capsule Take 1 capsule (40 mg total) by mouth daily. 90 capsule 1     [DISCONTINUED] acetaminophen (TYLENOL EXTRA STRENGTH) 500 MG tablet Take 1 tablet (500 mg total) by mouth every 4 (four) hours as needed for pain. 100 tablet 2     [DISCONTINUED] DULoxetine (CYMBALTA) 30 MG capsule Take 1 capsule (30 mg total) by mouth daily. 90 capsule 1     [DISCONTINUED] meclizine (ANTIVERT) 25 mg tablet Take 1 tablet (25 mg total) by mouth 3 (three) times a day as needed for dizziness or nausea. 30 tablet 1     [DISCONTINUED] QUEtiapine (SEROQUEL) 25 MG tablet Take 1 tablet (25 mg total) by mouth at bedtime. 90 tablet 1     capsaicin (ZOSTRIX) 0.025 % cream Apply to affected area as needed. 60 g 1     deferasirox (EXJADE) 500 MG disintegrating tablet Take 2 tablets (1,000 mg total) by mouth daily before breakfast. 60 tablet 11     fluticasone propionate (FLONASE ALLERGY RELIEF) 50 mcg/actuation nasal spray 1-2 sprays a day as needed. 16 g 5     lidocaine (LIDODERM) 5 % Place 1 patch on the skin daily as needed. Remove & Discard patch within 12 hours or as directed by MD 15 patch 0     ondansetron (ZOFRAN ODT) 4 MG disintegrating tablet Take 1 tablet (4 mg total) by mouth every 8 (eight) hours as needed for nausea. 30 tablet 0     predniSONE (DELTASONE) 20 MG tablet Take 3 tablets at  "once daily for five days. 15 tablet 0     [DISCONTINUED] acetaminophen-codeine (TYLENOL #3) 300-30 mg per tablet Take 1 tablet by mouth every 6 (six) hours as needed for pain (and cough). 10 tablet 0     No current facility-administered medications on file prior to visit.        Pertinent past medical, surgical, social and family history reviewed and updated in Epic.          REVIEW OF SYSTEMS    ROS: 10 pt review of systems performed and all negative except noted in HPI.     PHYSICAL EXAM    /76 (Patient Site: Right Arm, Patient Position: Sitting, Cuff Size: Adult Regular)   Pulse 71   Temp 97.4  F (36.3  C) (Temporal)   Resp 18   Ht 5' 1\" (1.549 m)   Wt 150 lb (68 kg)   LMP 12/01/2014 (Exact Date)   BMI 28.34 kg/m    GEN:  56 y.o. female sitting comfortably in no apparent distress.   HEENT: EOMI, no scleral icterus  Neck: Supple without lymphadenopathy or thyromegally   CHEST/LUNG: No respiratory distress, good air flow to bases, CTAB   CV: RRR, S1, S2 normal; no murmurs, rubs or gallops. No edema.   ABD:  Soft, non-tender, non distended, no guarding,  No masses  SKIN: warm, dry, no rashes or lesions  NEURO: Gait normal, coordination intact  PSYCH:  Mood and affect appropriate      At the end of the encounter, I discussed results, diagnosis, medications. Discussed red flags for immediate return to clinic/ER, as well as indications for follow up if no improvement. Patient and/or caregiver understood and agreed to plan. Patient was stable for discharge.    Heidi Hernandez"
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
## 2021-06-15 NOTE — TELEPHONE ENCOUNTER
.Reason for Call:  Other call back      Detailed comments: gal from home care is visiting patient today. She called from the \[facility]\"" home to get clarification on what exactly does dr colunga want to schedule for this patient,     Phone Number Patient can be reached at: Other phone number:  [phone]*    Best Time: any    Can we leave a detailed message on this number?: Yes    Call taken on 3/5/2021 at 1:36 PM by Stephenie Reynolds

## 2021-06-15 NOTE — PROGRESS NOTES
[facility] Hematology and Oncology Progress Note    Patient: Indra Jason  MRN: [id number]  Date of Service: 02/23/2021          Reason for Visit    Chief Complaint   Patient presents with     Benign Hematology     Elevated ferritin level       Assessment and Plan    1. Elevated Ferritin, possible hemochromatosis with iron overload in liver/heart: pt is taking exjade intermittently. She usually takes it for a month and then states she cannot get from specialty pharmacy due to varying reasons. The specialty pharmacy states they have not filled it since August. Her MRI in October and ferritin were both actually normal. At this time, she can continue observation. She will get labs every 6 months (Hm1, ferritin, liver function). We may have to start exjade again if ferritin starts increasing.       ECOG Performance   ECOG Performance Status: 2     Distress Assessment  Distress Assessment Score: No distress    Pain  Currently in Pain: No/denies        Problem List    1. Elevated ferritin level  HM1(CBC and Differential)    Ferritin    Comprehensive Metabolic Panel      ______________________________________________________________________________    History of Present Illness    Measurable disease: Ferritin, iron saturation     Current therapy: Observation since August 2020.     Exjade 5002-3919 mg p.o. daily since June, 2019. Seems to only take intermittnetly    Interim History: pt is due today for a follow up visit. She is doing ok. Having some swelling in legs. Mild chronic fatigue. Mild shortness of breath, which is not new.     Pain Status  Currently in Pain: No/denies    Review of Systems    Oncology Nurse Assessment/CMA Intake: Constitutional  Constitutional (WDL): Exceptions to WDL  Fatigue: Concerns  Weight Gain: Concerns(up 13 pounds since 10/20/2020)  Hot flashes/Night Sweats: Concerns(night sweats)  Neurosensory  Neurosensory (WDL): Exceptions to WDL  Ataxia: Concerns(using cane, feels weak)  Eye   Eye  Disorder (WDL): Exceptions to WDL  Blurred Vision: Concerns  Ear  Ear Disorder (WDL): All ear disorder elements are within defined limits  Cardiovascular  Cardiovascular (WDL): All cardiovascular elements are within defined limits  Pulmonary  Respiratory (WDL): Exceptions to WDL  Dyspnea: Concerns(constant)  Gastrointestinal  Gastrointestinal (WDL): Exceptions to WDL  Anorexia: Concerns  Genitourinary  Genitourinary (WDL): All genitourinary elements are within defined limits  Lymphatic  Lymph (WDL): Assessment not pertinent to visit  Musculoskeletal and Connective Tissue  Musculoskeletal and Connetive Tissue Disorders (WDL): Exceptions to WDL  Arthralgia: Concerns(back)  Muscle Weakness : Concerns  Integumentary  Integumentary (WDL): All integumentary elements are within defined limits  Patient Coping  Patient Coping: Accepting;Open/discussion  Accompanied by  Accompanied by: Family Member  Oral Chemo Adherence         Past History  Past Medical History:   Diagnosis Date     Abdominal pain     Created by Conversion   Overview:  Normal colonoscopy 05/02/2013     Allergic rhinitis 10/7/2016     Anxiety      Cervical Polyps     Created by Conversion   Overview:  Overview:  Created by Conversion     Chronic lower back pain 2/23/2016     Depression      Eosinophilia 2/6/2012     Epigastric pain 2/9/2019     H. pylori infection      Nonspecific abnormal finding of lung field 2/27/2019    Overview:  Refer to clinic note Dr Jones 2/27/2013     Pulmonary tuberculosis 5/19/2016    Overview:  History of pulmonary tuberculosis treated in 1994.  AFB smears/culture September 13-15, 2011 negative.     RBC microcytosis 3/21/2019     Screen for colon cancer 5/2/2013    Overview:  Normal colonoscopy 05/02/2013     Suicidal ideation        PHYSICAL EXAM  /74   Pulse 78   Ht 5' (1.524 m)   Wt 149 lb 3.2 oz (67.7 kg)   LMP 12/01/2014 (Exact Date)   SpO2 97%   BMI 29.14 kg/m      GENERAL: no acute distress. Cooperative in  conversation. Visitor with pt. Masks on  RESP: Regular respiratory rate. No expiratory wheezes   MUSCULOSKELETAL:  bilateral leg swelling  NEURO: non focal. Alert and oriented x3.   PSYCH: within normal limits. No depression or anxiety.  SKIN: exposed skin is dry intact.       Lab Results    Lab Standing Order on 02/23/2021   Component Date Value Ref Range Status     Sodium 02/23/2021 145  136 - 145 mmol/L Final     Potassium 02/23/2021 3.7  3.5 - 5.0 mmol/L Final     Chloride 02/23/2021 107  98 - 107 mmol/L Final     CO2 02/23/2021 31  22 - 31 mmol/L Final     Anion Gap, Calculation 02/23/2021 7  5 - 18 mmol/L Final     Glucose 02/23/2021 135* 70 - 125 mg/dL Final     BUN 02/23/2021 22  8 - 22 mg/dL Final     Creatinine 02/23/2021 0.78  0.60 - 1.10 mg/dL Final     GFR MDRD Af Amer 02/23/2021 >60  >60 mL/min/1.73m2 Final     GFR MDRD Non Af Amer 02/23/2021 >60  >60 mL/min/1.73m2 Final     Bilirubin, Total 02/23/2021 0.4  0.0 - 1.0 mg/dL Final     Calcium 02/23/2021 9.1  8.5 - 10.5 mg/dL Final     Protein, Total 02/23/2021 8.1* 6.0 - 8.0 g/dL Final     Albumin 02/23/2021 4.0  3.5 - 5.0 g/dL Final     Alkaline Phosphatase 02/23/2021 84  45 - 120 U/L Final     AST 02/23/2021 18  0 - 40 U/L Final     ALT 02/23/2021 34  0 - 45 U/L Final     WBC 02/23/2021 18.7* 4.0 - 11.0 thou/uL Final     RBC 02/23/2021 4.76  3.80 - 5.40 mill/uL Final     Hemoglobin 02/23/2021 11.1* 12.0 - 16.0 g/dL Final     Hematocrit 02/23/2021 37.7  35.0 - 47.0 % Final     MCV 02/23/2021 79* 80 - 100 fL Final     MCH 02/23/2021 23.3* 27.0 - 34.0 pg Final     MCHC 02/23/2021 29.4* 32.0 - 36.0 g/dL Final     RDW 02/23/2021 15.5* 11.0 - 14.5 % Final     Platelets 02/23/2021 188  140 - 440 thou/uL Final     Neutrophils % 02/23/2021 83* 50 - 70 % Final     Lymphocytes % 02/23/2021 13* 20 - 40 % Final     Monocytes % 02/23/2021 4  2 - 10 % Final     Eosinophils % 02/23/2021 0  0 - 6 % Final     Basophils % 02/23/2021 0  0 - 2 % Final     Immature  Granulocyte % 02/23/2021 1* <=0 % Final     Neutrophils Absolute 02/23/2021 15.5* 2.0 - 7.7 thou/uL Final     Lymphocytes Absolute 02/23/2021 2.3  0.8 - 4.4 thou/uL Final     Monocytes Absolute 02/23/2021 0.7  0.0 - 0.9 thou/uL Final     Eosinophils Absolute 02/23/2021 0.0  0.0 - 0.4 thou/uL Final     Basophils Absolute 02/23/2021 0.0  0.0 - 0.2 thou/uL Final     Immature Granulocyte Absolute 02/23/2021 0.1* <=0.0 thou/uL Final   ]  Lab Results   Component Value Date    FERRITIN 130 10/20/2020    FERRITIN 130 09/18/2020    FERRITIN 487 (H) 06/05/2020    FERRITIN 507 (H) 03/19/2020    FERRITIN 538 (H) 01/30/2020    FERRITIN 625 (H) 11/29/2019    FERRITIN 579 (H) 10/11/2019    FERRITIN 549 (H) 09/13/2019    FERRITIN 791 (H) 08/09/2019    FERRITIN 957 (H) 06/03/2019   ]    Imaging    No results found.   Study Result    EXAM: MR LIVER W WO CONTRAST  LOCATION: [facility]  DATE/TIME: 12/17/2020 12:34 PM     INDICATION: hemochromatosis, elevated ferritin, iron overload syndrome  COMPARISON: MRI dated 10/04/2019 and 09/18/2019  TECHNIQUE: Routine MRI liver protocol including T1 in/out phase, diffusion, multiplane T2, and dynamic T1 with IV contrast.    CONTRAST: Gadavist 6     FINDINGS:      LIVER: T2* relaxometry yields a liver iron concentration of 1.3 mg/g (less than 2 mg/g is considered normal). T2* value is 23.8 ms and R2* is 42 Hz. When using similar technique on the comparison study from October 2019, LIC was 2.6 mg/g and T2* was 11.7   ms. No focal liver lesions.     ADDITIONAL FINDINGS: No lymphadenopathy. No bile duct dilatation. Normal spleen, pancreas, and adrenal glands.      IMPRESSION:   1.  Normal liver iron concentration (1.3 mg/g compared with 2.6 mg/g on the comparison study from October 2019).          present and was involved in entire conversation on telephone      Signed by: Delaney Treviño, MAUREEN

## 2021-06-15 NOTE — TELEPHONE ENCOUNTER
----- Message from Shira Gonzalez MD sent at 2/24/2021 12:35 PM CST -----  Please tell patient her covid 19 test is normal.

## 2021-06-16 PROBLEM — R03.0 ELEVATED BLOOD PRESSURE READING WITHOUT DIAGNOSIS OF HYPERTENSION: Status: ACTIVE | Noted: 2021-05-13

## 2021-06-16 PROBLEM — F43.10 PTSD (POST-TRAUMATIC STRESS DISORDER): Status: ACTIVE | Noted: 2019-01-11

## 2021-06-16 PROBLEM — E83.19 IRON OVERLOAD SYNDROME: Status: ACTIVE | Noted: 2019-12-19

## 2021-06-16 PROBLEM — M79.89 LEG SWELLING: Status: ACTIVE | Noted: 2019-06-10

## 2021-06-16 PROBLEM — R42 DIZZINESS: Status: ACTIVE | Noted: 2019-02-09

## 2021-06-16 PROBLEM — R79.89 ELEVATED FERRITIN LEVEL: Status: ACTIVE | Noted: 2019-01-14

## 2021-06-16 PROBLEM — F33.1 MODERATE EPISODE OF RECURRENT MAJOR DEPRESSIVE DISORDER (H): Status: ACTIVE | Noted: 2019-01-11

## 2021-06-16 NOTE — TELEPHONE ENCOUNTER
Telephone Encounter by Magan Carter at 6/28/2019  5:02 PM     Author: Magan Carter Service: -- Author Type: --    Filed: 6/28/2019  5:11 PM Encounter Date: 6/26/2019 Status: Signed    : Magan Carter       PRIOR AUTHORIZATION DENIED    Denial Rational: The patient's plan only covers the medication when they have these approved conditions: Pain relief of the mouth and throat, pain relief before intubation, short term pain relief from minor burns, sunburns, cuts of the skin or insect bites. The patient also needs to have tried/failed the OTC Lidocaine.          Appeal Information: If the provider would like to appeal this denial, please provide a letter of medical necessity and once it has been completed and placed in the patient's chart, notify the [facility] Team ([facility] [id number]) and the appeal can be initiated on behalf of the patient and provider.  Please also include any therapies that the patient has tried and their outcomes.

## 2021-06-16 NOTE — TELEPHONE ENCOUNTER
Telephone Encounter by Marleny Cameorn RN at 11/5/2019  9:28 AM     Author: Marleny Cameron RN Service: -- Author Type: Registered Nurse    Filed: 11/5/2019  9:33 AM Encounter Date: 11/5/2019 Status: Signed    : Marleny Cameron RN (Registered Nurse)       Refill request sent to Med refill pool in a signed encounter, unable to pend medication for provider. Created new encounter to route.     Marleny Cameron RN Care Connection Triage      Karena Ashton LICSW           11/5/19 9:24 AM   Note      Patient requesting medication refill:      deferasirox (EXJADE) 250 MG disintegrating tablet 0 6/10/2019     Sig: Dispense 5 tabs (1,250mg) in an appropriate amount of water, orange or apply juice and take po daily before breakfast   Class: Phone In      Per SN[facility] visit on 11/4/19,  From: Luz Maria Lepe LPN On: 11/04/2019 05:59 PM   To: Mariah Patel RN   Priority: Routine   Routing Comments:   deferasirox (exjade) 250mg 5 tabs (1,250mg) qd - am  - NO SUPPLY IN HOME per pharmacy pt needs to be seen for more refills, pt and daughter aware of this and will make an appointment soon. Have not as of 11/4/19 CASSIDY     Thank you - Luz Maria             Patient has been encouraged to schedule follow up with PCP and plans to make appointment while in the clinic today.                     11/5/19 9:21 AM      Indra Jason contacted Karena Ashton LIC

## 2021-06-16 NOTE — PROGRESS NOTES
Clinic Care Coordination Contact    Situation: Patient chart reviewed by care coordinator.    Background: SW completed chart review    Assessment: Patient remains in maintenance. Patient attended appointments with care team. Homecare RN appears to be supporting with getting patient scheduled for a physical     Plan/Recommendations: Standard Outreach

## 2021-06-16 NOTE — TELEPHONE ENCOUNTER
Wants to get orders to discharge and also want to get some sub this ? Because she is not using it and not getting better . Will you like you to try something else . She is have more swelling in legs also .

## 2021-06-16 NOTE — TELEPHONE ENCOUNTER
I don't think anything else would be covered by insurance. Okay to discontinue.   Recommend icy hot over the counter or Voltaren 1% OTC.

## 2021-06-16 NOTE — TELEPHONE ENCOUNTER
RN cannot approve Refill Request    RN can NOT refill this medication med is not covered by policy/route to provider. Last office visit: 2/16/2021 Shira Gonzalez MD Last Physical: Visit date not found Last MTM visit: Visit date not found Last visit same specialty: 3/3/2021 Heidi Hernandez DO.  Next visit within 3 mo: Visit date not found  Next physical within 3 mo: Visit date not found      Mary Andrew, Care Connection Triage/Med Refill 4/21/2021    Requested Prescriptions   Pending Prescriptions Disp Refills     predniSONE (DELTASONE) 20 MG tablet [Pharmacy Med Name: PREDNISONE 20 MG TABS 20 Tablet] 15 tablet 0     Sig: TAKE 3 TABLETS AT ONCE DAILY FOR FIVE DAYS.       There is no refill protocol information for this order

## 2021-06-16 NOTE — TELEPHONE ENCOUNTER
Reason for Call:  Home Health Care    [facility] regarding (reason for call):     Orders are needed for this patient.  Pt is stopping the rx capsacin    PT: n/a    OT: n/a    Skilled Nursing home care nurse calling to let us know the capacin rx sangeeta causin pain on the patients skin can we try another rx?    Pt Provider: dr colunga    Phone Number Homecare Nurse can be reached at: [phone]    Can we leave a detailed message on this number? Yes     Phone number patient can be reached at: Other phone number:  [phone]*    Best Time: any    Call taken on 3/26/2021 at 3:35 PM by Stephenie Reynolds

## 2021-06-16 NOTE — PROGRESS NOTES
4/8/2021  Maintenance 2 months Follow up  Clinic Care Coordination Contact  [facility]/Voicemail       Clinical Data: Care Coordinator Outreach  Outreach attempted x 2.  No answer No VM.  Unable to leave message on patient's voicemail with call back information and requested return call.  Plan: Care Coordinator will send unable to contact letter with care coordinator contact information via mail.     Care Coordinator will try to reach patient again in 1 month.      CHW Follow up: Monthly    CHW Plan: Follow up on goals    CHW Next Follow Up: 5-13-21

## 2021-06-16 NOTE — TELEPHONE ENCOUNTER
Pt informed of message through language line . She already has a a scheduled phone appointment with Dr. Hernandez on 4/26 which pt stated she will wait until then to talk with Dr. Hernandez.

## 2021-06-16 NOTE — TELEPHONE ENCOUNTER
Attempted to reach clinic regarding second level triage did not reach clinic after holding several minutes.    Placed follow up call to patient with Lao .    Advised patient to be seen in the Emergency Room now due to shortness of breath.    Caller verbalized understanding agrees to be seen in the Emergency Room.      Ashanti Wyatt RN  [facility] Nurse Advisors      COVID 19 Nurse Triage Plan/Patient Instructions    Please be aware that novel coronavirus (COVID-19) may be circulating in the community. If you develop symptoms such as fever, cough, or SOB or if you have concerns about the presence of another infection including coronavirus (COVID-19), please contact your health care provider or visit  [email or web address].    Disposition/Instructions    ED Visit recommended. Follow protocol based instructions.      Bring Your Own Device:  Please also bring your smart device(s) (smart phones, tablets, laptops) and their charging cables for your personal use and to communicate with your care team during your visit.      Thank you for taking steps to prevent the spread of this virus.  o Limit your contact with others.  o Wear a simple mask to cover your cough.  o Wash your hands well and often.    Resources    [facility]: About COVID-19: [email or web address]    CDC: What to Do If You're Sick: www.cdc.gov/coronavirus/2019-ncov/about/steps-when-sick.html    CDC: Ending Home Isolation: www.cdc.gov/coronavirus/2019-ncov/hcp/disposition-in-home-patients.html     CDC: Caring for Someone: www.cdc.gov/coronavirus/2019-ncov/if-you-are-sick/care-for-someone.html     [facility]: Interim Guidance for Hospital Discharge to Home: www.health.[facility].mn.us/diseases/coronavirus/hcp/hospdischarge.pdf    [facility] clinical trials (COVID-19 research studies): [email or web address].[facility]/umn-clinical-trials     Below are the COVID-19 hotlines at the [place] [facility] ([facility]).  Interpreters are available.   o For health questions: Call 814-280-3427 or 1-830.900.1755 (7 a.m. to 7 p.m.)  o For questions about schools and childcare: Call 760-970-1751 or 1-603.141.2099 (7 a.m. to 7 p.m.)

## 2021-06-16 NOTE — PROGRESS NOTES
4/1/2021  Maintenance 2 months follow up call.  Clinic Care Coordination Contact  [facility]/Voicemail  Antoine : Nicolette Randolph ID# [id number]     Clinical Data: Care Coordinator Outreach  Outreach attempted x 1. Antoine  left message on patient's voicemail with call back information and requested return call.  Plan: Care Coordinator  will try to reach patient again in 10 business days.      CHW Follow up: 4-8-21

## 2021-06-16 NOTE — TELEPHONE ENCOUNTER
Please call patient ph. [phone].    Second Level Triage/Emergency Room or PCP Triage.     Patient reporting headache and dry cough starting 2 weeks ago.    Patient reports she is using her Albuterol inhaler every 4 hours with improvement.     Reporting mild shortness of breath at rest, increases with exertion.    Intermittent chest pain with cough.     Afebrile.     Denies COVID 19 exposure.     Disposition ED/or PCP Triage.    Spoke with Central Scheduling/no Virtual Visits available until 5 pm. Message routed to provider.    Ashanti Wyatt RN  [facility] Nurse Advisors  COVID 19 Nurse Triage Plan/Patient Instructions    Please be aware that novel coronavirus (COVID-19) may be circulating in the community. If you develop symptoms such as fever, cough, or SOB or if you have concerns about the presence of another infection including coronavirus (COVID-19), please contact your health care provider or visit  [email or web address].    Disposition/Instructions    Virtual Visit with provider recommended. Reference Visit Selection Guide.    Thank you for taking steps to prevent the spread of this virus.  o Limit your contact with others.  o Wear a simple mask to cover your cough.  o Wash your hands well and often.    Resources    [facility]: About COVID-19: [email or web address]    CDC: What to Do If You're Sick: www.cdc.gov/coronavirus/2019-ncov/about/steps-when-sick.html    CDC: Ending Home Isolation: www.cdc.gov/coronavirus/2019-ncov/hcp/disposition-in-home-patients.html     CDC: Caring for Someone: www.cdc.gov/coronavirus/2019-ncov/if-you-are-sick/care-for-someone.html     [facility]: Interim Guidance for Hospital Discharge to Home: www.health.[facility].mn.us/diseases/coronavirus/hcp/hospdischarge.pdf    [facility] clinical trials (COVID-19 research studies): clinicalaffairs.[facility]/umn-clinical-trials     Below are the COVID-19 hotlines at the [facility]  ([facility]). Interpreters are available.   o For health questions: Call 121-521-8119 or 1-622.677.9687 (7 a.m. to 7 p.m.)  o For questions about schools and childcare: Call 183-600-5477 or 1-294.419.8815 (7 a.m. to 7 p.m.)     Reason for Disposition    MILD difficulty breathing (e.g., minimal/no SOB at rest, SOB with walking, pulse <100)    Additional Information    Negative: SEVERE difficulty breathing (e.g., struggling for each breath, speaks in single words)    Negative: Difficult to awaken or acting confused (e.g., disoriented, slurred speech)    Negative: Bluish (or gray) lips or face now    Negative: Shock suspected (e.g., cold/pale/clammy skin, too weak to stand, low BP, rapid pulse)    Negative: Sounds like a life-threatening emergency to the triager    Negative: SEVERE or constant chest pain or pressure (Exception: mild central chest pain, present only when coughing)    Negative: MODERATE difficulty breathing (e.g., speaks in phrases, SOB even at rest, pulse 100-120)    Negative: [1] Headache AND [2] stiff neck (can't touch chin to chest)    Protocols used: CORONAVIRUS (COVID-19) DIAGNOSED OR [id number] 1.3.21

## 2021-06-16 NOTE — TELEPHONE ENCOUNTER
Patient has a future Telephone Visit appointment on 4/26/21 with Dr. Hernandez. Completing task.

## 2021-06-16 NOTE — TELEPHONE ENCOUNTER
Please call and inquire if this was just an FYI or if she needs an order to discontinue.  And if so okay to discontinue.

## 2021-06-17 NOTE — PROGRESS NOTES
Indra Jason is a 56 y.o. female who is being evaluated via a billable telephone visit.      What phone number would you like to be contacted at? [phone]   How would you like to obtain your AVS? AVS Preference: Mail a copy.    Assessment & Plan     Indra was seen today for cough.    Diagnoses and all orders for this visit:    Acute bronchitis with symptoms > 10 days: Patient is a poor historian.  She keeps repeating there were 2 pills she was given and these made her feel better she said she went to the ED but then later clarified that this was 2 months ago.  She is requesting these medications.  It is possible she has COPD.  No formal work-up.  She was first found to be diagnosed with bronchospasm about 2 years ago and given albuterol inhaler.  She just reporting dry cough at this time.  Decided to treat her with a prednisone burst 60 mg for 5 days as this is what she had previously gotten from my partner and Zyrtec to see if there is an allergy component to this.  She is to take her albuterol inhaler q. 4 to 6 hours for the next few days.  She is to follow-up if no improvement.  She denies any fever.  However if she does have COPD it is possibly she needs antibiotics as well for exacerbation.  -     predniSONE (DELTASONE) 20 MG tablet; Take 3 tablets at once daily for five days.    Seasonal allergic rhinitis due to pollen: Possibly uncontrolled causing dry cough.  Refill Zyrtec and Flonase discussed daily use for now.  -     cetirizine (ZYRTEC) 10 MG tablet; Take 1 tablet (10 mg total) by mouth daily.  -     fluticasone propionate (FLONASE ALLERGY RELIEF) 50 mcg/actuation nasal spray; 1-2 sprays a day as needed.    Moderate episode of recurrent major depressive disorder (H): Stable.  Continue Seroquel and Cymbalta  -     QUEtiapine (SEROQUEL) 25 MG tablet; Take 1 tablet (25 mg total) by mouth at bedtime.  -     DULoxetine (CYMBALTA) 30 MG capsule; Take 1 capsule (30 mg total) by mouth daily.    PTSD  (post-traumatic stress disorder): Stable continue follow-up with psychiatry  -     QUEtiapine (SEROQUEL) 25 MG tablet; Take 1 tablet (25 mg total) by mouth at bedtime.  -     FLUoxetine (PROZAC) 40 MG capsule; Take 1 capsule (40 mg total) by mouth daily.    Anxiety and depression: Stable continue follow-up psychiatry  -     QUEtiapine (SEROQUEL) 25 MG tablet; Take 1 tablet (25 mg total) by mouth at bedtime.  -     FLUoxetine (PROZAC) 40 MG capsule; Take 1 capsule (40 mg total) by mouth daily.    Bronchospasm  -     albuterol (PROAIR HFA;PROVENTIL HFA;VENTOLIN HFA) 90 mcg/actuation inhaler; INHALE 2 PUFFS INTO THE LUNGS FOUR TIMES A DAY    Chronic pain syndrome  -     cholecalciferol, vitamin D3, (VITAMIN D3) 25 mcg (1,000 unit) capsule; Take 1 capsule (1,000 Units total) by mouth daily.    Dizziness  -     meclizine (ANTIVERT) 25 mg tablet; Take 1 tablet (25 mg total) by mouth 3 (three) times a day as needed for dizziness or nausea.    Elevated ferritin level  -     deferasirox (EXJADE) 500 MG disintegrating tablet; Take 2 tablets (1,000 mg total) by mouth daily before breakfast.    Iron overload syndrome  -     deferasirox (EXJADE) 500 MG disintegrating tablet; Take 2 tablets (1,000 mg total) by mouth daily before breakfast.    Epigastric pain  -     famotidine (PEPCID) 40 MG tablet; Take 1 tablet (40 mg total) by mouth daily.    Left foot pain  -     capsaicin (ZOSTRIX) 0.025 % cream; Apply to affected area as needed.    Foot cramps  -     capsaicin (ZOSTRIX) 0.025 % cream; Apply to affected area as needed.  -     acetaminophen (TYLENOL EXTRA STRENGTH) 500 MG tablet; Take 1 tablet (500 mg total) by mouth every 4 (four) hours as needed for pain.    Tension headache  -     acetaminophen (TYLENOL EXTRA STRENGTH) 500 MG tablet; Take 1 tablet (500 mg total) by mouth every 4 (four) hours as needed for pain.    Nausea  -     ondansetron (ZOFRAN ODT) 4 MG disintegrating tablet; Take 1 tablet (4 mg total) by mouth every 8  "(eight) hours as needed for nausea.    Chronic issues stable refills provided as above           BMI:   Estimated body mass index is 28.34 kg/m  as calculated from the following:    Height as of 3/3/21: 5' 1\" (1.549 m).    Weight as of 3/3/21: 150 lb (68 kg).     No follow-ups on file.    DO NA [facility]    Subjective   Indra Jason is 56 y.o. and presents today for the following health issues   HPI   States seen in ER and given two meds that helped her cough. Feeling much better but still with cough  Went to ?  Never mind did not go to ED- this was from 2 months ago  Only had cough so that's why I did not go to ER last week  Dry cough  phelgm in white color  No exposure to covid  I don't think I have covid.   Has sense of smell and my taste.     She is requesting all of her medication she refills today she does not know what she is out of.    Review of Symptoms  - Fever  - Chills   - Headache  - Sore throat  - Loss of sense of smell/taste  - Nasal congestion/runny nose  + Cough   - Breathing problems  - Nausea/vomiting   - Diarrhea  - Abdominal pain  - Muscle pain  - Fatigue        Objective       Vitals:  No vitals were obtained today due to virtual visit.    Physical Exam  Gen: alert and oriented although poor historian, NAD  Resp: no audible cough during the visit.       Phone call duration: 21 minutes    "

## 2021-06-17 NOTE — PROGRESS NOTES
5/14/2021  Clinic Care Coordination Contact    Community Health Worker Follow Up  Maintenance 2 months follow up call.  Antoine : Arsen ID#[id number]    Called and spoke to patient about if she has any other goals or needs before graduating from [facility].    Patient handed the phone to talk to her brother Man   Brother reported he is the care taker and that nurse comes out once a week for medications  He would like to request for adult diapers and a can not sure what the medical equipment is call.  Suggested to discuss with the doctor about the adult diapers and other medical supply needs at home.  Offered to schedule appt to discuss with PCP for medical supply needs.    Conference call to  and patient scheduled with PCP on 6-9-21  CHW sent telephone message to PCP care team regarding order for adult diapers    Discussed with patient if she has an other goals or needs beside getting the adult diapers.  Patient stated she does not have any other needs.    Patient okay graduate from [facility].  Patient can re enroll in [facility] in the future if needed  Brother has CHW direct number if he needs support.      Patient has completed all goals with Clinic Care Coordination.    Routed to [facility] SW to  review the chart and confirm if graduation is approved.

## 2021-06-17 NOTE — TELEPHONE ENCOUNTER
Reason for Call:  Medication or medication refill:    Do you use a [facility]?  Name of the pharmacy and phone number for the current request[phone]    Name of the medication requested: magic mouth wash    Other request: script sent 05/13 they need the ratio for this please you can new script if you want     Can we leave a detailed message on this number? Yes    Phone number patient can be reached at: Home number on file [phone] (home)    Best Time: anytime  Call taken on 5/17/2021 at 4:49 PM by Melanie Antonio

## 2021-06-17 NOTE — PROGRESS NOTES
Subjective:    Indra Jason is a 56 y.o. female who presents with chief complaint of sore in mouth.  She has had a sore in her mouth for 4 to 5 days.  Still very bothersome.  Sometimes cannot eat due to pain.  She thinks it is possible she has had fevers at night.  Her skin feels a bit burning and achy, knee pain.  She talked about having a cough.  I reviewed that she had a virtual visit on 4/26/2021 diagnosed with bronchitis, prednisone burst was prescribed.  She says that prednisone helped her cough a lot.    Elevated blood pressure today.  BP Readings from Last 3 Encounters:   05/13/21 (!) 151/97   03/03/21 121/76   02/23/21 127/74     Has first Covid vaccine appointment scheduled for 5/25/2021.    Patient Active Problem List   Diagnosis     Fatigue     Vitamin D deficiency     Anemia     Abdominal pain     Tension headache     Chronic pain     PTSD (post-traumatic stress disorder)     Moderate episode of recurrent major depressive disorder (H)     Elevated ferritin level     Dizziness     Screen for colon cancer     Leg swelling     Iron overload syndrome     Anxiety and depression       Current Outpatient Medications:      acetaminophen (TYLENOL EXTRA STRENGTH) 500 MG tablet, Take 1 tablet (500 mg total) by mouth every 4 (four) hours as needed for pain., Disp: 100 tablet, Rfl: 2     albuterol (PROAIR HFA;PROVENTIL HFA;VENTOLIN HFA) 90 mcg/actuation inhaler, INHALE 2 PUFFS INTO THE LUNGS FOUR TIMES A DAY, Disp: 18 g, Rfl: 2     capsaicin (ZOSTRIX) 0.025 % cream, Apply to affected area as needed., Disp: 60 g, Rfl: 1     cetirizine (ZYRTEC) 10 MG tablet, Take 1 tablet (10 mg total) by mouth daily., Disp: 90 tablet, Rfl: 3     cholecalciferol, vitamin D3, (VITAMIN D3) 25 mcg (1,000 unit) capsule, Take 1 capsule (1,000 Units total) by mouth daily., Disp: 90 capsule, Rfl: 3     cyanocobalamin 1000 MCG tablet, Take 1 tablet (1,000 mcg total) by mouth daily., Disp: 90 tablet, Rfl: 3     deferasirox (EXJADE) 500 MG  disintegrating tablet, Take 2 tablets (1,000 mg total) by mouth daily before breakfast., Disp: 60 tablet, Rfl: 11     DULoxetine (CYMBALTA) 30 MG capsule, Take 1 capsule (30 mg total) by mouth daily., Disp: 90 capsule, Rfl: 1     famotidine (PEPCID) 40 MG tablet, Take 1 tablet (40 mg total) by mouth daily., Disp: 90 tablet, Rfl: 1     FLUoxetine (PROZAC) 40 MG capsule, Take 1 capsule (40 mg total) by mouth daily., Disp: 90 capsule, Rfl: 1     fluticasone propionate (FLONASE ALLERGY RELIEF) 50 mcg/actuation nasal spray, 1-2 sprays a day as needed., Disp: 16 g, Rfl: 5     lidocaine (LIDODERM) 5 %, Place 1 patch on the skin daily as needed. Remove & Discard patch within 12 hours or as directed by MD, Disp: 15 patch, Rfl: 0     meclizine (ANTIVERT) 25 mg tablet, Take 1 tablet (25 mg total) by mouth 3 (three) times a day as needed for dizziness or nausea., Disp: 30 tablet, Rfl: 1     ondansetron (ZOFRAN ODT) 4 MG disintegrating tablet, Take 1 tablet (4 mg total) by mouth every 8 (eight) hours as needed for nausea., Disp: 30 tablet, Rfl: 0     predniSONE (DELTASONE) 20 MG tablet, Take 3 tablets at once daily for five days., Disp: 15 tablet, Rfl: 0     QUEtiapine (SEROQUEL) 25 MG tablet, Take 1 tablet (25 mg total) by mouth at bedtime., Disp: 90 tablet, Rfl: 1     alum/mag hydrox-simethicone-diphenhydramine-lidocaine (MAGIC MOUTHWASH) suspension, Take 5 mL by mouth 3-4 times a day, as needed.  Swish around in mouth and then spit out or swallow., Disp: 120 mL, Rfl: 0     Objective:   Allergies:  Patient has no known allergies.    Vitals:  Vitals:    05/13/21 1508   BP: (!) 151/97   Patient Site: Left Arm   Patient Position: Sitting   Cuff Size: Adult Regular   Pulse: 85   Resp: 16   Temp: (!) 96.4  F (35.8  C)   TempSrc: Temporal   SpO2: 99%   Weight: 157 lb (71.2 kg)         Body mass index is 29.66 kg/m .    Vital signs reviewed.  General: Patient is alert and oriented x 3, in no apparent distress  Ears: TMs are  non-erythematous with good light reflex bilaterally  Throat: no erythema, edema or exudate noted, small 3 mm aphthous ulcer present on lower anterior gumline, no other lesions noted  Lymphatic: no anterior cervical lymph node enlargement  Cardiac: regular rate and rhythm, no murmurs  Pulmonary: lungs clear to auscultation bilaterally, no crackles, rales, rhonchi, or wheezing noted        Assessment and Plan:   1. Aphthous ulcer of mouth  Suspect viral.  I discussed this should resolve over the next week.  Use Magic mouthwash for symptomatic relief.  Discussed foods to avoid.  Follow-up in 2 weeks if not better, sooner if worsening.  - alum/mag hydrox-simethicone-diphenhydramine-lidocaine (MAGIC MOUTHWASH) suspension; Take 5 mL by mouth 3-4 times a day, as needed.  Swish around in mouth and then spit out or swallow.  Dispense: 120 mL; Refill: 0    2.  Elevated blood pressure reading without diagnosis of hypertension.  BP Readings from Last 3 Encounters:   05/13/21 (!) 151/97   03/03/21 121/76   02/23/21 127/74     Neglected to get this rechecked before she left.  No acute concerns today.  She will be back for an office visit in 2 weeks, re-check then.    This dictation uses voice recognition software, which may contain typographical errors.

## 2021-06-17 NOTE — PROGRESS NOTES
Clinic Care Coordination Contact    Situation: Patient chart reviewed by care coordinator.    Background: SW completed chart review    Assessment: Patient remains in maintenance    Plan/Recommendations: Standard Outreach

## 2021-06-17 NOTE — PROGRESS NOTES
5/14/2021    Clinic Care Coordination Contact  Patient has completed all goals with Clinic Care Coordination.  Please review the chart and confirm if graduation is approved.

## 2021-06-18 NOTE — LETTER
Letter by Ashanti Nicolas RN at      Author: Ashanti Nicolas RN Service: -- Author Type: --    Filed:  Encounter Date: 2/25/2019 Status: (Other)       Dear Indra Jason    Thank you for choosing [facility] for your care.  We are committed to providing you with the highest quality and compassionate healthcare services.  The following information pertains to your first appointment with our clinic.    Date/Time of appointment: Monday March 4, 2019 at 10:45    Note: Please arrive 30 minutes prior to your appointment time.  This allows time to complete forms, possible labs and nursing assessment.     Name of your Physician: Steve Pavon MD    What to bring to your appointment:    Completed Patient History/Initial Nursing Assessment and Medication/Allergy List (these forms were sent to you).    Any paperwork or films from your physician that we have asked you to bring.    Your current insurance card(s).    Parking:    Please refer to the map included to direct you.  The [facility] is located at the [address] end of [address] in [place], [place].      After turning onto [address] from [address], take a right turn at the first stop sign.  We have designated parking on the left, identified as parking for Cancer Care patients (Lot D).     The Code to Enter Lot D is: 0301. This code changes monthly and will always coincide with the current month followed by 01. For example August will be 0801.  The month will continue to change but the 01 will remain constant.  If lot D is full please use Parking Lot A, directly across the street.    Please enter the Cancer Care Center on the north end of the [facility].  You will see a sign on the building.      For Medical Oncology or Hematology appointments, please take the elevator to the second floor to check in.     Also please note appointments can last 1.5-2 hours.      We hope these instructions are helpful to you.  If you have any  questions or concerns, please call us at [phone].  It is our pleasure to assist you.    Warm Regards,  Ashanti Nicolas  Nurse Navigator  [phone]

## 2021-06-19 NOTE — LETTER
Letter by Ihsan Braga LGSW at      Author: Ihsan Braga LGSW Service: -- Author Type: --    Filed:  Encounter Date: 4/24/2019 Status: (Other)         May 4, 2019     Patient: Indra Jason   YOB: 1965   Date of Visit: 4/24/2019       To Whom It May Concern:    It is my medical opinion that Indra Jason should remain out of work until further notice due to chronic pain syndrome, dizziness, fatigue, major depression, Headaches, iron overload.     If you have any questions or concerns, please don't hesitate to call.    Sincerely,        Electronically signed by MICHELLE Huffman

## 2021-06-19 NOTE — LETTER
Letter by Ihsan Braga LGSW at      Author: Ihsan Braga LGSW Service: -- Author Type: --    Filed:  Encounter Date: 4/24/2019 Status: (Other)         April 24, 2019     Patient: Indra Jason   YOB: 1965   Date of Visit: 4/24/2019       To Steve Díazause:    This letter is to inform you this patient is in the process of applying for citizenship through [facility] [employer] ([facility]). She has been in the [place] for 7 years and needs to obtain citizenship prior to applying for SSI. Once she obtains citizenship, we will be helping her apply for SSI. In the meantime she in unable to work due to medical conditions (see letter of support from primary physician, Dr. Hernandez).     Sincerely,         Electronically signed by Ihsan Braga,      [phone]

## 2021-06-19 NOTE — LETTER
Letter by Hanane Woo MD at      Author: Hanane Woo MD Service: -- Author Type: --    Filed:  Encounter Date: 5/28/2019 Status: (Other)         Indra Jason  [address]  [place] [address]             May 28, 2019         Dear Ms. Jason,    Below are the results from your recent visit:    Resulted Orders   Hepatic Profile   Result Value Ref Range    Bilirubin, Total 0.3 0.0 - 1.0 mg/dL    Bilirubin, Direct <0.1 <=0.5 mg/dL    Protein, Total 7.5 6.0 - 8.0 g/dL    Albumin 3.8 3.5 - 5.0 g/dL    Alkaline Phosphatase 77 45 - 120 U/L    AST 25 0 - 40 U/L    ALT 38 0 - 45 U/L   Hemoglobinopathy/Thalassemia Cascade   Result Value Ref Range    Hgb A2 Quant 3.0 2.2 - 3.5 %    Hgb F Quant <0.8 0.0 - 2.0 %    Hemoglobin,ELP Normal hemoglobin electrophoresis pattern.     Path ICD: R79.89     Interpreted By: David Chambers MD        Your labs are normal.     Please call with questions or contact us using [employer].    Sincerely,        Electronically signed by Hanane Woo MD

## 2021-06-19 NOTE — LETTER
Letter by Cecile Orellana RN at      Author: Cecile Orellana RN Service: -- Author Type: --    Filed:  Encounter Date: 3/15/2019 Status: (Other)       Dear Indra Jason    Thank you for choosing [facility] for your care.  We are committed to providing you with the highest quality and compassionate healthcare services.  The following information pertains to your first appointment with our clinic.    Date/Time of appointment: Thursday March 21st at 10:00 am. This allows time to complete forms, possible labs and nursing assessment.     Name of your Physician: Dr Bobbi Sanchez    What to bring to your appointment:    Completed Patient History/Initial Nursing Assessment and Medication/Allergy List (these forms were sent to you).    Any paperwork or films from your physician that we have asked you to bring.    Your current insurance card(s).    Parking:    Please refer to the map included to direct you.  The [facility] is located at the [place] end of [facility] in [place], [place].      After turning onto [address] from [facility], take a right turn at the first stop sign.  We have designated parking on the left, identified as parking for Cancer Care patients (Lot D).     The Code to Enter Lot D is: 0301. This code changes monthly and will always coincide with the current month followed by 01. For example August will be 0801.  The month will continue to change but the 01 will remain constant.  If lot D is full please use Parking Lot A, directly across the street.    Please enter the Cancer Care Center on the north end of the \[facility]\"".  You will see a sign on the building.        For Medical Oncology or Hematology appointments, please take the elevator to the second floor to check in.   For Radiation Oncology appointments, please go straight through the double doors and check in.     Also please note appointments can last 1.5-2 hours.      We hope these instructions are helpful to  you.  If you have any questions or concerns, please call us at [phone].  It is our pleasure to assist you.    Warm Regards,  Cecile Orellana  Nurse Navigator  [phone]

## 2021-06-19 NOTE — LETTER
Letter by Aspen West CHW at      Author: Aspen West CHW Service: -- Author Type: --    Filed:  Encounter Date: 3/29/2019 Status: (Other)         March 29, 2019     Patient: Indra Jason   YOB: 1965   Date of Visit: 3/29/2019       To Whom it May Concern:    Indra Jason was seen in my clinic on 3/29/2019. She may return to work on 3/29/19.    If you have any questions or concerns, please don't hesitate to call.    Sincerely,         Electronically signed by Aspen West

## 2021-06-19 NOTE — LETTER
Letter by Beena Cameron LICSW at      Author: Beena Cameron LICSW Service: -- Author Type: --    Filed:  Encounter Date: 4/24/2019 Status: (Other)         May 7, 2019     Patient: Indra Jason   YOB: 1965   Date of Visit: 4/24/2019       To Whom It May Concern:    It is my medical opinion that Indra Jason should remain out of work until further notice due to chronic pain syndrome, dizziness, fatigue, major depression, Headaches, iron overload.     If you have any questions or concerns, please don't hesitate to call.    Sincerely,        Electronically signed by Heidi Hernandez DO

## 2021-06-20 NOTE — LETTER
Letter by Aspen Lerner RN at      Author: Aspen Lerner RN Service: -- Author Type: --    Filed:  Encounter Date: 7/23/2020 Status: (Other)       7/23/2020        Indra Jason  [address] [place] [address]    This letter provides a written record that you were tested for COVID-19 on 7/21/2020.     Your result was negative. This means that we didnt find the virus that causes COVID-19 in your sample. A test may show negative when you do actually have the virus. This can happen when the virus is in the early stages of infection, before you feel illness symptoms.    If you have symptoms   Stay home and away from others (self-isolate) until you meet ALL of the guidelines below:    Youve had no fever--and no medicine that reduces fever--for 3 full days (72 hours). And ?    Your other symptoms have gotten better. For example, your cough or breathing has improved. And?    At least 10 days have passed since your symptoms started.    During this time:    Stay home. Dont go to work, school or anywhere else.     Stay in your own room, including for meals. Use your own bathroom if you can.    Stay away from others in your home. No hugging, kissing or shaking hands. No visitors.    Clean high touch surfaces often (doorknobs, counters, handles, etc.). Use a household cleaning spray or wipes. You can find a full list on the EPA website at www.epa.gov/pesticide-registration/list-n-disinfectants-use-against-sars-cov-2.    Cover your mouth and nose with a mask, tissue or washcloth to avoid spreading germs.    Wash your hands and face often with soap and water.    Going back to work  Check with your employer for any guidelines to follow for going back to work.    Employers: This document serves as formal notice that your employee tested negative for COVID-19, as of the testing date shown above.

## 2021-06-21 ENCOUNTER — RECORDS - HEALTHEAST (OUTPATIENT)
Dept: ADMINISTRATIVE | Facility: OTHER | Age: 56
End: 2021-06-21

## 2021-06-21 ENCOUNTER — AMBULATORY - HEALTHEAST (OUTPATIENT)
Dept: FAMILY MEDICINE | Facility: CLINIC | Age: 56
End: 2021-06-21

## 2021-06-21 NOTE — PROGRESS NOTES
[facility] clinic EXAM note      Chief Complaint   Patient presents with     Headache     ongoing for years     Dizziness     ongoing for years     Establish Care     recently moved back to [place] from [place] 2 months ago     Flu Vaccine       Due to language barrier, an  was present during the history-taking and subsequent discussion (and for part of the physical exam) with this patient.      Assessment & Plan    Problem List Items Addressed This Visit     Chronic lower back pain (Chronic)    Relevant Medications    acetaminophen (Q-PAP EXTRA STRENGTH) 500 MG tablet    Chronic pain    Relevant Medications    cyanocobalamin (VITAMIN B-12) 1000 MCG tablet    cholecalciferol, vitamin D3, (VITAMIN D3) 1,000 unit capsule    lidocaine (XYLOCAINE) 5 % ointment    Other Relevant Orders    Vitamin D, Total (25-Hydroxy) (Completed)    Allergic rhinitis    Relevant Medications    cetirizine (ZYRTEC) 10 MG tablet    fluticasone (FLONASE ALLERGY RELIEF) 50 mcg/actuation nasal spray      Other Visit Diagnoses     Establishing care with new doctor, encounter for    -  Primary    Relevant Orders    Comprehensive Metabolic Panel (Completed)    Glycosylated Hemoglobin A1c (Completed)    Lipid Cascade (Completed)    Common cold        Relevant Medications    ascorbic acid, vitamin C, (ASCORBIC ACID WITH JEN HIPS) 500 MG tablet    cetirizine (ZYRTEC) 10 MG tablet    Anemia        Relevant Medications    ferrous sulfate 325 (65 FE) MG tablet    Other Relevant Orders    HM1(CBC and Differential) (Completed)    HM1 (CBC with Diff) (Completed)    Gastroesophageal reflux disease without esophagitis        Relevant Medications    omeprazole (PRILOSEC) 20 MG capsule    ranitidine (ZANTAC) 150 MG tablet    B12 deficiency        Relevant Medications    cyanocobalamin (VITAMIN B-12) 1000 MCG tablet    Other Relevant Orders    HM1(CBC and Differential) (Completed)    HM1 (CBC with Diff) (Completed)    Depression        Relevant Orders     "Ambulatory referral to Psychology              History    Indra Jason is a 53 y.o.  female who presents for the following issues:    Establishing Care: Previously seen at this clinic by Dr. Gonzalez. Moved to [place] about 1 year ago. Everything \"went wrong\". Stopped taking her medications. Didn't know anyone or where to go. She is back in [place]. She did see one doctor and prescribed some medications. Since being back in [place] doesn't have any medications. X 2 months. Didn't have active insurance.  Not truly a new patient as last seen by Dr. Gonzalez in 2016.  She reports no new diagnoses to report.  No new surgeries or medications.      Concerns Today: She just needs her medications refilled.  Has chronic headaches and dizziness which are improved by her vitamins.  We reviewed all of her medications to include the following:    Chronic lower back pain: Takes Tylenol, B12, vitamin D and lidocaine ointment.    Vitamin D deficiency: We will check a level today.  Refill medication.    Zyrtec for allergic rhinitis as well as Flonase.  States the symptoms have returned after she has been off her medications.    History of iron deficiency: Is supposed to be on iron daily.    History of GERD: Takes omeprazole and ranitidine both.  Has been out of both.  Do note she has history of H. pylori with a positive antibody test back in 2013.  Unable to see the notes regarding treatment or whether she was treated.  I assume she was.  Would may be consider repeating test for H. pylori is continues with significant abdominal pain.    Vitamin B12 deficiency: Is on the thousand micrograms of vitamin B12.    Depression: Has a history of depression she thinks it situational.  Feeling better now that she is back with her family.  She does endorse some forgetfulness and mood swings.  So she still is having some problems.  But family is altogether she is with her son and her grandchildren and this makes her happy.  Would like to see a therapist again.  Was " seen one before she left and thought it was helpful.  Little interest or pleasure in doing things: Several days  Feeling down, depressed, or hopeless: Several days  Trouble falling or staying asleep, or sleeping too much: Not at all  Feeling tired or having little energy: Nearly every day  Poor appetite or overeating: Nearly every day  Feeling bad about yourself - or that you are a failure or have let yourself or your family down: Several days  Trouble concentrating on things, such as reading the newspaper or watching television: Several days  Moving or speaking so slowly that other people could have noticed. Or the opposite - being so fidgety or restless that you have been moving around a lot more than usual: Not at all  Thoughts that you would be better off dead, or of hurting yourself in some way: Not at all  PHQ-9 Total Score: 10  If you checked off any problems, how difficult have these problems made it for you to do your work, take care of things at home, or get along with other people?: Somewhat difficult  Depression Follow-up Plan: mental health care assessment        mEDICATIONS    Current Outpatient Prescriptions on File Prior to Visit   Medication Sig Dispense Refill     SEA SOFT NASAL MIST 0.65 % nasal spray USE 1 SPRAY INTO EACH NOSTRIL AS NEEDED FOR CONGESTION. 45 mL 5     No current facility-administered medications on file prior to visit.        Pertinent past medical, surgical, social and family history reviewed and updated in Epic.    Social History     Social History     Marital status:      Spouse name: N/A     Number of children: N/A     Years of education: N/A     Occupational History     Not on file.     Social History Main Topics     Smoking status: Former Smoker     Smokeless tobacco: Never Used     Alcohol use No     Drug use: No     Sexual activity: Yes     Other Topics Concern     Not on file     Social History Narrative     Past Surgical History:   Procedure Laterality Date      ABDOMINAL SURGERY N/A      Past Medical History:   Diagnosis Date     Anxiety      Depression      No family history on file.        Review of systems    ROS: 14 pt review of systems preformed and all negative except noted in HPI.    Physical Exam    /80 (Patient Site: Left Arm, Patient Position: Sitting, Cuff Size: Adult Regular)  Pulse 68  Temp 98.3  F (36.8  C) (Oral)   Resp 18  Wt 136 lb (61.7 kg)  LMP 12/01/2014  BMI 24.87 kg/m2  GEN:  53 y.o. female sitting comfortably in no apparent distress.   HEENT: EOMI, no scleral icterus, buccal mucosa moist  Neck: Supple without lymphadenopathy or thyromegally   CHEST/LUNG: No respiratory distress, good air flow to bases, CTAB   CV: RRR, S1, S2 normal; no murmurs, rubs or gallops. No edema.  ABD:  Soft, non-tender, non distended, no guarding,  No masses  MSK:  Strength grossly normal  SKIN: warm, dry, no rashes or lesions  NEURO: Gait normal, coordination intact  PSYCH:  Mood and affect appropriate      Heidi Hernandez

## 2021-06-21 NOTE — PROGRESS NOTES
[facility] clinic EXAM note      Chief Complaint   Patient presents with     Follow-up     Cough     on and off for 5-6 months     Flu Vaccine       Due to language barrier, an  was present during the history-taking and subsequent discussion (and for part of the physical exam) with this patient.      Assessment & Plan    Problem List Items Addressed This Visit     Cough: unclear cause. On ranitidine for GERD and treatment for allergies. Seems to help but cough still comes and goes for  5-6 months now. Will check for TB to make sure was treated. Will also treat with zpak given how long symptoms have persisted. F/u if no improvement. Consider CXR if zpak doesn't resolve symptoms.     Relevant Medications    azithromycin (ZITHROMAX) 250 MG tablet    Other Relevant Orders    QTF-Mycobacterium tuberculosis by QuantiFERON-TB Gold Plus      Other Visit Diagnoses     Microcytosis    -  Primary: hgb was normal on last check but hx of anemia and MCV of 77. Will check for thalassemia and ferritin to evaluate.      Relevant Orders    Hemoglobinopathy/Thalassemia Cascade    Ferritin (Completed)    Gastroesophageal reflux disease without esophagitis    : stable. Refill provided.     Relevant Medications    ranitidine (ZANTAC) 150 MG tablet          History    Indra Jason is a 53 y.o.  female who presents for the following issues:    1. Cough: not getting better: x 5-6 months.  Not constant. Comes and goes. Some mucous. White when its there.flonase and zyrtec are helping. Ranitidine are Helping. difficulty of breathing with cough. Hx of latent TB, treated. No fevers. No congestion. Doesn't feel like its allergies or GERD.    2. Follow up from previous visit: didn't get our call or letter regarding labs. Reviewed my note regarding labs:  Please call patient and inform:  Definitely still having vitamin D deficiency.  Your current level is 16 and normal is 30.  Cholesterol is normal.  Your hemoglobin level is normal but  you do show signs of iron deficiency anemia still.  We may need to look into this further but please continue her iron.  No signs of diabetes.  You have a couple of liver enzymes that are elevated and I do not have a good reason for this.  We may need to look into this further as well your next visit.  It is not an emergency.  They are only mildly elevated.        mEDICATIONS    Current Outpatient Medications on File Prior to Visit   Medication Sig Dispense Refill     acetaminophen (Q-PAP EXTRA STRENGTH) 500 MG tablet TAKE 1-2 TABLETS (500-1,000 MG TOTAL) BY MOUTH 3 TIMES A DAY AS NEEDED FOR PAIN. 100 tablet 5     ascorbic acid, vitamin C, (ASCORBIC ACID WITH JEN HIPS) 500 MG tablet TAKE 1 TABLET (500 MG TOTAL) BY MOUTH 2 TIMES A DAY WITH IRON TABLETS 180 tablet 3     cetirizine (ZYRTEC) 10 MG tablet Take 1 tablet (10 mg total) by mouth daily. 90 tablet 2     cholecalciferol, vitamin D3, (VITAMIN D3) 1,000 unit capsule Take 1 capsule (1,000 Units total) by mouth daily. 90 capsule 3     cyanocobalamin (VITAMIN B-12) 1000 MCG tablet Take 1 tablet (1,000 mcg total) by mouth daily. 30 tablet 11     ferrous sulfate 325 (65 FE) MG tablet Take 1 tablet (325 mg total) by mouth 2 (two) times a day. 60 tablet 12     fluticasone (FLONASE ALLERGY RELIEF) 50 mcg/actuation nasal spray 1-2 sprays a day as needed. 16 g 5     lidocaine (XYLOCAINE) 5 % ointment Apply sized amount to affected area up to 3 times daily. Lower back 35.44 g 2     omeprazole (PRILOSEC) 20 MG capsule Take 1 capsule (20 mg total) by mouth daily. 30 capsule 5     SEA SOFT NASAL MIST 0.65 % nasal spray USE 1 SPRAY INTO EACH NOSTRIL AS NEEDED FOR CONGESTION. 45 mL 5     [DISCONTINUED] ranitidine (ZANTAC) 150 MG tablet TAKE ONE TABLET BY MOUTH AS NEEDED FOR HEARTBURN. 30 tablet 1     No current facility-administered medications on file prior to visit.        Pertinent past medical, surgical, social and family history reviewed and updated in Epic.    Social  History     Socioeconomic History     Marital status:      Spouse name: Not on file     Number of children: Not on file     Years of education: Not on file     Highest education level: Not on file   Social Needs     Financial resource strain: Not on file     Food insecurity - worry: Not on file     Food insecurity - inability: Not on file     Transportation needs - medical: Not on file     Transportation needs - non-medical: Not on file   Occupational History     Not on file   Tobacco Use     Smoking status: Former Smoker     Smokeless tobacco: Never Used   Substance and Sexual Activity     Alcohol use: No     Drug use: No     Sexual activity: Yes   Other Topics Concern     Not on file   Social History Narrative     Not on file         Review of system   Pertinent Positives and negatives in HPI.     Physical Exam    /82 (Patient Site: Right Arm, Patient Position: Sitting, Cuff Size: Adult Regular)   Pulse 88   Temp 98.9  F (37.2  C) (Oral)   Resp 18   Wt 138 lb (62.6 kg)   LMP 12/01/2014   SpO2 96%   BMI 25.24 kg/m    GEN:  53 y.o. female sitting comfortably in no apparent distress.   HEENT: EOMI, no scleral icterus, buccal mucosa moist  Neck: Supple without lymphadenopathy or thyromegally   CHEST/LUNG: No respiratory distress, good air flow to bases, CTAB   CV: RRR, S1, S2 normal; no murmurs, rubs or gallops. No edema.   SKIN: warm, dry, no rashes or lesions  NEURO: Gait normal, coordination intact  PSYCH:  Mood and affect appropriate      Heidi Hernandez

## 2021-06-21 NOTE — PROGRESS NOTES
Please call patient and inform:  1 of the test we did for iron deficiency was significantly elevated.  Please stop taking your iron pills.  And please come in as we need to discuss and do more lab work.  Also the test for tuberculosis is positive.  Are you sure that you were fully treated for the latent TB?  We will need to have you come in for an x-ray.

## 2021-06-21 NOTE — PROGRESS NOTES
"[facility] clinic EXAM note      Chief Complaint   Patient presents with     Follow-up     positive TB       Due to language barrier, an  was present during the history-taking and subsequent discussion (and for part of the physical exam) with this patient.        Assessment & Plan    Problem List Items Addressed This Visit     None      Visit Diagnoses     Elevated ferritin level    -  Primary:     Relevant Orders    Iron and Transferrin Iron Binding Capacity (Completed)    Ferritin    Hepatic Profile (Completed)    Positive QuantiFERON-TB Gold test    : I do not see on chart review that she has been treated. If xray negative will need tx for latent TB    Relevant Orders    XR Chest 2 Views (Completed)        F/u in 2 days to review labs and imaging.     History    Indra Jason is a 53 y.o.  female who presents for the following issues:    Follow-up lab results.  I asked patient to return for concerns of an elevated ferritin level and a positive QuantiFERON gold at her last visit.  She was also noted to have some mildly elevated LFTs.  She reports regarding her cough that she was feeling better after the Z-Meng provided at the last appointment.  Prior to that cough had been constant and continues throughout the day and now cough is just every once in a while/intermittent.  Really not bothering her too much.  I do note a pulse ox of 93% today.  She reports that she was treated for latent TB about 15 [place]  [place] [facility].  Prior to coming [place].  She states she came [place] the [place] 2 [place] ago but appears appears she means [place] even though she has been at this clinic 6/20/2012 so something is getting lost in translation here.  She states she took medication for 6 months.    She did not get my message about stopping her iron.  It appears that she does not have an idea of what her medications are for.  She says is that the one to increase my blood levels?  She " states she will stop taking it now.        mEDICATIONS    Current Outpatient Medications on File Prior to Visit   Medication Sig Dispense Refill     acetaminophen (Q-PAP EXTRA STRENGTH) 500 MG tablet TAKE 1-2 TABLETS (500-1,000 MG TOTAL) BY MOUTH 3 TIMES A DAY AS NEEDED FOR PAIN. 100 tablet 5     ascorbic acid, vitamin C, (ASCORBIC ACID WITH JEN HIPS) 500 MG tablet TAKE 1 TABLET (500 MG TOTAL) BY MOUTH 2 TIMES A DAY WITH IRON TABLETS 180 tablet 3     azithromycin (ZITHROMAX) 250 MG tablet Take 2 tabs on day one and 1 tab for days 2-5 (4 days). 6 tablet 0     cetirizine (ZYRTEC) 10 MG tablet Take 1 tablet (10 mg total) by mouth daily. 90 tablet 2     cholecalciferol, vitamin D3, (VITAMIN D3) 1,000 unit capsule Take 1 capsule (1,000 Units total) by mouth daily. 90 capsule 3     cyanocobalamin (VITAMIN B-12) 1000 MCG tablet Take 1 tablet (1,000 mcg total) by mouth daily. 30 tablet 11     ferrous sulfate 325 (65 FE) MG tablet Take 1 tablet (325 mg total) by mouth 2 (two) times a day. 60 tablet 12     fluticasone (FLONASE ALLERGY RELIEF) 50 mcg/actuation nasal spray 1-2 sprays a day as needed. 16 g 5     lidocaine (XYLOCAINE) 5 % ointment Apply sized amount to affected area up to 3 times daily. Lower back 35.44 g 2     omeprazole (PRILOSEC) 20 MG capsule Take 1 capsule (20 mg total) by mouth daily. 30 capsule 5     ranitidine (ZANTAC) 150 MG tablet TAKE ONE TABLET BY MOUTH AS NEEDED FOR HEARTBURN.. 30 tablet 1     SEA SOFT NASAL MIST 0.65 % nasal spray USE 1 SPRAY INTO EACH NOSTRIL AS NEEDED FOR CONGESTION. 45 mL 5     No current facility-administered medications on file prior to visit.        Pertinent past medical, surgical, social and family history reviewed and updated in Epic.    Social History     Socioeconomic History     Marital status:      Spouse name: Not on file     Number of children: Not on file     Years of education: Not on file     Highest education level: Not on file   Social Needs     Financial  resource strain: Not on file     Food insecurity - worry: Not on file     Food insecurity - inability: Not on file     Transportation needs - medical: Not on file     Transportation needs - non-medical: Not on file   Occupational History     Not on file   Tobacco Use     Smoking status: Former Smoker     Smokeless tobacco: Never Used   Substance and Sexual Activity     Alcohol use: No     Drug use: No     Sexual activity: Yes   Other Topics Concern     Not on file   Social History Narrative     Not on file         Review of systems     Pertinent Positives and negatives in HPI.     Physical Exam    /74 (Patient Site: Left Arm, Patient Position: Sitting, Cuff Size: Adult Regular)   Pulse 78   Temp 98.2  F (36.8  C) (Oral)   Resp 18   Wt 140 lb (63.5 kg)   LMP 12/01/2014   SpO2 93%   BMI 25.61 kg/m    GEN:  53 y.o. female sitting comfortably in no apparent distress.   HEENT: EOMI, no scleral icterus, buccal mucosa moist  CHEST/LUNG: No respiratory distress, good air flow to bases, CTAB   CV: RRR, S1, S2 normal; no murmurs, rubs or gallops. No edema  SKIN: warm, dry, no rashes or lesions  NEURO: Gait normal, coordination intact  PSYCH:  Mood and affect appropriate      Heidi Hernandez

## 2021-06-21 NOTE — LETTER
Letter by Heidi Hernandez DO at      Author: Heidi Hernandez DO Service: -- Author Type: --    Filed:  Encounter Date: 12/18/2020 Status: (Other)         Indra Jason  [address]  [place] [address]             December 18, 2020         Dear Ms. Jason,    Below are the results from your recent visit:    Resulted Orders   MR Liver With Without Contrast    Narrative    EXAM: MR LIVER W WO CONTRAST  LOCATION: [facility]  DATE/TIME: 12/17/2020 12:34 PM    INDICATION: hemochromatosis, elevated ferritin, iron overload syndrome  COMPARISON: MRI dated 10/04/2019 and 09/18/2019  TECHNIQUE: Routine MRI liver protocol including T1 in/out phase, diffusion, multiplane T2, and dynamic T1 with IV contrast.    CONTRAST: Gadavist 6    FINDINGS:     LIVER: T2* relaxometry yields a liver iron concentration of 1.3 mg/g (less than 2 mg/g is considered normal). T2* value is 23.8 ms and R2* is 42 Hz. When using similar technique on the comparison study from October 2019, LIC was 2.6 mg/g and T2* was 11.7   ms. No focal liver lesions.    ADDITIONAL FINDINGS: No lymphadenopathy. No bile duct dilatation. Normal spleen, pancreas, and adrenal glands.       Impression    1.  Normal liver iron concentration (1.3 mg/g compared with 2.6 mg/g on the comparison study from October 2019).         The MRI of your liver is back to normal.  Please follow-up with heme/oncology provider at some point.  Even a telephone or virtual visit would be nice to touch base with them. You can call the hemetology/onology clinic at [phone] to schedule a virtual appointment.       Please call with questions or contact us using [employer].    Sincerely,        Electronically signed by Heidi Hernandez DO

## 2021-06-21 NOTE — PROGRESS NOTES
Please call patient and inform:  Definitely still having vitamin D deficiency.  Your current level is 16 and normal is 30.  Cholesterol is normal.  Your hemoglobin level is normal but you do show signs of iron deficiency anemia still.  We may need to look into this further but please continue her iron.  No signs of diabetes.  You have a couple of liver enzymes that are elevated and I do not have a good reason for this.  We may need to look into this further as well your next visit.  It is not an emergency.  They are only mildly elevated.

## 2021-06-21 NOTE — LETTER
Letter by Wes Juan CHW at      Author: Wes Juan CHW Service: -- Author Type: --    Filed:  Encounter Date: 4/8/2021 Status: (Other)       CARE COORDINATION  [facility] [address] [place] [address]    April 14, 2021    Indra Jason  [address]  [place] [address]      Dear Justin Burrell enrolled with the [facility] Care Coordination Services.    In order for us to provide guidance we need to connect on a monthly bases.    We have tried calling you 2 times in the last month and have been unsuccessful in reaching you.     Please call me at [phone] to update on your goals and if you need support and services from Clinic Care Coordination team.    If you reach my voicemail, please leave a message with your daytime telephone number and a date and time that I can return your call.                                                                              Sincerely,            HODA Mims                                                                     Clinic Care Coordination                                          [facility]

## 2021-06-22 NOTE — TELEPHONE ENCOUNTER
1-3-19  Called and spoke to patient and son.  Explained CCC.  Welcomed patient back to [place].  Scheduled RN assessment 1-7-19 at 8am.

## 2021-06-22 NOTE — TELEPHONE ENCOUNTER
----- Message from Heidi Hernandez DO sent at 2018  9:09 PM CST -----  Regarding: RE: CCC referral   I also see she never  Followed up with me on her most recent results. Can we please get her scheduled. thanks    ----- Message -----  From: Karena AshtonTAL  Sent: 2018   1:32 PM  To: Cele Kaur RN, Heidi Hernandez DO, #  Subject: CCC referral                                     Referral for CCC    She has established care with psychotherapist.  Dr. Hernandez is PCP.    Recommending the following:   RN Med set up  PCA referral   Citizenship process  Financial support/resources.  Public/subsidized housing  Possibly apply for soc sec/disability  ARMHS    I have referred her to [facility] for psychiatry. They will also be able to help her with the citizenship process once she starts the process with CCC help, such as [employer].     Dx: PTSD. Major depressive disorder, recurrent. Anxiety. Insomnia. Bereavement (spouse  6-7 years ago)     Other factors:   Forgetfulness/memory impairment. Pain (chest, knee back).   Refugee from [place]. Been in  since 2012.  Financial stress- worried about meeting basic needs. Her son in law is the only income for the family. They pay around $1,000/month in rent. 7 of them living off of his 1 income in the household. She has 7 sons and 1 daughter ranging from age 10-40.   Recently returned to [place] about 6 months ago after living in [place] for a bit.    She does receive Food Support and MA through [facility].   Chart says she has an MFIP worker, but she is not sure who or what that is.

## 2021-06-22 NOTE — PROGRESS NOTES
Mental Health Visit Note    11/29/2018    Start time: 10:13am    Stop Time: 10:48am   Session # 1 (Intake)    Session Type: Patient is presenting for an Individual session.    Indra Jason is a 53 y.o. female is being seen today for    Chief Complaint   Patient presents with     Intake     Patient presented for intake psychotherapy appointment as referred by PCP for symptoms of depression and anxiety.   .     New symptoms or complaints: depressed mood, pain, anxiety    Functional Impairment:   Personal: 4  Family: 2  Work: 4  Social:3    Clinical assessment of mental status:   Grooming: Well groomed  Attire: Appropriate  Age: Appears Stated  Behavior Towards Examiner: Cooperative  Motor Activity: Within normal   Eye Contact: Appropriate  Mood: Depressed  Affect: Congruent w/content of speech, Anxious and Depressed  Speech/Language: Within normal  Attention: Within normal  Concentration: Brief  Thought Process: Within normal  Thought Content: Hallucinations: Within noraml  Delusions: Within normal  Orientation: X 3  Memory: Impairment  Judgement: No Evidence of Impairment  Estimated Intelligence: Below Average  Demonstrated Insight: Diminished  Fund of Knowledge: adequate        Suicidal/Homicidal Ideation present: None Reported This Session. Denies SI/ HI.   Completed C-SSRS in session. No ideation. No self-harm. No preparatory behaviors. No past attempts. She does report a lifetime history of passive SI, but none present within the last month. Low risk for suicide.       Patient's impression of their current status: The patient described reasons for engaging in therapy services during today's session. Patient reports depressed mood, worries and pain.    The patient believes that the symptoms began after the death of , which was about 6-7 years ago. Her symptoms occur daily. She receives the support of her daughter and son in law. Her son-in-law drives her to appointments. She has a total of 6 sons and 1  daughter. The youngest is 7 year old son. Her oldest child is around 30-40 (she is not sure).   The patient has engaged in psychotherapy services before. She confirmed seeing there previous [facility] psychotherapist, Jonah PARADA in 2016.   Patient described the following expectations for treatment: Feel less sad and worried.       Therapist impression of patients current state: The patient presented for an initial intake session with this provider. Patient is a 53 year old Tajik female. Patient was referred by Dr. Hernandez to engage in individual psychotherapy to address symptoms of depression. A professional Tajik  from [employer], Phillip, was present for the visit due to the language complexity. The patient appeared cooperative and open-minded throughout the intake and easily engaged in dialogue. Therapist and patient reviewed consent and privacy policy. Patient reported understanding and signed document- sent to be scanned into EMR. The patient has engaged in outpatient psychotherapy services at [facility]. A brief DA was was complete by TAL Cortes 9/30/2016. She worked with Danie from 9/30/2016-11/30/2016. She moved to [place] for a short time, which ended the therapy, and she has since returned. The patient completed the following questionnaires for session today: WHODAS, CAGE. Therapist and patient completed C-SSRS together in session. No concerns about patient's safety or the safety of others per assessment today. Therapist would like patient to complete PHQ-9, MICAELA-7 and Mood disorder Questionnaire at future session.  These questionnaires will be used to inform diagnoses and will be uploaded to patient chart after standard DA is completed. Therapist and patient will further review patient's history during the next follow-up encounter in order to complete a standard diagnostic assessment. Goals for treatment will be established after the 2nd or 3rd follow-up session with this  provider. The patient plans to follow-up with psychotropic medication management with her PCP. Patient may benefit from psychiatry services and PCA based on initial intake visit. She has never had psychiatry before. Therapist also recommends continued psychotherapy.       Type of psychotherapeutic technique provided: Insight oriented, Client centered and Solution-focused    Progress toward short term goals:Progress as expected, presented for intake visit as referred by PCP. She was open and engaged in session and interesting in continuing care with provider    Review of long term goals: Not done at today's visit. Today was first intake session. Long-term goals will be established during future sessions after completion of diagnostic assessment.    WHODAS 2.0 12-item version: 27 or 56%   Scores presented in qualifiers to represent level of disability.  SEVERE Problem (high, extreme, ...) 50-95%  H1= 30  H2= unsure  H3= some      Diagnosis:   1. Episode of recurrent major depressive disorder, unspecified depression episode severity (H)        Plan and Follow up: Patient is scheduled to return for second psychotherapy session on 12/14/18.  A Standard Diagnostic Assessment will be completed at or after patient's second session with this provider.         Discharge Criteria/Planning: Patient will continue with follow-up until therapy can be discontinued without return of signs and symptoms.    Karena Ashton 11/29/2018

## 2021-06-22 NOTE — PROGRESS NOTES
1-3-19  Called and spoke to patient and son.  Explained CCC. Agreed to enroll again in [facility] for support.   Welcomed [place] back to [place].  Scheduled RN assessment 19 at 8am.        Referral for CCC:  Karena Ashton LICSW   Sent: 2018   1:32 PM   To: Cele Kaur RN, Heidi Hernandez DO, *   Subject: CCC referral                                     She has established care with psychotherapist.  Dr. Hernandez is PCP.   Recommending the following:   RN Med set up   PCA referral   Citizenship process   Financial support/resources.   Public/subsidized housing   Possibly apply for soc sec/disability   ARM     I have referred her to [facility] for psychiatry. They will also be able to help her with the citizenship process once she starts the process with [facility] help, such as [employer].     Dx: PTSD. Major depressive disorder, recurrent. Anxiety. Insomnia. Bereavement (spouse  6-7 years ago)     Other factors:   Forgetfulness/memory impairment. Pain (chest, knee back).   Refugee from [place]. Been in  since 2012.   Financial stress- worried about meeting basic needs. Her son in law is the only income for the family. They pay around $1,000/month in rent. 7 of them living off of his 1 income in the household. She has 7 sons and 1 daughter ranging from age 10-40.   [place] to [place] about 6 [place] ago after living in [place] for a bit.     [facility]   Chart says she has an MFIP worker, but she is not sure who or what that is.

## 2021-06-22 NOTE — TELEPHONE ENCOUNTER
Left message #1 at [phone] to schedule appt with PCP to follow up on her results.   Postponing task out to a week and will try again.

## 2021-06-22 NOTE — PROGRESS NOTES
1-7-19  Met with patient and her daughter Oral Jason.  Joint visit with Cele Kaur, CCC RN for RN Assessment.  [facility]  [place] moved to [place] from [place]. Son still lives in [place].  Welcome [place] back to [place].  Provided patient welcome folder and explained [facility] Walk In Clinic, Health Care Directive, CCC brochure and Care Guide direct number.  Explained ROIs  Patient signed JAIME to communicate with DHS, daughter and son in law,     Assisted and supported patient to Speciality Schedulers to schedule for psychiatry services at [facility] for med management.  Melanie informed patient already has appt schedule next Friday 1-18-19 at 9:15 am with Candace Villavicencio at [facility] on [id number], [phone].  Daughter has appt information.  Thanked daughter for supporting patient to her appt.    Completed Care Plan    Plan:   follow up 1-25-19    Care Guide Will  CCC : please assist patient/dtr with making an appointment due to difficulty with landlord.  Mental Health Care referral or follow-up: Referral made to [facility].  Karena Ashton sent a referral and CCC RN sent a referral also.  Please ensure patient has an appointment. Referral for psychiatry service med management. Speciality schedulers will notify Care Guide.  Financial Resource Guide Referral- Please send referral.  Unsure if patient could qualify for any additional financial support/resources.  Per referral from Karena.

## 2021-06-22 NOTE — PROGRESS NOTES
"54yr F PMH: chronic low back pain, cervical polyps, fatigue, vitamin b12 def, vitamin d def, anemia, adjustment disorder,  Tension headache, major depressive disorder, allergic rhinitis, Currently lives with Dtr and KVNG, patients 2 children and her grandchildren for a total of 7 people in a 2 bedroom apartment.  Patient has 7 sons and 1 dtr total.  2 sons in [place] and 5 sons & 1 dtr in [place].  She is a refugee from [facility].  Been in the  since 2012.  She receives Food support from the [facility] of $500.  No other income. KVNG and dtr care for her primarily.  KVNG completes all grocery shopping, picking up scripts, and driving to Dr's appointments.  She c/o pain to lower back and \"body aches\".  Takes medications out of the bottles.  Writer provided patient with a MSU box today and set up for patient today.  Dtr reads english to be able to manage medications and verbalized how to properly reorder medications. Denies falls.  States she takes \"frequent rests\" while taking the stairs to her 3rd story apartment.  No elevator in the apartment building.  Patient would like a PCA assessment.  Stating dtr assists with washing patients hair, showering, cooking, etc for patient.  Zero DME items used.  Patient having difficulty with landlord.  Stating they have called the landlord several times and the \"electricity in the apartment does not work\".   Will have the patient meet with [facility] SW regarding difficulty with landlord.   Referral sent for psychiatry to [facility] to assist with citizenship process.  Patient enrolled for below goals.      RN Recommendations and Referrals  [facility] : please assist patient/dtr with making an appointment due to difficulty with landlord.  Mental Health Care referral or follow-up: Referral made to [facility].  Karena Ashton sent a referral and [facility] RN sent a referral also.  Please ensure patient has an appointment.   Financial Resource Guide Referral- Please send referral.  Unsure if patient could " qualify for any additional financial support/resources.  Per referral from Karena.    Action Plan    RN Will  Will add the patient to tracking list  Will be available as needs arise    Care Guide Will  [facility] : please assist patient/dtr with making an appointment due to difficulty with landlord.  Mental Health Care referral or follow-up: Referral made to [facility].  Karena Ashton sent a referral and [facility] RN sent a referral also.  Please ensure patient has an appointment.   Financial Resource Guide Referral- Please send referral.  Unsure if patient could qualify for any additional financial support/resources.  Per referral from Karena.      Goals  Goals        Patient Stated       Goal: I want support to connect with an organization/agency to help for disability benefits within the next 2 months.  (pt-stated)      1. I will meet with the [facility] SW to learn about SSI/SSDI/RSDI and what to expect with the application process. (if not necessary, delete).   2. [facility] SW will assist with referring me to an agency to help me with applying for SSI/SSDI/RSDI. (can do this during visit for action step 1 if step 1 is needed, if not, this can be action step 1 and step 1 deleted).   3. I will follow up with the agency I have been referred to for assist with my application for SSI/SSDI/RSDI and any additional paperwork or questions I have about my case.   4. I will contact care guide if I need assistance completing paperwork related to my disability application    Date goal set:  1/7/19        I want resources to help address housing issues with the landlord in the within the next month.  (pt-stated)      Action steps to achieve this goal  1. 1. I will speak with the [facility] CG at outreach telephone calls.  2.  I will speak with the [facility] SW to identify community resources that might be available to me.  3.  I will provide any necessary paperwork/documentation in a timely manner if needed to assist with this process  4.  I will  speak with the [facility] SW regarding the lights not working and the difficulty I am having with our landlord.    Date goal set:  1/7/19        I would like support to connect with an organization or community agency to help with citizenship process within the next 2 months. (pt-stated)      Citizenship Goal Phase 1: for RN or SW    Action Steps:  1. I will meet with [facility] SW  to learn about different community agencies and organizations that help with my citizenship process.    2. I will meet with [facility] SW  for Intake Screening with [facility].    3. When I have connected with an agency staff or , I will call to inform my Care Guide.    4. If I have any questions about citizenship process, I will call my   ( etc.  Anastasiia Bautista at [phone]. )     Date goal set:  1/7/19        I would like to have a PCA if I qualify to assist me with my daily needs in the next 3-6 months. (pt-stated)      Action steps to achieve this goal  1.  I will speak with my [facility] Care Guide at outreach telephone calls.  2.  I will meet with the [facility] representative for an assessment.  3.  I will be available by telephone for any calls related to this goal.    Date goal set:  1/7/19        I would like to see if I qualify for any financial assistance in the next 3-6 months. (pt-stated)      Action steps to achieve this goal  1.  1. I will speak with the [facility] CG at outreach telephone calls.  2.  I will speak with the [facility] FRG to identify community resources that might be available to me.  3.  I will provide any necessary paperwork/documentation in a timely manner if needed to assist with this process    Date goal set:  1/7/19        I would like to see [facility] for psychiatry.  They will also be able to assist with my Citizenship process in the next 2-3 months. (pt-stated)      Action steps to achieve this goal  1. I will speak with the [facility] Care Guide at outreach telephone calls.  2.  I will answer the phone when the  "specialty  calls to make an appointment at [facility] for psychiatry.  3.  I will attend all scheduled appointments at [facility] for my mental health and citizenship process.    Date goal set:  1/7/19                Clinic Care Coordination RN Assessed Needs  Patient Centered Assessment Method-PCAM TOTAL SCORE: 28 (1/8/2019  8:41 AM)    Level 2:  A score of 25-36 indicates that the patient has a moderate initial need for RN or SW intervention at the discretion of the .  The RN will add this patient to her panel and follow closely in partnership with the care guide until stable.  She will reach out to the care guide for support in care coordination needs and graduate the patient to standard care guide outreach when appropriate.      PCAM (Patient Centered Assessment Method)   HEALTH AND WELL-BEING  Other Physical Health Concerns:: chronic back pain, b12 def, vit D def, Anemia, Adjustment disorder,major depression, elevated liver enzymes  RN Assessment: Physical Health Needs: Mild vague physical symptoms or problems- but do not impact on daily life or are not of concern to client  RN Assessment: Physical Health Problems: Mild impact on mental well-being e.g. \"feeling fed-up\", \"reduced enjoyment\"  RN Assessment:Other Mental Well-Being Concern: Mod to severe problems that interfere with function  RN Assessment: Lifestyle Behaviors: Mod to severe impact on client's well-being, preventing enjoyment of usual activities  SOCIAL ENVIRONMENT  RN Assessment: Home Environment: Safe, stable, but with some inconsistency  RN Assessment: Daily Activites: Adequate participation with social networks  RN Assessment: Social Network: Adequate participation with social networks  RN Assessment: Financial Resources: Financially insecure, some resource challenges  HEALTH LITERACY AND COMMUNICATION  RN Assessment: Health Literacy: Little understanding which impacts on their ability to undertake better management  RN Assessment: " Engagement: Adequate communication, with or without minor barriers  SERVICE COORDINATION  Other Services: Other care/services in place and adequate  Coordination of Services: Required care/services in place and adequately coordinated  PCAM TOTAL SCORE: 28      Emergency Plan  Depression  Everyone feels down at times. The blues are a natural part of life. But an unhappy period that s intense or lasts for more than a couple of weeks can be a sign of depression. Depression is a serious illness. It can disrupt the lives of family and friends. If you know someone you think may be depressed, find out what you can do to help.    Know the serious signals  Warning signals for suicide include:    Threats or talk of suicide    Statements such as  I won t be a problem much longer  or  Nothing matters     Giving away possessions or making a will or  arrangements    Buying a gun or other weapon    Sudden, unexplained cheerfulness or calm after a period of depression  If you notice any of these signs, get help right away. Call a health care professional, mental health clinic, or suicide hotline and ask what action to take. In an emergency, don t hesitate to call the police.    [facility] Mental Health Crisis Lines:  [facility] [phone]  [facility] [phone]  [facility] [phone]  [facility] [phone]  [facility], Adults [phone]  [facility], Children [phone]  [facility], Adults [phone]  [facility], Children [phone]    Handwashing: Tips for Patients, Family, and Friends  Germs are everywhere around us. Normally, we live with germs without getting sick. In certain cases, harmful germs cause us to get sick with an infection. Or we can spread harmful germs to others and cause them to get sick. Keeping your hands clean is the best way to prevent getting or spreading germs that cause infection. Wash your hands with soap and water or use an alcohol-based hand  .  When to clean your hands: For patients  In the hospital or in your home, you can come in contact with many harmful germs. To help prevent infection, wash your hands often, especially:  After using the bathroom  Before and after eating  After coughing or sneezing  After using a tissue  After touching or changing a dressing or bandage  After touching any object or surface that may be contaminated  After touching an animal during a pet therapy session (hospital)  After touching an animal, cleaning up after a pet, or preparing food for pets (home)  If you don t have access to soap and water, use an alcohol-based hand gel containing at least 60% alcohol. These products kill most germs and are easy to use. But use soap and water (not alcohol-based hand gel) if your hands are visibly dirty.  When to clean your hands: For family and friends  When visiting or caring for a loved one, washing your hands or using an alcohol-based hand  can help stop germs from spreading. Wash your hands:  Before entering and after leaving the patient s room  As soon as you remove gloves or other protective clothing  After changing a dressing or bandage  After any contact with blood or other body fluids  After touching or changing the patient s bed linen or towels  After touching an animal during a pet therapy session (hospital)  After touching an animal, cleaning up after a pet, or preparing food for pets (home)  Many hospitals have sinks or gel dispensers right outside patient rooms. If not, carry a bottle of alcohol-based hand gel with you, and use it every time you visit. Use soap and water (not alcohol-based hand gel) if your hands are visibly dirty.  Tips for good handwashing  Here are some suggestions to follow:  Use warm water and plenty of soap. Work up a good lather.  Clean the whole hand, including under your nails, between your fingers, and up the wrists.  Wash for at least 15 to 20 seconds. Don t just wipe. Scrub  well.  Rinse, letting the water run down your fingers, not up your wrists.  Dry your hands well. Use a paper towel to turn off the faucet and open the door.  Time matters  The longer you wash your hands, the more germs you ll remove. Most people wash their hands for 6 to 7 seconds. But at least 15 seconds are needed to remove germs. Singing Happy Birthday or the Alphabet Song are examples of how long 15 seconds would be. To protect yourself and others from infection, washing for 30 seconds is best.  How to use an alcohol-based hand   Alcohol-based hand  may kill more germs than soap and water. Use them when your hands aren t visibly dirty. For best results, follow these steps:  Choose a gel or spray that contains at least 60 percent alcohol. Products with less alcohol may not kill germs.  Spread about a tablespoon of  in the palm of one hand.  Rub your hands together briskly, cleaning the backs of your hands, the palms, between your fingers, and up the wrists.  Rub until the  is gone, and your hands are completely dry.  Emergency Plan Recommendation:    When to Use the Emergency Department (ED)  An emergency means you could die if you don t get care quickly. Or you could be hurt permanently (disabled). Read below to know when to use -- and when not to use -- an emergency department (also called ED).    Dangers to your life  Here are examples of emergencies. These need immediate care:  A hard time breathing  Severe chest pain  Choking  Severe bleeding  Suddenly not able to move or speak  Blacking out (fainting)`  Poisoning    Dangers of permanent injuries  Here are other emergencies. These also need immediate care:  Deep cuts or severe burns  Broken bones, or sudden severe pain and swelling in a joint    When it s an emergency  If you have an emergency, follow these steps:    1. Go to the nearest emergency department  If you can, go to the hospital ED closest to you right away.  If you  cannot get there right away, or if it is not safe to take yourself, call 911 or your police emergency number.  2. Call your primary care doctor  Tell your doctor about the emergency. Call within 24 hours of going to the ED.  If you cannot call, have someone call for you.  Go to your doctor (not the ED) for any follow-up care.    When it s not an emergency  If a problem is not an emergency, follow these steps:    1. Call your primary care doctor  If you don t know the name of your doctor, call your health plan.  If you cannot call, have someone call for you.  2. Follow instructions  Your doctor will tell you what you should do.  You may be told to see your doctor right away. You may be told to go to the ED. Or you may be told to go to an urgent care center.  Follow your doctor s advice.

## 2021-06-22 NOTE — PROGRESS NOTES
"Mental Health Visit Note    12/28/2018    Start time: 12:05pm    Stop Time: 1:05pm   Session # 2    Session Type: Patient is presenting for an Individual session.    Indra Jason is a 53 y.o. female is being seen today for    Chief Complaint   Patient presents with      Follow Up     Patient presented for follow up psychotherapy to address financial stress, headache and dizziness that makes her wonder if she will die, and feeling \"scary\" which impacts sleep.   .     New symptoms or complaints: None    Functional Impairment:   Personal: 4  Family: 3  Work: 4  Social:4    Clinical assessment of mental status:   Grooming: Well groomed  Attire: Appropriate  Age: Appears Stated  Behavior Towards Examiner: Cooperative  Motor Activity: Within normal   Eye Contact: Downcast  Mood: Depressed  Affect: Congruent w/content of speech, Flat and Depressed  Speech/Language: Delayed/Hesitant and Soft. Quieter today.  Attention: Distractible  Concentration: Brief  Thought Process: Within normal  Thought Content: Hallucinations: Within noraml  Delusions: Within normal  Orientation: X 3  Memory: Impairment  Judgement: No Evidence of Impairment  Estimated Intelligence: Below Average  Demonstrated Insight: Diminished  Fund of Knowledge: inadequate        Suicidal/Homicidal Ideation present: None Reported This Session    Patient's impression of their current status: Patient reports a headache and dizziness today. She expresses frustration about her somatic symptoms and that she can't do the things she used to do, which makes her sad. She shared more about her life in [facility] and when she worked on the [employer] and visited family and friends and had good energy. She doesn't feel like she can do any of that anymore.   Patient endorses the following symptoms:   Sleep: Extremely poor. \"waking up in the night due to pain, cough, thinking, scary.\"   Depressed mood: daily. Feelings of loss and grief. Family is more . We used to all " "live together, now so spread out.  Anxiety/Worries: A lot of worries about meeting basic needs/financial stress. Also continues to feel \"scary.\" Moments of panic thinking \"Maybe I'm going to die\" due to how she feels.   Pain: headache \"all the time\" which is especially bad today. Took Acetaminophen this AM. Other body pains noted.   Appetite: poor  Engagement in activities:  attend [employer] with family and met with a friend.   Medication Adherence: good      Therapist impression of patients current state: Patient presented for follow up individual psychotherapy visit. A professional Turkish  from [employer], Phillip, was present for the visit due to the language complexity.   Patient's daughter was also present for session. Patient was quieter in session today and appeared depressed as she was downcast. She was especially bothered by her headache and some dizziness today which likely impacted her mood. Therapist highly recommends psychiatry services and [facility] for patient. She is having a lot of financial stress and would benefit from more financial resources/support. We reviewed some resource options that [facility] could assist with.   Therapist introduced some concepts of coping with anxiety and fear response. Provided some techniques to try, but it may be hard for patient to remember due to memory impairment. She will likely benefit from visual aids/reminders and help from her daughter who attends session with her. Therapist and patient explored goals for long-term care.  Prior to next session, patient will expect a call from clinic in regards to [facility] referral to schedule and intake and psychiatry appointment information.       Type of psychotherapeutic technique provided: Insight oriented, Client centered and motivational interviewing. Solution focused.    Progress toward short term goals:Progress as expected, established long-term goals for treatment. Specialty  assisted her with connecting with [facility] for " psychiatry services. Therapist completed referral for [facility].     Review of long term goals: Treatment Plan updated and Effective 12/28/2018-03/28/2019    Diagnosis:   1. Episode of recurrent major depressive disorder, unspecified depression episode severity (H)    2. PTSD (post-traumatic stress disorder)        Plan and Follow up: Patient is scheduled for follow up psychotherapy on 1/11/2019      Discharge Criteria/Planning: Client has chronic symptoms and ongoing therapy for maintenance stability recommended.    Karena WILSON 12/28/2018

## 2021-06-22 NOTE — PROGRESS NOTES
1-7-19   My Clinic Care Coordination Care Plan  Thank you for enrolling in Clinic Care Coordination.   My name is Wes Juan and I will be your Clinic Care Guide at [facility].  Direct#: [phone]  Fax: [phone]    [address]  [address]  [phone]    My Preferred Method of Contact:  Phone: [phone]    My Primary/Preferred Language:  Yoruba    Preferred Learning Style:  Reading information, Face to face discussion, Pictures/Diagrams and Hands on teaching    Emergency Contact: Extended Emergency Contact Information  Primary Emergency Contact: Vaishnavi Prabhakar   [place]  Mobile Phone: [phone]  Relation: Child  Secondary Emergency Contact: Syd Prabhakar   [place]  Mobile Phone: [phone]  Relation: Child     PCP:  Heidi Hernandez DO  Care Team            Heidi Hernandez DO   [phone] PCP - General, Family Medicine    Kelsey Barajas   [phone] Audiologist, Audiology    [facility]    Lora Sherman   [phone] Nurse Practitioner, Audiology    [facility]    Olive Ortega, CNP   [phone] Nurse Practitioner, Pain Medicine    [facility]  (Pain Center) [address] [place] [address]    Jen Baxter (PT)   [phone] Physical Therapist, Rehabilitation    Physical Therapy at [facility]  [facility]  [address] [place] [address]    [employer]-     Member ID:                                             PMI#: [id number]     [facility] Care Coordination Care Guide, Clinic Care Coordination    [facility]     [phone]    faxL [phone]    Amandeep Delcid (son in law)   [phone]  Victor M Casie- Daughter [phone]     JAIME signed 1-7-19     Idalia Villavicencio, MSW, [facility], [facility],   [phone] , Behavioral Health    [facility]  for psychiatry services-med management     appt 1-18-19 at 9:15am            Hospitalizations and/or ED Visits  Most Recent:none     Previous: none  Reason:  none    Concerns regarding my health see RN assessment  Advanced Directive/Living Will: The patient was given information regarding Adanced Directives/Living Will      Indra elected to enroll in the [facility] Care Coordination.  Indra was given a copy of the Clinic Care Coordination brochure and full description of how to access their care team both during clinic hours and after hours.   My Care Guide's Name and Phone Number:  HODA Mism; [phone].     The Care Guide and myself agreed to be in contact monthly.      Medication Update  The patient's medication list is up to date per PCP    Health Maintenance and Immunization Update  The patient's preventive health screenings and immunizations are up to date per PCP.    My Emergency Plan  Wait for RN    All [facility] clinic patients have access to a Nurse 24 hours a day, 7 days a week.  If you have questions or want advice from a Nurse, please know [facility] is here for you.  You can call your clinic and they will connect you or you can call Care Connection at [phone].  [facility] also has Walk In Care clinics in multiple locations.  Call the number listed above for more information about our Walk In Care clinics or visit the [facility] website at [email or web address].    Patient Support  I will ask my emergency contact for help in supporting me in these goals.  Relationship to patient: daughter and son   Home # : [phone] , best time to call any time.
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
## 2021-06-22 NOTE — TELEPHONE ENCOUNTER
----- Message from [place] sent at 2018 12:06 PM CST -----  Regarding: FW: CCC referral   Aun,    This is a patient that you worked with before.    ----- Message -----  From: Poli KarenaTAL  Sent: 2018   1:32 PM  To: Cele Kaur RN, Heidi Hernandez, DO, #  Subject: CCC referral                                     Referral for CCC    She has established care with psychotherapist.  Dr. Hernandez is PCP.    Recommending the following:   RN Med set up  PCA referral   Citizenship process  Financial support/resources.  Public/subsidized housing  Possibly apply for soc sec/disability  ARM    I have referred her to [facility] for psychiatry. They will also be able to help her with the citizenship process once she starts the process with [facility] help, such as [facility].     Dx: PTSD. Major depressive disorder, recurrent. Anxiety. Insomnia. Bereavement (spouse  6-7 years ago)     Other factors:   Forgetfulness/memory impairment. Pain (chest, knee back).   Refugee from [place]. Been in  since 2012.  Financial stress- worried about meeting basic needs. Her son in law is the only income for the family. They pay around $1,000/month in rent. 7 of them living off of his 1 income in the household. She has 7 sons and 1 daughter ranging from age 10-40.   Recently returned to [place] about 6 months ago after living in [place] for a bit.    She does receive Food Support and MA through [facility].   Chart says she has an MFIP worker, but she is not sure who or what that is.

## 2021-06-22 NOTE — PROGRESS NOTES
"Outpatient Mental Health Treatment Plan    Name:  Indra Jason  :  1965  MRN:  [id number]    Treatment Plan:  Initial Treatment Plan  Intake/initial treatment plan date:  2018  Benefit and risks and alternatives have been discussed: Yes  Is this treatment appropriate with minimal intrusion/restrictions: Yes  Estimated duration of treatment:  Approximately 20 sessions.  Anticipated frequency of services:  Every 1-2 weeks  Necessity for frequency: This frequency is needed to establish therapeutic goals and for continuity of care in order to monitor progress.  Necessity for treatment: To address cognitive, behavioral, and/or emotional barriers in order to work toward goals and to improve quality of life.    Session Type: Patient is presenting for an Individual session.    Plan:     Personal goal: \"To feel less sad and worried\"     ? Depression    Goal:  Decrease average depression level from 4 to 1.   Strategies:    ?[x] Decrease social isolation     [x] Increase involvement in meaningful activities     ?[x] Discuss sleep hygiene     ?[x] Explore thoughts and expectations about self and others     ?[x] Process grief (loss of significant person, independence, role,        etc.)     ?[x] Assess for suicide risk     ?[x] Implement physical activity routine (with physician approval)     [x] Consider introduction of bibliotherapy and/or videos     [x] Continue compliance with medical treatment plan (or explore         barriers)       ?  ?Degree to which this is a problem: 4  Degree to which goal is met: 1  Date of Review: 2018-2019            ?   ? Anxiety    Goal:  Decrease average anxiety level from 4 to 1.   Strategies: ? [x]Learn and practice relaxation techniques and other coping        strategies (e.g., thought stopping, reframing, meditation)     ? [x] Increase involvement in meaningful activities     ? [x] Discuss sleep hygiene     ? [x] Explore thoughts and expectations about self and " others     ? [x] Identify and monitor triggers for panic/anxiety symptoms     ? [x] Implement physical activity routine (with physician approval)     ? [x] Consider introduction of bibliotherapy and/or videos     ? [x] Continue compliance with medical treatment plan (or explore          barriers)                                           Degree to which this is a problem: 4  Degree to which goal is met: 1  Date of Review: 12/28/2018-03/28/2019      Chronic pain and other somatic symptoms  Goal:  ? Decrease average pain level from 4 to 1.   ? Increase % acceptance of pain from 30 to 60.   Strategies: ? [x] Explore thoughts and expectations about self and others    [x] Explore emotional reactions to illness/injury      ? [x] Learn and practice relaxation techniques and other coping          strategies            [x] Implement physical activity routine (with physician approval)     ? [x] Engage in values clarification and goal-setting     ? [x] Consider introduction of bibliotherapy and/or videos     ? [x] Increase involvement in meaningful activities     ? [x] Discuss sleep hygiene     ? [x] Process grief (loss of significant person, independence, role,          etc.)     ? [x] Assess for suicide risk     ?  Degree to which this is a problem: 4  Degree to which goal is met: 1  Date of Review: 12/28/2018-03/28/2019      Plan:   Other:  PTSD  Goal: Decrease average impact of PTSD symptoms from 4 to 1                        Strategies:      ?[x] Forge a therapeutic alliance built on trust in order to allow for further processing of trauma experience.     ?[x] Assessment of client-acculturation process. Psycho-education about western-therapy.     ?[x] Exposure therapy and psycho-education to develop a sense normalization, legitimization, and description of trauma reactions.      ?[x] Development of trauma timeline or narrative.     ?[x] Cognitive Restructuring to help make sense of the trauma and to put meaning to the  trauma- develop new insights and thorough understanding of behaviors during the event- modify feelings of guilt and shame.      ?[x] Learn and implement somatic/experiential techniques.     ?[x] Learn and Implement Mindfulness/Grounding techniques to decrease hyperarousal.    ?Degree to which this is a problem: 4  Degree to which goal is met: 1  Date of Review:12/28/2018-03/28/2019         Functional Impairment:  1=Not at all/Rarely  2=Some days  3=Most Days  4=Every Day    Personal : 4  Family : 3  Social : 4   Work/school : 4    Diagnosis:  PTSD  Major Depressive Disorder, recurrent      WHODAS 2.0 12-item version: 27 or 56%   Scores presented in qualifiers to represent level of disability.  SEVERE Problem (high, extreme, ...) 50-95%  H1= 30  H2= unsure  H3= some         Clinical assessments and measures completed:. MICAELA-7, PHQ-9 and CAGE-AID, C-SSRS, PHQ-15  PHQ-9 = 17  PHQ-15= 17  MICAELA-7 = 16  C-SSRS: Low risk. No current SI.      Strengths:  She receives the support of her daughter and son in law. Her son-in-law drives her to appointments. She has been referred for psychiatry and Clinic care coordination. She has been in therapy before; therefore, she has some understanding of the therapeutic process. She is open and engaged in session.  Limitations:  Continued acculturation stressors and other basic needs that need to be met first.  Memory impairment- may have difficulty recalling what is discussed, learned and practiced in sessions.  Cultural Considerations: Patient is a Turkmen female. She requires the use of a Turkmen  to communicate with providers. She has fled war in [facility] and lived in refugee camps prior to resettling in the [place]. She experiences grief from the death of her  about 7 years ago. She is a single mother of 7 children, some of whom are adults, but some are young and live at home.    Persons responsible for this plan: Patient and Provider. Coordinate care with PCP as  needed. Coordinate care with Psychiatrist and CCC team once established with them.            Psychotherapist Signature           Patient Signature:              Guardian Signature             Provider: Performed and documented by TAL Kruger   Date:  12/28/2018

## 2021-06-23 NOTE — PROGRESS NOTES
[facility] EXAM note      Chief Complaint   Patient presents with     Follow-up     mammogram       Due to language barrier, an  was present during the history-taking and subsequent discussion (and for part of the physical exam) with this patient.        Assessment & Plan    Problem List Items Addressed This Visit     Chronic lower back pain (Chronic)    Relevant Medications    acetaminophen (Q-PAP EXTRA STRENGTH) 500 MG tablet    TB lung, latent:  Start treatment with [facility] for 9 months. F/u in 1 month for recheck.     Relevant Medications    isoniazid (NYDRAZID) 300 MG tablet    Other Relevant Orders    Hepatic Profile (Completed)    Allergic rhinitis    Relevant Medications    cetirizine (ZYRTEC) 10 MG tablet      Other Visit Diagnoses     Elevated ferritin    -  Primary: recheck today to see if improvement after stopping iron pills. Also HFE analysis warranted. Pending results referral to heme or GI or both.     Relevant Orders    Ferritin (Completed)    Iron and Transferrin Iron Binding Capacity (Completed)    Hepatic Profile (Completed)    Hemochromatosis Mutation Analysis (S65C, C282Y and H63D) (HHMUT)    Visit for screening mammogram        Relevant Orders    Mammo Screening Bilateral    Chronic pain syndrome        Relevant Medications    cyanocobalamin (VITAMIN B-12) 1000 MCG tablet    cholecalciferol, vitamin D3, (VITAMIN D3) 1,000 unit capsule    B12 deficiency        Relevant Medications    cyanocobalamin (VITAMIN B-12) 1000 MCG tablet    Common cold        Relevant Medications    ascorbic acid, vitamin C, (ASCORBIC ACID WITH JEN HIPS) 500 MG tablet    cetirizine (ZYRTEC) 10 MG tablet    Gastroesophageal reflux disease without esophagitis    : continue current regimen    Relevant Medications    omeprazole (PRILOSEC) 20 MG capsule    ranitidine (ZANTAC) 150 MG tablet    Epigastric pain    : will check h. Pylori. Patient does not recall if she was treated. Noted +Ab in 2013.     Relevant  Orders    H. pylori Antigen, Stool(HPSAG)              History    Indra Jason is a 54 y.o.  female who presents for the following issues:    1. Due for mammogram. Wants to do today.     2. TB: + Quantiferon GOld on 11/16. Negative CXR on 11/27 for active disease. Has been treated before back home in [facility] 15 years ago. No close contacts with TB. +chronic cough. No night sweats, weightloss, fevers.     3. Elevated Ferritin Level: lost to f/u briefly as last appt end of November. Noticed she hadn't followed up when she came in for behavioral health appointment. She stopped her iron pills as instructed at the end of november. Iron overload noted with significantly elevated ferritin level and and transferrin saturation. She states she is feeling well without any new concerns today. No RUQ pain. Does have chronic epigastric pain.     4. She is also asking for refills of her medications.         mEDICATIONS    Current Outpatient Medications on File Prior to Visit   Medication Sig Dispense Refill     fluticasone (FLONASE ALLERGY RELIEF) 50 mcg/actuation nasal spray 1-2 sprays a day as needed. 16 g 5     lidocaine (XYLOCAINE) 5 % ointment Apply sized amount to affected area up to 3 times daily. Lower back 35.44 g 2     SEA SOFT NASAL MIST 0.65 % nasal spray USE 1 SPRAY INTO EACH NOSTRIL AS NEEDED FOR CONGESTION. 45 mL 5     No current facility-administered medications on file prior to visit.        Pertinent past medical, surgical, social and family history reviewed and updated in Power [employer].    Social History     Socioeconomic History     Marital status:      Spouse name: Not on file     Number of children: Not on file     Years of education: Not on file     Highest education level: Not on file   Social Needs     Financial resource strain: Not on file     Food insecurity - worry: Not on file     Food insecurity - inability: Not on file     Transportation needs - medical: Not on file     Transportation needs -  "non-medical: Not on file   Occupational History     Not on file   Tobacco Use     Smoking status: Former Smoker     Smokeless tobacco: Never Used     Tobacco comment: no passive exposure in home.   Substance and Sexual Activity     Alcohol use: No     Drug use: No     Sexual activity: Yes   Other Topics Concern     Not on file   Social History Narrative     Not on file         Review of systems     Pertinent Positives and negatives in HPI.     Physical Exam    /82 (Patient Site: Left Arm, Patient Position: Sitting, Cuff Size: Adult Regular)   Pulse 87   Temp 98.3  F (36.8  C) (Oral)   Ht 5' 2\" (1.575 m)   Wt 142 lb 8 oz (64.6 kg)   LMP 12/01/2014   SpO2 98%   BMI 26.06 kg/m    GEN:  54 y.o. female sitting comfortably in no apparent distress.   HEENT: EOMI, no scleral icterus, buccal mucosa moist  Neck: Supple    CHEST/LUNG: No respiratory distress, good air flow to bases, CTAB   CV: RRR, S1, S2 normal; no murmurs, rubs or gallops.   SKIN: warm, dry, no rashes or lesions  NEURO: Gait normal, coordination intact  PSYCH:  Mood and affect appropriate      At the end of the encounter, I discussed results, diagnosis, medications. Discussed red flags for immediate return to clinic/ER, as well as indications for follow up if no improvement. Patient and/or caregiver understood and agreed to plan. Patient was stable for discharge.      Heidi Hernandez"

## 2021-06-23 NOTE — PROGRESS NOTES
2-5-19  Attempt 1: Care Guide called patient. No answer.  If this patient is returning my call, please transfer to [phone].  Upcoming appt 2-8-19 with PCP at 11:20am    Follow up in clinic 2-8-19 at 11:20am with PCP

## 2021-06-23 NOTE — PROGRESS NOTES
"  Mental Health Visit Note    1/11/2019    Start time: 10:49am    Stop Time: 11:43am   Session # 1    Session Type: Patient is presenting for an Individual session.    Indra Jason is a 54 y.o. female is being seen today for    Chief Complaint   Patient presents with     MH Follow Up     Patient presented for follow up psychotherapy to address symptoms of fear, worry and sadness   .     New symptoms or complaints: None    Functional Impairment:   Personal: 4  Family: 3  Work: 4  Social:4    Clinical assessment of mental status: Reviewed. MSE is current effective 1/11/2019  Grooming: Well groomed  Attire: Appropriate  Age: Appears Stated  Behavior Towards Examiner: Cooperative, Guarded  Motor Activity: Within normal   Eye Contact: Downcast  Mood: Depressed  Affect: Congruent w/content of speech, Flat and Depressed  Speech/Language: Delayed/Hesitant and Soft. Quieter today.  Attention: Distractible  Concentration: Brief  Thought Process: Within normal  Thought Content: Hallucinations: Within noraml  Delusions: Within normal  Orientation: X 3  Memory: Impairment  Judgement: No Evidence of Impairment  Estimated Intelligence: Below Average  Demonstrated Insight: Diminished  Fund of Knowledge: inadequate        Suicidal/Homicidal Ideation present: None Reported This Session    Patient's impression of their current status: Patient reports fear, sadness, and scary feelings. She has pain in her right arm today after having labs completed.   Patient endorses the following symptoms:  Sleep:\"good.\" Sometimes troubles with sleeping and nightmares.  Depressed mood: yes- sadness and heavy heart  Anxiety/Worries: yes- worries, fear and scary feeling.   Pain: in right arm. Taking Acetaminophen as needed.  Appetite: normal  Engagement in activities:limited. Isolating lately- encouraged more engagement with family and meaningful activities, such as time with her granddaughter  Medication Adherence: good- reviewed many of the " medications on her med list say discontinued. She is on very few medications at this time.   She did not endorse any headaches or dizziness today.      Therapist impression of patients current state: Patient presented for follow up individual psychotherapy visit. A professional Community Health  from [facility], Denis JULES, was present for the visit due to the language complexity.   Patient's daughter and 1 year old granddaughter was also present for session. Patient had attended appointment with PCP prior to our visit. Patient was quiet in session today and appeared depressed as she was downcast. Her affect was completely flat, no emotional expression. There were some distractions with her 1 year old granddaughter who was present. She has her first psychiatry appointment with [facility] in 1 week. We reviewed what to expect for the visit and also verified she had a ride and the address. She is not on any psychotropic medications and would greatly benefit from them to help with managing depression and trauma symptoms. We developed insight into the mind/body connection today as well as into trauma triggers, such as when people knock on their apartment door. She experiences a sense of hyperarousal and is easily started. We discussed somatic and deep breathing techniques will be helpful with decreasing arousal levels when in fear response. Therapist acknowledge the strengths, internal resources, and resilience patient has. We will continue to work through a trauma lens as well as ACT to help with pain concerns.    Type of psychotherapeutic technique provided: Insight oriented and Client centered, AIR Model for trauma, ACT     Progress toward short term goals:Progress as expected, connected with CCC. Has initial psychiatry appointment at [facility] on 1/18/19 at 9:15am.  Saw PCP today for follow up.    Prior to next session, implement deep breathing relaxation techniques and attend initial psychiatry appointment.      Review of long  term goals: Effective 12/28/2018-03/28/2019. Reviewed, signed, sent to be scanned into EMR.     Diagnosis:   1. PTSD (post-traumatic stress disorder)    2. Moderate episode of recurrent major depressive disorder (H)        Plan and Follow up: Patient is scheduled for follow up psychotherapy on 1/25/2019      Discharge Criteria/Planning: Client has chronic symptoms and ongoing therapy for maintenance stability recommended.    Karena Ashton [facility] 1/11/2019

## 2021-06-23 NOTE — PROGRESS NOTES
Please call patient and inform:  I need her to come in to have one more lab completed to help answer why she has the iron overload. I tried to add it on to her other labs but wasn't able to. She can get this done when she returns her stool sample.

## 2021-06-23 NOTE — TELEPHONE ENCOUNTER
Attempt call #1 with language line,  Melquiades ID# [id number]. Call went to patients voicemail. Left message for patient to call back at [phone].

## 2021-06-23 NOTE — PROGRESS NOTES
2-8-19  Met with patient in clinic and follow up on goals and action steps.  Also present: patient's daughter Victor M and granddaughter Sury.  Tajik ; [facility]  Patient reported:  -still waiting for someone to come and assess for PCA.   Sent message to [facility] regarding referral for PCA assessment referral.    -received only food stamp from [facility] $500/month, no GA. Goal completed.  -Explained to patient that she will not be able to apply for disability benefit/SSI benefit because she is not [place] citizen.   She has been in the  since 2012 and she is over the 7 years rule to apply for SSI benefit.   When she is [place] citizen then she can connect with [employer] for support to apply for SSI benefit.    Deleted disability goal for now until she becomes [place] citizen. Will revisit goal in the future.    -currently living daughter's family. still have issues with connecting with landlord regarding no lights in the 2 bedrooms for 3 months.  Daughter tried many time to resolved but landlord not available.   Daughter will bring in landlord contact information next to give to Care Guide to help advocate [facility] no [facility] in [facility] for 3 months.  -saw Wilmer Fam PA-C at [facility] but not sure if she is completing the medical waiver N-648 form for her. She has appt with Wilmer on 2-11-19 at 10:30am.  Not sure if she has psychiatrist at [facility].  -continue to see Karena Ashton at [facility] for therapy services.  Thanked patient for following up with PCP to address her health concerns.  Emphasized with patient to focus on taking care her health by following through with therapist and PCP and working toward becoming [place] citizen.  Patient smiled and nodded her head. Stated she understands.      Plan:  Meet with daughter in the clinic next week  [facility] no [facility] in bedrooms for 3 months.  Care Guide will connect with [facility]  to clarify and verify if she has psychiatry services at [facility] and consult with [facility] regarding PCA assessment referral.   Follow up in clinic 3-1-19 joint visit with Karena Ashton, therapist.

## 2021-06-23 NOTE — PROGRESS NOTES
"  Mental Health Visit Note    1/25/2019    Start time: 11:13 am    Stop Time: 11:55am   Session # 2    Session Type: Patient is presenting for an Individual session.    Indra Jason is a 54 y.o. female is being seen today for    Chief Complaint   Patient presents with     MH Follow Up     Patient persented for follow up psychotherapy to address symptoms of depression, coping with pain, inability to sleep due to fear.    .     New symptoms or complaints: None    Functional Impairment:   Personal: 4  Family: 3  Work: 4  Social:4    Clinical assessment of mental status:   Grooming: Well groomed  Attire: Appropriate  Age: Appears Stated  Behavior Towards Examiner: Cooperative  Motor Activity: anxious agitation   Eye Contact: Downcast  Mood: Depressed  Affect: Congruent w/content of speech, Tearful, Flat and Depressed  Speech/Language: Delayed/Hesitant and Soft.   Attention: Distractible  Concentration: Brief  Thought Process: Within normal  Thought Content: Hallucinations: Within noraml  Delusions: Within normal  Orientation: X 3  Memory: Impairment  Judgement: No Evidence of Impairment  Estimated Intelligence: Below Average  Demonstrated Insight: Diminished  Fund of Knowledge: inadequate        Suicidal/Homicidal Ideation present: None Reported This Session    Patient's impression of their current status: Patient endorses chest tightness for the last 3 days. She has back, hand and leg pain and weakness in her body as well as constant fatigue. She reports \"irritability\" and \"mood swings.\" A trigger for her is that her son's aren't responsible for her as they should be according to cultural norms. She is living with her daughter, who is a good support and helps with her medications. In her culture, the son takes care of the mother. She feels disappointed in them as they took in their in-laws rather then her. She feels disrespected. They rarely communicate with her and don't visit her. She expresses worry about her " [facility] who lives in [place] and wonders if she should move up here.       Therapist impression of patients current state: Patient presented for follow up individual psychotherapy visit. A professional Syriac  from [facility] [place] [place], was present for the visit due to the language complexity. Patient's daughter and grandchild were in session for a bit and then left as the grandchild became restless and was distracting. She appeared depressed. She became tearful at times, face flushed, and she covered up with a scarf over her head in the moment. Developed insight to her irritability and mood swings and that there is likely a lot of sadness underneath. She is experiencing stress and worry of the sandwich-generation where she is worries about her mother and her children. Therapist recommends [facility] and patient was agreeable. Completed JAIME and referral form for [facility].       Type of psychotherapeutic technique provided: Insight oriented, Client centered and CBT, AIR Model for trauma    Progress toward short term goals:Progress as expected, attended [facility] psychiatry appointment. No medications prescribed yet. Has another follow up on Feb. 2- plans to attend.        Review of long term goals: Not done at today's visit and Effective 12/28/2018-03/28/2019.     Diagnosis:   1. PTSD (post-traumatic stress disorder)    2. Moderate episode of recurrent major depressive disorder (H)        Plan and Follow up: Patient is scheduled for follow up psychotherapy on 2/15/2019, 3/1/2019      Discharge Criteria/Planning: Client has chronic symptoms and ongoing therapy for maintenance stability recommended.    Karena Ashton [facility] 1/25/2019
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
## 2021-06-23 NOTE — PROGRESS NOTES
Faxed signed JAIME to [facility] information services- requested copy of patient's mental health Diagnostic Assessment be faxed to [place] as part of [facility] referral.   Faxed signed JAIME and referral form to [place] for [facility] services.   JAIME for 2-way verbal communication to coordinate mental health care and services. Also release of medical record from [facility], specifically the DA. JAIME effective 1/25/2019-1/25/2020.  Karena PARADA

## 2021-06-23 NOTE — TELEPHONE ENCOUNTER
Please call patient remind her to come have her lab drawn as well as to return her stool sample when she is able.

## 2021-06-23 NOTE — TELEPHONE ENCOUNTER
----- Message from [place] sent at 2018 12:06 PM CST -----  Regarding: FW: CCC referral   Aun,    This is a patient that you worked with before.    ----- Message -----  From: Poli KarenaTAL  Sent: 2018   1:32 PM  To: Cele Kaur RN, Heidi Hernandez, DO, #  Subject: CCC referral                                     Referral for CCC    She has established care with psychotherapist.  Dr. Hernandez is PCP.    Recommending the following:   RN Med set up  PCA referral   Citizenship process  Financial support/resources.  Public/subsidized housing  Possibly apply for soc sec/disability  ARM    I have referred her to [facility] for psychiatry. They will also be able to help her with the citizenship process once she starts the process with [facility] help, such as [employer].     Dx: PTSD. Major depressive disorder, recurrent. Anxiety. Insomnia. Bereavement (spouse  6-7 years ago)     Other factors:   Forgetfulness/memory impairment. Pain (chest, knee back).   Refugee from [place]. Been in  since 2012.  Financial stress- worried about meeting basic needs. Her son in law is the only income for the family. They pay around $1,000/month in rent. 7 of them living off of his 1 income in the household. She has 7 sons and 1 daughter ranging from age 10-40.   Recently returned to [place] about 6 months ago after living in [place] for a bit.    She does receive Food Support and MA through [facility].   Chart says she has an MFIP worker, but she is not sure who or what that is.

## 2021-06-24 NOTE — PROGRESS NOTES
Mental Health Visit Note    3/14/2019    Start time: 10:07 am    Stop Time: 10:50am   Session # 5    Session Type: Patient is presenting for an Individual session.    Indra Jason is a 54 y.o. female is being seen today for    Chief Complaint   Patient presents with     MH Follow Up     Patient presented for follow up psychotherapy to address coping with heavy heart and fatigue that impacts daily activities.   .     New symptoms or complaints: None    Functional Impairment:   Personal: 4  Family: 3  Work: 4  Social:4    Clinical assessment of mental status: Reviewed. MSE is current effective 3/14/19  Grooming: Well groomed  Attire: Appropriate  Age: Appears Stated  Behavior Towards Examiner: Cooperative  Motor Activity: Within normal   Eye Contact: fleeeting  Mood: Depressed  Affect: Congruent w/content of speech  Speech/Language: Within normal and Soft.  Attention: Distractible  Concentration: Brief  Thought Process: Within normal  Thought Content: Hallucinations: Within noraml  Delusions: Within normal  Orientation: X 3  Memory: Impairment  Judgement: No Evidence of Impairment  Estimated Intelligence: Below Average  Demonstrated Insight: Diminished  Fund of Knowledge: inadequate        Suicidal/Homicidal Ideation present: None Reported This Session. No SI/HI    Patient's impression of their current status: Patient denies any pain today and found the OMT with PCP to be helpful. She has concerns about her medication and expresses confusion. She is not sure if the antidepressant is helping to decrease her symptoms. She endorses worries and sadness. She continues to have nightmares at times.     Therapist impression of patients current state: Patient presented for follow up individual psychotherapy visit. A different male professional English  from [employer] was present for the visit due to the language complexity. Her son-in-law was also present for the visit.  Patient appeared calm and comfortable as she  denies physical complaints today. She showed more affect today. She continues to present with symptoms of depression. She is forgetful of skills learned and practiced in session. She may require frequent practice to gain competence and the ability to recall the skills to use outside of our time together.  There are concerns about patient's understanding of medications. Provided education about the importance of good medication adherence. Therapist recommends a visit with [facility] RN for medication education and reviewing set up and refilling meds- sent request to [facility] RN, CG and PCP.  It is not clear if she has started working with [facility] psychiatry- her first appointment may be the one on 3/25/19. Had patient sign JAIME to be able to reach out to [facility]. She has not been connected with an [employer] worker yet. She continues to utilize support of clinic care coordination for citizenship and other goals.   Prior to next session, her son in law will get her medications refilled from the pharmacy.     Type of psychotherapeutic technique provided: Insight oriented, Client centered and Solution-focused, relaxation/grounding    Progress toward short term goals:Progress as expected, attended follow up with PCP yesterday.   She continues to work on grounding/relaxation techniques to help her sleep.      Review of long term goals: Not done at today's visit and Effective 12/28/2018-03/28/2019. Will update next session    Diagnosis:   1. PTSD (post-traumatic stress disorder)    2. Moderate episode of recurrent major depressive disorder (H)        Plan and Follow up: Patient is scheduled for follow up psychotherapy on 3/27/2019  Follow up with [facility] psychiatry: 3/25/19      Discharge Criteria/Planning: Client has chronic symptoms and ongoing therapy for maintenance stability recommended.    Karnea Ashton [facility] 3/14/2019

## 2021-06-24 NOTE — PROGRESS NOTES
Please call patient and inform:  Those labs are still elevated. I really need you to see the specialist. This could be a reason you aren't feeling that well. I have placed another referral. Please schedule soon.
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
## 2021-06-24 NOTE — TELEPHONE ENCOUNTER
Call placed to Indra Riggins's son, to attempt to schedule a consult with Hematology, referral received from Dr Heidi Hernandez.   Left message with my contact information on it and reason for call.    Diagnosis for referral to hematology: Elevated ferritin level.   Will attempt to call again to set up apt.

## 2021-06-24 NOTE — PROGRESS NOTES
"Mental Health Visit Note    3/1/2019    Start time: 11:08 am    Stop Time: 11:49am   Session # 4    Session Type: Patient is presenting for an Individual session.    Indra Jason is a 54 y.o. female is being seen today for    Chief Complaint   Patient presents with      Follow Up     Patient presented for follow up pyschotherapy to address acculturation difficulty, fatigue and sadness.    .     New symptoms or complaints: None    Functional Impairment:   Personal: 4  Family: 3  Work: 4  Social:4    Clinical assessment of mental status:   Grooming: Well groomed  Attire: Appropriate  Age: Appears Stated  Behavior Towards Examiner: Cooperative  Motor Activity: Within normal   Eye Contact: fleeeting  Mood: Depressed  Affect: Congruent w/content of speech and Flat  Speech/Language: Within normal and Soft.  Attention: Distractible  Concentration: Brief  Thought Process: Within normal  Thought Content: Hallucinations: Within noraml  Delusions: Within normal  Orientation: X 3  Memory: Impairment  Judgement: No Evidence of Impairment  Estimated Intelligence: Below Average  Demonstrated Insight: Diminished  Fund of Knowledge: inadequate        Suicidal/Homicidal Ideation present: None Reported This Session    Patient's impression of their current status: Patient had a recent fall at home due to dizziness. She doesn't have ARMHS yet- no follow up from [facility]. Her apartment problems were fixed, which has helped decrease stress levels. Sleep has been \"good,\" although she continues to have nightmares and is unable to fall back asleep after. She reports feeling \"a little better\" now that she has been taking Duloxetine for a few weeks. She reports being \"less scared.\"    Therapist impression of patients current state: Patient presented for follow up individual psychotherapy visit. A professional Kinyarwanda  from [facility] [place] [place], was present for the visit due to the language complexity. Patient's son-in-law brought her to the " appointment today. She appears depressed with flat affect. She is very quiet in session. She experiences hypervigilance and is easily startled. She would benefit from grounding and relaxation techniques to help decrease these symptoms and improve sleep. We reviewed some skills today that she plans to practice prior to next session.   Follow up with [facility]: 3/25/19  Follow up with PCP: 3/13/19    Type of psychotherapeutic technique provided: Insight oriented, Client centered and CBT, relaxation/grounding    Progress toward short term goals:Progress as expected, noticing improvement of mood with medications. Improved living environment after CG followed up on concerns- this helped decrease stress.        Review of long term goals: Not done at today's visit and Effective 12/28/2018-03/28/2019.     Diagnosis:   1. PTSD (post-traumatic stress disorder)    2. Moderate episode of recurrent major depressive disorder (H)        Plan and Follow up: Patient is scheduled for follow up psychotherapy on 3/14/2019      Discharge Criteria/Planning: Client has chronic symptoms and ongoing therapy for maintenance stability recommended.    Karena Ashton [facility] 3/1/2019

## 2021-06-24 NOTE — PROGRESS NOTES
3-8-19  Spoke to patient through [facility] : [employer] and follow up on goals.  Patient reported:  -the care taker came and fixed the lights in the bedrooms. Everything is fixed and working. Goal completed.  -received food stamps $500/month. Goal completed.  -see Wilmer Fam PA-C at [facility] for med management. Goal completed.    Plan:   follow up 4-11-19

## 2021-06-24 NOTE — PROGRESS NOTES
Please call patient and inform:  Your test for the hereditary blood disorder was negative. At this point- please DO NOT take your iron pills. And I would like you to see a blood specialist  Or hematologist. Someone will call you to set up this appointment.

## 2021-06-24 NOTE — TELEPHONE ENCOUNTER
2-20-19  Caller: Matilde,   [phone]  Re: address issue in the apartment    Received call back from Matilde and explained to her of patient concerns regarding no lights in the 2 bedrooms for 3 months, water leakage in the bathroom, no hot/hard water, concern about electricity.  Informed her that there are lights in the other rooms, but not the 2 bedrooms.   Informed her that they changed the light bulbs, but still no lights.  She stated she can't get someone out today due to the weather.  Stated she will send someone out this week.  Informed her that Care Guide will follow up in 2 weeks if this has been addressed.  Thanked for her support.

## 2021-06-24 NOTE — TELEPHONE ENCOUNTER
"Called and left message for pt to return call.# 1  \" Okay to relay message\"  Use  line to contact :Cindy ID:[id number]    "
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
## 2021-06-24 NOTE — TELEPHONE ENCOUNTER
----- Message from Heidi Hernandez DO sent at 2/24/2019  7:26 AM CST -----  Please call patient and inform:  Your test for the hereditary blood disorder was negative. At this point- please DO NOT take your iron pills. And I would like you to see a blood specialist  Or hematologist. Someone will call you to set up this appointment.

## 2021-06-24 NOTE — TELEPHONE ENCOUNTER
Pt's son was called and pt was scheduled for their Hematology appointment on Monday March 4th at 1045, arriving at 1015 to fill out paperwork. Informational packet was mailed (though not sure it will get to them in time, so they'll arrive at 1015 to apt anyway) to pt today and included welcome letter with appointment information, MD bio card, 6-page health history form to fill out, and current medication list to review.
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
## 2021-06-24 NOTE — PROGRESS NOTES
Mental Health Visit Note    2/15/2019    Start time: 11:14 am    Stop Time: 11:56am   Session # 3    Session Type: Patient is presenting for an Individual session.    Indra Jason is a 54 y.o. female is being seen today for    Chief Complaint   Patient presents with     MH Follow Up     Patient presented for follow up psychotherapy to address symptoms of feeling scared and down which causes a tendency to isolate.   .     New symptoms or complaints: None    Functional Impairment:   Personal: 4  Family: 3  Work: 4  Social:4    Clinical assessment of mental status:   Grooming: Well groomed  Attire: Appropriate  Age: Appears Stated  Behavior Towards Examiner: Cooperative  Motor Activity: Within normal   Eye Contact: Downcast, avoidant  Mood: Depressed  Affect: Congruent w/content of speech and Flat  Speech/Language: Soft. Limited.   Attention: Distractible  Concentration: Brief  Thought Process: Within normal  Thought Content: Hallucinations: Within noraml  Delusions: Within normal  Orientation: X 3  Memory: Impairment  Judgement: No Evidence of Impairment  Estimated Intelligence: Below Average  Demonstrated Insight: Diminished  Fund of Knowledge: inadequate        Suicidal/Homicidal Ideation present: None Reported This Session    Patient's impression of their current status: Patient endorses stress and frustration about housing concerns. There are several repairs she wants done to the apartment and there has been no action by management. She is hesitantt o move because she doesn't want to move away from the [facility] community. She continues to have stress headaches, dizziness, pain concerns, and depressed mood. She was started on Duloxetine 30mg by PCP on 2/8/2019. It is too soon for her to notice any improvements from antidepressant. She denies any side effects. She is rescheduled for [facility] on 2/25/19 as previous appt was cancelled due to bad weather.     Therapist impression of patients current state:  Patient presented for follow up individual psychotherapy visit. A professional Hebrew  from [facility], Phillip, was present for the visit due to the language complexity. Patient's son-in-law brought her to the appointment today. She is working closely with the clinic care guideWes, to address goals and housing concerns. She provided the phone number for apartment management and signed an JAIME so care guide can reach out and communicate/advocate on patient's behalf. Being around the Hebrew community is important to her. There are not many apartments that allow their families to live in their traditional ways- several members of a family in a 2 bedroom apartment. Therefore, that is a barrier that she considers when thinking about moving. Therapist reviewed notes from [facility] and PCP for coordination of care. PCP believes dizziness is due to depression, headaches and trauma history. Therapist highly agrees that some of patient's symptoms are a somatic presentation of her mental health concerns. She continues to benefit from psychotherapy, care coordination, and support/advocacy efforts. Therapist provided education on bringing clinic visit summary to [facility] appointment for coordination of care and viewing current medication list. She is compliant with medications.    Type of psychotherapeutic technique provided: Insight oriented, Client centered, Solution-focused and CBT    Progress toward short term goals:Progress as expected, appointment on Feb. 2 at [facility] was rescheduled due to weather conditions.      Good progress in regards to PCP starting patient on antidepressant medications. She will expect a follow up from care guide, Wes, regarding outreach to [facility].    Review of long term goals: Not done at today's visit and Effective 12/28/2018-03/28/2019.     Diagnosis:   1. PTSD (post-traumatic stress disorder)    2. Moderate episode of recurrent major depressive disorder (H)        Plan and Follow up: Patient is  scheduled for follow up psychotherapy on 3/1/2019      Discharge Criteria/Planning: Client has chronic symptoms and ongoing therapy for maintenance stability recommended.    Karena Ashton [facility] 2/15/2019

## 2021-06-25 NOTE — PROGRESS NOTES
I would like Cele to meet with patient again to review medications, set up and refills.   There was a lot of confusion today on what she is to take, what she needs refills of, issues/errors with refilling.   I think another visit for education would be helpful.   It appears there were some errors when son-in-law tried to refill medications at pharmacy. There were 6 medications she was missing today that need to be requested to be filled by pharmacy.   Daughter helps set up medications in the pill box.     There is a medication she says she should be taking and there are refills available, but she hasn't refilled, and it is not on her medication list.   isoniazid 300 mg Oral DAILY (for TB?)     Could we get her scheduled with Cele again please?     I need to see if she is actually connected with [facility] yet- it doesn't sound like it per her report and antidepressant is signed off by Dr. Hernandez.  Aun- lets chat about it.

## 2021-06-25 NOTE — PROGRESS NOTES
In Basket message received for Hematology Consult,refering physician Dr Heidi Hernandez.  Placed call to Indra Riggins's son to set up appointment. Same scheduled for Thursday March 21st 2019 at 10:30 am with , with a nurse apt at 10:00 am. Packet sent via [employer] to Indra with informational letter, [facility] intake form, medication and allergy list, and appointment letter. Work up for elevated ferritin has been done at [facility].   Medical records will collect any outside records.      I explained that Indra would becoming to a cancer center and that the doctors are both cancer and blood doctors. Explained the appointment process of arriving early and bringing Health History and medication allergy list along to appointment.     Given [facility] Number to call if Indra or Vaishnavi has further questions or concerns. [phone]

## 2021-06-25 NOTE — PROGRESS NOTES
Consultation - Microcytosis with high ferritin  Indra Jason,  1965, MRN [id number]    Admitting Dx: Elevated ferritin level [R79.89]    PCP: Heidi Hernandez DO, [phone]   Code status:  [unfilled]       Extended Emergency Contact Information  Primary Emergency Contact: Vaishnavi Prabhakar   [place]  Mobile Phone: [phone]  Relation: Child  Secondary Emergency Contact: Syd Prabhakar   [place]  Mobile Phone: [phone]  Relation: Child       Assessment and Plan   Elevated ferritin and microcytic red cell indices.  The most parsimonious explanation would be a hemoglobinopathy, although those generally have anemia if associated with iron overload.  She is not anemic.  The patient has had HFE testing done and this was normal.  However, the patient is , so variant forms of hereditary hemochromatosus (HFE2, HAMP, TFR2, or NVD36S1) are not excluded.  Https://www.nature.com/articles/wby5167449    Since it is possible she may have a hemoglobinopathy, we will arrange for testing for that.  In the meantime, she can be started on iron chelation therapy with deferasirox (Exjade), which is appropriate for patients with hemoglobinopathy.    If she has no abnormality on hemoglobin panel, then we could arrange for non-HFE testing, though this would entail a referral for genetic counseling.      Active Problems:    * No active hospital problems. *       Chief Complaint High ferritin       HPI    We have been requested to evaluate Indra Jason for elevated iron.  She is a 54 y.o. year old female seen with an .  She has no personal or family history of blood problems.  She was noted to microcytic indices on CBC and an iron panel was sent which was notable for high ferritin and high transferrin saturation.  The patient then had HFE testing done and this was normal.  However, the patient is , so variant forms of hereditary hemochromatosus are not excluded.  Nevertheless,  it is also possible she may have a hemoglobinopathy, and so we will arrange for testing for that.  In the meantime, she can be started on iron chelation therapy with deferasirox (Exjade), which is appropriate for patients with hemoglobinopathy.    If she has no abnormality on hemoglobin panel, then we could arrange for non-HFE testing, though this would entail a referral for genetic counseling.       Medical History  Active Ambulatory (Non-Hospital) Problems    Diagnosis     RBC microcytosis     TB lung, latent     Somatic dysfunction of cervical region     Somatic dysfunction of head region     Somatic dysfunction of thoracic region     Somatic dysfunction of abdominal region     Dizziness     Epigastric pain     Elevated ferritin level     PTSD (post-traumatic stress disorder)     Moderate episode of recurrent major depressive disorder (H)     Elevated liver enzymes     Tension headache     Fatigue     Vitamin D deficiency     Vitamin B12 deficiency anemia     Adjustment Disorder     Past Medical History:   Diagnosis Date     Abdominal pain      Allergic rhinitis 10/7/2016     Anxiety      Cervical Polyps      Chronic lower back pain 2/23/2016     Depression      Eosinophilia 2/6/2012     H. pylori infection      Nonspecific abnormal finding of lung field 2/27/2019     Pulmonary tuberculosis 5/19/2016     Screen for colon cancer 5/2/2013    Surgical History  She  has a past surgical history that includes Abdominal surgery (N/A).   Social History  Reviewed, and she  reports that she quit smoking about 15 years ago. She quit after 15.00 years of use. she has never used smokeless tobacco. She reports that she does not drink alcohol or use drugs.   Allergies  No Known Allergies Family History  Reviewed, and family history is not on file.   Psychosocial Needs  Social History     Social History Narrative     Not on file     Additional psychosocial needs reviewed per nursing assessment.     Prior to Admission Medications  "    (Not in a hospital admission)       Review of Systems:  A 12 point comprehensive review of systems was negative except as noted.  chronic prroblems that include lightheadedness (1 fall at home), numbness in the hands and feet, exertional breathlessness, palpitations and some joint problems.  All are unchanging from baseline. Physical Exam:  [unfilled]    /83   Pulse 86   Temp 98.3  F (36.8  C) (Oral)   Ht 5' 0.5\" (1.537 m)   Wt 147 lb 12.8 oz (67 kg)   LMP 12/01/2014 (Exact Date)   SpO2 95%   BMI 28.39 kg/m    General appearance: alert, appears stated age and cooperative  Head: Normocephalic, without obvious abnormality, atraumatic  Throat: lips, mucosa, and tongue normal; teeth and gums normal  Neck: no adenopathy, no JVD, supple, symmetrical, trachea midline and thyroid not enlarged, symmetric, no tenderness/mass/nodules  Back: symmetric, no curvature. ROM normal. No CVA tenderness.  Lungs: clear to auscultation bilaterally and normal percussion bilaterally  Heart: regular rate and rhythm, S1, S2 normal, no murmur, click, rub or gallop  Abdomen: soft, non-tender; bowel sounds normal; no masses,  no organomegaly  Extremities: extremities normal, atraumatic, no cyanosis or edema  Pulses: 2+ and symmetric  Skin: Skin color, texture, turgor normal. No rashes or lesions  Lymph nodes: Cervical, supraclavicular, and axillary nodes normal.  Neurologic: Alert and oriented X 3, normal strength and tone. Normal symmetric reflexes. Normal coordination and gait       Pertinent Labs  Lab Results: personally reviewed.   Office Visit on 03/13/2019   Component Date Value     Ferritin 03/13/2019 1,367*     Iron 03/13/2019 119      Transferrin 03/13/2019 152*     Transferrin Saturation, * 03/13/2019 63*     Transferrin IBC, Calcula* 03/13/2019 190*   Lab on 02/12/2019   Component Date Value     Specimen Description 02/12/2019 Feces      H pylori Antigen 02/12/2019 SEE NOTES      Report Status 02/12/2019 FINAL " 02/14/2019    Office Visit on 02/08/2019   Component Date Value     Molecular DX Inherit Dis* 02/08/2019 Results Below         Pertinent Radiology  Radiology Results: Not applicable  EKG Results: Not applicable

## 2021-06-26 NOTE — PROGRESS NOTES
"Indra Jason is a 56 y.o. female who is being evaluated via a billable telephone visit.      What phone number would you like to be contacted at? [phone]  How would you like to obtain your AVS? AVS Preference: Mail a copy.    Assessment & Plan     Indra was seen today for medical supplies.    Diagnoses and all orders for this visit:    Urinary incontinence, unspecified type: Appears to have both stress and urge incontinence for last 2 to 3 months.  Recommend she drop off a UA to rule out a UTI.  She will come in the clinic tomorrow to leave a urine sample.  It will prescribe diapers for now.  Consider further work-up with uro-Gyn in future.  -     Urinalysis-UC if Indicated  -     diaper,brief,adult,disposable Misc; Use 1 each As Directed as needed.    Mixed stress and urge urinary incontinence  -     diaper,brief,adult,disposable Misc; Use 1 each As Directed as needed.    Cough: Producing white sputum per patient.  She states this cough has been going on for the last 5 to 6 months and she uses her albuterol inhaler 4 times a day.  This was prescribed for her when she was diagnosed with bronchitis.  No official diagnosis of COPD or asthma but she states that is how she feels and gets short of breath and wheezing at times.  Recommend she come in for chest x-ray she will come in tomorrow for this.  No red flag signs symptoms to suggest an infection at this time.  Suspect more with COPD given smoking history although it sounds [facility].  We will also have her complete PFTs to help make diagnosis.  Follow-up after that.  -     XR Chest 2 Views  -     PFT Complete; Future    Wheezing  -     PFT Complete; Future               BMI:   Estimated body mass index is 29.66 kg/m  as calculated from the following:    Height as of 3/3/21: 5' 1\" (1.549 m).    Weight as of 5/13/21: 157 lb (71.2 kg).     No follow-ups on file.    Heidi Hernandez DO  [facility]    Subjective   Indra Jason is 56 y.o. " and presents today for the following health issues   HPI   Cane that can help me bend when traveling in the plane.   That is easeier for me in case I need to see my mom sometime later on  Hard time bending.   I have the cane but I can not bend it  Has cane. Needs one that bends.     Diapers for incontinuence  Since last 2-3 month  If I cough the urine is leaking in the underwear  If I have to go can't make it to the bathroom in time    Chest pain with cough. Taking cough medicine but does not help.   Since 5-6month and did not help me while i'm coughing my eyes become red.   Some shortness of breath- like asthma  Using inhaler 4 times in a day. That does help me.   While coughing trouble breathing and asthma like symptms.     Used to smoke in [place]. But here no smoking. X 10 year moved to [place]. 2-3 years?      Review of Systems  Denies CP, palpitation, sour taste in mouth  + shortness of breath, cough, white sputum  No n/v/d/c  No fever, chills  No weight loss        Objective       Vitals:  No vitals were obtained today due to virtual visit.    Physical Exam  GEN: NAD over the phone, appropriate tone, poor historian regarding health concerns.          Phone call duration: 17 minutes

## 2021-06-26 NOTE — PROGRESS NOTES
Please call patient and inform:  cxr stable. Your urine had a little bit of red blood cells in it. For your next visit I would like you to schedule first thing in the morning so we can collect your first void. Please schedule f/u visit in a couple of weeks.

## 2021-06-28 ENCOUNTER — COMMUNICATION - HEALTHEAST (OUTPATIENT)
Dept: FAMILY MEDICINE | Facility: CLINIC | Age: 56
End: 2021-06-28

## 2021-06-28 DIAGNOSIS — J98.01 BRONCHOSPASM: ICD-10-CM

## 2021-06-28 RX ORDER — ALBUTEROL SULFATE 90 UG/1
AEROSOL, METERED RESPIRATORY (INHALATION)
Qty: 18 G | Refills: 2 | Status: SHIPPED | OUTPATIENT
Start: 2021-06-28 | End: 2022-03-28

## 2021-06-28 NOTE — PROGRESS NOTES
Progress Notes by Ihsan Braga LGSW at 12/9/2019 10:00 AM     Author: Ihsan Braga LGSW Service: -- Author Type:     Filed: 12/9/2019 11:25 AM Encounter Date: 12/9/2019 Status: Signed    : Ihsan Braga LGSW ()       Clinic Care Coordination Contact  SW met with Indra, daughter, and  for follow up and to update assessment.    Main concerns are getting travel loan forgiven or postponed and insurance switched to [employer].      Clinic Care Coordination Contact  OUTREACH    Referral Information:  Referral Source: PCP    Primary Diagnosis: Psychosocial    Chief Complaint   Patient presents with   ? Clinic Care Coordination - Initial   ? Clinic Care Coodination - Face To Face        Universal Utilization:   Clinic Utilization  Difficulty keeping appointments:: No  Compliance Concerns: No  No-Show Concerns: No  No PCP office visit in Past Year: No  Utilization    Last refreshed: 12/9/2019 10:43 AM:  Hospital Admissions 0           Last refreshed: 12/9/2019 10:43 AM:  ED Visits 1           Last refreshed: 12/9/2019 10:43 AM:  No Show Count (past year) 6              Current as of: 12/9/2019 10:43 AM              Clinical Concerns:  Current Medical Concerns:  Indra's concerns are being managed by PCP    Current Behavioral Concerns: None reported    Education Provided to patient: Role of CCC   Pain  Pain (GOAL):: No  Health Maintenance Reviewed: Not assessed  Clinical Pathway: None     Medication Management:  Indra typically has an in home nurse help with medications, however since PMAP switched to [employer] the nuse couldn't come anymore. Indra and her daughter are working with the PharmD for medication management until the nurse can come back. Indra is working with FRW to switch back to [employer] so the nurse visits will be covered.      Functional Status:  Dependent ADLs:: Ambulation-cane  Bed or wheelchair confined:: No  Mobility Status: Independent w/Device  Fallen 2  or more times in the past year?: No  Any fall with injury in the past year?: No    Living Situation:  Current living arrangement:: I live in a private home with family  Type of residence:: Apartment    Diet/Exercise/Sleep:  Inadequate nutrition (GOAL):: No  Food Insecurity: No  Tube Feeding: No  Exercise:: Currently not exercising  Inadequate activity/exercise (GOAL):: No  Significant changes in sleep pattern (GOAL): No    Transportation:  Transportation concerns (GOAL):: No  Transportation means:: Medical transport, Regular car, Family     Psychosocial:  Spiritism or spiritual beliefs that impact treatment:: No  Mental health DX:: No  Mental health management concern (GOAL):: No  Informal Support system:: Children, Family     Financial/Insurance:   Financial/Insurance concerns (GOAL):: Yes  Indra has a travel loan that was postponed for 6 months. It is back in repayment. MICHELLE is working with Indra and her daughter to get the loan forgiven or postponed again. They brought in [facility] paperwork explaining which benefits she receives and the amount. MICHELLE reminded them the [employer] needed paystubs for everyone in the household. The daughter reported her  did not feel it was necessary to submit them because Indra's case is separate. MICHELLE acknowledged this and also informed them the [employer] may require it. MICHELLE faxed the information along with a letter detailing that Indra does not pay utilities or have a checking account.      Resources and Interventions:  Current Resources:   List of home care services:: Skilled Nursing;         Equipment Currently Used at Home: cane, straight    Advance Care Plan/Directive  Advanced Care Plans/Directives on file:: No          Goals:   Goals        General    Financial Wellbeing (pt-stated)     Notes - Note edited  12/9/2019 11:11 AM by Ihsan Braga LGSW    Goal Statement- I want to defer my travel loan as soon as possible    Action steps to achieve this  goal  1.  My documents have been submitted on 12/9/2019  2.  I will let my CHW know if I receive anything from [facility]    Date goal set:  11/22/2019 BC    Updated: 12/9/2019 BC        Medical (pt-stated)     Notes - Note created  12/9/2019 11:12 AM by Ihsan Braga LGSW    Goal Statement- I want to switch my plan back to [facility] so I can have the same in home nurse come to my home within 1 month    Action steps to achieve this goal  1.  I will work with FRW to switch my insurance from [employer] back to [employer]  2.  I will contact the nurse once this switches so they can come back to my home  3.  I will update my CHW on the status of this goal    Date goal set:  12/9/2019 BC                  Patient/Caregiver understanding: Reported understanding       Future Appointments              Tomorrow Francisco Zafar, PharmD [facility] Medication Therapy Management [facility]    In 1 week Steve Pavon MD; CHANTEL PAVON NURSE [facility] Cancer Care and Hematology, [place]          Plan: Standard outreach

## 2021-06-30 NOTE — PROGRESS NOTES
Progress Notes by Wes Juan CHW at 12/1/2020 10:15 AM     Author: Wes Juan CHW Service: -- Author Type: Community Health Worker    Filed: 12/1/2020 11:48 AM Encounter Date: 12/1/2020 Status: Signed    : Wes Juan CHW (Community Health Worker)       Melanie Antonio Aun, CHW             Hi Wes, pt. Is scheduled 12/17/@5613 .Johns I called and spoke with brother Man he will find a ride to bring her to appt.  agustin

## 2021-06-30 NOTE — PROGRESS NOTES
Progress Notes by Ihsan Braga LGSW at 12/7/2020  9:00 AM     Author: Ihsan Braga LGSW Service: -- Author Type:     Filed: 12/7/2020  2:20 PM Encounter Date: 12/7/2020 Status: Signed    : Ihsan Braga LGSW ()       Clinic Care Coordination Contact  CCC MICHELLE contacted Indra and her brother with an  to update her assessment for continued enrollment in [facility].    MICHELLE setup a [employer] for Indra for her appointment on 12/17/2020 for her MRI.    SW reviewed with her brother the information that is needed for her travel loan deferment request. SW will assist in trying to get a new copy of her county benefits.     Man reported rent is $740, $50 for electric, and $40 for cell phone. They split this.        Clinic Care Coordination Contact  OUTREACH    Referral Information:  Referral Source: PCP    Primary Diagnosis: Psychosocial    Chief Complaint   Patient presents with   ? Clinic Care Coordination - Initial        Universal Utilization:   Clinic Utilization  Difficulty keeping appointments:: No  Compliance Concerns: No  No-Show Concerns: No  No PCP office visit in Past Year: No  Utilization    Last refreshed: 12/7/2020 11:31 AM: Hospital Admissions 1           Last refreshed: 12/7/2020 11:31 AM: ED Visits 3           Last refreshed: 12/7/2020 11:31 AM: No Show Count (past year) 11              Current as of: 12/7/2020 11:31 AM              Clinical Concerns:  Current Medical Concerns:  No new concerns reported    Current Behavioral Concerns: Man (her brother) reported sometimes she gets mad at him when he asks about her eating food. He will encourage her to eat and she gets upset sometimes. He isn't sure why    Education Provided to patient: Role of CCC   Pain  Pain (GOAL):: No  Health Maintenance Reviewed: Not assessed  Clinical Pathway: None     Medication Management:  No concerns     Functional Status:  Dependent ADLs:: Dressing, Bathing  Dependent IADLs::  Cooking, Laundry, Medication Management, Transportation  Mobility Status: Independent w/Device  Fallen 2 or more times in the past year?: No  Any fall with injury in the past year?: No    Living Situation:  Current living arrangement:: I live in a private home with family  Type of residence:: Apartment    Lifestyle & Psychosocial Needs:        Diet:: Regular(Eats very little, sometimes a good appetite, have enough food to eat)  Inadequate nutrition (GOAL):: No  Tube Feeding: No  Inadequate activity/exercise (GOAL):: No  Significant changes in sleep pattern (GOAL): No  Transportation means:: Family     Worship or spiritual beliefs that impact treatment:: No  Mental health DX:: No  Mental health management concern (GOAL):: No  Informal Support system:: Children, Family   Socioeconomic History   ? Marital status:      Spouse name: Not on file   ? Number of children: 8   ? Years of education: Not on file   ? Highest education level: Not on file     Tobacco Use   ? Smoking status: Former Smoker     Years: 15.00     Quit date: 3/21/2004     Years since quittin.7   ? Smokeless tobacco: Never Used   ? Tobacco comment: no passive exposure in home.   Substance and Sexual Activity   ? Alcohol use: No   ? Drug use: No   ? Sexual activity: Never                Resources and Interventions:  Current Resources:   Skilled Home Care Services: Skilled Nursing(Once weekly)  Community Resources: PCA(Daughter is her PCA, about 8 hours per day)     Equipment Currently Used at Home: cane straight(Nurse recommending she get a walker)  Type of Employment: Unemployed       Advance Care Plan/Directive  Advanced Care Plans/Directives on file:: No  Advanced Care Plan/Directive Status: Not Applicable          Goals:   Goals        General    Financial Wellbeing (pt-stated)     Notes - Note edited  2020 11:02 AM by Wes Juan, CHW    Goal Statement- I want to defer my travel loan as soon as possible    Action steps to achieve  this goal  1. Brother (Vicente) will help to gather the all necessary document to submit to get the loan deferral  2. I will update CCC Team at next outreach.    Updated: 12/1/2020            Medical (pt-stated)     Notes - Note edited  12/1/2020 11:01 AM by Wes Juan, CHW    Goal Statement: I will schedule follow up with PCP after I complete MRI scan within 1 month.  Date Goal set: 08/18/20    Barriers: language  Strengths: family support  Date to Achieve By: September  Patient expressed understanding of goal: yes  Action steps to achieve this goal:  1. Brother (Vicente) will wait to get a call from the clinic to reschedule MRI appt at [facility] on 11-10-20 and 10-9-20.  2. I will report to my Community Health Worker if any additional resources or support needed.    Updated: 12-1-20 AL    09/11/20  Pt notes that she would like to wait until after imaging to follow up with PCP            Patient/Caregiver understanding: Reported understanding       Future Appointments              In 1 week CHANTEL RADIOLOGY NURSE; CHANTEL MR 1 M [facility],           Plan: SW is working on trying to get a copy of her [facility] benefits mailed to her, Standard Outreach

## 2021-07-03 NOTE — ADDENDUM NOTE
Addendum Note by Francisco Martino, PharmGLENN at 12/27/2019 10:30 AM     Author: Francisco Martino PharmD Service: -- Author Type: Pharmacist    Filed: 12/30/2019 11:55 AM Encounter Date: 12/27/2019 Status: Signed    : Francisco Martino PharmD (Pharmacist)    Addended by: FRANCISCO MARTINO on: 12/30/2019 11:55 AM        Modules accepted: Orders

## 2021-07-03 NOTE — ADDENDUM NOTE
Addendum Note by Heidi Hernandez DO at 8/5/2019  4:40 PM     Author: Heidi Hernandez DO Service: -- Author Type: Physician    Filed: 8/5/2019  4:40 PM Encounter Date: 8/5/2019 Status: Signed    : Heidi Hernandez DO (Physician)    Addended by: HEIDI HERNANDEZ on: 8/5/2019 04:40 PM        Modules accepted: Orders

## 2021-07-03 NOTE — ADDENDUM NOTE
Addendum Note by Heidi Hernandez DO at 3/13/2019  1:40 PM     Author: Heidi Hernandez DO Service: -- Author Type: Physician    Filed: 3/13/2019 10:13 PM Encounter Date: 3/13/2019 Status: Signed    : Heidi Hernandez DO (Physician)    Addended by: HEIDI HERNANDEZ on: 3/13/2019 10:13 PM        Modules accepted: Orders

## 2021-07-07 NOTE — TELEPHONE ENCOUNTER
Medication Request  Medication name: Albuterol HFA  Requested [facility]acy: [facility]  Reason for request: Refill  When did you use medication last?:  refill  Patient offered appointment:  N/A - electronic request  Okay to leave a detailed message: no

## 2021-07-07 NOTE — TELEPHONE ENCOUNTER
RN cannot approve Refill Request    RN can NOT refill this medication Prescribed for acute reasons. Last office visit: 3/3/2021 Heidi Hernandez DO Last Physical: 10/17/2018 Last MTM visit: Visit date not found Last visit same specialty: 5/13/2021 Francoise Boone PA-C.  Next visit within 3 mo: Visit date not found  Next physical within 3 mo: Visit date not found      Edith Garber, [facility] Triage/Med Refill 6/28/2021    Requested Prescriptions   Pending Prescriptions Disp Refills     albuterol (PROAIR HFA;PROVENTIL HFA;VENTOLIN HFA) 90 mcg/actuation inhaler 18 g 2     Sig: INHALE 2 PUFFS INTO THE LUNGS FOUR TIMES A DAY       Albuterol/Levalbuterol Refill Protocol Passed - 6/28/2021  2:02 PM        Passed - PCP or prescribing provider visit in last year     Last office visit with prescriber/PCP: 3/3/2021 Heidi Hernandez DO OR same dept: 5/13/2021 Francoise Boone PA-C OR same specialty: 5/13/2021 Francoise Boone PA-C Last physical: 10/17/2018       Next appt within 3 mo: Visit date not found  Next physical within 3 mo: Visit date not found  Prescriber OR PCP: Heidi Hernandez DO  Last diagnosis associated with med order: 1. Bronchospasm  - albuterol (PROAIR HFA;PROVENTIL HFA;VENTOLIN HFA) 90 mcg/actuation inhaler; INHALE 2 PUFFS INTO THE LUNGS FOUR TIMES A DAY  Dispense: 18 g; Refill: 2    If protocol passes may refill for 6 months if within 3 months of last provider visit (or a total of 9 months). If patient requesting >1 inhaler per month refill x 6 months and have patient make appointment with provider.

## 2021-07-09 ENCOUNTER — MEDICAL CORRESPONDENCE (OUTPATIENT)
Dept: HEALTH INFORMATION MANAGEMENT | Facility: CLINIC | Age: 56
End: 2021-07-09

## 2021-08-07 ENCOUNTER — HOSPITAL ENCOUNTER (EMERGENCY)
Facility: HOSPITAL | Age: 56
Discharge: HOME | End: 2021-08-07
Attending: EMERGENCY MEDICINE | Admitting: EMERGENCY MEDICINE
Payer: COMMERCIAL

## 2021-08-07 ENCOUNTER — APPOINTMENT (OUTPATIENT)
Dept: RADIOLOGY | Facility: HOSPITAL | Age: 56
End: 2021-08-07
Attending: STUDENT IN AN ORGANIZED HEALTH CARE EDUCATION/TRAINING PROGRAM
Payer: COMMERCIAL

## 2021-08-07 DIAGNOSIS — S82.65XA CLOSED NONDISPLACED FRACTURE OF LATERAL MALLEOLUS OF LEFT FIBULA, INITIAL ENCOUNTER: ICD-10-CM

## 2021-08-07 PROCEDURE — 73610 X-RAY EXAM OF ANKLE: CPT | Mod: LT

## 2021-08-07 PROCEDURE — 99284 EMERGENCY DEPT VISIT MOD MDM: CPT | Mod: 25

## 2021-08-07 PROCEDURE — 27786 TREATMENT OF ANKLE FRACTURE: CPT | Mod: LT

## 2021-08-07 NOTE — DISCHARGE INSTRUCTIONS
You were seen in the Emergency Department today for evaluation of left ankle pain.  Your imaging studies showed a distal fibula fracture. Follow up with [facility] to ensure resolution of symptoms. Return if you have new or worsening symptoms.
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
## 2021-08-07 NOTE — ED TRIAGE NOTES
Patient had fall today around 11am to noon injuring her left ankle. Pain and swelling in ankle area. Speaks Yi.

## 2021-08-12 ENCOUNTER — TRANSFERRED RECORDS (OUTPATIENT)
Dept: HEALTH INFORMATION MANAGEMENT | Facility: CLINIC | Age: 56
End: 2021-08-12

## 2021-08-24 ENCOUNTER — TELEPHONE (OUTPATIENT)
Dept: FAMILY MEDICINE | Facility: CLINIC | Age: 56
End: 2021-08-24

## 2021-08-24 NOTE — TELEPHONE ENCOUNTER
Patient reached out to Gema  to help her start the process of applying to Social Security benefits. Gema would like to know if this process has been started but she wants to double check to make sure that this has not been started yet. Please call Gema, [phone] to advise. Please call patient and conference Gema in the call.

## 2021-08-25 PROCEDURE — G0180 MD CERTIFICATION HHA PATIENT: HCPCS | Performed by: FAMILY MEDICINE

## 2021-08-27 ENCOUNTER — LAB (OUTPATIENT)
Dept: INFUSION THERAPY | Facility: HOSPITAL | Age: 56
End: 2021-08-27
Attending: INTERNAL MEDICINE
Payer: COMMERCIAL

## 2021-08-27 ENCOUNTER — ONCOLOGY VISIT (OUTPATIENT)
Dept: ONCOLOGY | Facility: HOSPITAL | Age: 56
End: 2021-08-27
Attending: INTERNAL MEDICINE
Payer: COMMERCIAL

## 2021-08-27 VITALS
DIASTOLIC BLOOD PRESSURE: 92 MMHG | RESPIRATION RATE: 16 BRPM | WEIGHT: 153.8 LBS | HEART RATE: 75 BPM | TEMPERATURE: 98.1 F | OXYGEN SATURATION: 99 % | SYSTOLIC BLOOD PRESSURE: 121 MMHG | BODY MASS INDEX: 29.06 KG/M2

## 2021-08-27 DIAGNOSIS — R79.89 ELEVATED FERRITIN LEVEL: Primary | ICD-10-CM

## 2021-08-27 DIAGNOSIS — R79.89 ELEVATED FERRITIN LEVEL: ICD-10-CM

## 2021-08-27 LAB
ALBUMIN SERPL-MCNC: 3.9 G/DL (ref 3.5–5)
ALP SERPL-CCNC: 84 U/L (ref 45–120)
ALT SERPL W P-5'-P-CCNC: 15 U/L (ref 0–45)
ANION GAP SERPL CALCULATED.3IONS-SCNC: 7 MMOL/L (ref 5–18)
AST SERPL W P-5'-P-CCNC: 17 U/L (ref 0–40)
BASOPHILS # BLD AUTO: 0 10E3/UL (ref 0–0.2)
BASOPHILS NFR BLD AUTO: 0 %
BILIRUB SERPL-MCNC: 0.6 MG/DL (ref 0–1)
BUN SERPL-MCNC: 12 MG/DL (ref 8–22)
CALCIUM SERPL-MCNC: 9.7 MG/DL (ref 8.5–10.5)
CHLORIDE BLD-SCNC: 104 MMOL/L (ref 98–107)
CO2 SERPL-SCNC: 31 MMOL/L (ref 22–31)
CREAT SERPL-MCNC: 0.76 MG/DL (ref 0.6–1.1)
EOSINOPHIL # BLD AUTO: 0.1 10E3/UL (ref 0–0.7)
EOSINOPHIL NFR BLD AUTO: 2 %
ERYTHROCYTE [DISTWIDTH] IN BLOOD BY AUTOMATED COUNT: 14.2 % (ref 10–15)
FERRITIN SERPL-MCNC: 222 NG/ML (ref 10–130)
GFR SERPL CREATININE-BSD FRML MDRD: 87 ML/MIN/1.73M2
GLUCOSE BLD-MCNC: 122 MG/DL (ref 70–125)
HCT VFR BLD AUTO: 39.9 % (ref 35–47)
HGB BLD-MCNC: 11.7 G/DL (ref 11.7–15.7)
IMM GRANULOCYTES # BLD: 0 10E3/UL
IMM GRANULOCYTES NFR BLD: 0 %
LYMPHOCYTES # BLD AUTO: 2.8 10E3/UL (ref 0.8–5.3)
LYMPHOCYTES NFR BLD AUTO: 33 %
MCH RBC QN AUTO: 22.7 PG (ref 26.5–33)
MCHC RBC AUTO-ENTMCNC: 29.3 G/DL (ref 31.5–36.5)
MCV RBC AUTO: 77 FL (ref 78–100)
MONOCYTES # BLD AUTO: 0.4 10E3/UL (ref 0–1.3)
MONOCYTES NFR BLD AUTO: 5 %
NEUTROPHILS # BLD AUTO: 5.1 10E3/UL (ref 1.6–8.3)
NEUTROPHILS NFR BLD AUTO: 60 %
NRBC # BLD AUTO: 0 10E3/UL
NRBC BLD AUTO-RTO: 0 /100
PLATELET # BLD AUTO: 294 10E3/UL (ref 150–450)
POTASSIUM BLD-SCNC: 4 MMOL/L (ref 3.5–5)
PROT SERPL-MCNC: 8.5 G/DL (ref 6–8)
RBC # BLD AUTO: 5.16 10E6/UL (ref 3.8–5.2)
SODIUM SERPL-SCNC: 142 MMOL/L (ref 136–145)
WBC # BLD AUTO: 8.4 10E3/UL (ref 4–11)

## 2021-08-27 PROCEDURE — 99214 OFFICE O/P EST MOD 30 MIN: CPT | Performed by: INTERNAL MEDICINE

## 2021-08-27 PROCEDURE — 85025 COMPLETE CBC W/AUTO DIFF WBC: CPT

## 2021-08-27 PROCEDURE — G0463 HOSPITAL OUTPT CLINIC VISIT: HCPCS

## 2021-08-27 PROCEDURE — 82040 ASSAY OF SERUM ALBUMIN: CPT

## 2021-08-27 PROCEDURE — 82728 ASSAY OF FERRITIN: CPT

## 2021-08-27 PROCEDURE — 36415 COLL VENOUS BLD VENIPUNCTURE: CPT

## 2021-08-27 RX ORDER — HYDROCODONE BITARTRATE AND ACETAMINOPHEN 5; 325 MG/1; MG/1
TABLET ORAL
COMMUNITY
Start: 2021-08-20 | End: 2021-10-03

## 2021-08-27 ASSESSMENT — PAIN SCALES - GENERAL: PAINLEVEL: SEVERE PAIN (6)

## 2021-08-27 NOTE — PROGRESS NOTES
"Oncology Rooming Note    August 27, 2021 1:50 PM   Indra Jason is a 56 year old female who presents for:    Chief Complaint   Patient presents with     Oncology Clinic Visit     Initial Vitals: BP (!) 121/92 (BP Location: Left arm, Cuff Size: Adult Regular)   Pulse 75   Temp 98.1  F (36.7  C) (Oral)   Resp 16   Wt 69.8 kg (153 lb 12.8 oz)   SpO2 99%   BMI 29.06 kg/m   Estimated body mass index is 29.06 kg/m  as calculated from the following:    Height as of 3/3/21: 1.549 m (5' 1\").    Weight as of this encounter: 69.8 kg (153 lb 12.8 oz). Body surface area is 1.73 meters squared.  Severe Pain (6) Comment: Data Unavailable   No LMP recorded. Patient is postmenopausal.  Allergies reviewed: Yes  Medications reviewed: Yes    Medications: MEDICATION REFILLS NEEDED TODAY. Provider was notified.  Pharmacy name entered into EPIC: [place], [place] - [address] [place] [address]. [address]    Clinical concerns: continued left ankle pain and swelling. Refill of [place].   Having occ headaches and dizziness    Annmarie Brown              "

## 2021-08-27 NOTE — PROGRESS NOTES
[facility] Hematology and Oncology Progress Note    Patient: Indra Jason  MRN: [id number]  Date of Service: 03/20/2020        Reason for Visit    Chief Complaint   Patient presents with     Benign Hematology     Elevated ferritin level       Assessment and Plan      Elevated ferritin, possible hemochromatosis  Iron overload in the liver and heart  Microcytic red cells with normal hemoglobin electrophoresis    No signs or symptoms of iron overload.  Last MRI of the liver was normal.  Ferritin was down to 185.    Ferritin level from today is pending.  For now we will continue observation.    We will plan to see the patient back again in a year with recheck of labs a week before she returns.    No indication for Exjade at this time.  Will consider if ferritin level rises or there is MRI evidence of iron overload.    Plan: As above    Measurable disease: Ferritin, iron saturation    Current therapy: Exjade 1250 mg p.o. daily since December 26, 2019  Previously on 1000 mg p.o. daily for 3 to 4 months between June and October 2019        ECOG Performance   ECOG Performance Status: 2    Distress Assessment  Distress Assessment Score: No distress    Pain         Problem List    1. Iron overload syndrome  HM1(CBC and Differential)    Comprehensive Metabolic Panel    Ferritin        CC: Heidi Hernandez,     ______________________________________________________________________________    History of Present Illness    Ms. Indra Jason was spoken to with help of an .  Seen 6 months ago.  Has been off Exjade therapy since a year ago in August.  Had a fall and had a fracture in the left ankle for which she has bandage and is having some pain.  Also some headaches and dizziness.  Good energy level.  No other joint symptoms.  No rash.  ECOG status is 1.    Pain Status  Currently in Pain: No/denies    Review of Systems    12 point review of systems completed.  Integumentary (WDL): All integumentary elements are within  defined limits  Patient Coping  Patient Coping: Accepting  Distress Assessment  Distress Assessment Score: No distress  Accompanied by       Past History  Past Medical History:   Diagnosis Date     Abdominal pain     Created by Conversion   Overview:  Normal colonoscopy 05/02/2013     Allergic rhinitis 10/7/2016     Anxiety      Cervical Polyps     Created by Conversion   Overview:  Overview:  Created by Conversion     Chronic lower back pain 2/23/2016     Depression      Eosinophilia 2/6/2012     H. pylori infection      Nonspecific abnormal finding of lung field 2/27/2019    Overview:  Refer to clinic note Dr Jones 2/27/2013     Pulmonary tuberculosis 5/19/2016    Overview:  History of pulmonary tuberculosis treated in 1994.  AFB smears/culture September 13-15, 2011 negative.     Screen for colon cancer 5/2/2013    Overview:  Normal colonoscopy 05/02/2013         Past Surgical History:   Procedure Laterality Date     ABDOMINAL SURGERY N/A        Physical Exam    Recent Vitals 12/19/2019   Height -   Weight 149 lbs 11 oz   BSA (m2) 1.7 m2   BP -   Pulse -   Temp 98.4   Temp src 1   SpO2 -   Some recent data might be hidden       No physical exam done.        Lab Results    Roopa pending.  Normal CBC and CMP.  Imaging    Liver and cardiac MRI reviewed.    Signed by: Steve Pavon MD

## 2021-08-27 NOTE — LETTER
"    8/27/2021         RE: Indra Jason  [address]  [place] [address]        Dear Colleague,    Thank you for referring your patient, Indra Jason, to the [facility]. Please see a copy of my visit note below.    Oncology Rooming Note    August 27, 2021 1:50 PM   Indra Jason is a 56 year old female who presents for:    Chief Complaint   Patient presents with     Oncology Clinic Visit     Initial Vitals: BP (!) 121/92 (BP Location: Left arm, Cuff Size: Adult Regular)   Pulse 75   Temp 98.1  F (36.7  C) (Oral)   Resp 16   Wt 69.8 kg (153 lb 12.8 oz)   SpO2 99%   BMI 29.06 kg/m   Estimated body mass index is 29.06 kg/m  as calculated from the following:    Height as of 3/3/21: 1.549 m (5' 1\").    Weight as of this encounter: 69.8 kg (153 lb 12.8 oz). Body surface area is 1.73 meters squared.  Severe Pain (6) Comment: Data Unavailable   No LMP recorded. Patient is postmenopausal.  Allergies reviewed: Yes  Medications reviewed: Yes    Medications: MEDICATION REFILLS NEEDED TODAY. Provider was notified.  Pharmacy name entered into EPIC: [facility] - [place], [place] - [address] [place] [address]. [address]    Clinical concerns: continued left ankle pain and swelling. Refill of [place].   Having occ headaches and dizziness    Annmarie SARGENT [facility] Hematology and Oncology Progress Note    Patient: Indra Jason  MRN: [id number]  Date of Service: 03/20/2020        Reason for Visit    Chief Complaint   Patient presents with     Benign Hematology     Elevated ferritin level       Assessment and Plan      Elevated ferritin, possible hemochromatosis  Iron overload in the liver and heart  Microcytic red cells with normal hemoglobin electrophoresis    No signs or symptoms of iron overload.  Last MRI of the liver was normal.  Ferritin was down to 185.    Ferritin level from today is pending.  For now we will continue observation.    We will plan to see " the patient back again in a year with recheck of labs a week before she returns.    No indication for Exjade at this time.  Will consider if ferritin level rises or there is MRI evidence of iron overload.    Plan: As above    Measurable disease: Ferritin, iron saturation    Current therapy: Exjade 1250 mg p.o. daily since December 26, 2019  Previously on 1000 mg p.o. daily for 3 to 4 months between June and October 2019        ECOG Performance   ECOG Performance Status: 2    Distress Assessment  Distress Assessment Score: No distress    Pain         Problem List    1. Iron overload syndrome  HM1(CBC and Differential)    Comprehensive Metabolic Panel    Ferritin        CC: Heidi Hernandez, DO    ______________________________________________________________________________    History of Present Illness    Ms. Indra Jason was spoken to with help of an .  Seen 6 months ago.  Has been off Exjade therapy since a year ago in August.  Had a fall and had a fracture in the left ankle for which she has bandage and is having some pain.  Also some headaches and dizziness.  Good energy level.  No other joint symptoms.  No rash.  ECOG status is 1.    Pain Status  Currently in Pain: No/denies    Review of Systems    12 point review of systems completed.  Integumentary (WDL): All integumentary elements are within defined limits  Patient Coping  Patient Coping: Accepting  Distress Assessment  Distress Assessment Score: No distress  Accompanied by       Past History  Past Medical History:   Diagnosis Date     Abdominal pain     Created by Conversion   Overview:  Normal colonoscopy 05/02/2013     Allergic rhinitis 10/7/2016     Anxiety      Cervical Polyps     Created by Conversion   Overview:  Overview:  Created by Conversion     Chronic lower back pain 2/23/2016     Depression      Eosinophilia 2/6/2012     H. pylori infection      Nonspecific abnormal finding of lung field 2/27/2019    Overview:  Refer to clinic note   Robert 2/27/2013     Pulmonary tuberculosis 5/19/2016    Overview:  History of pulmonary tuberculosis treated in 1994.  AFB smears/culture September 13-15, 2011 negative.     Screen for colon cancer 5/2/2013    Overview:  Normal colonoscopy 05/02/2013         Past Surgical History:   Procedure Laterality Date     ABDOMINAL SURGERY N/A        Physical Exam    Recent Vitals 12/19/2019   Height -   Weight 149 lbs 11 oz   BSA (m2) 1.7 m2   BP -   Pulse -   Temp 98.4   Temp src 1   SpO2 -   Some recent data might be hidden       No physical exam done.        Lab Results    [place] pending.  Normal CBC and CMP.  Imaging    Liver and cardiac MRI reviewed.    Signed by: Steve Pavon MD        Again, thank you for allowing me to participate in the care of your patient.        Sincerely,        Steve Pavon MD

## 2021-08-31 ENCOUNTER — PATIENT OUTREACH (OUTPATIENT)
Dept: CARE COORDINATION | Facility: CLINIC | Age: 56
End: 2021-08-31

## 2021-08-31 DIAGNOSIS — Z78.9 ENROLLED IN CHRONIC CARE MANAGEMENT: ICD-10-CM

## 2021-08-31 DIAGNOSIS — Z76.89 HEALTH CARE HOME: Primary | ICD-10-CM

## 2021-08-31 DIAGNOSIS — Z53.9 DIAGNOSIS NOT YET DEFINED: Primary | ICD-10-CM

## 2021-08-31 NOTE — PROGRESS NOTES
8/31/2021  Clinic Care Coordination Contact  Community Health Worker Initial Outreach  UTC/Voicemail     Antoine Ribeiro , [phone]   Clinical Data: Care Coordinator Outreach Enrollment   Outreach attempted x 1.  Left message on patient's voicemail with call back information and requested return call.  Plan: Care Coordinator will try to reach patient again in 1-2 business days.      CHW Outreach 9-1-21

## 2021-09-01 ENCOUNTER — PATIENT OUTREACH (OUTPATIENT)
Dept: NURSING | Facility: CLINIC | Age: 56
End: 2021-09-01

## 2021-09-01 NOTE — PROGRESS NOTES
9/1/2021  Clinic Care Coordination Contact  Community Health Worker Initial Outreach    CHW Initial Information Gathering:  Referral Source: Self-patient/Caregiver  Preferred Hospital: [facility]  [phone]  Preferred Urgent Care: [facility], [phone]  Current living arrangement:: I live in a private home with family (lives with brother's family)  Type of residence:: Apartment  Community Resources: Home Care, PCA (nurse services, [facility])  Supplies used at home:: None, Other  Equipment Currently Used at Home: cane, straight  Informal Support system:: Family  No PCP office visit in Past Year: No  Transportation means:: Family, Medical transport (friends, family, has medical transportation but has no one to help call)       Patient accepts CC: Yes. Patient scheduled for assessment with CCC  on [facility]  at 9-9-21 . Patient noted desire to discuss needing a walker, apply for social security benefit, and help to apply for naturalization certificate for the 2 kids.  Already a [place] citizen 3 years ago 2018  [facility] helped with citizenship process.    Currently getting county benefit.  Has nurse that comes out to set up meds   Does not know the name of the nurse or agency. Will give CHW contact to nurse  Has PCA 8.5 hours a day    [facility] -PCA services  [address] [address]  [phone]  PCA services 8.5 hours    Patient gave verbal permission and consent to talk to Gema and serve as [facility] .  Explained HIPPA.  Patient will sign JAIME to communicate with Gema works at [facility].  [phone]    CHW review assessment, goals. Delegations, and consult with [facility] if needed 9-9-21

## 2021-09-08 ENCOUNTER — TELEPHONE (OUTPATIENT)
Dept: FAMILY MEDICINE | Facility: CLINIC | Age: 56
End: 2021-09-08

## 2021-09-08 NOTE — TELEPHONE ENCOUNTER
Travel questionnaire was asked. Verified that they have no signs of COVID-19 symptoms.    Patient dropped off MEDICAL OPINION for Dr. Hernandez to fill out. Placed the original copies in the 's slot.    When forms are completed, patient would like it:    -Please call patient to let them know it's ready    Ok to leave a detailed message if unable to get a hold of the Patient.    Please re-route task back to the  to shred the copied forms and complete the task. Thanks!

## 2021-09-08 NOTE — TELEPHONE ENCOUNTER
Are these forms supposed to state she is unable to work.  And is this due to mental health or physical reason?

## 2021-09-09 ENCOUNTER — TELEPHONE (OUTPATIENT)
Dept: CARE COORDINATION | Facility: CLINIC | Age: 56
End: 2021-09-09

## 2021-09-09 ENCOUNTER — PATIENT OUTREACH (OUTPATIENT)
Dept: NURSING | Facility: CLINIC | Age: 56
End: 2021-09-09
Attending: FAMILY MEDICINE

## 2021-09-09 DIAGNOSIS — Z78.9 ENROLLED IN CHRONIC CARE MANAGEMENT: ICD-10-CM

## 2021-09-09 SDOH — ECONOMIC STABILITY: TRANSPORTATION INSECURITY
IN THE PAST 12 MONTHS, HAS LACK OF TRANSPORTATION KEPT YOU FROM MEETINGS, WORK, OR FROM GETTING THINGS NEEDED FOR DAILY LIVING?: NO

## 2021-09-09 SDOH — ECONOMIC STABILITY: FOOD INSECURITY: WITHIN THE PAST 12 MONTHS, YOU WORRIED THAT YOUR FOOD WOULD RUN OUT BEFORE YOU GOT MONEY TO BUY MORE.: NEVER TRUE

## 2021-09-09 SDOH — ECONOMIC STABILITY: INCOME INSECURITY: IN THE LAST 12 MONTHS, WAS THERE A TIME WHEN YOU WERE NOT ABLE TO PAY THE MORTGAGE OR RENT ON TIME?: NO

## 2021-09-09 SDOH — HEALTH STABILITY: PHYSICAL HEALTH: ON AVERAGE, HOW MANY DAYS PER WEEK DO YOU ENGAGE IN MODERATE TO STRENUOUS EXERCISE (LIKE A BRISK WALK)?: 0 DAYS

## 2021-09-09 SDOH — ECONOMIC STABILITY: TRANSPORTATION INSECURITY
IN THE PAST 12 MONTHS, HAS THE LACK OF TRANSPORTATION KEPT YOU FROM MEDICAL APPOINTMENTS OR FROM GETTING MEDICATIONS?: NO

## 2021-09-09 SDOH — ECONOMIC STABILITY: FOOD INSECURITY: WITHIN THE PAST 12 MONTHS, THE FOOD YOU BOUGHT JUST DIDN'T LAST AND YOU DIDN'T HAVE MONEY TO GET MORE.: NEVER TRUE

## 2021-09-09 SDOH — HEALTH STABILITY: PHYSICAL HEALTH: ON AVERAGE, HOW MANY MINUTES DO YOU ENGAGE IN EXERCISE AT THIS LEVEL?: 0 MIN

## 2021-09-09 ASSESSMENT — SOCIAL DETERMINANTS OF HEALTH (SDOH)
HOW OFTEN DO YOU GET TOGETHER WITH FRIENDS OR RELATIVES?: MORE THAN THREE TIMES A WEEK
WITHIN THE LAST YEAR, HAVE TO BEEN RAPED OR FORCED TO HAVE ANY KIND OF SEXUAL ACTIVITY BY YOUR PARTNER OR EX-PARTNER?: NO
HOW HARD IS IT FOR YOU TO PAY FOR THE VERY BASICS LIKE FOOD, HOUSING, MEDICAL CARE, AND HEATING?: NOT VERY HARD
DO YOU BELONG TO ANY CLUBS OR ORGANIZATIONS SUCH AS CHURCH GROUPS UNIONS, FRATERNAL OR ATHLETIC GROUPS, OR SCHOOL GROUPS?: NO
HOW OFTEN DO YOU ATTENT MEETINGS OF THE CLUB OR ORGANIZATION YOU BELONG TO?: NEVER
WITHIN THE LAST YEAR, HAVE YOU BEEN HUMILIATED OR EMOTIONALLY ABUSED IN OTHER WAYS BY YOUR PARTNER OR EX-PARTNER?: NO
IN A TYPICAL WEEK, HOW MANY TIMES DO YOU TALK ON THE PHONE WITH FAMILY, FRIENDS, OR NEIGHBORS?: MORE THAN THREE TIMES A WEEK
HOW OFTEN DO YOU ATTEND CHURCH OR RELIGIOUS SERVICES?: NEVER
WITHIN THE LAST YEAR, HAVE YOU BEEN KICKED, HIT, SLAPPED, OR OTHERWISE PHYSICALLY HURT BY YOUR PARTNER OR EX-PARTNER?: NO
WITHIN THE LAST YEAR, HAVE YOU BEEN AFRAID OF YOUR PARTNER OR EX-PARTNER?: NO

## 2021-09-09 ASSESSMENT — LIFESTYLE VARIABLES
HOW OFTEN DO YOU HAVE A DRINK CONTAINING ALCOHOL: NEVER
HOW OFTEN DO YOU HAVE SIX OR MORE DRINKS ON ONE OCCASION: NEVER

## 2021-09-09 NOTE — LETTER
[facility] CARE COORDINATION    September 9, 2021    Indra Jason  [address]  [place] [address]      Dear Indra,    I am a clinic care coordinator who works with Heidi Hernandez DO at [facility]. I wanted to thank you for spending the time to talk with me.  Below is a description of clinic care coordination and how I can further assist you.      The clinic care coordination team is made up of a registered nurse,  and community health worker who understand the health care system. The goal of clinic care coordination is to help you manage your health and improve access to the health care system in the most efficient manner. The team can assist you in meeting your health care goals by providing education, coordinating services, strengthening the communication among your providers and supporting you with any resource needs.    Please feel free to contact the Community Health Worker at [phone] with any questions or concerns. We are focused on providing you with the highest-quality healthcare experience possible and that all starts with you.     Sincerely,     Ihsan Braga, [facility]    Enclosed: I have enclosed a copy of the Patient Centered Plan of Care. This has helpful information and goals that we have talked about. Please keep this in an easy to access place to use as needed.

## 2021-09-09 NOTE — PROGRESS NOTES
9/9/2021  Clinic Care Coordination Contact  Care Team Conversations    Patient enrolled in [facility] as of  9-9-21  Reviewed assessment, goals, any delegations from CCC SW, and consult with CC SW as needed.    Follow up appt with [facility] RN/[facility] SW: none    CHW Follow up: Monthly    CHW Plan: Follow up on goals    CHW Next Follow Up: 10-11-21

## 2021-09-09 NOTE — PROGRESS NOTES
Clinic Care Coordination Contact  CCC MICHELLE contacted Indra by phone with an  to complete an assessment for enrollment into [facility].    MICHELLE messaged scheduling pool to contact [facility] to schedule with PCP to address care gaps.     MICHELLE completed referral to [employer] to support with the Social Security process.    MICHELLE will mail USCIS forms to Indra for her children to complete for their naturalization certificates.     MICHELLE messaged PCP regarding need for walker. Indra reported she fell at home and thinks a walker would be more supportive.     Clinic Care Coordination Contact  OUTREACH    Referral Information:  Referral Source: Self-patient/Caregiver    Primary Diagnosis: Psychosocial    Chief Complaint   Patient presents with     Clinic Care Coordination - Initial        Universal Utilization:   Clinic Utilization  Difficulty keeping appointments:: No  Compliance Concerns: No  No-Show Concerns: No  No PCP office visit in Past Year: No  Utilization    Hospital Admissions  0             ED Visits  0             No Show Count (past year)  1                Current as of: 9/8/2021  5:21 PM              Clinical Concerns:  Current Medical Concerns:  Ongoing concerns related to pain and no appetite. Recently fell at home and sprained her foot. Would like a walker     Current Behavioral Concerns: None reported    Education Provided to patient: Role of CCC   Pain  Pain (GOAL):: Yes  Type: Chronic (>3mo)  Health Maintenance Reviewed: Due/Overdue Messaged scheduling to support with addressing this.  Clinical Pathway: None    Medication Management:  Medication review status: Medications reviewed and no changes reported per patient.             Functional Status:  Bed or wheelchair confined:: No  Mobility Status: Independent w/Device  Fallen 2 or more times in the past year?: No  Any fall with injury in the past year?: Yes (Reported she fell at home, sprained her foot and has swelling. Is taking medication. Has home  nurse)    Living Situation:  Current living arrangement:: I live in a private home with family (lives with brother's family)  Type of residence:: Apartment    Lifestyle & Psychosocial Needs:    Social Determinants of Health     Tobacco Use: Medium Risk     Smoking Tobacco Use: Former Smoker     Smokeless Tobacco Use: Never Used   Alcohol Use: Not At Risk     Frequency of Alcohol Consumption: Never     Average Number of Drinks: Not on file     Frequency of Binge Drinking: Never   Financial Resource Strain: Low Risk      Difficulty of Paying Living Expenses: Not very hard   Food Insecurity: No Food Insecurity     Worried About Running Out of Food in the Last Year: Never true     Ran Out of Food in the Last Year: Never true   Transportation Needs: No Transportation Needs     Lack of Transportation (Medical): No     Lack of Transportation (Non-Medical): No   Physical Activity: Inactive     Days of Exercise per Week: 0 days     Minutes of Exercise per Session: 0 min   Stress: No Stress Concern Present     Feeling of Stress : Only a little   Social Connections: Socially Isolated     Frequency of Communication with Friends and Family: More than three times a week     Frequency of Social Gatherings with Friends and Family: More than three times a week     Attends Mandaen Services: Never     Active Member of Clubs or Organizations: No     Attends Club or Organization Meetings: Never     Marital Status:    Intimate Partner Violence: Not At Risk     Fear of Current or Ex-Partner: No     Emotionally Abused: No     Physically Abused: No     Sexually Abused: No   Depression: At risk     PHQ-2 Score: 3   Housing Stability: Low Risk      Unable to Pay for Housing in the Last Year: No     Number of Places Lived in the Last Year: 1     Unstable Housing in the Last Year: No     Diet:: Regular  Inadequate nutrition (GOAL):: No  Tube Feeding: No  Inadequate activity/exercise (GOAL):: No  Significant changes in sleep pattern  (GOAL): No  Transportation means:: Family, Medical transport (friends, family, has medical transportation but has no one to help call)     Druze or spiritual beliefs that impact treatment:: No  Mental health DX:: No  Mental health management concern (GOAL):: No  Chemical Dependency Status: No Current Concerns  Informal Support system:: Family             Resources and Interventions:  Current Resources:   Skilled Home Care Services: Skilled Nursing  Community Resources: Home Care, PCA (nurse services, [facility])  Supplies used at home:: None, Other  Equipment Currently Used at Home: cane, straight  Employment Status: unemployed         Advance Care Plan/Directive  Advanced Care Plans/Directives on file:: No  Advanced Care Plan/Directive Status: Not Applicable          Goals:   Goals        General     Financial Wellbeing (pt-stated)      Notes - Note created  9/9/2021 11:35 AM by Ihsan Braga LICSW     Goal Statement: I want to connect to an organization to support me with the Social Security process within one month  Date Goal set: 09/09/21  Barriers: Language  Strengths:   Date to Achieve By: 10/30/2021  Patient expressed understanding of goal: Yes  Action steps to achieve this goal:  1. I will wait to hear from [employer]   2. I will update CCC team           Functional (pt-stated)      Notes - Note created  9/9/2021 11:34 AM by Ihsan Braga LICSW     Goal Statement: I want to get a walker within 1-2 months  Date Goal set: 09/09/21  Barriers: Language  Strengths:   Date to Achieve By: 11/30/2021  Patient expressed understanding of goal: Yes  Action steps to achieve this goal:  1. I will wait to hear from my care team about getting a walker   2. I will update [facility] team         Medical (pt-stated)      Notes - Note created  9/9/2021 11:34 AM by Ihsan Braga LICSW     Goal Statement: I want to address my care gaps within 1-2 months  Date Goal set:  09/09/21  Barriers: Language  Strengths:   Date to Achieve By: 11/30/2021  Patient expressed understanding of goal: Yes  Action steps to achieve this goal:  1. I will schedule an appointment with my PCP to address my overdue care gaps  2. I will update CCC team         Problem Solving (pt-stated)      Notes - Note created  9/9/2021 11:36 AM by Ihsan Braga [facility]     Goal Statement: I want my children to apply for their naturalization certificates within 1-2 months   Date Goal set: 09/09/21  Barriers: Language  Strengths:   Date to Achieve By: 11/30/2021  Patient expressed understanding of goal: Yes  Action steps to achieve this goal:  1. SW will mail us the necessary forms to complete for this from [facility]  2. I will update CCC team               Patient/Caregiver understanding: Reported understanding     Outreach Frequency: monthly      Plan: Standard Outreach

## 2021-09-09 NOTE — TELEPHONE ENCOUNTER
Hello,    Can you please reach out to patient to schedule a preventive care visit with her PCP to address care gaps? It looks like she has a few overdue items.    Thank you!

## 2021-09-10 NOTE — TELEPHONE ENCOUNTER
The forms is supposed to state she is unable to work due to her falling at home a month ago, she is having pain in her hands and legs from fall and unable to walk and feeling dizzy.     She was seen in the ER following her fall.

## 2021-09-13 NOTE — TELEPHONE ENCOUNTER
Please call pt and schedule appointment in order to fill out forms, a Virtual appointment is okay also.

## 2021-09-22 DIAGNOSIS — R25.2 FOOT CRAMPS: ICD-10-CM

## 2021-09-22 DIAGNOSIS — F43.10 PTSD (POST-TRAUMATIC STRESS DISORDER): ICD-10-CM

## 2021-09-22 DIAGNOSIS — M54.50 CHRONIC LOWER BACK PAIN: ICD-10-CM

## 2021-09-22 DIAGNOSIS — F33.1 MODERATE EPISODE OF RECURRENT MAJOR DEPRESSIVE DISORDER (H): ICD-10-CM

## 2021-09-22 DIAGNOSIS — R10.13 EPIGASTRIC PAIN: ICD-10-CM

## 2021-09-22 DIAGNOSIS — J30.1 SEASONAL ALLERGIC RHINITIS DUE TO POLLEN: ICD-10-CM

## 2021-09-22 DIAGNOSIS — R42 DIZZINESS: ICD-10-CM

## 2021-09-22 DIAGNOSIS — F32.A ANXIETY AND DEPRESSION: ICD-10-CM

## 2021-09-22 DIAGNOSIS — J98.01 BRONCHOSPASM: ICD-10-CM

## 2021-09-22 DIAGNOSIS — F41.9 ANXIETY AND DEPRESSION: ICD-10-CM

## 2021-09-22 DIAGNOSIS — G44.209 TENSION HEADACHE: ICD-10-CM

## 2021-09-22 DIAGNOSIS — G89.29 CHRONIC LOWER BACK PAIN: ICD-10-CM

## 2021-09-22 DIAGNOSIS — G89.4 CHRONIC PAIN SYNDROME: ICD-10-CM

## 2021-09-22 NOTE — TELEPHONE ENCOUNTER
Reason for Call:  Medication or medication refill:    Do you use a [facility]?  Name of the pharmacy and phone number for the current request: [facility]     Name of the medication requested:   Albuteral Inhaler 90 mcg  Vitamin D 25 mg  Vitamin B12 1000 mcg  Duloxetine 30 mg  Cetirizine 10 mg  Famotidine 40 mg  Nasal spray 50 mcg PRN  Fluoxetine 40 mg  Tylenol  extra strength 500 mg  Meclizine 25 mg  Quetiapine 25 mg    Other request: these will be new rxs to [facility]   she no longer wants to use [facility] pharmacy in [place]    Can we leave a detailed message on this number? YES    Phone number patient can be reached at: Home number on file [phone] (home)    Best Time: anytime    Call taken on 9/22/2021 at 11:48 AM by Melanie Antonio

## 2021-09-24 DIAGNOSIS — Z71.89 COUNSELING AND COORDINATION OF CARE: Primary | ICD-10-CM

## 2021-09-27 ENCOUNTER — PATIENT OUTREACH (OUTPATIENT)
Dept: NURSING | Facility: CLINIC | Age: 56
End: 2021-09-27

## 2021-09-27 RX ORDER — UBIDECARENONE 75 MG
CAPSULE ORAL
COMMUNITY
Start: 2021-08-31 | End: 2021-09-27

## 2021-09-27 RX ORDER — QUETIAPINE FUMARATE 25 MG/1
25 TABLET, FILM COATED ORAL AT BEDTIME
Qty: 90 TABLET | Refills: 1 | Status: SHIPPED | OUTPATIENT
Start: 2021-09-27 | End: 2022-03-27

## 2021-09-27 RX ORDER — LIDOCAINE HYDROCHLORIDE 20 MG/ML
SOLUTION OROPHARYNGEAL
COMMUNITY
Start: 2021-08-31 | End: 2021-10-03

## 2021-09-27 RX ORDER — NYSTATIN 100000/ML
SUSPENSION, ORAL (FINAL DOSE FORM) ORAL
COMMUNITY
Start: 2021-08-31 | End: 2021-10-03

## 2021-09-27 RX ORDER — CETIRIZINE HYDROCHLORIDE 10 MG/1
10 TABLET ORAL DAILY
Qty: 90 TABLET | Refills: 1 | Status: SHIPPED | OUTPATIENT
Start: 2021-09-27 | End: 2022-03-30

## 2021-09-27 RX ORDER — DULOXETIN HYDROCHLORIDE 30 MG/1
30 CAPSULE, DELAYED RELEASE ORAL DAILY
Qty: 90 CAPSULE | Refills: 1 | Status: SHIPPED | OUTPATIENT
Start: 2021-09-27 | End: 2022-03-30

## 2021-09-27 RX ORDER — MECLIZINE HYDROCHLORIDE 25 MG/1
25 TABLET ORAL 3 TIMES DAILY PRN
Qty: 270 TABLET | Refills: 1 | Status: SHIPPED | OUTPATIENT
Start: 2021-09-27 | End: 2022-03-30

## 2021-09-27 RX ORDER — ACETAMINOPHEN 500 MG
500 TABLET ORAL EVERY 4 HOURS PRN
Qty: 360 TABLET | Refills: 2 | Status: SHIPPED | OUTPATIENT
Start: 2021-09-27 | End: 2022-03-30

## 2021-09-27 RX ORDER — ALBUTEROL SULFATE 90 UG/1
AEROSOL, METERED RESPIRATORY (INHALATION)
Qty: 18 G | Refills: 5 | Status: SHIPPED | OUTPATIENT
Start: 2021-09-27 | End: 2021-10-03

## 2021-09-27 RX ORDER — FAMOTIDINE 40 MG/1
40 TABLET, FILM COATED ORAL DAILY
Qty: 90 TABLET | Refills: 1 | Status: SHIPPED | OUTPATIENT
Start: 2021-09-27 | End: 2022-05-08

## 2021-09-27 RX ORDER — FLUOXETINE 40 MG/1
40 CAPSULE ORAL DAILY
Qty: 90 CAPSULE | Refills: 1 | Status: SHIPPED | OUTPATIENT
Start: 2021-09-27 | End: 2022-03-25

## 2021-09-27 NOTE — TELEPHONE ENCOUNTER
"Last Written Prescription Date:  4/26/21  Last Fill Quantity: 18 g,  # refills: 2   Last office visit provider:  6/9/21     Last Written Prescription Date:  4/26/21  Last Fill Quantity: 100,  # refills: 2   Last office visit provider:  6/9/21     Last Written Prescription Date:  4/26/21  Last Fill Quantity: 30,  # refills: 1   Last office visit provider:  6/9/21     Requested Prescriptions   Pending Prescriptions Disp Refills     albuterol (PROAIR HFA/PROVENTIL HFA/VENTOLIN HFA) 108 (90 Base) MCG/ACT inhaler 18 g 2     Sig: [ALBUTEROL (PROAIR HFA;PROVENTIL HFA;VENTOLIN HFA) 90 MCG/ACTUATION INHALER] INHALE 2 PUFFS INTO THE LUNGS FOUR TIMES A DAY       Asthma Maintenance Inhalers - Anticholinergics Passed - 9/27/2021  8:42 AM        Passed - Patient is age 12 years or older        Passed - Recent (12 mo) or future (30 days) visit within the authorizing provider's specialty     Patient has had an office visit with the authorizing provider or a provider within the authorizing providers department within the previous 12 mos or has a future within next 30 days. See \"Patient Info\" tab in inbasket, or \"Choose Columns\" in Meds & Orders section of the refill encounter.              Passed - Medication is active on med list       Short-Acting Beta Agonist Inhalers Protocol  Passed - 9/27/2021  8:42 AM        Passed - Patient is age 12 or older        Passed - Recent (12 mo) or future (30 days) visit within the authorizing provider's specialty     Patient has had an office visit with the authorizing provider or a provider within the authorizing providers department within the previous 12 mos or has a future within next 30 days. See \"Patient Info\" tab in inbasket, or \"Choose Columns\" in Meds & Orders section of the refill encounter.              Passed - Medication is active on med list           DULoxetine (CYMBALTA) 30 MG capsule 90 capsule 1     Sig: Take 1 capsule (30 mg) by mouth daily       Serotonin-Norepinephrine " "Reuptake Inhibitors  Failed - 9/27/2021  8:42 AM        Failed - Blood pressure under 140/90 in past 12 months     BP Readings from Last 3 Encounters:   08/27/21 (!) 121/92   05/13/21 (!) 151/97   03/03/21 121/76                 Failed - PHQ-9 score of less than 5 in past 6 months     Please review last PHQ-9 score.           Passed - Medication is active on med list        Passed - Patient is age 18 or older        Passed - No active pregnancy on record        Passed - No positive pregnancy test in past 12 months        Passed - Recent (6 mo) or future (30 days) visit within the authorizing provider's specialty     Patient had office visit in the last 6 months or has a visit in the next 30 days with authorizing provider or within the authorizing provider's specialty.  See \"Patient Info\" tab in inbasket, or \"Choose Columns\" in Meds & Orders section of the refill encounter.               famotidine (PEPCID) 40 MG tablet 90 tablet 1     Sig: Take 1 tablet (40 mg) by mouth daily       H2 Blockers Protocol Passed - 9/27/2021  8:42 AM        Passed - Patient is age 12 or older        Passed - Recent (12 mo) or future (30 days) visit within the authorizing provider's specialty     Patient has had an office visit with the authorizing provider or a provider within the authorizing providers department within the previous 12 mos or has a future within next 30 days. See \"Patient Info\" tab in inbasket, or \"Choose Columns\" in Meds & Orders section of the refill encounter.              Passed - Medication is active on med list           FLUoxetine (PROZAC) 40 MG capsule 90 capsule 1     Sig: Take 1 capsule (40 mg) by mouth daily       SSRIs Protocol Failed - 9/27/2021  8:42 AM        Failed - PHQ-9 score less than 5 in past 6 months     Please review last PHQ-9 score.           Passed - Medication is active on med list        Passed - Patient is age 18 or older        Passed - No active pregnancy on record        Passed - No " "positive pregnancy test in last 12 months        Passed - Recent (6 mo) or future (30 days) visit within the authorizing provider's specialty     Patient had office visit in the last 6 months or has a visit in the next 30 days with authorizing provider or within the authorizing provider's specialty.  See \"Patient Info\" tab in inbasket, or \"Choose Columns\" in Meds & Orders section of the refill encounter.               acetaminophen (TYLENOL) 500 MG tablet 100 tablet 2     Sig: Take 1 tablet (500 mg) by mouth every 4 hours as needed       Analgesics (Non-Narcotic Tylenol and ASA Only) Passed - 9/27/2021  8:42 AM        Passed - Recent (12 mo) or future (30 days) visit within the authorizing provider's specialty     Patient has had an office visit with the authorizing provider or a provider within the authorizing providers department within the previous 12 mos or has a future within next 30 days. See \"Patient Info\" tab in inbasket, or \"Choose Columns\" in Meds & Orders section of the refill encounter.              Passed - Patient is 7 months old or older     If patient is a peds patient of the age 7 mos -12 years, ok to refill using weight-based dosing.     If >3g daily and/or sig is not \"prn\", check for liver enzymes. If normal in the last year, ok to refill.  If not, refer to the provider.          Passed - Medication is active on med list           meclizine (ANTIVERT) 25 MG tablet 30 tablet 1     Sig: Take 1 tablet (25 mg) by mouth 3 times daily as needed        Antivertigo/Antiemetic Agents Passed - 9/27/2021  8:42 AM        Passed - Recent (12 mo) or future (30 days) visit within the authorizing provider's specialty     Patient has had an office visit with the authorizing provider or a provider within the authorizing providers department within the previous 12 mos or has a future within next 30 days. See \"Patient Info\" tab in inbasket, or \"Choose Columns\" in Meds & Orders section of the refill encounter.       " "       Passed - Medication is active on med list        Passed - Patient is 18 years of age or older           QUEtiapine (SEROQUEL) 25 MG tablet 90 tablet 1     Sig: Take 1 tablet (25 mg) by mouth At Bedtime       Antipsychotic Medications Failed - 9/27/2021  8:42 AM        Failed - Blood pressure under 140/90 in past 12 months     BP Readings from Last 3 Encounters:   08/27/21 (!) 121/92   05/13/21 (!) 151/97   03/03/21 121/76                 Failed - Lipid panel on file within the past 12 months     Recent Labs   Lab Test 10/11/19  1218   CHOL 163   TRIG 154*   HDL 44*   LDL 88               Passed - Patient is 12 years of age or older        Passed - CBC on file in past 12 months     Recent Labs   Lab Test 08/27/21  1228   WBC 8.4   RBC 5.16   HGB 11.7   HCT 39.9                    Passed - Heart Rate on file within past 12 months     Pulse Readings from Last 3 Encounters:   08/27/21 75   05/13/21 85   03/03/21 71               Passed - A1c or Glucose on file in past 12 months     Recent Labs   Lab Test 08/27/21  1228 04/12/19  1212 10/18/18  1048      < >  --    A1C  --   --  5.8    < > = values in this interval not displayed.       Please review patients last 3 weights. If a weight gain of >10 lbs exists, you may refill the prescription once after instructing the patient to schedule an appointment within the next 30 days.    Wt Readings from Last 3 Encounters:   08/27/21 69.8 kg (153 lb 12.8 oz)   05/13/21 71.2 kg (157 lb)   03/03/21 68 kg (150 lb)             Passed - Medication is active on med list        Passed - Patient is not pregnant        Passed - No positve pregnancy test on file in past 12 months        Passed - Recent (6 mo) or future (30 days) visit within the authorizing provider's specialty     Patient had office visit in the last 6 months or has a visit in the next 30 days with authorizing provider or within the authorizing provider's specialty.  See \"Patient Info\" tab in " "inbasket, or \"Choose Columns\" in Meds & Orders section of the refill encounter.             Signed Prescriptions Disp Refills    nystatin (MYCOSTATIN) 696625 UNIT/ML suspension         There is no refill protocol information for this order       lidocaine (XYLOCAINE) 2 % solution         There is no refill protocol information for this order       cyanocobalamin (VITAMIN B-12) 100 MCG tablet         There is no refill protocol information for this order       acetaminophen-codeine (TYLENOL #3) 300-30 MG tablet         There is no refill protocol information for this order       cholecalciferol (VITAMIN D3) 25 mcg (1000 units) capsule 90 capsule 1     Sig: Take 1 capsule (25 mcg) by mouth daily       Vitamin Supplements (Adult) Protocol Passed - 9/27/2021  8:42 AM        Passed - High dose Vitamin D not ordered        Passed - Recent (12 mo) or future (30 days) visit within the authorizing provider's specialty     Patient has had an office visit with the authorizing provider or a provider within the authorizing providers department within the previous 12 mos or has a future within next 30 days. See \"Patient Info\" tab in inbasket, or \"Choose Columns\" in Meds & Orders section of the refill encounter.              Passed - Medication is active on med list          vitamin B-12 (CYANOCOBALAMIN) 1000 MCG tablet 90 tablet 0     Sig: Take 1 tablet (1,000 mcg) by mouth daily       Vitamin Supplements (Adult) Protocol Passed - 9/27/2021  8:42 AM        Passed - High dose Vitamin D not ordered        Passed - Recent (12 mo) or future (30 days) visit within the authorizing provider's specialty     Patient has had an office visit with the authorizing provider or a provider within the authorizing providers department within the previous 12 mos or has a future within next 30 days. See \"Patient Info\" tab in inbasket, or \"Choose Columns\" in Meds & Orders section of the refill encounter.              Passed - Medication is active on " "med list          cetirizine (ZYRTEC) 10 MG tablet 90 tablet 1     Sig: Take 1 tablet (10 mg) by mouth daily       Antihistamines Protocol Passed - 9/27/2021  8:42 AM        Passed - Patient is 3-64 years of age     Apply weight-based dosing for peds patients age 3 - 12 years of age.    Forward request to provider for patients under the age of 3 or over the age of 64.          Passed - Recent (12 mo) or future (30 days) visit within the authorizing provider's specialty     Patient has had an office visit with the authorizing provider or a provider within the authorizing providers department within the previous 12 mos or has a future within next 30 days. See \"Patient Info\" tab in inbasket, or \"Choose Columns\" in Meds & Orders section of the refill encounter.              Passed - Medication is active on med list             Aneudy Reese RN 09/27/21 8:51 AM  "

## 2021-09-27 NOTE — TELEPHONE ENCOUNTER
"Routing refill request to provider for review/approval because:  Labs out of range:  BP  Labs not current:  Lipid panel  PHQ 9 score  Early refill request    Last Written Prescription Date:  4/30/21  Last Fill Quantity: 90,  # refills: 1   Last office visit provider:  6/9/21     Requested Prescriptions   Pending Prescriptions Disp Refills     DULoxetine (CYMBALTA) 30 MG capsule 90 capsule 1     Sig: Take 1 capsule (30 mg) by mouth daily       Serotonin-Norepinephrine Reuptake Inhibitors  Failed - 9/27/2021  8:42 AM        Failed - Blood pressure under 140/90 in past 12 months     BP Readings from Last 3 Encounters:   08/27/21 (!) 121/92   05/13/21 (!) 151/97   03/03/21 121/76                 Failed - PHQ-9 score of less than 5 in past 6 months     Please review last PHQ-9 score.           Passed - Medication is active on med list        Passed - Patient is age 18 or older        Passed - No active pregnancy on record        Passed - No positive pregnancy test in past 12 months        Passed - Recent (6 mo) or future (30 days) visit within the authorizing provider's specialty     Patient had office visit in the last 6 months or has a visit in the next 30 days with authorizing provider or within the authorizing provider's specialty.  See \"Patient Info\" tab in inbasket, or \"Choose Columns\" in Meds & Orders section of the refill encounter.               famotidine (PEPCID) 40 MG tablet 90 tablet 1     Sig: Take 1 tablet (40 mg) by mouth daily       H2 Blockers Protocol Passed - 9/27/2021  8:42 AM        Passed - Patient is age 12 or older        Passed - Recent (12 mo) or future (30 days) visit within the authorizing provider's specialty     Patient has had an office visit with the authorizing provider or a provider within the authorizing providers department within the previous 12 mos or has a future within next 30 days. See \"Patient Info\" tab in inbasket, or \"Choose Columns\" in Meds & Orders section of the refill " "encounter.              Passed - Medication is active on med list           FLUoxetine (PROZAC) 40 MG capsule 90 capsule 1     Sig: Take 1 capsule (40 mg) by mouth daily       SSRIs Protocol Failed - 9/27/2021  8:42 AM        Failed - PHQ-9 score less than 5 in past 6 months     Please review last PHQ-9 score.           Passed - Medication is active on med list        Passed - Patient is age 18 or older        Passed - No active pregnancy on record        Passed - No positive pregnancy test in last 12 months        Passed - Recent (6 mo) or future (30 days) visit within the authorizing provider's specialty     Patient had office visit in the last 6 months or has a visit in the next 30 days with authorizing provider or within the authorizing provider's specialty.  See \"Patient Info\" tab in inbasket, or \"Choose Columns\" in Meds & Orders section of the refill encounter.               QUEtiapine (SEROQUEL) 25 MG tablet 90 tablet 1     Sig: Take 1 tablet (25 mg) by mouth At Bedtime       Antipsychotic Medications Failed - 9/27/2021  8:42 AM        Failed - Blood pressure under 140/90 in past 12 months     BP Readings from Last 3 Encounters:   08/27/21 (!) 121/92   05/13/21 (!) 151/97   03/03/21 121/76                 Failed - Lipid panel on file within the past 12 months     Recent Labs   Lab Test 10/11/19  1218   CHOL 163   TRIG 154*   HDL 44*   LDL 88               Passed - Patient is 12 years of age or older        Passed - CBC on file in past 12 months     Recent Labs   Lab Test 08/27/21  1228   WBC 8.4   RBC 5.16   HGB 11.7   HCT 39.9                    Passed - Heart Rate on file within past 12 months     Pulse Readings from Last 3 Encounters:   08/27/21 75   05/13/21 85   03/03/21 71               Passed - A1c or Glucose on file in past 12 months     Recent Labs   Lab Test 08/27/21  1228 04/12/19  1212 10/18/18  1048      < >  --    A1C  --   --  5.8    < > = values in this interval not displayed. " "      Please review patients last 3 weights. If a weight gain of >10 lbs exists, you may refill the prescription once after instructing the patient to schedule an appointment within the next 30 days.    Wt Readings from Last 3 Encounters:   08/27/21 69.8 kg (153 lb 12.8 oz)   05/13/21 71.2 kg (157 lb)   03/03/21 68 kg (150 lb)             Passed - Medication is active on med list        Passed - Patient is not pregnant        Passed - No positve pregnancy test on file in past 12 months        Passed - Recent (6 mo) or future (30 days) visit within the authorizing provider's specialty     Patient had office visit in the last 6 months or has a visit in the next 30 days with authorizing provider or within the authorizing provider's specialty.  See \"Patient Info\" tab in inbasket, or \"Choose Columns\" in Meds & Orders section of the refill encounter.             Signed Prescriptions Disp Refills    nystatin (MYCOSTATIN) 574303 UNIT/ML suspension         There is no refill protocol information for this order       lidocaine (XYLOCAINE) 2 % solution         There is no refill protocol information for this order       cyanocobalamin (VITAMIN B-12) 100 MCG tablet         There is no refill protocol information for this order       acetaminophen-codeine (TYLENOL #3) 300-30 MG tablet         There is no refill protocol information for this order       albuterol (PROAIR HFA/PROVENTIL HFA/VENTOLIN HFA) 108 (90 Base) MCG/ACT inhaler 18 g 5     Sig: [ALBUTEROL (PROAIR HFA;PROVENTIL HFA;VENTOLIN HFA) 90 MCG/ACTUATION INHALER] INHALE 2 PUFFS INTO THE LUNGS FOUR TIMES A DAY       Asthma Maintenance Inhalers - Anticholinergics Passed - 9/27/2021  8:42 AM        Passed - Patient is age 12 years or older        Passed - Recent (12 mo) or future (30 days) visit within the authorizing provider's specialty     Patient has had an office visit with the authorizing provider or a provider within the authorizing providers department within the " "previous 12 mos or has a future within next 30 days. See \"Patient Info\" tab in inbasket, or \"Choose Columns\" in Meds & Orders section of the refill encounter.              Passed - Medication is active on med list       Short-Acting Beta Agonist Inhalers Protocol  Passed - 9/27/2021  8:42 AM        Passed - Patient is age 12 or older        Passed - Recent (12 mo) or future (30 days) visit within the authorizing provider's specialty     Patient has had an office visit with the authorizing provider or a provider within the authorizing providers department within the previous 12 mos or has a future within next 30 days. See \"Patient Info\" tab in inbasket, or \"Choose Columns\" in Meds & Orders section of the refill encounter.              Passed - Medication is active on med list          cholecalciferol (VITAMIN D3) 25 mcg (1000 units) capsule 90 capsule 1     Sig: Take 1 capsule (25 mcg) by mouth daily       Vitamin Supplements (Adult) Protocol Passed - 9/27/2021  8:42 AM        Passed - High dose Vitamin D not ordered        Passed - Recent (12 mo) or future (30 days) visit within the authorizing provider's specialty     Patient has had an office visit with the authorizing provider or a provider within the authorizing providers department within the previous 12 mos or has a future within next 30 days. See \"Patient Info\" tab in inbasket, or \"Choose Columns\" in Meds & Orders section of the refill encounter.              Passed - Medication is active on med list          vitamin B-12 (CYANOCOBALAMIN) 1000 MCG tablet 90 tablet 0     Sig: Take 1 tablet (1,000 mcg) by mouth daily       Vitamin Supplements (Adult) Protocol Passed - 9/27/2021  8:42 AM        Passed - High dose Vitamin D not ordered        Passed - Recent (12 mo) or future (30 days) visit within the authorizing provider's specialty     Patient has had an office visit with the authorizing provider or a provider within the authorizing providers department within " "the previous 12 mos or has a future within next 30 days. See \"Patient Info\" tab in inbasket, or \"Choose Columns\" in Meds & Orders section of the refill encounter.              Passed - Medication is active on med list          cetirizine (ZYRTEC) 10 MG tablet 90 tablet 1     Sig: Take 1 tablet (10 mg) by mouth daily       Antihistamines Protocol Passed - 9/27/2021  8:42 AM        Passed - Patient is 3-64 years of age     Apply weight-based dosing for peds patients age 3 - 12 years of age.    Forward request to provider for patients under the age of 3 or over the age of 64.          Passed - Recent (12 mo) or future (30 days) visit within the authorizing provider's specialty     Patient has had an office visit with the authorizing provider or a provider within the authorizing providers department within the previous 12 mos or has a future within next 30 days. See \"Patient Info\" tab in inbasket, or \"Choose Columns\" in Meds & Orders section of the refill encounter.              Passed - Medication is active on med list          acetaminophen (TYLENOL) 500 MG tablet 360 tablet 2     Sig: Take 1 tablet (500 mg) by mouth every 4 hours as needed       Analgesics (Non-Narcotic Tylenol and ASA Only) Passed - 9/27/2021  8:42 AM        Passed - Recent (12 mo) or future (30 days) visit within the authorizing provider's specialty     Patient has had an office visit with the authorizing provider or a provider within the authorizing providers department within the previous 12 mos or has a future within next 30 days. See \"Patient Info\" tab in inbasket, or \"Choose Columns\" in Meds & Orders section of the refill encounter.              Passed - Patient is 7 months old or older     If patient is a peds patient of the age 7 mos -12 years, ok to refill using weight-based dosing.     If >3g daily and/or sig is not \"prn\", check for liver enzymes. If normal in the last year, ok to refill.  If not, refer to the provider.          Passed - " "Medication is active on med list          meclizine (ANTIVERT) 25 MG tablet 270 tablet 1     Sig: Take 1 tablet (25 mg) by mouth 3 times daily as needed        Antivertigo/Antiemetic Agents Passed - 9/27/2021  8:42 AM        Passed - Recent (12 mo) or future (30 days) visit within the authorizing provider's specialty     Patient has had an office visit with the authorizing provider or a provider within the authorizing providers department within the previous 12 mos or has a future within next 30 days. See \"Patient Info\" tab in inbasket, or \"Choose Columns\" in Meds & Orders section of the refill encounter.              Passed - Medication is active on med list        Passed - Patient is 18 years of age or older             Aneudy Reese RN 09/27/21 8:55 AM  "

## 2021-09-27 NOTE — PROGRESS NOTES
9/27/2021  Clinic Care Coordination Contact    Community Health Worker Follow Up    Care Gaps:     Health Maintenance Due   Topic Date Due     URINE DRUG SCREEN  Never done     ADVANCE CARE PLANNING  Never done     DEPRESSION ACTION PLAN  Never done     Pneumococcal Vaccine: Pediatrics (0 to 5 Years) and At-Risk Patients (6 to 64 Years) (1 of 4 - PCV13) Never done     ZOSTER IMMUNIZATION (1 of 2) Never done     HPV TEST  04/05/2018     PAP  04/05/2018     PREVENTIVE CARE VISIT  05/21/2020     MAMMO SCREENING  01/11/2021     PHQ-9  07/29/2021     INFLUENZA VACCINE (1) 09/01/2021     COVID-19 Vaccine (3 - Moderna risk 3-dose series) 09/01/2021       Care Gaps Last addressed on discuss on 10-1-21 with the doctor    Goals:   Goals Addressed as of 9/28/2021 at 5:03 PM                    9/27/21       Financial Wellbeing (pt-stated)   50%    Added 9/9/21 by Ihsan Braga LICSW      Goal Statement: I want to connect to an organization to support me with the Social Security process within one month  Date Goal set: 09/09/21  Barriers: Language  Strengths:   Date to Achieve By: 10/30/2021  Patient expressed understanding of goal: Yes  Action steps to achieve this goal:  1. I will wait to hear from Disability Specialists   2. I will update CCC team             Functional (pt-stated)   40%    Added 9/9/21 by Ihsan Braga LICSW      Goal Statement: I want to get a walker within 1-2 months  Date Goal set: 09/09/21  Barriers: Language  Strengths:   Date to Achieve By: 11/30/2021  Patient expressed understanding of goal: Yes  Action steps to achieve this goal:  1. I will talk to the doctor on Friday 10-1-21 at 10:15 am about getting a walker.  2. I will update CCC team    Updated: 9-27-21           Medical (pt-stated)   40%    Added 9/9/21 by Ihsan Braga LICSW      Goal Statement: I want to address my care gaps within 1-2 months  Date Goal set: 09/09/21  Barriers: Language  Strengths:   Date to Achieve By:  "11/30/2021  Patient expressed understanding of goal: Yes  Action steps to achieve this goal:  1. I will talk to the doctor on 10-1-21 at 10:15am about  my overdue care gaps  2. I will update CCC team    Updated: 9-27-21 AL            Problem Solving (pt-stated)   40%    Added 9/9/21 by Ihsan Braga, [facility]      Goal Statement: I want my children to apply for their naturalization certificates within 1-2 months   Date Goal set: 09/09/21  Barriers: Language  Strengths:   Date to Achieve By: 11/30/2021  Patient expressed understanding of goal: Yes  Action steps to achieve this goal:  1. I will drop off the form on 10-1-21 with PCP for CC SW  2. I will  see CCC SW on 10-4-21 at 2pm  to review the necessary forms     2. I will update CCC team     Updated: 9-27-21 AL                Intervention and Education during outreach:   Antoine  Gema  Patient reported\"  -received some letters and forms but does not know what it is need support to review and fill out the form.  Will bring the forms and letters on 10-1-21 to the clinic with PCP.  Schedule with CC SW 10-4-21 art 2pm     CCC SW 10-4-21   CHW Follow up: Monthly    CHW Plan: Follow up on goals    CHW Next Follow Up: 11-4-21       "

## 2021-09-27 NOTE — TELEPHONE ENCOUNTER
Refilled 4/26/21 with 3 refills.  Refilled 1/30/21 with 3 refills.    Pharmacy change.  Refills adjusted.

## 2021-10-01 ENCOUNTER — ANCILLARY PROCEDURE (OUTPATIENT)
Dept: MAMMOGRAPHY | Facility: CLINIC | Age: 56
End: 2021-10-01
Attending: FAMILY MEDICINE
Payer: COMMERCIAL

## 2021-10-01 ENCOUNTER — OFFICE VISIT (OUTPATIENT)
Dept: FAMILY MEDICINE | Facility: CLINIC | Age: 56
End: 2021-10-01
Payer: COMMERCIAL

## 2021-10-01 VITALS
BODY MASS INDEX: 29.47 KG/M2 | DIASTOLIC BLOOD PRESSURE: 87 MMHG | HEART RATE: 85 BPM | SYSTOLIC BLOOD PRESSURE: 128 MMHG | TEMPERATURE: 97.2 F | WEIGHT: 156.1 LBS | HEIGHT: 61 IN

## 2021-10-01 DIAGNOSIS — G89.4 CHRONIC PAIN SYNDROME: ICD-10-CM

## 2021-10-01 DIAGNOSIS — Z12.31 ENCOUNTER FOR SCREENING MAMMOGRAM FOR MALIGNANT NEOPLASM OF BREAST: ICD-10-CM

## 2021-10-01 DIAGNOSIS — Z00.00 ANNUAL PHYSICAL EXAM: Primary | ICD-10-CM

## 2021-10-01 DIAGNOSIS — R42 DIZZINESS: ICD-10-CM

## 2021-10-01 DIAGNOSIS — F33.1 MODERATE EPISODE OF RECURRENT MAJOR DEPRESSIVE DISORDER (H): ICD-10-CM

## 2021-10-01 DIAGNOSIS — S82.65XD CLOSED NONDISPLACED FRACTURE OF LATERAL MALLEOLUS OF LEFT FIBULA WITH ROUTINE HEALING, SUBSEQUENT ENCOUNTER: ICD-10-CM

## 2021-10-01 DIAGNOSIS — J98.01 BRONCHOSPASM: ICD-10-CM

## 2021-10-01 PROCEDURE — 90682 RIV4 VACC RECOMBINANT DNA IM: CPT | Performed by: FAMILY MEDICINE

## 2021-10-01 PROCEDURE — 99396 PREV VISIT EST AGE 40-64: CPT | Mod: 25 | Performed by: FAMILY MEDICINE

## 2021-10-01 PROCEDURE — 87624 HPV HI-RISK TYP POOLED RSLT: CPT | Performed by: FAMILY MEDICINE

## 2021-10-01 PROCEDURE — 90471 IMMUNIZATION ADMIN: CPT | Performed by: FAMILY MEDICINE

## 2021-10-01 PROCEDURE — G0123 SCREEN CERV/VAG THIN LAYER: HCPCS | Performed by: FAMILY MEDICINE

## 2021-10-01 PROCEDURE — 77067 SCR MAMMO BI INCL CAD: CPT | Mod: TC | Performed by: RADIOLOGY

## 2021-10-01 ASSESSMENT — PATIENT HEALTH QUESTIONNAIRE - PHQ9: SUM OF ALL RESPONSES TO PHQ QUESTIONS 1-9: 10

## 2021-10-01 ASSESSMENT — MIFFLIN-ST. JEOR: SCORE: 1222.05
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
## 2021-10-04 ENCOUNTER — TELEPHONE (OUTPATIENT)
Dept: FAMILY MEDICINE | Facility: CLINIC | Age: 56
End: 2021-10-04

## 2021-10-04 ENCOUNTER — ALLIED HEALTH/NURSE VISIT (OUTPATIENT)
Dept: NURSING | Facility: CLINIC | Age: 56
End: 2021-10-04
Payer: COMMERCIAL

## 2021-10-04 DIAGNOSIS — Z71.89 COUNSELING AND COORDINATION OF CARE: Primary | ICD-10-CM

## 2021-10-04 PROCEDURE — 99207 PR NO CHARGE NURSE ONLY: CPT

## 2021-10-04 NOTE — TELEPHONE ENCOUNTER
----- Message from Heidi Hernandez DO sent at 10/2/2021  7:53 AM CDT -----  Mammogram normal. Routine screening recommended.

## 2021-10-04 NOTE — PROGRESS NOTES
Clinic Care Coordination Contact    Follow Up Progress Note      Assessment: CCC MICHELLE met with Indra in the clinic and used a phone .     She reported she brought forms to her appointment on 10/1. It took time to locate these forms. MICHELLE was able to consult with PCP. She is going to complete the medical opinion form for Indra's MFI and send it to the [facility]. The other forms for MICHELLE were the [employer] forms MICHELLE mailed to Indra.    We attempted to complete these today, however, she didn't bring her children's ID's and she couldn't remember their birth dates or tell me how to spell their names. We scheduled another visit on 10/11 so we can try again, she will bring their ID's.     Care Gaps:    Health Maintenance Due   Topic Date Due     URINE DRUG SCREEN  Never done     ADVANCE CARE PLANNING  Never done     Pneumococcal Vaccine: Pediatrics (0 to 5 Years) and At-Risk Patients (6 to 64 Years) (1 of 4 - PCV13) Never done     ZOSTER IMMUNIZATION (1 of 2) Never done     HPV TEST  04/05/2018     PAP  04/05/2018     COVID-19 Vaccine (3 - Moderna risk 3-dose series) 09/01/2021           Goals addressed this encounter:   Goals Addressed                    This Visit's Progress       COMPLETED: Medical (pt-stated)         I met with my PCP on 10/01 to address care gaps          Problem Solving (pt-stated)         Goal Statement: I want my children to apply for their naturalization certificates within 1-2 months   Date Goal set: 09/09/21  Barriers: Language  Strengths:   Date to Achieve By: 11/30/2021  Patient expressed understanding of goal: Yes  Action steps to achieve this goal:  1. I will drop off the form on 10-1-21 with PCP for GENE MARTINEZ  2. I will  see CCC MICHELLE on 10-11-21 at 2pm  to review the necessary forms                     Intervention/Education provided during outreach: See above          Plan:   MICHELLE will meet with Indra on 10/11

## 2021-10-08 DIAGNOSIS — K12.0 APHTHOUS ULCER OF MOUTH: ICD-10-CM

## 2021-10-08 NOTE — TELEPHONE ENCOUNTER
Routing refill request to provider for review/approval because:  Drug not on the [facility] refill protocol     Last Written Prescription Date:  05/13/2021  Last Fill Quantity: 120 mL,  # refills: 0   Last office visit provider:  10/01/2021 David     Requested Prescriptions   Pending Prescriptions Disp Refills     magic mouthwash (ENTER INGREDIENTS IN COMMENTS) suspension 120 mL 0     Sig: [ALUM/MAG HYDROX-SIMETHICONE-DIPHENHYDRAMINE-LIDOCAINE (MAGIC MOUTHWASH) SUSPENSION] Take 5 mL by mouth 3-4 times a day, as needed.  Swish around in mouth and then spit out or swallow.       There is no refill protocol information for this order          Demi Luciano RN 10/08/21 3:05 PM

## 2021-10-09 LAB
HUMAN PAPILLOMA VIRUS 16 DNA: NEGATIVE
HUMAN PAPILLOMA VIRUS 18 DNA: NEGATIVE
HUMAN PAPILLOMA VIRUS FINAL DIAGNOSIS: NORMAL
HUMAN PAPILLOMA VIRUS OTHER HR: NEGATIVE

## 2021-10-11 ENCOUNTER — ALLIED HEALTH/NURSE VISIT (OUTPATIENT)
Dept: NURSING | Facility: CLINIC | Age: 56
End: 2021-10-11
Payer: COMMERCIAL

## 2021-10-11 DIAGNOSIS — Z71.89 COUNSELING AND COORDINATION OF CARE: Primary | ICD-10-CM

## 2021-10-11 LAB
BKR LAB AP GYN ADEQUACY: NORMAL
BKR LAB AP GYN INTERPRETATION: NORMAL
BKR LAB AP HPV REFLEX: NORMAL
BKR LAB AP PREVIOUS ABNORMAL: NORMAL
PATH REPORT.COMMENTS IMP SPEC: NORMAL
PATH REPORT.RELEVANT HX SPEC: NORMAL

## 2021-10-11 PROCEDURE — 99207 PR NO CHARGE NURSE ONLY: CPT

## 2021-10-11 NOTE — PROGRESS NOTES
Clinic Care Coordination Contact    Follow Up Progress Note      Assessment: [facility] MICHELLE met with Indra and family member in the clinic to complete her son's application for certificate of naturalization. We need a Medicaid letter for the fee waiver application, MICHELLE will call the [facility] on 10/13. We will also need information from Indra's Naturalization Certificate which she didn't have with today. Then, we will need signatures. Once she has the medicaid letter, we can meet again to complete the rest.     Care Gaps:    Health Maintenance Due   Topic Date Due     URINE DRUG SCREEN  Never done     ADVANCE CARE PLANNING  Never done     Pneumococcal Vaccine: Pediatrics (0 to 5 Years) and At-Risk Patients (6 to 64 Years) (1 of 4 - PCV13) Never done     ZOSTER IMMUNIZATION (1 of 2) Never done     PAP  04/05/2018     COVID-19 Vaccine (3 - Moderna risk 3-dose series) 09/01/2021         Goals addressed this encounter:   Goals Addressed                    This Visit's Progress       Problem Solving (pt-stated)         Goal Statement: I want my children to apply for their naturalization certificates within 1-2 months   Date Goal set: 09/09/21  Barriers: Language  Strengths:   Date to Achieve By: 11/30/2021  Patient expressed understanding of goal: Yes  Action steps to achieve this goal:  1. I will wait to receive Medicaid Letter  2. I will schedule in person visit with MICHELLE once letter is received to complete my son's application                    Intervention/Education provided during outreach: See above     Outreach Frequency: monthly    Plan:   MICHELLE will call the [facility] on 10/13

## 2021-10-13 ENCOUNTER — PATIENT OUTREACH (OUTPATIENT)
Dept: NURSING | Facility: CLINIC | Age: 56
End: 2021-10-13

## 2021-10-13 NOTE — PROGRESS NOTES
Clinic Care Coordination Contact  Care Team Conversations    [facility] SW spoke with the [facility] team, they will mail a letter of confirmation for Medicaid to home address.

## 2021-10-25 DIAGNOSIS — Z53.9 DIAGNOSIS NOT YET DEFINED: Primary | ICD-10-CM

## 2021-10-26 PROCEDURE — G0180 MD CERTIFICATION HHA PATIENT: HCPCS | Performed by: FAMILY MEDICINE

## 2021-11-05 ENCOUNTER — PATIENT OUTREACH (OUTPATIENT)
Dept: CARE COORDINATION | Facility: CLINIC | Age: 56
End: 2021-11-05

## 2021-11-05 NOTE — PROGRESS NOTES
Clinic Care Coordination Contact  [facility]/Voicemail       Clinical Data: CHW Outreach    Outreach attempted x 1.  Unable to leave patient's voicemail with call back information and requested return call. Due to Patient's voicemail is full at this time     CHW utilized an  (ID: [id number])      Chart Review    Address Goals;   o Was Patient able to discuss obtaining a Walker with PCP at 10-1-2021 Office Visit?   o Has Patient heard back from ?   o Has Patient received Medicaid Letter? (Needed for Patient's Son application for certificate of naturalization - fee waiver)     Upcoming Appointments;   o PCP Follow Up (12/01/2021 10:00AM) [address] [place] [address]     Plan: CHW will make another attempt to reach the patient via phone or MyChart.    CHW outreach in the next 2 weeks.      HODA Monsalve  Clinic Care Coordination   [facility]   Phone: [phone]  [email or web address]

## 2021-11-08 ENCOUNTER — PATIENT OUTREACH (OUTPATIENT)
Dept: NURSING | Facility: CLINIC | Age: 56
End: 2021-11-08
Payer: COMMERCIAL

## 2021-11-08 NOTE — PROGRESS NOTES
11/8/2021  Clinic Care Coordination Contact    Community Health Worker Follow Up    Care Gaps:     Health Maintenance Due   Topic Date Due     URINE DRUG SCREEN  Never done     ADVANCE CARE PLANNING  Never done     Pneumococcal Vaccine: Pediatrics (0 to 5 Years) and At-Risk Patients (6 to 64 Years) (1 of 4 - PCV13) Never done     ZOSTER IMMUNIZATION (1 of 2) Never done     COVID-19 Vaccine (3 - Moderna risk 4-dose series) 09/01/2021       Care Gaps Last addressed on 11-8-21 will discuss care gaps on 12-1-21 with the doctor.    Goals:   Goals Addressed as of 11/8/2021 at 3:26 PM                    Today    9/27/21       Problem Solving (pt-stated)   40%  40%    Added 9/9/21 by Ihsan Braga, [facility]      Goal Statement: I want my children to apply for their naturalization certificates within 1-2 months   Date Goal set: 09/09/21  Barriers: Language  Strengths:   Date to Achieve By: 11/30/2021  Patient expressed understanding of goal: Yes  Action steps to achieve this goal:  1. I will talk to GENE MARTINEZ on 11-12-21 at 10:30am regarding getting the Medicaid Letter  2. I will schedule in person visit with MICHELLE once letter is received to complete my son's application    Updated: 11-8-21                  Intervention and Education during outreach:   Received call from patient and [facility]  Gema.  Patient getting anxious about son's certificate for citizenship.  She like to know status of son's certificate application.if application sent.  Reviewed GENE MARTINEZ notes on 10-12-21 and informed her that GENE MARTINEZ need copy of the Medicaid letter from the [facility] for the fee waiver and once she receives the letter then schedule in person visit with GENE Craven.    Patient stated that she has not received any letters or paper from the [facility].  She would like to talk to MICHELLE Craven again for support.    Scheduled appt with GENE MARTINEZ 11-12-21 at 10:30am regarding the letter from the [facility].    GENE MARTINEZ follow up 11-12-21   CHW Follow up:  Monthly    CHW Plan: Follow up on goals    CHW Next Follow Up: 12-10-21

## 2021-11-11 ENCOUNTER — TRANSFERRED RECORDS (OUTPATIENT)
Dept: HEALTH INFORMATION MANAGEMENT | Facility: CLINIC | Age: 56
End: 2021-11-11
Payer: COMMERCIAL

## 2021-11-12 ENCOUNTER — PATIENT OUTREACH (OUTPATIENT)
Dept: NURSING | Facility: CLINIC | Age: 56
End: 2021-11-12
Payer: COMMERCIAL

## 2021-11-12 NOTE — PROGRESS NOTES
CCC SW attempted to reach patient by phone. No answer, SW will call again next week. Unable to leave voicemail

## 2021-11-17 ENCOUNTER — PATIENT OUTREACH (OUTPATIENT)
Dept: NURSING | Facility: CLINIC | Age: 56
End: 2021-11-17
Payer: COMMERCIAL

## 2021-11-17 NOTE — PROGRESS NOTES
Clinic Care Coordination Contact  Care Team Conversations    CCC SW attempted to reach patient, no answer, and unable to leave voicemail.    Status changed to Unable to be reached. CHW to do standard outreach.

## 2021-11-18 ENCOUNTER — TELEPHONE (OUTPATIENT)
Dept: FAMILY MEDICINE | Facility: CLINIC | Age: 56
End: 2021-11-18
Payer: COMMERCIAL

## 2021-11-23 ENCOUNTER — PATIENT OUTREACH (OUTPATIENT)
Dept: CARE COORDINATION | Facility: CLINIC | Age: 56
End: 2021-11-23
Payer: COMMERCIAL

## 2021-11-23 NOTE — PROGRESS NOTES
Clinic Care Coordination Contact  Care Team Conversations    CCC SW received message that patient was wanting to schedule with SW from the clinic. SW attempted to reach patient to schedule, no answer and voicemail full.

## 2021-11-24 ENCOUNTER — PATIENT OUTREACH (OUTPATIENT)
Dept: CARE COORDINATION | Facility: CLINIC | Age: 56
End: 2021-11-24
Payer: COMMERCIAL

## 2021-11-24 NOTE — PROGRESS NOTES
11/24/2021  Received VM from patient and [facility]  Gema 11-24-21 that they waited to talk to CC SW last Friday.  Need help from CC SW with paperwork sign paper for [place]

## 2021-11-24 NOTE — PROGRESS NOTES
11/24/2021  Clinic Care Coordination Contact    Community Health Worker Follow Up    Care Gaps:     Health Maintenance Due   Topic Date Due     URINE DRUG SCREEN  Never done     ADVANCE CARE PLANNING  Never done     Pneumococcal Vaccine: Pediatrics (0 to 5 Years) and At-Risk Patients (6 to 64 Years) (1 of 4 - PCV13) Never done     ZOSTER IMMUNIZATION (1 of 2) Never done     COVID-19 Vaccine (3 - Moderna risk 4-dose series) 09/01/2021       Care Gaps Last addressed on 11-24-21 will discuss on 12-1-21 with the doctor;    Goals:   Goals Addressed as of 11/24/2021 at 5:17 PM                    Today    11/8/21       Financial Wellbeing (pt-stated)   50%      Added 9/9/21 by Ihsan Braga LICSW      Goal Statement: I want to connect to an organization to support me with the Social Security process within one month  Date Goal set: 09/09/21  Barriers: Language  Strengths:   Date to Achieve By:   Patient expressed understanding of goal: Yes  Action steps to achieve this goal:  1. I will meet with CC MICHELLE on 12-6-21 at 11am for support with paperwork from [employer]   2. I will update CCC team    Updated 11-24-21             Functional (pt-stated)   50%      Added 9/9/21 by Ihsan Braga LICSW      Goal Statement: I want to get a walker within 1-2 months  Date Goal set: 09/09/21 Updated: 11-24-21  Barriers: Language  Strengths:   Date to Achieve By: 12/30/2021  Patient expressed understanding of goal: Yes  Action steps to achieve this goal:  1. I am still waiting for the walker I willl talk to the doctor on 12-1-21 status of walker.  2. I will update CCC team    Updated: 11-24-21           Problem Solving (pt-stated)   40%  40%    Added 9/9/21 by Ihsan Braga LICSW      Goal Statement: I want my children to apply for their naturalization certificates within 1-2 months   Date Goal set: 09/09/21 updated: 11-24-21  Barriers: Language  Strengths:   Date to Achieve By: 1-2022  Patient  expressed understanding of goal: Yes  Action steps to achieve this goal:  1. I will meet with GENE MARTINEZ on 12-6-21 at 11am regarding getting the Medicaid Letter  2. I will schedule in person visit with MICHELLE once letter is received to complete my son's application    Updated: 11-24-21                  Intervention and Education during outreach:  Antoine  : ID#[id number]    Called and spoke to patient through [employer]  and follow up on goals.  Patient reported:   -number is not working to call her brother's cell   [phone]  Updated in Chart correct contact number    Patient gave verbal permission to talk to brother Man.  -did not get the walker yet    Scheduled in person visit with GENE MARTINEZ on 12-6-21 at 11am for paperwork from [employer] and children citizenship.      GENE MARTINEZ 12-6-21   CHW Follow up: Monthly    CHW Plan: Follow up on goals    CHW Next Follow Up: 1-6-22

## 2021-12-06 ENCOUNTER — ALLIED HEALTH/NURSE VISIT (OUTPATIENT)
Dept: NURSING | Facility: CLINIC | Age: 56
End: 2021-12-06
Payer: COMMERCIAL

## 2021-12-06 DIAGNOSIS — Z71.89 COUNSELING AND COORDINATION OF CARE: Primary | ICD-10-CM

## 2021-12-06 PROCEDURE — 99207 PR NO CHARGE NURSE ONLY: CPT

## 2021-12-06 NOTE — PROGRESS NOTES
Clinic Care Coordination Contact    Follow Up Progress Note      Assessment: [facility] MICHELLE met with Indra and her children in the clinic. Used a telephone  to support communication with Indra, her two children speak English.    SW reviewed and completed the N-600 applications with her and her children. She had a [facility] confirmation letter to submit with the fee waiver. MICHELLE made a copy of the application for her records and mailed the signed copies to [facility].     MICHELLE reviewed the other paperwork she brought. It was her Medical Opinion form which was completed by PCP in October. MICHELLE re-faxed it to the worker just in case they haven't received it yet.     Care Gaps:    Health Maintenance Due   Topic Date Due     URINE DRUG SCREEN  Never done     ADVANCE CARE PLANNING  Never done     Pneumococcal Vaccine: Pediatrics (0 to 5 Years) and At-Risk Patients (6 to 64 Years) (1 of 4 - PCV13) Never done     ZOSTER IMMUNIZATION (1 of 2) Never done     COVID-19 Vaccine (3 - Moderna risk 4-dose series) 09/01/2021       Deferred addressing care gaps due to SW follow up to discuss goal.      Goals addressed this encounter:   Goals Addressed                    This Visit's Progress       COMPLETED: Problem Solving (pt-stated)         My children's application for naturalization has been submitted            Intervention/Education provided during outreach: See above     Outreach Frequency: monthly    Plan:   Standard outreach

## 2021-12-23 RX ORDER — LANOLIN ALCOHOL/MO/W.PET/CERES
1000 CREAM (GRAM) TOPICAL DAILY
Qty: 90 TABLET | Refills: 1 | OUTPATIENT
Start: 2021-12-23

## 2021-12-23 NOTE — TELEPHONE ENCOUNTER
"Due to RN not being able to verify medication information in former [facility] chart, RN will route to provider to review and fill if appropriate.    Last office visit provider:  10/1/2021 Dr. Hernandez     Requested Prescriptions   Pending Prescriptions Disp Refills     cyanocobalamin (VITAMIN B-12) 1000 MCG tablet 90 tablet      Sig: Take 1 tablet (1,000 mcg) by mouth daily       Vitamin Supplements (Adult) Protocol Passed - 12/20/2021  3:08 PM        Passed - High dose Vitamin D not ordered        Passed - Recent (12 mo) or future (30 days) visit within the authorizing provider's specialty     Patient has had an office visit with the authorizing provider or a provider within the authorizing providers department within the previous 12 mos or has a future within next 30 days. See \"Patient Info\" tab in inbasket, or \"Choose Columns\" in Meds & Orders section of the refill encounter.              Passed - Medication is active on med list             Ana Garcia RN 12/22/21 10:52 PM  "

## 2021-12-27 RX ORDER — LANOLIN ALCOHOL/MO/W.PET/CERES
CREAM (GRAM) TOPICAL
OUTPATIENT
Start: 2021-12-27

## 2021-12-28 NOTE — TELEPHONE ENCOUNTER
Left message #1 at [phone]. Postponing task out to a week and will try again. If patient returns call back, please help patient schedule an appointment per message below. Thanks!

## 2021-12-28 NOTE — TELEPHONE ENCOUNTER
"Routing refill request to provider for review/approval because:  Medication is reported/historical    Last Written Prescription Date:  n/a  Last Fill Quantity: n/a,  # refills: n/a   Last office visit provider:  10/1/2021     Requested Prescriptions   Pending Prescriptions Disp Refills     cyanocobalamin (VITAMIN B-12) 1000 MCG tablet         Vitamin Supplements (Adult) Protocol Passed - 12/24/2021  8:36 AM        Passed - High dose Vitamin D not ordered        Passed - Recent (12 mo) or future (30 days) visit within the authorizing provider's specialty     Patient has had an office visit with the authorizing provider or a provider within the authorizing providers department within the previous 12 mos or has a future within next 30 days. See \"Patient Info\" tab in inbasket, or \"Choose Columns\" in Meds & Orders section of the refill encounter.              Passed - Medication is active on med list             Richa Moreland RN 12/27/21 9:39 PM  "

## 2022-01-02 DIAGNOSIS — Z53.9 DIAGNOSIS NOT YET DEFINED: Primary | ICD-10-CM

## 2022-01-02 PROCEDURE — G0179 MD RECERTIFICATION HHA PT: HCPCS | Performed by: FAMILY MEDICINE

## 2022-01-04 NOTE — TELEPHONE ENCOUNTER
Left message #2 at [phone]. Sending letter out and postponing task out to 2 weeks and will try again if an appointment hasn't been made. If patient returns call back, please help patient schedule an appointment per message below. Thanks!

## 2022-01-06 ENCOUNTER — PATIENT OUTREACH (OUTPATIENT)
Dept: CARE COORDINATION | Facility: CLINIC | Age: 57
End: 2022-01-06
Payer: COMMERCIAL

## 2022-01-06 NOTE — PROGRESS NOTES
1/6/2022  Clinic Care Coordination Contact  [facility]/Voicemail       Clinical Data: Care Coordinator Outreach  Outreach attempted x 1.  Left message on patient's voicemail with call back information and requested return call.  Plan: Care Coordinator will try to reach patient again in 10 business days.    CHW follow up: 1-20-22

## 2022-01-10 DIAGNOSIS — G89.29 CHRONIC LOWER BACK PAIN: ICD-10-CM

## 2022-01-10 DIAGNOSIS — M54.50 CHRONIC LOWER BACK PAIN: ICD-10-CM

## 2022-01-13 NOTE — TELEPHONE ENCOUNTER
"Last Written Prescription Date:  9/27/21  Last Fill Quantity: 90,  # refills: 0   Last office visit provider:  10/1/21     Requested Prescriptions   Pending Prescriptions Disp Refills     vitamin B-12 (CYANOCOBALAMIN) 1000 MCG tablet 90 tablet 0     Sig: Take 1 tablet (1,000 mcg) by mouth daily       Vitamin Supplements (Adult) Protocol Passed - 1/10/2022  4:21 PM        Passed - High dose Vitamin D not ordered        Passed - Recent (12 mo) or future (30 days) visit within the authorizing provider's specialty     Patient has had an office visit with the authorizing provider or a provider within the authorizing providers department within the previous 12 mos or has a future within next 30 days. See \"Patient Info\" tab in inbasket, or \"Choose Columns\" in Meds & Orders section of the refill encounter.              Passed - Medication is active on med list             Aneudy Reese RN 01/13/22 10:10 AM  "

## 2022-01-18 NOTE — TELEPHONE ENCOUNTER
Left message #3 at [phone]. If patient returns call back, please help patient schedule an appointment per message below. Thanks! We have made several attempts to contact patient by phone and letter to schedule an appointment. Unfortunately, our calls have not been returned and we were unable to schedule. At this time, we will no longer make an attempt to schedule this appointment. Completing task.

## 2022-01-20 ENCOUNTER — PATIENT OUTREACH (OUTPATIENT)
Dept: CARE COORDINATION | Facility: CLINIC | Age: 57
End: 2022-01-20
Payer: COMMERCIAL

## 2022-01-20 NOTE — PROGRESS NOTES
1/20/2022  Clinic Care Coordination Contact  [facility]/Voicemail    Clinical Data: Care Coordinator Outreach  Outreach attempted x 2.  No answer. No VM unable to leave message.    Plan: Care Coordinator will send unable to contact letter with care coordinator contact information via mail  Care Coordinator will try to reach patient again in 1 month.      CHW Follow up: Monthly    CHW Plan: Follow up on goal    CHW Next Follow Up: 2-15-22

## 2022-01-20 NOTE — LETTER
[facility] CARE COORDINATION  [address] [place] [address]    January 20, 2022    Indra Jason  [address]  [place] [address]      Dear Indra,    I have been attempting to reach you since our last contact. I would like to continue to work with you and provide any additional support you may need on achieving your health care related goals.   I would appreciate if you would give me a call at [phone] to let me know if you would like to continue working together. I know that there are many things that can affect our ability to communicate and I hope we can continue to work together.    All of us at the [facility]  are invested in your health and are here to assist you in meeting your goals.     Sincerely,    Wes Juan  Community Health Worker  [facility] Care Coordination  [email or web address]  [email or web address]   Office: [phone]  Fax: [phone]

## 2022-01-26 ENCOUNTER — PATIENT OUTREACH (OUTPATIENT)
Dept: CARE COORDINATION | Facility: CLINIC | Age: 57
End: 2022-01-26
Payer: COMMERCIAL

## 2022-02-08 ENCOUNTER — MEDICAL CORRESPONDENCE (OUTPATIENT)
Dept: HEALTH INFORMATION MANAGEMENT | Facility: CLINIC | Age: 57
End: 2022-02-08
Payer: COMMERCIAL

## 2022-02-14 ENCOUNTER — MEDICAL CORRESPONDENCE (OUTPATIENT)
Dept: HEALTH INFORMATION MANAGEMENT | Facility: CLINIC | Age: 57
End: 2022-02-14
Payer: COMMERCIAL

## 2022-02-15 ENCOUNTER — MEDICAL CORRESPONDENCE (OUTPATIENT)
Dept: HEALTH INFORMATION MANAGEMENT | Facility: CLINIC | Age: 57
End: 2022-02-15
Payer: COMMERCIAL

## 2022-02-15 ENCOUNTER — PATIENT OUTREACH (OUTPATIENT)
Dept: CARE COORDINATION | Facility: CLINIC | Age: 57
End: 2022-02-15
Payer: COMMERCIAL

## 2022-02-15 NOTE — PROGRESS NOTES
2/15/2022  Clinic Care Coordination Contact  [facility]/Voicemail       Clinical Data: Care Coordinator Outreach  Outreach attempted x 3.  Left message on patient's voicemail with call back information and requested return call.  Plan: Care Coordinator will route to [facility] regarding send disenrollment letter with care coordinator contact information via mail.     Care Coordinator will do no further outreaches at this time.

## 2022-02-15 NOTE — LETTER
[facility] CARE COORDINATION  [address] [place] [address]    February 15, 2022    Indra Jason  [address]  [place] [address]      Dear Indra,    I have been unsuccessful in reaching you since our last contact. At this time the Care Coordination team will make no further attempts to reach you, however this does not change your ability to continue receiving care from your providers at your primary care clinic. If you need additional support from a care coordinator in the future please contact me at [phone]     All of us at [facility] are invested in your health and are here to assist you in meeting your goals.     Sincerely,    Wes Juan  Community Health Worker  [facility] Care Coordination  [email or web address]  [email or web address]   Office: [phone]  Fax: [phone]

## 2022-02-16 ENCOUNTER — OFFICE VISIT (OUTPATIENT)
Dept: FAMILY MEDICINE | Facility: CLINIC | Age: 57
End: 2022-02-16
Payer: COMMERCIAL

## 2022-02-16 VITALS
HEART RATE: 87 BPM | HEIGHT: 62 IN | RESPIRATION RATE: 16 BRPM | WEIGHT: 167 LBS | BODY MASS INDEX: 30.73 KG/M2 | TEMPERATURE: 98.4 F | SYSTOLIC BLOOD PRESSURE: 148 MMHG | DIASTOLIC BLOOD PRESSURE: 87 MMHG

## 2022-02-16 DIAGNOSIS — F33.1 MODERATE EPISODE OF RECURRENT MAJOR DEPRESSIVE DISORDER (H): ICD-10-CM

## 2022-02-16 DIAGNOSIS — R03.0 ELEVATED BLOOD PRESSURE READING WITHOUT DIAGNOSIS OF HYPERTENSION: Primary | ICD-10-CM

## 2022-02-16 PROCEDURE — 99213 OFFICE O/P EST LOW 20 MIN: CPT | Performed by: FAMILY MEDICINE

## 2022-02-16 RX ORDER — LANOLIN ALCOHOL/MO/W.PET/CERES
CREAM (GRAM) TOPICAL
COMMUNITY
Start: 2022-01-13

## 2022-02-16 NOTE — PATIENT INSTRUCTIONS
Check blood pressure 2-3 time a week, keep a log, follow up in 4 weeks    Patient Education     Controlling High Blood Pressure   High blood pressure (hypertension) is often called the silent killer. This is because many people who have it, don t know it. It can be very dangerous. High blood pressure can raise your risk of heart attack, stroke, heart disease, and heart failure. Controlling your blood pressure can decrease your risk of these problems. It's important to know the appropriate blood pressure range and remember to check your blood pressure regularly. Doing so can save your life.   Blood pressure measurements are given as 2 numbers. Systolic blood pressure is the upper number. This is the pressure when the heart contracts. Diastolic blood pressure is the lower number. This is the pressure when the heart relaxes between beats.   Blood pressure is categorized as normal, elevated, or stage 1 or stage 2 high blood pressure:     Normal blood pressure is systolic of less than 120 and diastolic of less than 80 (120/80)    Elevated blood pressure is systolic of 120 to 129 and diastolic less than 80    Stage 1 high blood pressure is systolic of 130 to 139 or diastolic between 80 to 89    Stage 2 high blood pressure is when systolic is 140 or higher or the diastolic is 90 or higher  A heart-healthy lifestyle can help you control your blood pressure without medicines. Here are some things you can do to pursue a heart-healthy lifestyle:     Choose heart-healthy foods     Select low-salt, low-fat foods. Limit sodium intake to 2,400 mg per day or the amount suggested by your healthcare provider.    Limit canned, dried, cured, packaged, and fast foods. These can contain a lot of salt.    Eat 8 to 10 servings of fruits and vegetables every day.    Choose lean meats, fish, or chicken.    Eat whole-grain pasta, brown rice, and beans.    Eat 2 to 3 servings of low-fat or fat-free dairy products.    Ask your doctor about the  DASH eating plan. This plan helps reduce blood pressure.    When you go to a restaurant, ask that your meal be prepared with no added salt.    Stay at a healthy weight     Ask your healthcare provider how many calories to eat a day. Then stick to that number.    Ask your healthcare provider what weight range is healthiest for you. If you are overweight, a weight loss of only 3% to 5% of your body weight can help lower blood pressure. Generally, a good weight loss goal is to lose 10% of your body weight in a year.    Limit snacks and sweets.    Get regular exercise.    Get up and get active     Find activities you enjoy that can be done alone or with friends or family. Such activities might include bicycling, dancing, walking, or jogging.    Park farther away from building entrances to walk more.    Use stairs instead of the elevator.    When you can, walk or bike instead of driving.    [place] leaves, garden, or do household repairs.    Be active at a moderate to vigorous level of physical activity for at least 40 minutes for a minimum of 3 to 4 days a week.     Manage stress     Make time to relax and enjoy life. Find time to laugh.    Communicate your concerns with your loved ones and your healthcare provider.    Visit with family and friends, and keep up with hobbies.    Limit alcohol and quit smoking     Men should have no more than 2 drinks per day.    Women should have no more than 1 drink per day.    Talk with your healthcare provider about quitting smoking. Smoking significantly increases your risk for heart disease and stroke. Ask your healthcare provider about community smoking cessation programs and other options.    Medicines  If lifestyle changes aren t enough, your healthcare provider may prescribe high blood pressure medicine. Take all medicines as prescribed. If you have any questions about your medicines, ask your healthcare provider before stopping or changing them.   Christos last reviewed this  educational content on 6/1/2019 2000-2021 [employer]. All rights reserved. This information is not intended as a substitute for professional medical care. Always follow your healthcare professional's instructions.

## 2022-02-16 NOTE — PROGRESS NOTES
"OFFICE VISIT - FAMILY MEDICINE     ASSESSMENT AND PLAN       ICD-10-CM    1. Elevated blood pressure reading without diagnosis of hypertension  R03.0    2. Moderate episode of recurrent major depressive disorder (H)  F33.1     Elevated Blood pressure, management discussed with patient, encouraged healthy lifestyle changes, decrease amount of salt intake, start physical activity and weight loss  program, check blood pressure 2-3 times a week keep a log, follow-up in about 4 weeks with PCP if persistently elevated will start a medication.  Depression has been stable with current management no suicidal.    CHIEF COMPLAINT   Blood Pressure Check       HPI   Indra Jason is a 57 year old female.  No Patient Care Coordination Note on file.    A nurse comes to her house every Wednesday to check on her, blood pressure was mildly elevated at her last visit, 167/98, she did have some mild headaches that same day.  But denies any chest pain shortness of breath or dizziness.  No prior diagnosis of hypertension, blood pressure has been slightly elevated in clinic in the past and today.  Denies any new symptoms.  She has chronic pain, tension headaches, depression, elevated ferritin.      Review of Systems As per HPI, otherwise negative.    OBJECTIVE   BP (!) 148/87 (BP Location: Left arm, Patient Position: Sitting, Cuff Size: Adult Regular)   Pulse 87   Temp 98.4  F (36.9  C) (Oral)   Resp 16   Ht 1.565 m (5' 1.61\")   Wt 75.8 kg (167 lb)   BMI 30.93 kg/m    Physical Exam    PFSH   No family history on file.  Social History     Socioeconomic History     Marital status:      Spouse name: Not on file     Number of children: 8     Years of education: Not on file     Highest education level: Not on file   Occupational History     Not on file   Tobacco Use     Smoking status: Former Smoker     Years: 15.00     Quit date: 3/21/2004     Years since quittin.9     Smokeless tobacco: Never Used     Tobacco comment: no " passive exposure in home.   Substance and Sexual Activity     Alcohol use: No     Drug use: No     Sexual activity: Never   Other Topics Concern     Not on file   Social History Narrative     Not on file     Social Determinants of Health     Financial Resource Strain: Low Risk      Difficulty of Paying Living Expenses: Not very hard   Food Insecurity: No Food Insecurity     Worried About Running Out of Food in the Last Year: Never true     Ran Out of Food in the Last Year: Never true   Transportation Needs: No Transportation Needs     Lack of Transportation (Medical): No     Lack of Transportation (Non-Medical): No   Physical Activity: Inactive     Days of Exercise per Week: 0 days     Minutes of Exercise per Session: 0 min   Stress: No Stress Concern Present     Feeling of Stress : Only a little   Social Connections: Socially Isolated     Frequency of Communication with Friends and Family: More than three times a week     Frequency of Social Gatherings with Friends and Family: More than three times a week     Attends Cheondoism Services: Never     Active Member of Clubs or Organizations: No     Attends Club or Organization Meetings: Never     Marital Status:    Intimate Partner Violence: Not At Risk     Fear of Current or Ex-Partner: No     Emotionally Abused: No     Physically Abused: No     Sexually Abused: No   Housing Stability: Low Risk      Unable to Pay for Housing in the Last Year: No     Number of Places Lived in the Last Year: 1     Unstable Housing in the Last Year: No       [facility]   [unfilled]  Past Surgical History:   Procedure Laterality Date     ABDOMEN SURGERY N/A        RESULTS/CONSULTS (Lab/Rad)   No results found for this or any previous visit (from the past 168 hour(s)).  *MA Screening Digital Bilateral  BILATERAL FULL FIELD DIGITAL SCREENING MAMMOGRAM    Performed on: 10/1/21    Compared to: 01/11/2019, 07/29/2016, and 03/28/2013    Technique: This study was evaluated with the assistance  of Computer-Aided   Detection.    Findings: The breasts have scattered areas of fibroglandular density.    There is no radiographic evidence of malignancy.     IMPRESSION: ACR BI-RADS Category 1: Negative    RECOMMENDED FOLLOW-UP: Annual routine screening mammogram    The results and recommendations of this examination will be communicated   to the patient.     [unfilled]    HEALTH MAINTENANCE / SCREENING   [unfilled], [unfilled],[unfilled]  Immunization History   Administered Date(s) Administered     COVID-19,PF,Moderna 06/21/2021, 08/04/2021     Flu, Unspecified 09/04/2013, 11/01/2016     Hep B, Peds or Adolescent 02/21/2013     HepB, Unspecified 02/06/2012, 04/02/2012     HepB-Adult 02/06/2012, 04/02/2012, 11/07/2013, 03/03/2015     Hepatitis A Immunity: Titer/MD Dx 02/21/2013     Hepatitis B Immunity: Titer 02/21/2013     Influenza (IIV3) PF 09/24/2013     Influenza Quad, Recombinant, pf(RIV4) (Flublok) 09/11/2019, 03/03/2021, 10/01/2021     Influenza Vaccine IM > 6 months Valent IIV4 (Alfuria,Fluzone) 09/18/2014, 09/18/2014, 10/28/2016, 11/16/2018     Influenza Vaccine IM Ages 6-35 Months 4 Valent (PF) 09/18/2014     Influenza Vaccine, 6+MO IM (QUADRIVALENT W/PRESERVATIVES) 09/24/2013, 10/15/2015     MMR 02/05/2010, 11/07/2013, 03/03/2015     TD (ADULT, 7+) 11/07/2013, 03/03/2015     Td (Adult), Adsorbed 02/05/2010     Td,adult,historic,unspecified 02/05/2010, 11/07/2013     Tdap (Adacel,Boostrix) 02/06/2012     Varicella 11/07/2013     Varicella Immunity: Titer/MD Dx 02/21/2013     Health Maintenance   Topic     URINE DRUG SCREEN      ADVANCE CARE PLANNING      Pneumococcal Vaccine: Pediatrics (0 to 5 Years) and At-Risk Patients (6 to 64 Years) (1 of 4 - PCV13)     ZOSTER IMMUNIZATION (1 of 2)     COVID-19 Vaccine (3 - Moderna risk 4-dose series)     PHQ-9      PREVENTIVE CARE VISIT      ANNUAL REVIEW OF  ORDERS      COLORECTAL CANCER SCREENING      MAMMO SCREENING      LIPID       DTAP/TDAP/TD IMMUNIZATION (4 - Td or Tdap)     HPV TEST      PAP      HEPATITIS C SCREENING      HIV SCREENING      INFLUENZA VACCINE      DEPRESSION ACTION PLAN      IPV IMMUNIZATION      MENINGITIS IMMUNIZATION      HEPATITIS B IMMUNIZATION      Review of external notes as documented elsewhere in note  25 minutes spent on the date of the encounter doing chart review, review of outside records, review of test results, interpretation of tests, patient visit and documentation          Elida Avila MD  Family Medicine, [facility]   This transcription uses voice recognition software, which may contain typographical errors.

## 2022-02-17 NOTE — PROGRESS NOTES
2/17/2022  Per CC SW disenroll from [facility]    Mailed dis enrollment letter to patient.    Patient has been mailed a unreachable letter and was provided with CHW contact information if they are interested inaccessing Clinic Care Coordination. no further outreach will be done at this time and patient can be re-referred.     Routed to PCP as [email or web address]

## 2022-02-28 ENCOUNTER — TELEPHONE (OUTPATIENT)
Dept: CARE COORDINATION | Facility: CLINIC | Age: 57
End: 2022-02-28

## 2022-02-28 ENCOUNTER — PATIENT OUTREACH (OUTPATIENT)
Dept: NURSING | Facility: CLINIC | Age: 57
End: 2022-02-28
Payer: COMMERCIAL

## 2022-02-28 DIAGNOSIS — F33.1 MODERATE EPISODE OF RECURRENT MAJOR DEPRESSIVE DISORDER (H): Primary | ICD-10-CM

## 2022-02-28 DIAGNOSIS — F43.10 PTSD (POST-TRAUMATIC STRESS DISORDER): ICD-10-CM

## 2022-02-28 NOTE — TELEPHONE ENCOUNTER
2/28/2022  Patient wants to re connect with [facility] for therapy services.  She did not know what happened why she stops getting call from Karena for therapy services.  She missed appts when lives with her daughter and son in law due to issues with daughter and son in law.  Currently living with her brother more supportive with attending appt and following up with appts.    Please advise

## 2022-02-28 NOTE — PROGRESS NOTES
2/28/2022  Clinic Care Coordination Contact    Community Health Worker Follow Up    Care Gaps:     Health Maintenance Due   Topic Date Due     URINE DRUG SCREEN  Never done     ADVANCE CARE PLANNING  Never done     Pneumococcal Vaccine: Pediatrics (0 to 5 Years) and At-Risk Patients (6 to 64 Years) (1 of 4 - PCV13) Never done     ZOSTER IMMUNIZATION (1 of 2) Never done     COVID-19 Vaccine (3 - Moderna risk 4-dose series) 09/01/2021       Care Gap Goal set: Yes    Goals:   Goals Addressed as of 2/28/2022 at 12:36 PM                    11/24/21 9/27/21       Financial Wellbeing (pt-stated)   50%  50%    Added 9/9/21 by Ihsan Braga, [facility]      Goal Statement: I want to re connect with an organization for support with the Social Security process within one month  Date Goal set: 2-28-22  Barriers: Language  Strengths:   Date to Achieve By: 4-2022  Patient expressed understanding of goal: Yes  Action steps to achieve this goal:  1. I will talk to staff from [facility] [facility] on 3-21-22 at 10:15am  2. I will update CCC team    [facility]  [address] [place] [address]  [phone]  Fax:[phone]    Updated:  2-28-21             Other (pt-stated)         Added 2/28/22 by Wes Juan      Goal Statement: I want support to renew food stamp and CASH benefit within 2-3 months.  Date goal set: 2/28/2022  Measure of Success: resume food and cash  Barriers: language  Strengths: support from brother and friend, CCC team  Date to Achieve By: 4-2022  Patient expressed understanding of goal: Yes    Action steps to achieve this goal  1. I will ask Gema, staff at home care to help call Financial worker to check on the status of the Food and Cash benefit  2. I will ask Gema to help call E[employer] Info Line to check on status of benefit  3. I will update CC SW on 38-22 at 2pm and for any support.    [facility]  [place] [address]  [phone] EZ info line to checkon  benefits  [phone]              Other (pt-stated)         Added 2/28/22 by Wes Juan      Goal Statement: I want support to re establish with mental health provider as soon as possible.  Date goal set: 2-28-22  Measure of Success: apt with therapist  Barriers: language  Strengths: support from CC Team  Date to Achieve By: 4-2022  Patient expressed understanding of goal: yes    Action steps to achieve this goal  1. I will wait to get a call from the clinic to schedule appt   2. I will ask for support from CC SW on 3-8-22 at 2pm if I dont have an appt               Intervention and Education during outreach:   Received call from patient and Antoine Ribeiro .  Stated pt received CHW letter and requested help regarding EBT food and Cash from the [facility] left  They did not get food or cash for over a month  They already sent every thing to the [facility]  Son's check stubs and son's school records.  Suggested to call EZ info line to check or call to financial worker to check if they received the document to process application.  -did not hear from any one about disability benefit.    Conference call to [facility] regarding status of referral   Spoke to [place] and stated they did not find her name in the system,.  Requested if she could complete intake today.  CHW supported patient to complete intake process with [place] at [facility].  appt scheduled for 3-21-22 at 10:15am  Need med list and diagnoses fax over.    Pt would like to see Karena Ashton again she is not sure what happened to telephone appt with Karena.  She stopped calling  Updated patient contact number and emergency contact information.  She used to live daughter and son in law but they had strained relationship so she missed her appts because phone number was with daughter.    CHW sent message to PCP care team order for mental health services.see Karena Ashton again.    Pt active with CCC again.  CW will consult with GENE MARTINEZ  3-1-H22      CHW  will consult with GENE MARTINEZ 3-1-22    GENE MARTINEZ 3-8-22 re assessment    CHW Follow up: Monthly    CHW Plan: Follow up on goals    CHW Next Follow Up: 4-8-22

## 2022-03-01 NOTE — TELEPHONE ENCOUNTER
Let patient know that Karena is currently busy but I have placed a referral for her to get a new therapist.

## 2022-03-08 ENCOUNTER — TELEPHONE (OUTPATIENT)
Dept: FAMILY MEDICINE | Facility: CLINIC | Age: 57
End: 2022-03-08

## 2022-03-08 ENCOUNTER — TELEPHONE (OUTPATIENT)
Dept: CARE COORDINATION | Facility: CLINIC | Age: 57
End: 2022-03-08

## 2022-03-08 ENCOUNTER — PATIENT OUTREACH (OUTPATIENT)
Dept: NURSING | Facility: CLINIC | Age: 57
End: 2022-03-08
Payer: COMMERCIAL

## 2022-03-08 NOTE — LETTER
[facility]  Patient Centered Plan of Care  About Me:        Patient Name:  Indra Jason    YOB: 1965  Age:         57 year old   [employer] MRN:    [id number] Telephone Information:  Home Phone [phone]   Mobile [phone]       Address:  [address] [place] [address] Email address:  No e-mail address on record      Emergency Contact(s)    Name Relationship Lgl Grd Work Phone Home Phone Mobile Phone   Adams JASON MAN Brother    [phone]           Primary language:  Belarusian     needed? Yes   [facility]:  [phone] op. 1  Other communication barriers:Language barrier    Preferred Method of Communication:     Current living arrangement: I live in a private home with family    Mobility Status/ Medical Equipment: Independent w/Device        Health Maintenance  Health Maintenance Reviewed: Not assessed      My Access Plan  Medical Emergency 911   Primary Clinic Line   -     24 Hour Appointment Line [phone] or  [phone]) (toll-free)   24 Hour Nurse Line [phone] (toll-free)   Preferred Urgent Care [facility], [phone]     Preferred Hospital [facility]  [phone]     Preferred Pharmacy [facility] - [place], [place] - [address] Behavioral Health Crisis Line The National Suicide Prevention Lifeline at 1-190.702.8314 or 911             My Care Team Members  Patient Care Team       Relationship Specialty Notifications Start End    Heidi Hernandez DO PCP - General   10/18/18     Phone: [phone] Fax: [phone]         [address] [place] [address]    Steve Pavon MD MD Hematology & Oncology Admissions 3/11/19     Phone: [phone] Fax: [phone]         [address] [facility] [address]    Francisco Zafar, PharmD Pharmacist Pharmacist  12/4/19     Phone: [phone]          [facility] [address] [address] [place] [address]    [place]  Megan CARMEN, PharmD Pharmacist Pharmacist  1/17/20     Phone: [phone]          [facility] [address] [place] [address]    Heidi Hernandez DO Assigned PCP   6/16/21     Phone: [phone] Fax: [phone]         [address] [place] [address]    [facility]- PCA Personal Care Attendant [facility]  9/1/21     PCA services 8.5 hours    [address] [place] [address]    Phone: [phone]         [facility] [address] [facility] [address] [phone] PCA services 8.5 hours    Steve Pavon MD Assigned Cancer Care Provider   12/12/21     Phone: [phone] Fax: [phone]         [address] [facility] [address]    Wes Juan Community Health Worker Primary Care - CC  2/28/22     Phone: [phone] Fax: [phone]         [address] [place] [address]    Ihsan Braga LICSW    2/28/22             My Care Plans  Self Management and Treatment Plan  Goals and (Comments)  Goals        General     Financial Wellbeing (pt-stated)      Notes - Note edited  2/28/2022 12:34 PM by Wes Juan     Goal Statement: I want to re connect with an organization for support with the Social Security process within one month  Date Goal set: 2-28-22  Barriers: Language  Strengths:   Date to Achieve By: 4-2022  Patient expressed understanding of goal: Yes  Action steps to achieve this goal:  1. I will talk to staff from [facility] [facility] on 3-21-22 at 10:15am  2. I will update [facility] team    [facility] [address]  [place] [address]  [phone]  Fax:[phone]    Updated:  2-28-21           Medical (pt-stated)      Notes - Note created  3/8/2022  2:49 PM by Ihsan Braga LICSW     Goal Statement: I want to have a follow up with my PCP to discuss physical therapy and referral to neurology within one month  Date Goal set: 03/08/22    Barriers:   Strengths:   Date to Achieve By: 4/30/2022  Patient  expressed understanding of goal: Yes  Action steps to achieve this goal:  1. I will wait to hear from [facility] scheduling           Other (pt-stated)      Notes - Note edited  3/8/2022  2:49 PM by Ihsan Braga [facility]     Goal Statement: I want support to renew food stamp and CASH benefit within 2-3 months.  Date goal set: 2/28/2022  Measure of Success: resume food and cash  Barriers: language  Strengths: support from brother and friend, CCC team  Date to Achieve By: 4-2022  Patient expressed understanding of goal: Yes    Action steps to achieve this goal  1. I will ask Gema, staff at home care to help call Financial worker to check on the status of the Food and Cash benefit  2. I will ask Gema to help call [employer] Info Line to check on status of benefit  3. I will wait to receive a copy of my lease from [address] [address]  [phone] EZ info line to checkon benefits  [phone]                  Action Plans on File:                       Advance Care Plans/Directives Type:   No data recorded    My Medical and Care Information  Problem List   Patient Active Problem List   Diagnosis     Fatigue     Vitamin D deficiency     Anemia     Abdominal pain     Tension headache     Chronic pain     PTSD (post-traumatic stress disorder)     Moderate episode of recurrent major depressive disorder (H)     Elevated ferritin level     Dizziness     Screen for colon cancer     Leg swelling     Iron overload syndrome     Anxiety and depression     Elevated blood pressure reading without diagnosis of hypertension      Current Medications and Allergies:  See printed Medication Report.    Care Coordination Start Date: 3/8/2022   Frequency of Care Coordination: monthly     Form Last Updated: 03/08/2022

## 2022-03-08 NOTE — TELEPHONE ENCOUNTER
Travel questionnaire was asked. Verified that they have no signs of COVID-19 symptoms.    Patient dropped off paperwork for care Guide to fill out. Placed the original copies in the 's slot.    When forms are completed, patient would like it:    -Please call patient to let them know it's ready    Ok to leave a detailed message if unable to get a hold of the Patient.    Please re-route task back to the  to shred the copied forms and complete the task. Thanks!

## 2022-03-08 NOTE — PROGRESS NOTES
Clinic Care Coordination Contact  CCC MICHELLE contacted Indra by phone with an  to complete a new assessment for re-enrollment into [facility]. She gave verbal permission to speak with her brother.    Assessment was complicated by  issues. For some reason the  was very delayed in communication and asked several times for us to repeat ourselves.     MICHELLE created a letter to send to their landlord requesting a copy of their lease. They need this to update the county. MICHELLE will mail this.    [facility] appointment is later this month    Indra is scheduled for mental health intake in July 2022.     Indra would like a follow up with her PCP, message sent to scheduling pool for support.     Clinic Care Coordination Contact  OUTREACH    Referral Information:  Referral Source: PCP    Primary Diagnosis: Psychosocial    Chief Complaint   Patient presents with     Clinic Care Coordination - Initial        Universal Utilization:   Clinic Utilization  Difficulty keeping appointments:: No  Compliance Concerns: No  No-Show Concerns: No  No PCP office visit in Past Year: No  Utilization    Hospital Admissions  0             ED Visits  0             No Show Count (past year)  2                Current as of: 3/4/2022  2:42 AM              Clinical Concerns:  Current Medical Concerns:  No new concerns    Current Behavioral Concerns: No new concerns     Education Provided to patient: Role of [facility]      Health Maintenance Reviewed: Not assessed  Clinical Pathway: None    Medication Management:  Medication review status: Medications reviewed and no changes reported per patient.             Functional Status:  Bed or wheelchair confined:: No  Mobility Status: Independent w/Device  Fallen 2 or more times in the past year?: No  Any fall with injury in the past year?: No    Living Situation:  Current living arrangement:: I live in a private home with family  Type of residence:: Apartment    Lifestyle & Psychosocial  Needs:    Social Determinants of Health     Tobacco Use: Medium Risk     Smoking Tobacco Use: Former Smoker     Smokeless Tobacco Use: Never Used   Alcohol Use: Not At Risk     Frequency of Alcohol Consumption: Never     Average Number of Drinks: Not on file     Frequency of Binge Drinking: Never   Financial Resource Strain: Low Risk      Difficulty of Paying Living Expenses: Not very hard   Food Insecurity: No Food Insecurity     Worried About Running Out of Food in the Last Year: Never true     Ran Out of Food in the Last Year: Never true   Transportation Needs: No Transportation Needs     Lack of Transportation (Medical): No     Lack of Transportation (Non-Medical): No   Physical Activity: Inactive     Days of Exercise per Week: 0 days     Minutes of Exercise per Session: 0 min   Stress: No Stress Concern Present     Feeling of Stress : Only a little   Social Connections: Socially Isolated     Frequency of Communication with Friends and Family: More than three times a week     Frequency of Social Gatherings with Friends and Family: More than three times a week     Attends Presybeterian Services: Never     Active Member of Clubs or Organizations: No     Attends Club or Organization Meetings: Never     Marital Status:    Intimate Partner Violence: Not At Risk     Fear of Current or Ex-Partner: No     Emotionally Abused: No     Physically Abused: No     Sexually Abused: No   Depression: Not at risk     PHQ-2 Score: 2   Housing Stability: Low Risk      Unable to Pay for Housing in the Last Year: No     Number of Places Lived in the Last Year: 1     Unstable Housing in the Last Year: No     Diet:: Regular  Inadequate nutrition (GOAL):: No  Tube Feeding: No  Inadequate activity/exercise (GOAL):: No  Significant changes in sleep pattern (GOAL): No  Transportation means:: Family     Presybeterian or spiritual beliefs that impact treatment:: No  Mental health DX:: No  Mental health management concern (GOAL):: No  Chemical  Dependency Status: No Current Concerns  Informal Support system:: Family             Resources and Interventions:  Current Resources:   Skilled Home Care Services: Skilled Nursing           Employment Status: disabled         Advance Care Plan/Directive  Advanced Care Plans/Directives on file:: No  Advanced Care Plan/Directive Status: Not Applicable          Goals:   Goals        General     Financial Wellbeing (pt-stated)      Notes - Note edited  2/28/2022 12:34 PM by Wes Juan     Goal Statement: I want to re connect with an organization for support with the Social Security process within one month  Date Goal set: 2-28-22  Barriers: Language  Strengths:   Date to Achieve By: 4-2022  Patient expressed understanding of goal: Yes  Action steps to achieve this goal:  1. I will talk to staff from [facility] on 3-21-22 at 10:15am  2. I will update [facility] team    [facility] [address]  [place] [address]  [phone]  Fax:[phone]    Updated:  2-28-21           Medical (pt-stated)      Notes - Note created  3/8/2022  2:49 PM by Ihsan Braga LICSW     Goal Statement: I want to have a follow up with my PCP to discuss physical therapy and referral to neurology within one month  Date Goal set: 03/08/22    Barriers:   Strengths:   Date to Achieve By: 4/30/2022  Patient expressed understanding of goal: Yes  Action steps to achieve this goal:  1. I will wait to hear from [facility] scheduling           Other (pt-stated)      Notes - Note edited  3/8/2022  2:49 PM by Ihsan Braga LICSW     Goal Statement: I want support to renew food stamp and CASH benefit within 2-3 months.  Date goal set: 2/28/2022  Measure of Success: resume food and cash  Barriers: language  Strengths: support from brother and friend, CCC team  Date to Achieve By: 4-2022  Patient expressed understanding of goal: Yes    Action steps to achieve this goal  1. I will ask Gema, staff at home care to help call Financial worker  to check on the status of the Food and Cash benefit  2. I will ask Gema to help call [employer] to check on status of benefit  3. I will wait to receive a copy of my lease from [place] [address] [place] [address]  [phone] EZ info line to checkon benefits  [phone]                 Patient/Caregiver understanding: Reported understanding        Future Appointments              In 4 months Michael [facility]          Plan: Standard outreach

## 2022-03-10 ENCOUNTER — OFFICE VISIT (OUTPATIENT)
Dept: FAMILY MEDICINE | Facility: CLINIC | Age: 57
End: 2022-03-10
Payer: COMMERCIAL

## 2022-03-10 VITALS
HEART RATE: 88 BPM | WEIGHT: 163 LBS | DIASTOLIC BLOOD PRESSURE: 97 MMHG | BODY MASS INDEX: 30.19 KG/M2 | SYSTOLIC BLOOD PRESSURE: 155 MMHG | RESPIRATION RATE: 20 BRPM

## 2022-03-10 DIAGNOSIS — I10 ESSENTIAL HYPERTENSION: Primary | ICD-10-CM

## 2022-03-10 DIAGNOSIS — Z00.00 HEALTHCARE MAINTENANCE: ICD-10-CM

## 2022-03-10 DIAGNOSIS — R42 DIZZINESS: ICD-10-CM

## 2022-03-10 LAB
ALBUMIN UR-MCNC: NEGATIVE MG/DL
ANION GAP SERPL CALCULATED.3IONS-SCNC: 10 MMOL/L (ref 5–18)
APPEARANCE UR: CLEAR
BACTERIA #/AREA URNS HPF: ABNORMAL /HPF
BILIRUB UR QL STRIP: NEGATIVE
BUN SERPL-MCNC: 12 MG/DL (ref 8–22)
CALCIUM SERPL-MCNC: 9.8 MG/DL (ref 8.5–10.5)
CHLORIDE BLD-SCNC: 102 MMOL/L (ref 98–107)
CO2 SERPL-SCNC: 29 MMOL/L (ref 22–31)
COLOR UR AUTO: YELLOW
CREAT SERPL-MCNC: 0.76 MG/DL (ref 0.6–1.1)
GFR SERPL CREATININE-BSD FRML MDRD: 90 ML/MIN/1.73M2
GLUCOSE BLD-MCNC: 101 MG/DL (ref 70–125)
GLUCOSE UR STRIP-MCNC: NEGATIVE MG/DL
HGB BLD-MCNC: 12 G/DL (ref 11.7–15.7)
HGB UR QL STRIP: ABNORMAL
KETONES UR STRIP-MCNC: NEGATIVE MG/DL
LEUKOCYTE ESTERASE UR QL STRIP: NEGATIVE
NITRATE UR QL: NEGATIVE
PH UR STRIP: 5.5 [PH] (ref 5–8)
POTASSIUM BLD-SCNC: 4.4 MMOL/L (ref 3.5–5)
RBC #/AREA URNS AUTO: ABNORMAL /HPF
SODIUM SERPL-SCNC: 141 MMOL/L (ref 136–145)
SP GR UR STRIP: 1.01 (ref 1–1.03)
SQUAMOUS #/AREA URNS AUTO: ABNORMAL /LPF
UROBILINOGEN UR STRIP-ACNC: 0.2 E.U./DL
WBC #/AREA URNS AUTO: ABNORMAL /HPF

## 2022-03-10 PROCEDURE — 99214 OFFICE O/P EST MOD 30 MIN: CPT | Performed by: FAMILY MEDICINE

## 2022-03-10 PROCEDURE — 80048 BASIC METABOLIC PNL TOTAL CA: CPT | Performed by: FAMILY MEDICINE

## 2022-03-10 PROCEDURE — 85018 HEMOGLOBIN: CPT | Performed by: FAMILY MEDICINE

## 2022-03-10 PROCEDURE — 36415 COLL VENOUS BLD VENIPUNCTURE: CPT | Performed by: FAMILY MEDICINE

## 2022-03-10 PROCEDURE — 0064A COVID-19,PF,MODERNA (18+ YRS BOOSTER .25ML): CPT | Performed by: FAMILY MEDICINE

## 2022-03-10 PROCEDURE — 91306 COVID-19,PF,MODERNA (18+ YRS BOOSTER .25ML): CPT | Performed by: FAMILY MEDICINE

## 2022-03-10 PROCEDURE — 81001 URINALYSIS AUTO W/SCOPE: CPT | Performed by: FAMILY MEDICINE

## 2022-03-10 RX ORDER — AMLODIPINE BESYLATE 2.5 MG/1
2.5 TABLET ORAL DAILY
Qty: 30 TABLET | Refills: 3 | Status: SHIPPED | OUTPATIENT
Start: 2022-03-10 | End: 2022-07-02

## 2022-03-10 NOTE — PROGRESS NOTES
Assessment/ Plan  1. Essential hypertension  Consistently elevated.  Brings in notes on blood pressure from home health nurse and averages about 145/90-95.  Today 144/88 on recheck.  Will start amlodipine 2.5 mg, recommend follow-up with primary care doctor in 4 to 6 weeks.  - Basic metabolic panel  (Ca, Cl, CO2, Creat, Gluc, K, Na, BUN); Future  - Hemoglobin; Future  - UA with Microscopic reflex to Culture - Clinic Collect  - amLODIPine (NORVASC) 2.5 MG tablet; Take 1 tablet (2.5 mg) by mouth daily  Dispense: 30 tablet; Refill: 3  - Basic metabolic panel  (Ca, Cl, CO2, Creat, Gluc, K, Na, BUN)  - Hemoglobin  - Urine Microscopic    2. Healthcare maintenance  Covid booster  - COVID-19,PF,MODERNA (18+ YRS BOOSTER .25ML)    3. Dizziness  Requesting referral back to neurology.  I do not see previous neurology visit on the chart though seems likely that she had it.  This is a chronic recurring problem.  Of asked her to talk about this with her primary doctor     Body mass index is 30.19 kg/m .    Subjective  CC:  chief complaint  HPI:  Patient here with multiple complaints  Pain everywhere  Pain in her ankle that she fractured  Chronic dizziness  Chronic headache  All of this worse for the last 20 days    Sounds like, she was sent here by her nurse, due to elevated blood pressures  This is what I focused on today  Patient Active Problem List   Diagnosis     Fatigue     Vitamin D deficiency     Anemia     Abdominal pain     Tension headache     Chronic pain     PTSD (post-traumatic stress disorder)     Moderate episode of recurrent major depressive disorder (H)     Elevated ferritin level     Dizziness     Screen for colon cancer     Leg swelling     Iron overload syndrome     Anxiety and depression     Elevated blood pressure reading without diagnosis of hypertension     Current medications reviewed as follows:  acetaminophen (TYLENOL) 500 MG tablet, Take 1 tablet (500 mg) by mouth every 4 hours as needed  albuterol  (PROAIR HFA;PROVENTIL HFA;VENTOLIN HFA) 90 mcg/actuation inhaler, [ALBUTEROL (PROAIR HFA;PROVENTIL HFA;VENTOLIN HFA) 90 MCG/ACTUATION INHALER] INHALE 2 PUFFS INTO THE LUNGS FOUR TIMES A DAY  cetirizine (ZYRTEC) 10 MG tablet, Take 1 tablet (10 mg) by mouth daily  Cholecalciferol (D3-1000) 25 MCG (1000 UT) CAPS,   cyanocobalamin (VITAMIN B-12) 1000 MCG tablet,   DULoxetine (CYMBALTA) 30 MG capsule, Take 1 capsule (30 mg) by mouth daily  famotidine (PEPCID) 40 MG tablet, Take 1 tablet (40 mg) by mouth daily  FLUoxetine (PROZAC) 40 MG capsule, Take 1 capsule (40 mg) by mouth daily  QUEtiapine (SEROQUEL) 25 MG tablet, Take 1 tablet (25 mg) by mouth At Bedtime  acetaminophen-codeine (TYLENOL #3) 300-30 MG tablet,   capsaicin (ZOSTRIX) 0.025 % cream, [CAPSAICIN (ZOSTRIX) 0.025 % CREAM] Apply to affected area as needed. (Patient not taking: Reported on 3/10/2022)  cyanocobalamin (VITAMIN B-12) 1000 MCG tablet,   deferasirox (EXJADE) 500 MG disintegrating tablet, [DEFERASIROX (EXJADE) 500 MG DISINTEGRATING TABLET] Take 2 tablets (1,000 mg total) by mouth daily before breakfast. (Patient not taking: Reported on 3/10/2022)  diaper,brief,adult,disposable Misc, [DIAPER,BRIEF,ADULT,DISPOSABLE MISC] Use 1 each As Directed as needed. (Patient not taking: Reported on 2/16/2022)  ergocalciferol, vitamin D2, (VITAMIN D2 ORAL), [ERGOCALCIFEROL, VITAMIN D2, (VITAMIN D2 ORAL)]  (Patient not taking: Reported on 2/16/2022)  famotidine (PEPCID) 20 MG tablet,   lidocaine (LIDODERM) 5 %, [LIDOCAINE (LIDODERM) 5 %] Place 1 patch on the skin daily as needed. Remove & Discard patch within 12 hours or as directed by MD (Patient not taking: Reported on 3/10/2022)  magic mouthwash (ENTER INGREDIENTS IN COMMENTS) suspension, [ALUM/MAG HYDROX-SIMETHICONE-DIPHENHYDRAMINE-LIDOCAINE (MAGIC MOUTHWASH) SUSPENSION] Take 5 mL by mouth 3-4 times a day, as needed.  Swish around in mouth and then spit out or swallow. (Patient not taking: Reported on  3/10/2022)  meclizine (ANTIVERT) 25 MG tablet, Take 1 tablet (25 mg) by mouth 3 times daily as needed (Patient not taking: Reported on 3/10/2022)  ondansetron (ZOFRAN ODT) 4 MG disintegrating tablet, [ONDANSETRON (ZOFRAN ODT) 4 MG DISINTEGRATING TABLET] Take 1 tablet (4 mg total) by mouth every 8 (eight) hours as needed for nausea. (Patient not taking: Reported on 3/10/2022)  vitamin B-12 (CYANOCOBALAMIN) 1000 MCG tablet, Take 1 tablet (1,000 mcg) by mouth daily (Patient not taking: Reported on 3/10/2022)    No current facility-administered medications on file prior to visit.     History   Smoking Status     Former Smoker     Years: 15.00     Quit date: 3/21/2004   Smokeless Tobacco     Never Used     Comment: no passive exposure in home.     Social History     Social History Narrative     Not on file     Patient Care Team:  Heidi Hernandez DO as PCP - General  Steve Pavon MD as MD (Hematology & Oncology)  Francisco Zafar, PharmD as Pharmacist (Pharmacist)  Megan Osborne, PharmD as Pharmacist (Pharmacist)  Heidi Hernandez DO as Assigned PCP  [facility] PCA as Personal Care Attendant PCA (HOME HEALTH AGENCY (H[facility]), ([facility]))  Steve Pavon MD as Assigned Cancer Care Provider  Wes Juan as Community Health Worker (Primary Care - CC)  Ihsan Braga LICSW as   ROS  Positive for headache, dizziness, fatigue  General body pain    Objective  Physical Exam  Vitals:    03/10/22 1108   BP: (!) 155/97   BP Location: Right arm   Patient Position: Sitting   Cuff Size: Adult Regular   Pulse: 88   Resp: 20   Weight: 73.9 kg (163 lb)     Obese flat affect  No acute distress  No swelling of her joints  No edema  Chest clear  Cardiac exam normal with regular rate and rhythm, normal tones.  No murmurs, gallops, rubs    Diagnostics  Pending    Please note: Voice recognition software was used in this dictation.  It may therefore contain typographical errors.

## 2022-03-10 NOTE — TELEPHONE ENCOUNTER
"Hello,    Can you please help patient schedule with PCP for a follow up to discuss physical therapy and \"brain doctor\" referral?      Thank you!  "
Clarification, patient is asking to be scheduled with Karena Ashton. She would like to resume therapy. She does not need PT.     Karena, what is your protocol since patient has not been seen since 2000?  Can patient schedule appt with you or should she be referred elsewhere? Please advise?  
Attending Discharge Physical Examination:

## 2022-03-10 NOTE — LETTER
March 11, 2022      Indra Jason  [address]  [place] [address]        Dear ,    We are writing to inform you of your test results.    Blood tests look normal, urine test needs to get rechecked.  We can do that when you come in to see Dr. Hernandez    Resulted Orders   UA with Microscopic reflex to Culture - Clinic Collect   Result Value Ref Range    Color Urine Yellow Colorless, Straw, Light Yellow, Yellow    Appearance Urine Clear Clear    Glucose Urine Negative Negative mg/dL    Bilirubin Urine Negative Negative    Ketones Urine Negative Negative mg/dL    Specific Gravity Urine 1.015 1.005 - 1.030    Blood Urine Moderate (A) Negative    pH Urine 5.5 5.0 - 8.0    Protein Albumin Urine Negative Negative mg/dL    Urobilinogen Urine 0.2 0.2, 1.0 E.U./dL    Nitrite Urine Negative Negative    Leukocyte Esterase Urine Negative Negative   Basic metabolic panel  (Ca, Cl, CO2, Creat, Gluc, K, Na, BUN)   Result Value Ref Range    Sodium 141 136 - 145 mmol/L    Potassium 4.4 3.5 - 5.0 mmol/L    Chloride 102 98 - 107 mmol/L    Carbon Dioxide (CO2) 29 22 - 31 mmol/L    Anion Gap 10 5 - 18 mmol/L    Urea Nitrogen 12 8 - 22 mg/dL    Creatinine 0.76 0.60 - 1.10 mg/dL    Calcium 9.8 8.5 - 10.5 mg/dL    Glucose 101 70 - 125 mg/dL    GFR Estimate 90 >60 mL/min/1.73m2      Comment:      Effective December 21, 2021 eGFRcr in adults is calculated using the 2021 CKD-EPI creatinine equation which includes age and gender (Cady et al., NEJ, DOI: 10.1056/CLBUbc3549355)   Hemoglobin   Result Value Ref Range    Hemoglobin 12.0 11.7 - 15.7 g/dL   Urine Microscopic   Result Value Ref Range    Bacteria Urine Moderate (A) None Seen /HPF    RBC Urine 0-2 0-2 /HPF /HPF    WBC Urine 0-5 0-5 /HPF /HPF    Squamous Epithelials Urine Moderate (A) None Seen /LPF    Narrative    Urine Culture not indicated       If you have any questions or concerns, please call the clinic at the number listed above.       Sincerely,      Marty  MD Duncan

## 2022-03-15 DIAGNOSIS — J30.2 SEASONAL ALLERGIC RHINITIS: ICD-10-CM

## 2022-03-15 DIAGNOSIS — Z76.0 ENCOUNTER FOR MEDICATION REFILL: Primary | ICD-10-CM

## 2022-03-15 RX ORDER — FLUTICASONE PROPIONATE 50 MCG
1 SPRAY, SUSPENSION (ML) NASAL DAILY
Qty: 16 G | Refills: 3 | Status: SHIPPED | OUTPATIENT
Start: 2022-03-15

## 2022-03-15 NOTE — TELEPHONE ENCOUNTER
Reason for Call:  Medication or medication refill:    Do you use a [facility]?  Name of the pharmacy and phone number for the current request:  [facility] pharmacy in [place]    Name of the medication requested: fluticasone propionate (FLONASE ALLERGY RELIEF) 50 mcg/actuation nasal spray     Other request: Patient's homecare nurse called to request a refill of nasal spray. Please send refill to pharmacy.    Can we leave a detailed message on this number? YES    Phone number patient can be reached at: Home number on file [phone] (home)    Best Time: any    Call taken on 3/15/2022 at 2:21 PM by Jeri Brunner

## 2022-03-16 ENCOUNTER — MEDICAL CORRESPONDENCE (OUTPATIENT)
Dept: HEALTH INFORMATION MANAGEMENT | Facility: CLINIC | Age: 57
End: 2022-03-16
Payer: COMMERCIAL

## 2022-03-22 ENCOUNTER — APPOINTMENT (OUTPATIENT)
Dept: MRI IMAGING | Facility: HOSPITAL | Age: 57
End: 2022-03-22
Attending: EMERGENCY MEDICINE
Payer: COMMERCIAL

## 2022-03-22 ENCOUNTER — HOSPITAL ENCOUNTER (EMERGENCY)
Facility: HOSPITAL | Age: 57
Discharge: HOME | End: 2022-03-22
Attending: EMERGENCY MEDICINE | Admitting: EMERGENCY MEDICINE
Payer: COMMERCIAL

## 2022-03-22 ENCOUNTER — MEDICAL CORRESPONDENCE (OUTPATIENT)
Dept: HEALTH INFORMATION MANAGEMENT | Facility: CLINIC | Age: 57
End: 2022-03-22

## 2022-03-22 ENCOUNTER — PATIENT OUTREACH (OUTPATIENT)
Dept: NURSING | Facility: CLINIC | Age: 57
End: 2022-03-22

## 2022-03-22 ENCOUNTER — APPOINTMENT (OUTPATIENT)
Dept: RADIOLOGY | Facility: HOSPITAL | Age: 57
End: 2022-03-22
Attending: EMERGENCY MEDICINE
Payer: COMMERCIAL

## 2022-03-22 ENCOUNTER — NURSE TRIAGE (OUTPATIENT)
Dept: NURSING | Facility: CLINIC | Age: 57
End: 2022-03-22
Payer: COMMERCIAL

## 2022-03-22 VITALS
TEMPERATURE: 97.8 F | OXYGEN SATURATION: 94 % | DIASTOLIC BLOOD PRESSURE: 91 MMHG | SYSTOLIC BLOOD PRESSURE: 155 MMHG | RESPIRATION RATE: 16 BRPM | HEART RATE: 78 BPM

## 2022-03-22 DIAGNOSIS — R45.851 SUICIDAL IDEATION: ICD-10-CM

## 2022-03-22 DIAGNOSIS — R42 DIZZINESS: ICD-10-CM

## 2022-03-22 LAB
ALBUMIN SERPL-MCNC: 3.8 G/DL (ref 3.5–5)
ALBUMIN UR-MCNC: NEGATIVE MG/DL
ALP SERPL-CCNC: 81 U/L (ref 45–120)
ALT SERPL W P-5'-P-CCNC: 25 U/L (ref 0–45)
AMPHETAMINES UR QL SCN: NORMAL
ANION GAP SERPL CALCULATED.3IONS-SCNC: 8 MMOL/L (ref 5–18)
APPEARANCE UR: CLEAR
AST SERPL W P-5'-P-CCNC: 20 U/L (ref 0–40)
BARBITURATES UR QL: NORMAL
BASOPHILS # BLD AUTO: 0 10E3/UL (ref 0–0.2)
BASOPHILS NFR BLD AUTO: 0 %
BENZODIAZ UR QL: NORMAL
BILIRUB SERPL-MCNC: 0.7 MG/DL (ref 0–1)
BILIRUB UR QL STRIP: NEGATIVE
BUN SERPL-MCNC: 9 MG/DL (ref 8–22)
CALCIUM SERPL-MCNC: 9.2 MG/DL (ref 8.5–10.5)
CANNABINOIDS UR QL SCN: NORMAL
CHLORIDE BLD-SCNC: 107 MMOL/L (ref 98–107)
CO2 SERPL-SCNC: 27 MMOL/L (ref 22–31)
COCAINE UR QL: NORMAL
COLOR UR AUTO: COLORLESS
CREAT SERPL-MCNC: 0.7 MG/DL (ref 0.6–1.1)
CREAT UR-MCNC: 21 MG/DL
D DIMER PPP FEU-MCNC: 0.3 UG/ML FEU (ref 0–0.5)
EOSINOPHIL # BLD AUTO: 0.2 10E3/UL (ref 0–0.7)
EOSINOPHIL NFR BLD AUTO: 2 %
ERYTHROCYTE [DISTWIDTH] IN BLOOD BY AUTOMATED COUNT: 14.4 % (ref 10–15)
GFR SERPL CREATININE-BSD FRML MDRD: >90 ML/MIN/1.73M2
GLUCOSE BLD-MCNC: 102 MG/DL (ref 70–125)
GLUCOSE UR STRIP-MCNC: NEGATIVE MG/DL
HCT VFR BLD AUTO: 39.1 % (ref 35–47)
HGB BLD-MCNC: 11.6 G/DL (ref 11.7–15.7)
HGB UR QL STRIP: ABNORMAL
IMM GRANULOCYTES # BLD: 0 10E3/UL
IMM GRANULOCYTES NFR BLD: 0 %
KETONES UR STRIP-MCNC: NEGATIVE MG/DL
LEUKOCYTE ESTERASE UR QL STRIP: NEGATIVE
LYMPHOCYTES # BLD AUTO: 2.7 10E3/UL (ref 0.8–5.3)
LYMPHOCYTES NFR BLD AUTO: 28 %
MAGNESIUM SERPL-MCNC: 2 MG/DL (ref 1.8–2.6)
MCH RBC QN AUTO: 22.7 PG (ref 26.5–33)
MCHC RBC AUTO-ENTMCNC: 29.7 G/DL (ref 31.5–36.5)
MCV RBC AUTO: 77 FL (ref 78–100)
MONOCYTES # BLD AUTO: 0.5 10E3/UL (ref 0–1.3)
MONOCYTES NFR BLD AUTO: 5 %
NEUTROPHILS # BLD AUTO: 6.2 10E3/UL (ref 1.6–8.3)
NEUTROPHILS NFR BLD AUTO: 65 %
NITRATE UR QL: NEGATIVE
NRBC # BLD AUTO: 0 10E3/UL
NRBC BLD AUTO-RTO: 0 /100
OPIATES UR QL SCN: NORMAL
OXYCODONE UR QL: NORMAL
PCP UR QL SCN: NORMAL
PH UR STRIP: 6.5 [PH] (ref 5–7)
PLATELET # BLD AUTO: 283 10E3/UL (ref 150–450)
POTASSIUM BLD-SCNC: 3.8 MMOL/L (ref 3.5–5)
PROT SERPL-MCNC: 8.2 G/DL (ref 6–8)
RBC # BLD AUTO: 5.1 10E6/UL (ref 3.8–5.2)
RBC URINE: <1 /HPF
SARS-COV-2 RNA RESP QL NAA+PROBE: NEGATIVE
SODIUM SERPL-SCNC: 142 MMOL/L (ref 136–145)
SP GR UR STRIP: 1.01 (ref 1–1.03)
SQUAMOUS EPITHELIAL: 1 /HPF
TROPONIN I SERPL-MCNC: <0.01 NG/ML (ref 0–0.29)
TSH SERPL DL<=0.005 MIU/L-ACNC: 0.58 UIU/ML (ref 0.3–5)
UROBILINOGEN UR STRIP-MCNC: <2 MG/DL
WBC # BLD AUTO: 9.6 10E3/UL (ref 4–11)
WBC URINE: <1 /HPF

## 2022-03-22 PROCEDURE — 82040 ASSAY OF SERUM ALBUMIN: CPT | Performed by: EMERGENCY MEDICINE

## 2022-03-22 PROCEDURE — 84443 ASSAY THYROID STIM HORMONE: CPT | Performed by: EMERGENCY MEDICINE

## 2022-03-22 PROCEDURE — 80307 DRUG TEST PRSMV CHEM ANLYZR: CPT | Performed by: EMERGENCY MEDICINE

## 2022-03-22 PROCEDURE — 80053 COMPREHEN METABOLIC PANEL: CPT | Performed by: EMERGENCY MEDICINE

## 2022-03-22 PROCEDURE — 83735 ASSAY OF MAGNESIUM: CPT | Performed by: EMERGENCY MEDICINE

## 2022-03-22 PROCEDURE — 93005 ELECTROCARDIOGRAM TRACING: CPT | Performed by: EMERGENCY MEDICINE

## 2022-03-22 PROCEDURE — 36415 COLL VENOUS BLD VENIPUNCTURE: CPT | Performed by: EMERGENCY MEDICINE

## 2022-03-22 PROCEDURE — 90791 PSYCH DIAGNOSTIC EVALUATION: CPT

## 2022-03-22 PROCEDURE — 81001 URINALYSIS AUTO W/SCOPE: CPT | Performed by: EMERGENCY MEDICINE

## 2022-03-22 PROCEDURE — 85025 COMPLETE CBC W/AUTO DIFF WBC: CPT | Performed by: EMERGENCY MEDICINE

## 2022-03-22 PROCEDURE — 84484 ASSAY OF TROPONIN QUANT: CPT | Performed by: EMERGENCY MEDICINE

## 2022-03-22 PROCEDURE — 99285 EMERGENCY DEPT VISIT HI MDM: CPT | Mod: 25

## 2022-03-22 PROCEDURE — 87635 SARS-COV-2 COVID-19 AMP PRB: CPT | Performed by: EMERGENCY MEDICINE

## 2022-03-22 PROCEDURE — 85379 FIBRIN DEGRADATION QUANT: CPT | Performed by: EMERGENCY MEDICINE

## 2022-03-22 PROCEDURE — 70551 MRI BRAIN STEM W/O DYE: CPT

## 2022-03-22 PROCEDURE — C9803 HOPD COVID-19 SPEC COLLECT: HCPCS

## 2022-03-22 PROCEDURE — 71101 X-RAY EXAM UNILAT RIBS/CHEST: CPT | Mod: LT

## 2022-03-22 ASSESSMENT — ENCOUNTER SYMPTOMS
HEADACHES: 1
COUGH: 0
ABDOMINAL PAIN: 0
CONFUSION: 0
FEVER: 0
DIFFICULTY URINATING: 0
MYALGIAS: 1
NUMBNESS: 1
DIZZINESS: 1
WOUND: 0
BRUISES/BLEEDS EASILY: 0
WEAKNESS: 1

## 2022-03-22 NOTE — PROGRESS NOTES
3/22/2022  Clinic Care Coordination Contact  Community Health Worker Follow Up    Intervention and Education during outreach:    Received Vm from patient and Gema 3-22-22 need to see Karena as soon as possible.    Called and spoke to patient through Gema support to interpret in Greek   Patient reported:   -feeling numbness, tingling and very bad headache.  Brother stated it is her mental health and needs to see Karena again for therapy.    Conference call to Triage Line to address symptoms  Connected with DONNA Rodríguez   Recommended to go to ER to be evaluated stroke  Pt has no ride.  Suggested to call 911     [facility] was on long hold to connect with staff to set up ride.  Son at work and brother does not drive.  Patient handed the phone to skilled nurse Kourtney.  Spoke to DONNA Oconnell from [employer] [facility] and requested her support to help call 911 since he does not have transportation.  She will help call for patient.    Unable to follow up on goals due to address medical symptoms    Consulted with GENE MARTINEZ   Plan: GENE MARTINEZ will follow up      CHW Follow up: Monthly  CHW Plan: Follow up on goals  CHW Next Follow Up: 4-8-22      Wes Juan  Community Health Worker  [facility] Care Coordination  [email or web address]   Office: [phone]  Fax: [phone]
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
## 2022-03-22 NOTE — TELEPHONE ENCOUNTER
Telephone call:     Community health worker at [facility]   With patient and [facility]  on the line.   Patient complains of the following symptoms:   Body aches   Numbness and tingling - legs and hands bilaterally comes and goes worse this last week   Headache  Double vision- started a month ago.  Irritated easily   panicked   SOB   Down and depressed     Per protocol, patient was advised to be seen in ER now.   Care advice given.   Patient verbalizes understanding and agreement with plan of care.   She plans on going to [facility].     Anne Grant RN   03/22/22 11:48 AM  [facility] Nurse Advisor      Reason for Disposition    Headache (with neurologic deficit)    Additional Information    Negative: Difficult to awaken or acting confused (e.g., disoriented, slurred speech)    Negative: Weakness of the face, arm or leg on one side of the body and new onset    Negative: Numbness of the face, arm or leg on one side of the body and new onset    Negative: Loss of speech or garbled speech and new onset    Negative: Passed out (i.e., fainted, collapsed and was not responding)    Negative: Sounds like a life-threatening emergency to the triager    Negative: Unable to walk without falling    Negative: Stiff neck (can't touch chin to chest)    Negative: Possibility of carbon monoxide exposure    Negative: Severe pain in one eye    Negative: SEVERE headache, sudden-onset (i.e., reaching maximum intensity within seconds to 1 hour)    Negative: SEVERE headache, states 'worst headache' of life    Negative: Difficult to awaken or acting confused (e.g., disoriented, slurred speech)    Negative: New neurologic deficit that is present NOW, sudden onset of ANY of the following: * Weakness of the face, arm, or leg on one side of the body* Numbness of the face, arm, or leg on one side of the body* Loss of speech or garbled speech    Negative: Sounds like a life-threatening emergency to the triager    Protocols  used: NEUROLOGIC DEFICIT-A-OH, HEADACHE-A-OH    COVID 19 Nurse Triage Plan/Patient Instructions    Please be aware that novel coronavirus (COVID-19) may be circulating in the community. If you develop symptoms such as fever, cough, or SOB or if you have concerns about the presence of another infection including coronavirus (COVID-19), please contact your health care provider or visit [email or web address].     Disposition/Instructions    ED Visit recommended. Follow protocol based instructions.     Bring Your Own Device:  Please also bring your smart device(s) (smart phones, tablets, laptops) and their charging cables for your personal use and to communicate with your care team during your visit.    Thank you for taking steps to prevent the spread of this virus.  o Limit your contact with others.  o Wear a simple mask to cover your cough.  o Wash your hands well and often.    Resources    [facility]: About COVID-19: [email or web address]    CDC: What to Do If You're Sick: www.cdc.gov/coronavirus/2019-ncov/about/steps-when-sick.html    CDC: Ending Home Isolation: www.cdc.gov/coronavirus/2019-ncov/hcp/disposition-in-home-patients.html     CDC: Caring for Someone: www.cdc.gov/coronavirus/2019-ncov/if-you-are-sick/care-for-someone.html     [facility]: Interim Guidance for Hospital Discharge to Home: www.health.[facility].mn.us/diseases/coronavirus/hcp/hospdischarge.pdf    [facility] clinical trials (COVID-19 research studies): [email or web address]     Below are the COVID-19 hotlines at the [place] Department of [facility] ([facility]). Interpreters are available.   o For health questions: Call 027-145-3294 or 1-762.680.3752 (7 a.m. to 7 p.m.)  o For questions about schools and childcare: Call 759-889-3540 or 1-201.451.2400 (7 a.m. to 7 p.m.)

## 2022-03-22 NOTE — ED TRIAGE NOTES
Pt coming via [facility] with c/o dizziness and increased weakness for the past5 2-3 weeks.  Pt's [facility] nurse was out visiting and pt c/o of the above along with a h/a that has been off/on for the same amount of time.  For writer pt c/o dizziness for a week along with numbness and tingling to her legs and left side rib pain .  Pt contributes her rib pain to a fall she had 2-3 months ago.  Pt also feeling suicidal with a oplan to hang herself and or gol to the river and drown herself.  Pt states why should I live when my family is in [facility].

## 2022-03-23 NOTE — DISCHARGE INSTRUCTIONS
Aftercare Plan    NOTE THAT PATIENT DOES NOT READ ENGLISH (has family who she may allow to provide her care plan at discharge)     Thank you for allowing us to provide care for you today.  You were seen today for mental health concerns, such as depression, anxiety, or suicidal thinking.     Your provider feels that you do not require hospitalization at this time. However, your symptoms may become worse, and you may need to return to the Emergency Department. Most treatments of depression and suicidal thoughts are a process rather than a single intervention.  Medications and counseling can take several weeks or more to help.    If I am feeling unsafe or I am in a crisis, I will:   Contact my established care providers   Call the National Suicide Prevention Lifeline: [phone]   Go to the nearest emergency room   Call 911        Generally, every Emergency Department visit should have a follow-up clinic visit with either a primary or a specialty clinic/provider. Please follow-up as instructed by your emergency provider today.     By accepting these discharge instructions:    You promise to not harm yourself or others.    You agree that if you feel you are becoming unable to keep that promise, you will do something to help yourself before you do anything to harm yourself or others.     You agree to keep any safety plan arranged on your visit here today.    You agree to take any medication prescribed or recommended by your provider.    If you are getting worse, you can contact a friend or a family member, contact your counselor or family provider, contact a crisis line, or other options discussed with the provider or therapist today.    At any time, you can call 911 and return to the Emergency Department for more help.    You understand that follow-up is essential to your treatment, and you will make and keep appointments recommended on your visit today.     How to improve your mental health and prevent  suicide:    Involve others by letting family, friends, counselors know.  Do not isolate yourself.    Avoid alcohol or drugs. Remove weapons, poisons from your home.    Try to stick to routines for eating, sleeping and getting regular exercise.      Try to get into sunlight. Bright natural light not only treats seasonal affective disorder but also depression.    Increase safe activities that you enjoy.     If you feel worse, contact [phone] ([phone]), or call 911, or your primary provider/counselor for additional assistance.    If you were given a prescription for medicine here today, be sure to read all of the information (including the package insert) that comes with your prescription.  This will include important information about the medicine, its side effects, and any warnings that you need to know about.  The pharmacist who fills the prescription can provide more information and answer questions you may have about the medicine.  If you have questions or concerns that the pharmacist cannot address, please call or return to the Emergency Department. Remember that you can always come back to the Emergency Department if you are not able to see your regular provider in the amount of time listed above, if you get any new symptoms, or if there is anything that worries you.

## 2022-03-23 NOTE — ED NOTES
3/22/2022  Indra Jason 1965     [facility] Crisis Assessment    Patient was assessed: remote  Patient location: [facility] ED    Referral Data and Chief Complaint  Indra Jason is a 57 year old who uses she/her pronouns. Patient presented to the ED alone and was referred to the ED by community provider(s). Patient is presenting to the ED for the following concerns: diizziness and weakness.  Patient in triage tells attending she has plan to hang or drown herself due to missing her family in [place] and possible conf.      3/22/2022  Indra Jason 1965     [facility] Crisis Assessment    Patient was assessed: remote  Patient location: [facility] ED    Referral Data and Chief Complaint  Indra Jason is a 57 year old who uses she/her pronouns. Patient presented to the ED other: [facility] Community Provider helped patient arrange transportation to ED by EMS and was referred to the ED by community provider(s). Patient is presenting to the ED for the following concerns: patient was having numbness and weakness.  She also stated she missed her family in [place], was having conflict with other family and had a plan to hang herself or take herself to the river to drown herself.      Informed Consent and Assessment Methods    Patient is her own guardian. Writer met with patient and explained the crisis assessment process, including applicable information disclosures and limits to confidentiality, assessed understanding of the process, and obtained consent to proceed with the assessment. Patient was observed to be able to participate in the assessment as evidenced by actively engaging with  through Jes . Assessment methods included conducting a formal interview with patient, review of medical records, collaboration with medical staff, and obtaining relevant collateral information from family and community providers when available.    Narrative Summary of Presenting Problem and Current Functioning  What led to the patient  presenting for crisis services, factors that make the crisis life threatening or complex, stressors, how is this disrupting the patient's life, and how current functioning is in comparison to baseline. How is patient presenting during the assessment.     The patient was feeling weak and dizzy earlier.   She has also been experiencing depression and has been making efforts to see the [facility] therapist she previously saw.   It appears the patient has not seen the therapist, Karena for quiet a long time -- well over a year.      Patient has been missing her family that are in [facility].  She also was said to state through an  that she was having a conflict with family living here in the [facility] lately.   The patient has been becoming more depressed lately.  A call was made to her clinic care coordinator today to begin seeing the therapist she had been seeing, Karena.  Notes show that patient has been contacting her clinic seeking to get back into mental health care.   Notes show that the intake is not until July 2022 however.      The patient told the triage provider today through  that she had been thinking of ending her life by hanging herself or going to the river and drowning herself.    The patient was said to have a plan to hang or drown herself.       The patient met with this  by ipad with a [facility] [place] .  She indicated she felt better and agreed to tell her brother or come back to ED if she felt like hurting herself.    She said she could not see her mental health support as frequently as she believes she needs to.  She would like to see a therapist regularly again once per week.      History of the Crisis  Duration of the current crisis, coping skills attempted to reduce the crisis, community resources used, and past presentations.    The patient's history shows that she has been getting mental health care for depression for several years. The patient has had  Karena Ashton, at [facility] for therapy services in the past and [facility] for medications. .The patient has at least one son, daughter and grandchildren in the area.   She says she lives with her brother who appears to translate for her for phone calls and lives with her.   The patient has also had home health care to manage medications.         Patient has had [facility].  Care chart Records show Indra is scheduled for mental health intake in July 2022.  The patient is said to have some unspecified trauma history.  She is said to benefit from therapy.  [place]     Her medical chart states the patient has not been hospitalized inpatient in the past for mental health.  A notation from earlier this month indicates that patient is being referred to a therapist-- her former therapist Karena Ashton is booked per records.        Patient has a current, active community health worker whom she has had for at least two years per records:     Shalom Juan  Community Health Worker  [facility] Care Coordination  [email or web address]   Office: [phone]  Fax: [phone]    Collateral Information  Brother Man Casie     Risk Assessment    Risk of Harm to Self     ESS-6  1.a. Over the past 2 weeks, have you had thoughts of killing yourself? Yes  1.b. Have you ever attempted to kill yourself and, if yes, when did this last happen? No   2. Recent or current suicide plan? Yes mostly wants to feel better   3. Recent or current intent to act on ideation? No  4. Lifetime psychiatric hospitalization? No  5. Pattern of excessive substance use? No  6. Current irritability, agitation, or aggression? No  Scoring note: BOTH 1a and 1b must be yes for it to score 1 point, if both are not yes it is zero. All others are 1 point per number. If all questions 1a/1b - 6 are no, risk is negligible. If one of 1a/1b is yes, then risk is mild. If either  question 2 or 3, but not both, is yes, then risk is automatically moderate regardless of total score. If both 2 and 3 are yes, risk is automatically high regardless of total score.     Score: 2, moderate risk    The patient has the following risk factors for suicide: depressive symptoms and family disruption    Is the patient experiencing current suicidal ideation: Yes. Passive wish to be dead without thoughts or plan.     Is the patient engaging in preparatory suicide behaviors (formulating how to act on plan, giving away possessions, saying goodbye, displaying dramatic behavior changes, etc)? No    Does the patient have access to firearms or other lethal means? no    The patient has the following protective factors: voluntarily seeking mental health support, established relationship community mental health provider(s) and sense of obligation to people/pets    Support system information: patient has family and community health clinic    Patient strengths: patient appears to benefit from therapy and actively wants to have more engagement    Does the patient engage in non-suicidal self-injurious behavior (NSSI/SIB)? no    Is the patient vulnerable to sexual exploitation?  No    Is the patient experiencing abuse or neglect? no    Is the patient a vulnerable adult? No      Risk of Harm to Others  The patient has the following risk factors of harm to others: no risk factors identified    Does the patient have thoughts of harming others? No    Is the patient engaging in sexually inappropriate behavior?  no       Current Substance Abuse    Is there recent substance abuse? no    Was a urine drug screen or blood alcohol level obtained: Yes negative    CAGE AID  Have you felt you ought to cut down on your drinking or drug use?  No  Have people annoyed you by criticizing your drinking or drug use? No  Have you felt bad or guilty about your drinking or drug use? No  Have you ever had a drink or used drugs first thing in the  morning to steady your nerves or to get rid of a hangover? No  Score: 0/4       Current Symptoms/Concerns    Symptoms  Attention, hyperactivity, and impulsivity symptoms present: No    Anxiety symptoms present: No      Appetite symptoms present: No     Behavioral difficulties present: No     Cognitive impairment symptoms present: No    Depressive symptoms present: Yes Crying or feels like crying, Depressed mood, Feelings of helplessness , Loss of interest / Anhedonia , Somatic complaints and Thoughts of suicide/death      Eating disorder symptoms present: No    Learning disabilities, cognitive challenges, and/or developmental disorder symptoms present: No     Manic/hypomanic symptoms present: No    Personality and interpersonal functioning difficulties present : No    Psychosis symptoms present: No      Sleep difficulties present: Yes: Difficulty falling asleep     Substance abuse disorder symptoms present: No     Trauma and stressor related symptoms present: No           Mental Status Exam   Affect: Appropriate   Appearance: Appropriate    Attention Span/Concentration: Attentive?    Eye Contact: Engaged   Fund of Knowledge: Appropriate    Language /Speech Content: Fluent   Language /Speech Volume: Normal    Language /Speech Rate/Productions: Normal    Recent Memory: Intact   Remote Memory: Intact   Mood: Sad    Orientation to Person: Yes    Orientation to Place: Yes   Orientation to Time of Day: Yes    Orientation to Date: Yes    Situation (Do they understand why they are here?): Yes    Psychomotor Behavior: Normal    Thought Content: Clear   Thought Form: Intact       Mental Health and Substance Abuse History    History  Current and historical diagnoses or mental health concerns: MDD    Prior MH services (inpatient, programmatic care, outpatient, etc) : Yes in home care, OP therapist, medicationss    Has the patient used [facility] crisis team services before?: No    History of substance abuse: No    Prior ALLA  services (inpatient, programmatic care, detox, outpatient, etc) : No    History of commitment: No    Family history of MH/ALLA: unknown    Trauma history: yes; unspecified    Medication  Psychotropic medications: Yes, multiple (see chart)     Current Care Team  Primary Care Provider: Heidi Hernandez DO    Psychiatrist:     Therapist: No -- referrals have been made per notes     : none known     Other:  Community Health Worker:  Wes Juan [facility]     Wes Juan  Community Health Worker  [facility] Care Coordination  [email or web address]   Office: [phone]  Fax: [phone]    Release of Information  Was a release of information signed:  Patient does not read English;  No one at ED at time of assessment       Biopsychosocial Information    Socioeconomic Information  Current living situation: with brother in local apartment     Employment/income source: disability    Relevant legal issues: none known    Cultural, Catholic, or spiritual influences on mental health care: none known    Is the patient active in the  or a : No      Relevant Medical Concerns   Patient identifies concerns with completing ADLs? no     Patient can ambulate independently? Yes     Other medical concerns? No     History of concussion or TBI?  Yes, per records may have had head trauma        Diagnosis    F33.9 MDD by history           Therapeutic Intervention  The following therapeutic methodologies were employed when working with the patient: establishing rapport, active listening, assessing dimensions of crisis, identifying additional supports and alternative coping skills, establishing a discharge plan, safety planning and treatment planning. Patient response to intervention: patient was cooperative, engaged and calm.   She had been at ED for several hours by time  met with patient.      Disposition  Recommended disposition: Individual Therapy       Reviewed case and recommendations with attending provider. Attending Name: Aravind Summers      Attending concurs with disposition: Yes      Patient concurs with disposition: Yes      Guardian concurs with disposition: NA     Final disposition: Individual therapy , Medication management and Other: Continuted community health care services .     Clinical Substantiation of Recommendations   Rationale with supporting factors for disposition and diagnosis.     The patient had been at ED for several hours by the time this  met with patient.   The patient had an extensive medical workup.   The patient told this  through  that she was feeling much better than she had when she first arrived to the ED earlier today.   Patient agreed to go to brother, other family or come back to ED if she felt like harming herself.      The patient states she has been trying to get a therapist and would like to see someone once a week for needed support.   She says the medications she takes do seem to help but she wants to see a therapist for needed support.  She will need a [place] speaking provider or an  service with provider.  The patient has MDD.       Assessment Details  Patient interview started at: 8:15 pm and completed at: 8:47 pm.    Total duration spent on the patient case in minutes: 1.0 hrs     CPT code(s) utilized: 92077 - Psychotherapy for Crisis - 60 (30-74*) min       Aftercare and Safety Planning  Follow up plans with MH/ALLA services: Yes Plan to Provide patient through family when they arrive with her verbal permission       Aftercare plan placed in the AVS and provided to patient: Yes. Given to patient by plan is by ED staff to family     TAL Butt      Aftercare Plan       NOTE THAT PATIENT DOES NOT READ ENGLISH     Thank you for allowing us to provide care for you today.  You were seen today for mental health concerns, such as depression, anxiety, or suicidal thinking.       Your provider feels that you do not require hospitalization at this time. However, your symptoms may become worse, and you may need to return to the Emergency Department. Most treatments of depression and suicidal thoughts are a process rather than a single intervention.  Medications and counseling can take several weeks or more to help.     If I am feeling unsafe or I am in a crisis, I will:   Contact my established care providers   Call the National Suicide Prevention Lifeline: [phone]   Go to the nearest emergency room   Call 911         Generally, every Emergency Department visit should have a follow-up clinic visit with either a primary or a specialty clinic/provider. Please follow-up as instructed by your emergency provider today.     By accepting these discharge instructions:    You promise to not harm yourself or others.    You agree that if you feel you are becoming unable to keep that promise, you will do something to help yourself before you do anything to harm yourself or others.     You agree to keep any safety plan arranged on your visit here today.    You agree to take any medication prescribed or recommended by your provider.    If you are getting worse, you can contact a friend or a family member, contact your counselor or family provider, contact a crisis line, or other options discussed with the provider or therapist today.    At any time, you can call 911 and return to the Emergency Department for more help.    You understand that follow-up is essential to your treatment, and you will make and keep appointments recommended on your visit today.     How to improve your mental health and prevent suicide:    Involve others by letting family, friends, counselors know.  Do not isolate yourself.    Avoid alcohol or drugs. Remove weapons, poisons from your home.    Try to stick to routines for eating, sleeping and getting regular exercise.      Try to get into sunlight. Bright natural light not  only treats seasonal affective disorder but also depression.    Increase safe activities that you enjoy.     If you feel worse, contact [phone] ([phone]), or call 911, or your primary provider/counselor for additional assistance.    If you were given a prescription for medicine here today, be sure to read all of the information (including the package insert) that comes with your prescription.  This will include important information about the medicine, its side effects, and any warnings that you need to know about.  The pharmacist who fills the prescription can provide more information and answer questions you may have about the medicine.  If you have questions or concerns that the pharmacist cannot address, please call or return to the Emergency Department. Remember that you can always come back to the Emergency Department if you are not able to see your regular provider in the amount of time listed above, if you get any new symptoms, or if there is anything that worries you.

## 2022-03-24 ENCOUNTER — VIRTUAL VISIT (OUTPATIENT)
Dept: BEHAVIORAL HEALTH | Facility: CLINIC | Age: 57
End: 2022-03-24
Payer: COMMERCIAL

## 2022-03-24 DIAGNOSIS — F43.10 PTSD (POST-TRAUMATIC STRESS DISORDER): ICD-10-CM

## 2022-03-24 DIAGNOSIS — F41.9 ANXIETY AND DEPRESSION: ICD-10-CM

## 2022-03-24 DIAGNOSIS — F33.3 SEVERE EPISODE OF RECURRENT MAJOR DEPRESSIVE DISORDER, WITH PSYCHOTIC FEATURES (H): ICD-10-CM

## 2022-03-24 DIAGNOSIS — F32.A ANXIETY AND DEPRESSION: ICD-10-CM

## 2022-03-24 DIAGNOSIS — F33.1 MODERATE EPISODE OF RECURRENT MAJOR DEPRESSIVE DISORDER (H): ICD-10-CM

## 2022-03-24 LAB
ATRIAL RATE - MUSE: 100 BPM
DIASTOLIC BLOOD PRESSURE - MUSE: NORMAL MMHG
INTERPRETATION ECG - MUSE: NORMAL
P AXIS - MUSE: 61 DEGREES
PR INTERVAL - MUSE: 134 MS
QRS DURATION - MUSE: 80 MS
QT - MUSE: 404 MS
QTC - MUSE: 521 MS
R AXIS - MUSE: 55 DEGREES
SYSTOLIC BLOOD PRESSURE - MUSE: NORMAL MMHG
T AXIS - MUSE: 19 DEGREES
VENTRICULAR RATE- MUSE: 100 BPM

## 2022-03-24 PROCEDURE — 90834 PSYTX W PT 45 MINUTES: CPT | Mod: 95 | Performed by: SOCIAL WORKER

## 2022-03-24 RX ORDER — QUETIAPINE FUMARATE 25 MG/1
25 TABLET, FILM COATED ORAL AT BEDTIME
Qty: 90 TABLET | Refills: 1 | Status: CANCELLED | OUTPATIENT
Start: 2022-03-24

## 2022-03-24 ASSESSMENT — ANXIETY QUESTIONNAIRES
6. BECOMING EASILY ANNOYED OR IRRITABLE: SEVERAL DAYS
3. WORRYING TOO MUCH ABOUT DIFFERENT THINGS: SEVERAL DAYS
IF YOU CHECKED OFF ANY PROBLEMS ON THIS QUESTIONNAIRE, HOW DIFFICULT HAVE THESE PROBLEMS MADE IT FOR YOU TO DO YOUR WORK, TAKE CARE OF THINGS AT HOME, OR GET ALONG WITH OTHER PEOPLE: SOMEWHAT DIFFICULT
2. NOT BEING ABLE TO STOP OR CONTROL WORRYING: MORE THAN HALF THE DAYS
7. FEELING AFRAID AS IF SOMETHING AWFUL MIGHT HAPPEN: SEVERAL DAYS
1. FEELING NERVOUS, ANXIOUS, OR ON EDGE: MORE THAN HALF THE DAYS
5. BEING SO RESTLESS THAT IT IS HARD TO SIT STILL: NOT AT ALL
GAD7 TOTAL SCORE: 8

## 2022-03-24 ASSESSMENT — PATIENT HEALTH QUESTIONNAIRE - PHQ9
SUM OF ALL RESPONSES TO PHQ QUESTIONS 1-9: 20
5. POOR APPETITE OR OVEREATING: SEVERAL DAYS

## 2022-03-24 NOTE — PROGRESS NOTES
"    [facility]   Mental Health & Addiction Services     Progress Note - Initial Visit     Patient  Name:  Indra Jason  Date: 3/24/2022         Service Type: Individual     Visit Start Time: 2:09pm  Visit End Time: 3:00pm    Duration: 51 minutes    Visit #: 1    Attendees: Client and [place]  from [place].    Brother, Man, for a short moment at the end when patient was having difficulty answering questions and wanted his help/support.    Service Modality:  Phone Visit:      Provider verified identity through the following two step process.  Patient provided:  Patient  and Patient address    The patient has been notified of the following:      \"We have found that certain health care needs can be provided without the need for a face to face visit.  This service lets us provide the care you need with a phone conversation.       I will have full access to your [facility] medical record during this entire phone call.   I will be taking notes for your medical record.      Since this is like an office visit, we will bill your insurance company for this service.       There are potential benefits and risks of telephone visits (e.g. limits to patient confidentiality) that differ from in-person visits.?Confidentiality still applies for telephone services, and nobody will record the visit.  It is important to be in a quiet, private space that is free of distractions (including cell phone or other devices) during the visit.??      If during the course of the call I believe a telephone visit is not appropriate, you will not be charged for this service\"     Consent has been obtained for this service by care team member: Yes        DATA:   Interactive Complexity: -The need to manage maladaptive communication (related to, e.g., high anxiety, high reactivity, repeated questions, or disagreement) among participants that complicates delivery of care  -Use of play equipment or physical " devices to overcome barriers to diagnostic or therapeutic interaction with a patient who is not fluent in the same language or who has not developed or lost expressive or receptive language skills to use or understand typical language.   Syriac  present. Need to repeat questions- patient was confused at times and had difficulty answering. She also asked caregiver/brother to come to the phone at times to help, which also required repeating questions and explanations. Patient presentation of what appears to be some reported psychosis and dissociative features impacted communication as well.    Crisis: No     Presenting Concerns/  Current Stressors:   Worsening depression with suicidal thoughts   Recent ED visit due to suicidal ideations      ASSESSMENT:  Mental Status Assessment:  Attitude:   Cooperative . Confused at times  Orientation:   All       Moments of appeared dissociation  Speech   Rate / Production: Normal/ Responsive   Volume:  Normal   Mood:    Depressed   Thought Content:  Hallucinations  Rumination  , Frequent thought flow  Thought Form:  Coherent  Psychosis at times based on what she reported. She felt confused and had difficulty responding at times due to this.   Insight:    Poor       Safety Issues and Plan for Safety and Risk Management:     [place] Suicide Severity Rating Scale (Short Version)  [place] Suicide Severity Rating (Short Version) 10/21/2019 8/7/2021 3/22/2022 3/22/2022   Over the past 2 weeks have you felt down, depressed, or hopeless? - no yes -   Over the past 2 weeks have you had thoughts of killing yourself? - no yes -   Have you ever attempted to kill yourself? - no no -   Q1 Wished to be Dead (Past Month) no - yes yes   Q2 Suicidal Thoughts (Past Month) no - yes yes   Q3 Suicidal Thought Method - - yes yes   Q4 Suicidal Intent without Specific Plan - - no yes   Q5 Suicide Intent with Specific Plan - - yes yes   Q6 Suicide Behavior (Lifetime) - - - no   Level of Risk  per Screen - - high risk high risk     [place] Suicide Severity Rating Scale (Lifetime/Recent)  [place] Suicide Severity Rating (Lifetime/Recent) 10/21/2019 3/22/2022 3/22/2022   Q1 Wished to be Dead (Past Month) no yes yes   Q2 Suicidal Thoughts (Past Month) no yes yes   Q3 Suicidal Thought Method - yes yes   Q4 Suicidal Intent without Specific Plan - no yes   Q5 Suicide Intent with Specific Plan - yes yes   Q6 Suicide Behavior (Lifetime) - - no   Level of Risk per Screen - high risk high risk       Patient reports the following current fears or concerns for personal safety: her brother reports concerns that she tries to leave th house at times, even in the middle of the night. .  Patient reports the following current or recent suicidal ideation or behaviors: suicidal ideations- was just in the ED for suicidality this week..  Patient denies current or recent homicidal ideation or behaviors.  Patient denies current or recent self injurious behavior or ideation.  Patient denies other safety concerns.  A safety and risk management plan has been developed including: When the patient identifies the following:  Suicidal Ideation with intent or intent & plan  (Yes to C-SSRS Suicidal Ideation #4 and #5) in the past month     The following will be initiated:  inform emergency contact of safety risk and plan, Complete/Review/Update Safety Plan, Document conversation regarding reducing access to lethal means and important to note that patient was just in the ED for suicidality and was discharged home to be with her brother after declining inpatient psychiatric care.     Safety Plan:  .  Patient consented to co-developed safety plan. View below.  Safety and risk management plan was completed.  Patient agreed to use safety plan should any safety concerns arise.   Patient reports there are no firearms in the house.     Diagnostic Criteria:  Major Depressive Disorder  CRITERIA (A-C) REPRESENT A MAJOR DEPRESSIVE EPISODE - SELECT  THESE CRITERIA  A) Recurrent episode(s) - symptoms have been present during the same 2-week period and represent a change from previous functioning 5 or more symptoms (required for diagnosis)   - Depressed mood. Note: In children and adolescents, can be irritable mood.     - Diminished interest or pleasure in all, or almost all, activities.    - Increased sleep.    - Psychomotor activity retardation.    - Fatigue or loss of energy.    - Feelings of worthlessness or inappropriate and excessive guilt.    - Diminished ability to think or concentrate, or indecisiveness.    - Recurrent thoughts of death (not just fear of dying), recurrent suicidal ideation without a specific plan, or a suicide attempt or a specific plan for committing suicide.   B) The symptoms cause clinically significant distress or impairment in social, occupational, or other important areas of functioning  C) The episode is not attributable to the physiological effects of a substance or to another medical condition  D) The occurence of major depressive episode is not better explained by other thought / psychotic disorders  E) There has never been a manic episode or hypomanic episode  Post- Traumatic Stress Disorder  A. The person has been exposed to a traumatic event in which both of the following were present:     (1) the person experienced, witnessed, or was confronted with an event or events that involved actual or threatened death or serious injury, or a threat to the physical integrity of self or others     (2) the person's response involved intense fear, helplessness, or horror. Note: In children, this may be expressed instead by disorganized or agitated behavior  B. The traumatic event is persistently reexperienced in one (or more) of the following ways:     - Recurrent and intrusive distressing recollections of the event, including images, thoughts, or perceptions. Note: In young children, repetitive play may occur in which themes or aspects  of the trauma are expressed.      - Recurrent distressing dreams of the event. Note: In children, there may be frightening dreams without recognizable content.      - Acting or feeling as if the traumatic event were recurring (includes a sense of reliving the experience, illusions, hallucinations, and dissociative flashback episodes, including those that occur on awakening or when intoxicated). Note: In young children, trauma-specific reenactment may occur.      - Intense psychological distress at exposure to internal or external cues that symbolize or resemble an aspect of the traumatic event.      - Physiological reactivity on exposure to internal or external cues that symbolize or resemble an aspect of the traumatic event.   C. Persistent avoidance of stimuli associated with the trauma and numbing of general responsiveness (not present before the trauma), as indicated by three (or more) of the following:     - Efforts to avoid thoughts, feelings, or conversations associated with the trauma.      - Efforts to avoid activities, places, or people that arouse recollections of the trauma.      - Inability to recall an important aspect of the trauma.      - Markedly diminished interest or participation in significant activities.      - Feeling of detachment or estrangement from others.      - Restricted range of affect (e.g., unable to have loving feelings).   D. Persistent symptoms of increased arousal (not present before the trauma), as indicated by two (or more) of the following:     - Difficulty falling or staying asleep.      - Irritability or outbursts of anger.      - Difficulty concentrating.      - Hypervigilance.      - Exaggerated startle response.   E. Duration of the disturbance is more than 1 month.  F. The disturbance causes clinically significant distress or impairment in social, occupational, or other important areas of functioning.    - The aformentioned symptoms began several year(s) ago and occurs 7  days per week and is experienced as severe.    PHQ 1/29/2021 10/1/2021 3/24/2022   PHQ-9 Total Score 12 10 20   Q9: Thoughts of better off dead/self-harm past 2 weeks Several days Several days Several days     MICAELA-7 SCORE 10/21/2019 3/24/2022   Total Score 5 8         DSM5 Diagnoses: (Sustained by DSM5 Criteria Listed Above)  Diagnoses: 296.33 (F33.2) Major Depressive Disorder, Recurrent Episode, Severe _ and With mood-congruent psychotic features  309.81 (F43.10) Posttraumatic Stress Disorder (includes Posttraumatic Stress Disorder for Children 6 Years and Younger)  With dissociative symptoms  Psychosocial & Contextual Factors:   PROMIS-10 Scores  Global Mental Health Score: 6  Global Physical Health Score: 6  PROMIS TOTAL - SUBSCORES: 12    Intervention:  Therapist and patient reviewed consent and privacy policy including, but not limited to confidentiality, limits to confidentiality, patient rights, expectations, attendance. Patient reported understanding and provided verbal agreement. Re-establishing care with patient today. She was previously seen by this provider from 11/29/2018- 1/22/2020. She has been referred to re-establish care due to depression with suicidal ideations. Changes have occurred since we last worked together. This provider completed a Standard Diagnostic Assessment on 12/14/2018. Therefore, patient is due for an updated diagnostic assessment. Review of changes and updates to clinical measures will need to be completed. Session time will be used for these due to need for  and thorough review with patient as patient is illiterate and unable to complete by herself and no access to MyChart.   Review of most recent ED visit and suicidal ideations present. Assessment of risk factors and protective factors.   Psychodynamic- Patient processed internal experiences , Completed through review of safety issues and safety interventions and Completed Safety plan  Collateral Reports  "Completed:  Routed note to PCP      PLAN: (Homework, other):  1. Provider will continue Diagnostic Assessment.  Patient was given the following to do until next session: follow safety plans discussed today. Spend time with family.    Plan to follow up on 3/31/2022 with this provider.    2. Provider recommended the following referrals: psychiatry- if she is no longer attending [facility] for psychiatry (unclear today).   Recommend weekly psychotherapy services with this provider due to severity of symptoms and safety concerns.     3.  Suicide Risk and Safety Concerns were assessed for Indra Jason.    Patient meets the following risk assessment and triage: When the patient identifies the following:  Suicidal Ideation with intent or intent & plan  (Yes to C-SSRS Suicidal Ideation #4 and #5) in the past month     The following will be initiated:  inform emergency contact of safety risk and plan, Complete/Review/Update Safety Plan, Document conversation regarding reducing access to lethal means and Notified PCP and/or other medical providers to determine if medical evaluation is needed    Safety Plan: View below        TAL Cook  2022      -------------------------------------------------------------------------------------------------------------------        [facility]                                       Indra Jason   : 1965  MRN: [id number]    SAFETY PLAN:  Step 1: Warning signs / cues (Thoughts, images, mood, situation, behavior) that a crisis may be developing:    Thoughts: \"I just want this to end\"    Images: obsessive thoughts of death or dying: Suicidal ideations of going to the river to drown or hang self and flashbacks    Thinking Processes: ruminations (can't stop thinking about my problems): depressive thoughts, racing thoughts, intrusive thoughts (bothersome, unwanted thoughts that come out of nowhere): flashbacks, intrusive thoughts of suicidal ideations, " "depersonalization and psychosis and dissociation     Mood: worsening depression, hopelessness, helplessness and agitation    Behaviors: isolating/withdrawing , impulsive, reckless behaviors (acting without thinking): such as suddenly leaving the house- even in the middle of the night, saying good-bye, not taking care of myself, not taking care of my responsibilities, sleeping too much, not sleeping enough and increasing frequency and duration of dissociation    Situations: anniversary of losses, changes in symptoms: worsening depression, pain and trauma    Step 2: Coping strategies - Things I can do to take my mind off of my problems without contacting another person (relaxation technique, physical activity):    Distress Tolerance Strategies:  listen to positive and upbeat music: your favorite music, sensory based activities/self-soothe with five senses: what can I see, hear, taste, touch, smell?, watch a funny movie: or show, change body temperature (ice pack/cold water)  and paced breathing/progressive muscle relaxation    Physical Activities: go for a walk, gardening, meditation, deep breathing and stretching     Focus on helpful thoughts:  \"This is temporary\", \"I will get through this\", \"Ride the wave\" and remind myself of what is important to me: such as my family  Step 3: People and social settings that provide distraction:   Name: Brother (Man)  Phone: [phone] (Lives with patient)   Name: daughter (Victor M)     Name: 2 younger sons      [place]   Step 4: Remind myself of people and things that are important to me and worth living for: my children and other family members    Step 5: When I am in crisis, I can ask these people to help me use my safety plan:   Name: Brother (Man) and his family with whom she resides Phone: [phone] (Lives with patient)   Name: Home Health RN (Kourtney)  Phone: [phone]   Name: Adult daughter (Victor M)   Peer Support (non-crisis) [place] Warmline: 118.534.9799    Or text "  Support  to 93273    Step 6: Making the environment safe:     dispose of old medications , remove things I could use to hurt myself: such as ropes or sharp objects, arrange transportation: -having family drive  and be around others  Step 7: Professionals or agencies I can contact during a crisis:    Suicide Prevention Lifeline: 8-348-958-TALK (6652)    Crisis Text Line Service (available 24 hours a day, 7 days a week): Text [place] to 460342  Local Crisis Services: [facility] Crisis: [phone]       Call 911 or go to my nearest emergency department.   I helped develop this safety plan and agree to use it when needed.     Client signature ________Indra Jason (virtual visit)______________________  Today s date:  3/24/2022  Completed by Provider Name/ Credentials:  TAL Kruger  March 24, 2022  Adapted from Safety Plan Template 2008 Jen Enriquez and Serg Adler is reprinted with the express permission of the authors.  No portion of the Safety Plan Template may be reproduced without the express, written permission.  You can contact the authors at [email or web address] or [email or web address].

## 2022-03-25 DIAGNOSIS — F41.9 ANXIETY AND DEPRESSION: ICD-10-CM

## 2022-03-25 DIAGNOSIS — F43.10 PTSD (POST-TRAUMATIC STRESS DISORDER): ICD-10-CM

## 2022-03-25 DIAGNOSIS — F33.1 MODERATE EPISODE OF RECURRENT MAJOR DEPRESSIVE DISORDER (H): ICD-10-CM

## 2022-03-25 DIAGNOSIS — F32.A ANXIETY AND DEPRESSION: ICD-10-CM

## 2022-03-25 RX ORDER — FLUOXETINE 40 MG/1
40 CAPSULE ORAL DAILY
Qty: 90 CAPSULE | Refills: 1 | Status: SHIPPED | OUTPATIENT
Start: 2022-03-25 | End: 2022-09-23

## 2022-03-25 ASSESSMENT — ANXIETY QUESTIONNAIRES: GAD7 TOTAL SCORE: 8

## 2022-03-25 NOTE — TELEPHONE ENCOUNTER
"Routing refill request to provider for review/approval because:  Needs updated PHQ-9 score    Last Written Prescription Date:  9/27/2021  Last Fill Quantity: 90,  # refills: 1   Last office visit provider:  3/10/2022     Requested Prescriptions   Pending Prescriptions Disp Refills     FLUoxetine (PROZAC) 40 MG capsule 90 capsule 1     Sig: Take 1 capsule (40 mg) by mouth daily       SSRIs Protocol Failed - 3/24/2022  8:31 AM        Failed - PHQ-9 score less than 5 in past 6 months     Please review last PHQ-9 score.           Passed - Medication is active on med list        Passed - Patient is age 18 or older        Passed - No active pregnancy on record        Passed - No positive pregnancy test in last 12 months        Passed - Recent (6 mo) or future (30 days) visit within the authorizing provider's specialty     Patient had office visit in the last 6 months or has a visit in the next 30 days with authorizing provider or within the authorizing provider's specialty.  See \"Patient Info\" tab in inbasket, or \"Choose Columns\" in Meds & Orders section of the refill encounter.                 Zenaida Camacho RN 03/25/22 11:20 AM  "

## 2022-03-27 RX ORDER — QUETIAPINE FUMARATE 25 MG/1
25 TABLET, FILM COATED ORAL AT BEDTIME
Qty: 90 TABLET | Refills: 1 | Status: SHIPPED | OUTPATIENT
Start: 2022-03-27 | End: 2022-09-23

## 2022-03-27 NOTE — TELEPHONE ENCOUNTER
Routing refill request to provider for review/approval because:  Labs out of range:  BP  Labs not current:  Lipid panel    Last Written Prescription Date:  9/27/21  Last Fill Quantity: 90,  # refills:1  Last office visit provider:  3/10/22     Requested Prescriptions   Pending Prescriptions Disp Refills     QUEtiapine (SEROQUEL) 25 MG tablet 90 tablet 1     Sig: Take 1 tablet (25 mg) by mouth At Bedtime       Antipsychotic Medications Failed - 3/25/2022  7:50 AM        Failed - Blood pressure under 140/90 in past 12 months     BP Readings from Last 3 Encounters:   03/22/22 (!) 155/91   03/10/22 (!) 155/97   02/16/22 (!) 148/87                 Failed - Lipid panel on file within the past 12 months     Recent Labs   Lab Test 10/11/19  1218   CHOL 163   TRIG 154*   HDL 44*   LDL 88               Passed - Patient is 12 years of age or older        Passed - CBC on file in past 12 months     Recent Labs   Lab Test 03/22/22  1325   WBC 9.6   RBC 5.10   HGB 11.6*   HCT 39.1                    Passed - Heart Rate on file within past 12 months     Pulse Readings from Last 3 Encounters:   03/22/22 78   03/10/22 88   02/16/22 87               Passed - A1c or Glucose on file in past 12 months     Recent Labs   Lab Test 03/22/22  1325 04/12/19  1212 10/18/18  1048      < >  --    A1C  --   --  5.8    < > = values in this interval not displayed.       Please review patients last 3 weights. If a weight gain of >10 lbs exists, you may refill the prescription once after instructing the patient to schedule an appointment within the next 30 days.    Wt Readings from Last 3 Encounters:   03/10/22 73.9 kg (163 lb)   02/16/22 75.8 kg (167 lb)   10/01/21 70.8 kg (156 lb 1.6 oz)             Passed - Medication is active on med list        Passed - Patient is not pregnant        Passed - No positve pregnancy test on file in past 12 months        Passed - Recent (6 mo) or future (30 days) visit within the authorizing provider's  "specialty     Patient had office visit in the last 6 months or has a visit in the next 30 days with authorizing provider or within the authorizing provider's specialty.  See \"Patient Info\" tab in inbasket, or \"Choose Columns\" in Meds & Orders section of the refill encounter.                 Edith Garber RN 03/27/22 2:25 PM  "

## 2022-03-28 DIAGNOSIS — J98.01 BRONCHOSPASM: ICD-10-CM

## 2022-03-28 DIAGNOSIS — J30.1 SEASONAL ALLERGIC RHINITIS DUE TO POLLEN: ICD-10-CM

## 2022-03-28 DIAGNOSIS — R25.2 FOOT CRAMPS: ICD-10-CM

## 2022-03-28 DIAGNOSIS — G44.209 TENSION HEADACHE: ICD-10-CM

## 2022-03-28 DIAGNOSIS — F33.1 MODERATE EPISODE OF RECURRENT MAJOR DEPRESSIVE DISORDER (H): ICD-10-CM

## 2022-03-28 DIAGNOSIS — R42 DIZZINESS: ICD-10-CM

## 2022-03-30 RX ORDER — ALBUTEROL SULFATE 90 UG/1
2 AEROSOL, METERED RESPIRATORY (INHALATION) 4 TIMES DAILY
Qty: 18 G | Refills: 2 | Status: SHIPPED | OUTPATIENT
Start: 2022-03-30

## 2022-03-30 RX ORDER — UBIDECARENONE 100 MG
1 CAPSULE ORAL DAILY
Qty: 90 CAPSULE | Refills: 1 | Status: SHIPPED | OUTPATIENT
Start: 2022-03-30 | End: 2022-09-23

## 2022-03-30 RX ORDER — DULOXETIN HYDROCHLORIDE 30 MG/1
30 CAPSULE, DELAYED RELEASE ORAL DAILY
Qty: 90 CAPSULE | Refills: 1 | Status: SHIPPED | OUTPATIENT
Start: 2022-03-30 | End: 2022-09-23

## 2022-03-30 RX ORDER — CETIRIZINE HYDROCHLORIDE 10 MG/1
10 TABLET ORAL DAILY
Qty: 90 TABLET | Refills: 3 | Status: SHIPPED | OUTPATIENT
Start: 2022-03-30

## 2022-03-30 RX ORDER — ACETAMINOPHEN 500 MG
500 TABLET ORAL EVERY 4 HOURS PRN
Qty: 360 TABLET | Refills: 2 | Status: SHIPPED | OUTPATIENT
Start: 2022-03-30

## 2022-03-30 RX ORDER — MECLIZINE HYDROCHLORIDE 25 MG/1
25 TABLET ORAL 3 TIMES DAILY PRN
Qty: 270 TABLET | Refills: 1 | Status: SHIPPED | OUTPATIENT
Start: 2022-03-30

## 2022-03-30 NOTE — TELEPHONE ENCOUNTER
"Routing refill request to provider for review/approval because:  Provider input to determine length of treatment-Acetaminophen, Albuterol, Meclizine  Labs not current: PHQ9  Labs out of range: BP  Medication listed as historical     Last Written Prescription Date:  9/27/21  Last Fill Quantity: 360,  # refills: 2   Last office visit provider:  3/10/22     Last Written Prescription Date:  6/28/21  Last Fill Quantity: 18g,  # refills: 2     Last Written Prescription Date:  9/27/21  Last Fill Quantity: 90,  # refills: 1     Last Written Prescription Date:  11/21  Last Fill Quantity: historical,  # refills: historical     Last Written Prescription Date:  9/27/21  Last Fill Quantity: 270,  # refills: 1     Last Written Prescription Date:  9/27/21  Last Fill Quantity: 90,  # refills: 1     Requested Prescriptions   Pending Prescriptions Disp Refills     acetaminophen (TYLENOL) 500 MG tablet 360 tablet 2     Sig: Take 1 tablet (500 mg) by mouth every 4 hours as needed       Analgesics (Non-Narcotic Tylenol and ASA Only) Passed - 3/28/2022  2:36 PM        Passed - Recent (12 mo) or future (30 days) visit within the authorizing provider's specialty     Patient has had an office visit with the authorizing provider or a provider within the authorizing providers department within the previous 12 mos or has a future within next 30 days. See \"Patient Info\" tab in inbasket, or \"Choose Columns\" in Meds & Orders section of the refill encounter.              Passed - Patient is 7 months old or older     If patient is a peds patient of the age 7 mos -12 years, ok to refill using weight-based dosing.     If >3g daily and/or sig is not \"prn\", check for liver enzymes. If normal in the last year, ok to refill.  If not, refer to the provider.          Passed - Medication is active on med list           albuterol (PROAIR HFA/PROVENTIL HFA/VENTOLIN HFA) 108 (90 Base) MCG/ACT inhaler 18 g 2     Sig: Inhale 2 puffs into the lungs 4 times daily " "      Asthma Maintenance Inhalers - Anticholinergics Passed - 3/28/2022  2:36 PM        Passed - Patient is age 12 years or older        Passed - Recent (12 mo) or future (30 days) visit within the authorizing provider's specialty     Patient has had an office visit with the authorizing provider or a provider within the authorizing providers department within the previous 12 mos or has a future within next 30 days. See \"Patient Info\" tab in inbasket, or \"Choose Columns\" in Meds & Orders section of the refill encounter.              Passed - Medication is active on med list       Short-Acting Beta Agonist Inhalers Protocol  Passed - 3/28/2022  2:36 PM        Passed - Patient is age 12 or older        Passed - Recent (12 mo) or future (30 days) visit within the authorizing provider's specialty     Patient has had an office visit with the authorizing provider or a provider within the authorizing providers department within the previous 12 mos or has a future within next 30 days. See \"Patient Info\" tab in inbasket, or \"Choose Columns\" in Meds & Orders section of the refill encounter.              Passed - Medication is active on med list           DULoxetine (CYMBALTA) 30 MG capsule 90 capsule 1     Sig: Take 1 capsule (30 mg) by mouth daily       Serotonin-Norepinephrine Reuptake Inhibitors  Failed - 3/28/2022  2:36 PM        Failed - Blood pressure under 140/90 in past 12 months     BP Readings from Last 3 Encounters:   03/22/22 (!) 155/91   03/10/22 (!) 155/97   02/16/22 (!) 148/87                 Failed - PHQ-9 score of less than 5 in past 6 months     Please review last PHQ-9 score.           Passed - Medication is active on med list        Passed - Patient is age 18 or older        Passed - No active pregnancy on record        Passed - No positive pregnancy test in past 12 months        Passed - Recent (6 mo) or future (30 days) visit within the authorizing provider's specialty     Patient had office visit in the " "last 6 months or has a visit in the next 30 days with authorizing provider or within the authorizing provider's specialty.  See \"Patient Info\" tab in inbasket, or \"Choose Columns\" in Meds & Orders section of the refill encounter.               Cholecalciferol (D3-1000) 25 MCG (1000 UT) CAPS         Vitamin Supplements (Adult) Protocol Passed - 3/28/2022  2:36 PM        Passed - High dose Vitamin D not ordered        Passed - Recent (12 mo) or future (30 days) visit within the authorizing provider's specialty     Patient has had an office visit with the authorizing provider or a provider within the authorizing providers department within the previous 12 mos or has a future within next 30 days. See \"Patient Info\" tab in inbasket, or \"Choose Columns\" in Meds & Orders section of the refill encounter.              Passed - Medication is active on med list           meclizine (ANTIVERT) 25 MG tablet 270 tablet 1     Sig: Take 1 tablet (25 mg) by mouth 3 times daily as needed        Antivertigo/Antiemetic Agents Passed - 3/28/2022  2:36 PM        Passed - Recent (12 mo) or future (30 days) visit within the authorizing provider's specialty     Patient has had an office visit with the authorizing provider or a provider within the authorizing providers department within the previous 12 mos or has a future within next 30 days. See \"Patient Info\" tab in inbasket, or \"Choose Columns\" in Meds & Orders section of the refill encounter.              Passed - Medication is active on med list        Passed - Patient is 18 years of age or older           cetirizine (ZYRTEC) 10 MG tablet 90 tablet 1     Sig: Take 1 tablet (10 mg) by mouth daily       Antihistamines Protocol Passed - 3/28/2022  2:36 PM        Passed - Patient is 3-64 years of age     Apply weight-based dosing for peds patients age 3 - 12 years of age.    Forward request to provider for patients under the age of 3 or over the age of 64.          Passed - Recent (12 mo) or " "future (30 days) visit within the authorizing provider's specialty     Patient has had an office visit with the authorizing provider or a provider within the authorizing providers department within the previous 12 mos or has a future within next 30 days. See \"Patient Info\" tab in inbasket, or \"Choose Columns\" in Meds & Orders section of the refill encounter.              Passed - Medication is active on med list             Edith Garber RN 03/30/22 3:50 PM  "

## 2022-03-31 ENCOUNTER — VIRTUAL VISIT (OUTPATIENT)
Dept: BEHAVIORAL HEALTH | Facility: CLINIC | Age: 57
End: 2022-03-31
Payer: COMMERCIAL

## 2022-03-31 DIAGNOSIS — F33.3 SEVERE EPISODE OF RECURRENT MAJOR DEPRESSIVE DISORDER, WITH PSYCHOTIC FEATURES (H): Primary | ICD-10-CM

## 2022-03-31 DIAGNOSIS — F43.10 PTSD (POST-TRAUMATIC STRESS DISORDER): ICD-10-CM

## 2022-03-31 PROCEDURE — 90791 PSYCH DIAGNOSTIC EVALUATION: CPT | Mod: 95 | Performed by: SOCIAL WORKER

## 2022-03-31 ASSESSMENT — COLUMBIA-SUICIDE SEVERITY RATING SCALE - C-SSRS
TOTAL  NUMBER OF INTERRUPTED ATTEMPTS SINCE LAST CONTACT: NO
6. HAVE YOU EVER DONE ANYTHING, STARTED TO DO ANYTHING, OR PREPARED TO DO ANYTHING TO END YOUR LIFE?: NO
TOTAL  NUMBER OF ABORTED OR SELF INTERRUPTED ATTEMPTS SINCE LAST CONTACT: NO
ATTEMPT SINCE LAST CONTACT: NO
SUICIDE, SINCE LAST CONTACT: NO
2. HAVE YOU ACTUALLY HAD ANY THOUGHTS OF KILLING YOURSELF?: YES
1. SINCE LAST CONTACT, HAVE YOU WISHED YOU WERE DEAD OR WISHED YOU COULD GO TO SLEEP AND NOT WAKE UP?: YES
5. HAVE YOU STARTED TO WORK OUT OR WORKED OUT THE DETAILS OF HOW TO KILL YOURSELF? DO YOU INTEND TO CARRY OUT THIS PLAN?: NO
REASONS FOR IDEATION SINCE LAST CONTACT: COMPLETELY TO END OR STOP THE PAIN (YOU COULDN'T GO ON LIVING WITH THE PAIN OR HOW YOU WERE FEELING)

## 2022-03-31 NOTE — PROGRESS NOTES
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 symptoms.  4. 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   Hallucinations (visual and auditory)  Related to PTSD dx.   5. Provisional Diagnosis:  309.81 (F43.10) Posttraumatic Stress Disorder (includes Posttraumatic Stress Disorder for Children 6 Years and Younger)  With dissociative symptoms as evidenced by history of exposure to war and refugee experience  6. Prognosis: Expect Improvement.  7. Likely consequences of symptoms if not treated: decompensation  8. Client strengths include:  caring, empathetic, has a previous history of therapy, open to learning, open to suggestions / feedback and support of family, friends and providers .     Recommendations:     1. Plan for Safety and Risk Management:   A safety and risk management plan has been developed including: When the patient identifies the following:  Suicidal Ideation with intent or intent & plan  (Yes to C-SSRS Suicidal Ideation #4 and #5) in the past month     The following will be initiated:  Complete/Review/Update Safety Plan at our last session (intake) on 3/24/2022    Safety Plan:  View copy of safety plan below.  .  Patient consented to co-developed  safety plan.  Safety and risk management plan was completed.  Patient agreed to use safety plan should any safety concerns arise.  A copy was given to the patient..          Report to child / adult protection services was NA.     2. Patient's identified mental health concerns with a cultural influence will be addressed by TAL Kruger (psychotherapist) with including cultural components.     3. Initial Treatment will focus on:    Depressed Mood - sleep hygiene, increase engagement in activities and time with others, cognitive reframing efforts, decreasing SI, medication adherence  Trauma- learn and implement nervous system regulation skills, cultivate sense of internal safety.     4. Resources/Service Plan:    services will be used for therapy- Thai.   Modifications to assist communication are not indicated.   Additional disability accommodations are not indicated.      5. Collaboration:   Collaboration / coordination of treatment will be initiated with the following  support professionals: primary care physician.      6.  Referrals: none at this time.   Next Scheduled Appointment: 4/7/2022.   Next Scheduled psychiatry apptointment: 4/10/2022 at [facility] per patient report. Encouraged getting a print out of medication and information from them to coordinate with primary care and home care nurse.        7. ALLA:    ALLA:  Discussed the general effects of drugs and alcohol on health and well-being. Provider gave patient printed information about the effects of chemical use on their health and well being. Recommendations:  No substance use concerns .     8. Records:   These were reviewed at time of assessment.   Information in this assessment was obtained from the medical record and provided by patient who is a vague and poor historian.      Patient will have open access to their mental health medical record.        Provider Name/ Credentials:  TAL Kruger March 31,  "      -------------------------------------------------------------------------------------------------------------------------            [facility]                                        Indra Jason   : 1965  MRN: [id number]     SAFETY PLAN:  Step 1: Warning signs / cues (Thoughts, images, mood, situation, behavior) that a crisis may be developing:  ? Thoughts: \"I just want this to end\"  ? Images: obsessive thoughts of death or dying: Suicidal ideations of going to the river to drown or hang self and flashbacks  ? Thinking Processes: ruminations (can't stop thinking about my problems): depressive thoughts, racing thoughts, intrusive thoughts (bothersome, unwanted thoughts that come out of nowhere): flashbacks, intrusive thoughts of suicidal ideations, depersonalization and psychosis and dissociation   ? Mood: worsening depression, hopelessness, helplessness and agitation  ? Behaviors: isolating/withdrawing , impulsive, reckless behaviors (acting without thinking): such as suddenly leaving the house- even in the middle of the night, saying good-bye, not taking care of myself, not taking care of my responsibilities, sleeping too much, not sleeping enough and increasing frequency and duration of dissociation  ? Situations: anniversary of losses, changes in symptoms: worsening depression, pain and trauma    Step 2: Coping strategies - Things I can do to take my mind off of my problems without contacting another person (relaxation technique, physical activity):  ? Distress Tolerance Strategies:  listen to positive and upbeat music: your favorite music, sensory based activities/self-soothe with five senses: what can I see, hear, taste, touch, smell?, watch a funny movie: or show, change body temperature (ice pack/cold water)  and paced breathing/progressive muscle relaxation  ? Physical Activities: go for a walk, gardening, meditation, deep breathing and stretching   ? Focus on helpful " "thoughts:  \"This is temporary\", \"I will get through this\", \"Ride the wave\" and remind myself of what is important to me: such as my family  Step 3: People and social settings that provide distraction:                 Name: Brother (Man)                    Phone: [phone] (Lives with patient)                 Name: daughter (Victor M)                               Name: 2 younger sons                     ? park                Step 4: Remind myself of people and things that are important to me and worth living for: my children and other family members     Step 5: When I am in crisis, I can ask these people to help me use my safety plan:                 Name: Brother (Vicente) and his family with whom she resides         Phone: [phone] (Lives with patient)                 Name: Home Health RN (Kourtney)                   Phone: [phone]                 Name: Adult daughter (Victor M)     Peer Support (non-crisis) [place] Warmline: 226.268.1407                                Or text  Support  to 14412     Step 6: Making the environment safe:   ? dispose of old medications , remove things I could use to hurt myself: such as ropes or sharp objects, arrange transportation: -having family drive  and be around others  Step 7: Professionals or agencies I can contact during a crisis:  ? Suicide Prevention Lifeline: 0-439-595-SUXF (1033)  ? Crisis Text Line Service (available 24 hours a day, 7 days a week): Text [place] to 650280    Local Crisis Services: [facility] Crisis: [phone]         Call 911 or go to my nearest emergency department.       I helped develop this safety plan and agree to use it when needed.      Client signature ________Indra Jason (virtual visit)______________________  Today s date:  3/24/2022  Completed by Provider Name/ Credentials:  TAL Kruger                             March 24, 2022  Adapted from Safety Plan Template 2008 Jen Enriquez and Serg Adler is reprinted with the express " permission of the authors.  No portion of the Safety Plan Template may be reproduced without the express, written permission.  You can contact the authors at [email or web address] or [email or web address].

## 2022-04-07 ENCOUNTER — VIRTUAL VISIT (OUTPATIENT)
Dept: BEHAVIORAL HEALTH | Facility: CLINIC | Age: 57
End: 2022-04-07
Payer: COMMERCIAL

## 2022-04-07 DIAGNOSIS — F43.10 PTSD (POST-TRAUMATIC STRESS DISORDER): Primary | ICD-10-CM

## 2022-04-07 DIAGNOSIS — F33.3 SEVERE EPISODE OF RECURRENT MAJOR DEPRESSIVE DISORDER, WITH PSYCHOTIC FEATURES (H): ICD-10-CM

## 2022-04-07 PROCEDURE — 90832 PSYTX W PT 30 MINUTES: CPT | Mod: 95 | Performed by: SOCIAL WORKER

## 2022-04-08 ENCOUNTER — PATIENT OUTREACH (OUTPATIENT)
Dept: NURSING | Facility: CLINIC | Age: 57
End: 2022-04-08
Payer: COMMERCIAL

## 2022-04-08 NOTE — PROGRESS NOTES
3/29/22   Clinic Care Coordination Contact  Care Team Conversations    Received VM from patient's home health nurse Kourtney Hu [phone] 3-29-22.  Stated she set up an appt for patient to see her doctor on 4-11-22 at 2:25pm. Patient needs help to get to appt  requesting help to set up a ride.

## 2022-04-08 NOTE — PROGRESS NOTES
4/8/2022  Clinic Care Coordination Contact  Community Health Worker Follow Up    Intervention and Education during outreach:   Called and spoke to patient and patient's nephew.  Patient gave the phone to talk to her nephew brother 'son.   Nephew spoke English and will interpret in Swedish  CHW informed [employer]  transportation pickup on 4-11-22 between 1:20-1:50apm  Provided [employer] transportation number to nephew to call later on today to verify ride.     Explained and educated patient and nephew about transportation and learning to set up medical ride with [employer].  Stated patient's brother he does not know how due to language barrier.  Requested if nephew can help to set up ride.  Stated he not able to do it because he works.  Suggested to     Patient stated she has a new Swedish  and would like to request for Katherine Mirza to .  Suggested to talk to the nurse and ask if Rup works with [employer] insurance  Explained that sometime   contract with certain insurance.    Provided nephew number to  to call to check on status  Will ask Gema for support to check on SNAP.      CHW Follow up: Monthly  CHW Plan: Follow up on goal (s)  CHW Next Follow Up: 5-13-22      Wes Juan  Community Health Worker  [facility]  Clinic Care Coordination  [email or web address]   Office: [phone]  Fax: [phone]    Clinic Care Coordination Contact    Community Health Worker Follow Up    Care Gaps:     Health Maintenance Due   Topic Date Due     ADVANCE CARE PLANNING  Never done     Pneumococcal Vaccine: Pediatrics (0 to 5 Years) and At-Risk Patients (6 to 64 Years) (1 of 4 - PCV13) Never done     ZOSTER IMMUNIZATION (1 of 2) Never done     COVID-19 Vaccine (3 - Moderna risk 4-dose series) 04/07/2022       Care Gap Goal set: Yes    Goals:   Goals Addressed as of 4/8/2022 at 10:13 AM                    Today    11/24/21       Financial Wellbeing (pt-stated)    30%  50%    Added 9/9/21 by Ihsan Braga, [facility]      Goal Statement: I want to re connect with an organization for support with the Social Security process within one month  Date Goal set: 2-28-22  Barriers: Language  Strengths:   Date to Achieve By: 4-2022  Patient expressed understanding of goal: Yes  Action steps to achieve this goal:  1.Nephew will call to [employer] to check on status of her case completed intake appt on 3-21-22 at 10:15am  2. I will update [facility] team    [employer]  [address] [facility]  [place] [address]  [phone]  Fax:[phone]    Updated:  4-8-21             Medical (pt-stated)   50%      Added 3/8/22 by Ihsan Braga, [facility]      Goal Statement: I want to have a follow up with my PCP to discuss physical therapy and referral to neurology within one month  Date Goal set: 03/08/22    Barriers:   Strengths:   Date to Achieve By: 4/30/2022  Patient expressed understanding of goal: Yes  Action steps to achieve this goal:  1. I will attend appt on 4-11-22 at 2:25pm to discus PT.    Updated: 4-8-22            Other (pt-stated)   10%      Added 2/28/22 by Wes Juan      Goal Statement: I want support to renew food stamp and CASH benefit within 2-3 months.  Date goal set: 2/28/2022  Measure of Success: resume food and cash  Barriers: language  Strengths: support from brother and friend, CCC team  Date to Achieve By: 4-2022  Patient expressed understanding of goal: Yes    Action steps to achieve this goal  1. I will ask Geam, staff at home care to help call Financial worker to check on the status of the Food and Cash benefit  2. I will ask Gema to help call [employer] to check on status of benefit  3. I will wait to receive a copy of my lease from [facility]  [facility] [place] [address]  [phone] [employer] info line to checkon benefits  [phone]     Update 4-8-22

## 2022-04-08 NOTE — PROGRESS NOTES
4/8/2022  Clinic Care Coordination Contact  Care Team Conversations    Call: [employer]  Member Line:[phone]  Provider Line [phone]  RE: Ride for 4-11-22 at 2:25pm  [facility]            Rep: Lilian               Provided member ID, verified patient demographics, destination address appointment date and time.    [employer] [phone]   between: 1:20:1:50pm  2 passengers  Will call

## 2022-04-08 NOTE — PROGRESS NOTES
"    [facility]                                     Progress Note    Patient Name: Indra Jason  Date: 2022         Service Type: Individual      Session Start Time: 3:10pm  Session End Time: 3:41pm     Session Length: 31 minutes    Session #: 2    Attendees: Client and Urdu  (ID#[id number])    Service Modality:  Phone Visit:      Provider verified identity through the following two step process.  Patient provided:  Patient  and Patient is known previously to provider    The patient has been notified of the following:      \"We have found that certain health care needs can be provided without the need for a face to face visit.  This service lets us provide the care you need with a phone conversation.       I will have full access to your [facility] medical record during this entire phone call.   I will be taking notes for your medical record.      Since this is like an office visit, we will bill your insurance company for this service.       There are potential benefits and risks of telephone visits (e.g. limits to patient confidentiality) that differ from in-person visits.?Confidentiality still applies for telephone services, and nobody will record the visit.  It is important to be in a quiet, private space that is free of distractions (including cell phone or other devices) during the visit.??      If during the course of the call I believe a telephone visit is not appropriate, you will not be charged for this service\"     Consent has been obtained for this service by care team member: Yes     DATA  Interactive Complexity: No  Crisis: No        Progress Since Last Session (Related to Symptoms / Goals / Homework):   Symptoms: Improving in thoughts. However, continued depression, pain and fatigue    Homework: Partially completed      Episode of Care Goals: Minimal progress - ACTION (Actively working towards change); Intervened by reinforcing change plan / affirming " steps taken     Current / Ongoing Stressors and Concerns:   Depressed Mood   Pain   Fatigue    Loneliness. Boredom.     Treatment Objective(s) Addressed in This Session:   identify 2 strategies for managing pain  Increase interest, engagement, and pleasure in doing things  Decrease frequency and intensity of feeling down, depressed, hopeless  Improve quantity and quality of night time sleep / decrease daytime naps  Decrease thoughts that you'd be better off dead or of suicide / self-harm  complete ADLs daily (maintain personal hygiene, wears clean clothes, eats regular meals, etc.) 5 day per week       Intervention:  Patient centered- Patient processed thoughts, feelings and experiences contributing to mental health symptoms. Provided empathic listening, support and validation.   Established treatment plan goals   CBT: reframing thoughts. Behavioral Activation-encouraging spending more time out of bed. Motivational interviewing to increase engagement in activities and social contact with family.     Assessments completed prior to visit:  The following assessments were completed by patient for this visit:  PHQ9:   PHQ-9 SCORE 10/21/2019 12/4/2019 6/15/2020 1/29/2021 10/1/2021 3/24/2022   PHQ-9 Total Score 12 10 11 12 10 20     GAD7:   MICAELA-7 SCORE 10/21/2019 3/24/2022   Total Score 5 8     CAGE-AID:   CAGE-AID Total Score 10/21/2019   Total Score 0     PROMIS 10-Global Health (all questions and answers displayed):   PROMIS 10 3/24/2022   In general, would you say your health is: 1   In general, would you say your quality of life is: 1   In general, how would you rate your physical health? 1   In general, how would you rate your mental health, including your mood and your ability to think? 1   In general, how would you rate your satisfaction with your social activities and relationships? 2   In general, please rate how well you carry out your usual social activities and roles. (This includes activities at home, at work  and in your community, and responsibilities as a parent, child, spouse, employee, friend, etc.) 2   To what extent are you able to carry out your everyday physical activities such as walking, climbing stairs, carrying groceries, or moving a chair? 2   In the past 7 days, how often have you been bothered by emotional problems such as feeling anxious, depressed, or irritable? 4   In the past 7 days, how would you rate your fatigue on average? 5   In the past 7 days, how would you rate your pain on average, where 0 means no pain, and 10 means worst imaginable pain? 8   Global Mental Health Score 6   Global Physical Health Score 6   PROMIS TOTAL - SUBSCORES 12   Some recent data might be hidden     PROMIS 10-Global Health (only subscores and total score):   PROMIS-10 Scores Only 3/24/2022   Global Mental Health Score 6   Global Physical Health Score 6   PROMIS TOTAL - SUBSCORES 12     [place] Suicide Severity Rating Scale (Lifetime/Recent)  [place] Suicide Severity Rating (Lifetime/Recent) 10/21/2019 3/22/2022 3/22/2022   Q1 Wished to be Dead (Past Month) no yes yes   Q2 Suicidal Thoughts (Past Month) no yes yes   Q3 Suicidal Thought Method - yes yes   Q4 Suicidal Intent without Specific Plan - no yes   Q5 Suicide Intent with Specific Plan - yes yes   Q6 Suicide Behavior (Lifetime) - - no   Level of Risk per Screen - high risk high risk     [place] Suicide Severity Rating Scale (Short Version)  [place] Suicide Severity Rating (Short Version) 10/21/2019 8/7/2021 3/22/2022 3/22/2022 3/31/2022   Over the past 2 weeks have you felt down, depressed, or hopeless? - no yes - -   Over the past 2 weeks have you had thoughts of killing yourself? - no yes - -   Have you ever attempted to kill yourself? - no no - -   Q1 Wished to be Dead (Past Month) no - yes yes -   Q2 Suicidal Thoughts (Past Month) no - yes yes -   Q3 Suicidal Thought Method - - yes yes -   Q4 Suicidal Intent without Specific Plan - - no yes -   Q5 Suicide  "Intent with Specific Plan - - yes yes -   Q6 Suicide Behavior (Lifetime) - - - no -   Level of Risk per Screen - - high risk high risk -   1. Wish to be Dead (Since Last Contact) - - - - 1   Wish to be Dead Description (Since Last Contact) - - - - Reports: \"I wish I didn't have those thoughts'   2. Non-Specific Active Suicidal Thoughts (Since Last Contact) - - - - 1   3. Active Suicidal Ideation with any Methods (Not Plan) Without Intent to Act (Since Last Contact) - - - - 1   4. Active Suicidal Ideation with Some Intent to Act, Without Specific Plan (Since Last Contact) - - - - 0   5. Active Suicidal Ideation with Specific Plan and Intent (Since Last Contact) - - - - 0   Most Severe Ideation Rating (Since Last Contact) - - - - 3   Frequency (Since Last Contact) - - - - 3   Duration (Since Last Contact) - - - - 3   Deterrents (Since Last Contact) - - - - 2   Reasons for Ideation (Since Last Contact) - - - - 5   Actual Attempt (Since Last Contact) - - - - 0   Has subject engaged in non-suicidal self-injurious behavior? (Since Last Contact) - - - - 0   Interrupted Attempts (Since Last Contact) - - - - 0   Aborted or Self-Interrupted Attempt (Since Last Contact) - - - - 0   Preparatory Acts or Behavior (Since Last Contact) - - - - 0   Suicide (Since Last Contact) - - - - 0   Calculated C-SSRS Risk Score (Since Last Contact) - - - - Medium Risk         ASSESSMENT: Current Emotional / Mental Status (status of significant symptoms):   Risk status (Self / Other harm or suicidal ideation)   Patient denies current fears or concerns for personal safety.   Patient reports the following current or recent suicidal ideation or behaviors: occasional passive SI. Reports it has decreased since last session. .   Patient denies current or recent homicidal ideation or behaviors.   Patient denies current or recent self injurious behavior or ideation.   Patient denies other safety concerns.   Patient reports there has been no change in " risk factors since their last session.     Patient reports there has been no change in protective factors since their last session.     A safety and risk management plan has been developed including: Patient consented to co-developed safety plan on 3/2022.  Safety and risk management plan was reviewed.   Patient agreed to use safety plan should any safety concerns arise.  A copy was made available to the patient.        Attitude:   Cooperative    Orientation:   All   Speech    Rate / Production: Normal     Volume:  Normal    Mood:    Depressed    Thought Content:  Clear    Thought Form:  Coherent  Logical    Insight:    Fair      Medication Review:   No changes to current psychiatric medication(s)   Current Outpatient Medications   Medication     acetaminophen (TYLENOL) 500 MG tablet     acetaminophen-codeine (TYLENOL #3) 300-30 MG tablet     albuterol (PROAIR HFA/PROVENTIL HFA/VENTOLIN HFA) 108 (90 Base) MCG/ACT inhaler     amLODIPine (NORVASC) 2.5 MG tablet     capsaicin (ZOSTRIX) 0.025 % cream     cetirizine (ZYRTEC) 10 MG tablet     Cholecalciferol (D3-1000) 25 MCG (1000 UT) CAPS     cyanocobalamin (VITAMIN B-12) 1000 MCG tablet     cyanocobalamin (VITAMIN B-12) 1000 MCG tablet     deferasirox (EXJADE) 500 MG disintegrating tablet     diaper,brief,adult,disposable Misc     DULoxetine (CYMBALTA) 30 MG capsule     ergocalciferol, vitamin D2, (VITAMIN D2 ORAL)     famotidine (PEPCID) 20 MG tablet     famotidine (PEPCID) 40 MG tablet     FLUoxetine (PROZAC) 40 MG capsule     fluticasone (FLONASE) 50 MCG/ACT nasal spray     lidocaine (LIDODERM) 5 %     magic mouthwash (ENTER INGREDIENTS IN COMMENTS) suspension     meclizine (ANTIVERT) 25 MG tablet     ondansetron (ZOFRAN ODT) 4 MG disintegrating tablet     QUEtiapine (SEROQUEL) 25 MG tablet     vitamin B-12 (CYANOCOBALAMIN) 1000 MCG tablet     No current facility-administered medications for this visit.          Medication Compliance:   Yes     Changes in Health  Issues:   Yes: Pain, Associated Psychological Distress     Chemical Use Review:   Substance Use: Chemical use reviewed, no active concerns identified      Tobacco Use: No current tobacco use.      Diagnosis:  1. PTSD (post-traumatic stress disorder)    2. Severe episode of recurrent major depressive disorder, with psychotic features (H)        Collateral Reports Completed:   Not Applicable    PLAN: (Patient Tasks / Therapist Tasks / Other)  Patient is scheduled for follow up psychotherapy on 4/14/202  Prior to next session, increase time with family and outside of the house- such as on a nice day over the weekend.         Karena Ashton, [facility] 4/7/2022                                                         ______________________________________________________________________    Individual Treatment Plan    Patient's Name: Indra Jason  YOB: 1965    Date of Creation: 4/7/2022  Date Treatment Plan Last Reviewed/Revised: 4/7/2022    DSM5 Diagnoses:   1. PTSD (post-traumatic stress disorder)    2. Severe episode of recurrent major depressive disorder, with psychotic features (H)      Psychosocial / Contextual Factors: exposure to war and refugee experience, , loss of child, pain, bereavement.  PROMIS (reviewed every 90 days):   PROMIS-10 Scores Only 3/24/2022   Global Mental Health Score 6   Global Physical Health Score 6   PROMIS TOTAL - SUBSCORES 12       Referral / Collaboration:  Referral to another professional/service is not indicated at this time. Already established with psychiatry through [facility].     Anticipated number of session for this episode of care: 20+  Anticipation frequency of session: Weekly at this time due to severity of symptoms. May change  Anticipated Duration of each session: 38-52 minutes  Treatment plan will be reviewed in 90 days or when goals have been changed.     MeasurableTreatment Goal(s) related to diagnosis / functional impairment(s)    Goal 1: Patient will  decrease level of depression as evidenced by PHQ-9 score <5.    I will know I've met my goal when I find more breezy in life, enjoy activities, socialize, have increased energy and hope.       Objective #A (Patient Action)                          Patient will Increase interest, engagement, and pleasure in doing things.  Status: new 4/7/2022     Intervention(s)  Therapist will assign homework for patient to engage in activities and socialization 3x/week..     Objective #B  Patient will Decrease frequency and intensity of feeling down, depressed, hopeless.  Status: new 4/7/2022     Intervention(s)  Therapist will coordinate mental health care with psychiatry as appropriate  Therapist will assign homework for patient to engage in daily gratitude practice.   Therapist will teach sleep hygiene skills and assign homework for maintaining sleep routine.    Objective #C  Patient will Identify negative self-talk and behaviors: challenge core beliefs, myths, and actions.  Status: new 4/7/2022     Intervention(s)  Therapist will teach cognitive reframing skills and educate on programming/conditioning.    Therapist will assign homework for patient to implement cognitive reframing skills and engage in positive self talk. Therapist will assign homework to engage in journaling and daily positive self talk.       Goal 2: Patient will reduce levels anxiety and fear related to trauma symptoms. Patient will decrease impact of trauma symptoms on daily functioning.    I will know I've met my goal when I feel less scared, have less anxiety and distress impacting my daily life related to trauma history.      Objective #A (Patient Action)    Patient will implement resourcing and emotional regulation skills outside of session to decrease hyperarousal levels..  Status: New - Date: new 4/7/2022    Intervention(s)  Therapist will teach emotional regulation skills. Practice grounding exercises and guided imagery with patient in session. Therapist  "will guide patient through these practices in session and assign homework for patient to practice these skills outside of session.    Objective #B  Patient will learn about trauma and the impact on the mind and body..  Status: New - Date: new 2022    Intervention(s)  Therapist will provide educational materials on the brain and how trauma is stored in the body. Therapist will teach patient about the brain. Provider with utiliize somatic/experiential techniques in sessions..    Objective #C  Patient will challenge negative cognitions and replace them with positive cognitions.   Patient will develop insight into values, strengths, capacities and \"most resourced self.\".  Patient will process through trauma memories in a safe space to bring healing.   Status: New - Date: new 2022    Intervention(s)  Therapist will assign homework around cognitive reframing, identifying values and most resourced self. Therapist will provide EMDR treatment for processing of trauma memeories and challenging negative cognitions.      Patient has reviewed and agreed to the above plan.      Karena Ashton [facility]  2022    -----------------------------------------------------------------------------------------         [facility]                                        Indra Jason   : 1965  MRN: [id number]     SAFETY PLAN:  Step 1: Warning signs / cues (Thoughts, images, mood, situation, behavior) that a crisis may be developing:  ? Thoughts: \"I just want this to end\"  ? Images: obsessive thoughts of death or dying: Suicidal ideations of going to the river to drown or hang self and flashbacks  ? Thinking Processes: ruminations (can't stop thinking about my problems): depressive thoughts, racing thoughts, intrusive thoughts (bothersome, unwanted thoughts that come out of nowhere): flashbacks, intrusive thoughts of suicidal ideations, depersonalization and psychosis and dissociation   ? Mood: worsening " "depression, hopelessness, helplessness and agitation  ? Behaviors: isolating/withdrawing , impulsive, reckless behaviors (acting without thinking): such as suddenly leaving the house- even in the middle of the night, saying good-bye, not taking care of myself, not taking care of my responsibilities, sleeping too much, not sleeping enough and increasing frequency and duration of dissociation  ? Situations: anniversary of losses, changes in symptoms: worsening depression, pain and trauma    Step 2: Coping strategies - Things I can do to take my mind off of my problems without contacting another person (relaxation technique, physical activity):  ? Distress Tolerance Strategies:  listen to positive and upbeat music: your favorite music, sensory based activities/self-soothe with five senses: what can I see, hear, taste, touch, smell?, watch a funny movie: or show, change body temperature (ice pack/cold water)  and paced breathing/progressive muscle relaxation  ? Physical Activities: go for a walk, gardening, meditation, deep breathing and stretching   ? Focus on helpful thoughts:  \"This is temporary\", \"I will get through this\", \"Ride the wave\" and remind myself of what is important to me: such as my family  Step 3: People and social settings that provide distraction:                 Name: Brother (Man)                    Phone: [phone] (Lives with patient)                 Name: daughter (Victor M)                               Name: 2 younger sons                     ? park                Step 4: Remind myself of people and things that are important to me and worth living for: my children and other family members     Step 5: When I am in crisis, I can ask these people to help me use my safety plan:                 Name: Brother (Man) and his family with whom she resides         Phone: [phone] (Lives with patient)                 Name: Home Health RN (Kourtney)                   Phone: " [phone]                 Name: Adult daughter (Victo rM)     Peer Support (non-crisis) [place] Warmline: 467.543.2161                                Or text  Support  to 27388     Step 6: Making the environment safe:   ? dispose of old medications , remove things I could use to hurt myself: such as ropes or sharp objects, arrange transportation: -having family drive  and be around others  Step 7: Professionals or agencies I can contact during a crisis:  ? Suicide Prevention Lifeline: 2-914-124-TFTF (5342)  ? Crisis Text Line Service (available 24 hours a day, 7 days a week): Text [place] to 300608    Local Crisis Services: [facility] Crisis: [phone]         Call 911 or go to my nearest emergency department.       I helped develop this safety plan and agree to use it when needed.      Client signature ________nIdra Jason (virtual visit)______________________  Today s date:  3/24/2022  Completed by Provider Name/ Credentials:  TAL Kruger                             March 24, 2022  Adapted from Safety Plan Template 2008 Jen Enriquez and Serg Adler is reprinted with the express permission of the authors.  No portion of the Safety Plan Template may be reproduced without the express, written permission.  You can contact the authors at [email or web address] or [email or web address].

## 2022-04-11 ENCOUNTER — TELEPHONE (OUTPATIENT)
Dept: FAMILY MEDICINE | Facility: CLINIC | Age: 57
End: 2022-04-11

## 2022-04-11 ENCOUNTER — OFFICE VISIT (OUTPATIENT)
Dept: FAMILY MEDICINE | Facility: CLINIC | Age: 57
End: 2022-04-11
Payer: COMMERCIAL

## 2022-04-11 VITALS
SYSTOLIC BLOOD PRESSURE: 142 MMHG | DIASTOLIC BLOOD PRESSURE: 84 MMHG | OXYGEN SATURATION: 97 % | TEMPERATURE: 98.3 F | BODY MASS INDEX: 30.76 KG/M2 | WEIGHT: 166.1 LBS | HEART RATE: 94 BPM

## 2022-04-11 DIAGNOSIS — F33.1 MODERATE EPISODE OF RECURRENT MAJOR DEPRESSIVE DISORDER (H): ICD-10-CM

## 2022-04-11 DIAGNOSIS — I10 ESSENTIAL HYPERTENSION: ICD-10-CM

## 2022-04-11 DIAGNOSIS — G89.29 OTHER CHRONIC PAIN: Primary | ICD-10-CM

## 2022-04-11 DIAGNOSIS — F43.10 PTSD (POST-TRAUMATIC STRESS DISORDER): ICD-10-CM

## 2022-04-11 DIAGNOSIS — G44.209 TENSION HEADACHE: Primary | ICD-10-CM

## 2022-04-11 DIAGNOSIS — R42 DIZZINESS: ICD-10-CM

## 2022-04-11 PROBLEM — D70.2 OTHER DRUG-INDUCED NEUTROPENIA (H): Status: ACTIVE | Noted: 2022-04-11

## 2022-04-11 PROCEDURE — 99214 OFFICE O/P EST MOD 30 MIN: CPT | Performed by: FAMILY MEDICINE

## 2022-04-11 RX ORDER — DULOXETIN HYDROCHLORIDE 60 MG/1
60 CAPSULE, DELAYED RELEASE ORAL DAILY
Qty: 90 CAPSULE | Refills: 1 | Status: SHIPPED | OUTPATIENT
Start: 2022-04-11 | End: 2022-09-23

## 2022-04-11 RX ORDER — AMLODIPINE BESYLATE 5 MG/1
5 TABLET ORAL DAILY
Qty: 90 TABLET | Refills: 1 | Status: SHIPPED | OUTPATIENT
Start: 2022-04-11 | End: 2022-09-24

## 2022-04-11 NOTE — PROGRESS NOTES
Indra was seen today for hospital f/u and generalized body aches.    Diagnoses and all orders for this visit:    Other chronic pain: Discussed she is on the Cymbalta for mood and chronic pain.  She continues to complain about full body pain as well as intermittent suicidal ideations and depression.  We will increase her Cymbalta to 60 mg today in hopes to make improvements for both of these.    -     DULoxetine (CYMBALTA) 60 MG capsule; Take 1 capsule (60 mg) by mouth in the morning.    Moderate episode of recurrent major depressive disorder (H): As above continues to complain about both chronic pain/full body pain and depression/suicidal ideations.  I felt like her mood greatly improved since moving in with her brother and she confirms that today but she states that suicidal ideations still come and go. She has always had these.She does not have current SI. Contracted for safety. She has gotten back in with Karena her therapist and that has been helpful.  Will increase Cymbalta to 60 mg today.  Continue close follow-up on mood.  -     DULoxetine (CYMBALTA) 60 MG capsule; Take 1 capsule (60 mg) by mouth in the morning.    PTSD (post-traumatic stress disorder): as above. Continue Seroquel for sleep     Essential hypertension: Blood pressure remains borderline today.  Shows me a couple borderline at home care visits as well.  Will increase to 5 mg today.  That should get her in an appropriate range.  Discussed monitoring for any worsening swelling in her legs.  Follow-up in 3 months at the latest  -     amLODIPine (NORVASC) 5 MG tablet; Take 1 tablet (5 mg) by mouth in the morning.       Subjective   Indra is a 57 year old who presents for the following health issues  accompanied by her brother.     HPI   Follow-up from emergency room visit for dizziness and suicidal ideations.  MRI was normal.  She reports she is feel much better.  Leg is swelling and i'm in pain. The same as previous.  No change.  Pain all over her  body.  Jorge in 4/2019 were normal  Mood today: SI come and go  Talking with Karena again- helping.     PHQ-9 score:    PHQ 3/24/2022   PHQ-9 Total Score 20   Q9: Thoughts of better off dead/self-harm past 2 weeks Several days   Here for blood pressure follow-up.  Recently started on 2.5 mg of amlodipine a few weeks ago.  BP 3/15:  134/90, HR 72  3/29 124/86 HR 75  Denies increased swelling since starting amlodipine  No chest pain, shortness of breath.    Review of Systems   Constitutional, HEENT, cardiovascular, pulmonary, gi and gu systems are negative, except as otherwise noted.      Objective    BP (!) 142/84   Pulse 94   Temp 98.3  F (36.8  C) (Temporal)   Wt 75.3 kg (166 lb 1.6 oz)   SpO2 97%   BMI 30.76 kg/m    Body mass index is 30.76 kg/m .  Physical Exam   GENERAL: healthy, alert and no distress  NECK: no adenopathy, no asymmetry, masses, or scars and thyroid normal to palpation  RESP: lungs clear to auscultation - no rales, rhonchi or wheezes  CV: regular rate and rhythm, normal S1 S2, no S3 or S4, no murmur, click or rub, no peripheral edema and peripheral pulses strong  MS: trace edema to calf  Psych: her usual affect./mildly depressed

## 2022-04-12 ENCOUNTER — MEDICAL CORRESPONDENCE (OUTPATIENT)
Dept: HEALTH INFORMATION MANAGEMENT | Facility: CLINIC | Age: 57
End: 2022-04-12
Payer: COMMERCIAL

## 2022-04-22 ENCOUNTER — MEDICAL CORRESPONDENCE (OUTPATIENT)
Dept: HEALTH INFORMATION MANAGEMENT | Facility: CLINIC | Age: 57
End: 2022-04-22
Payer: COMMERCIAL

## 2022-04-27 ENCOUNTER — MEDICAL CORRESPONDENCE (OUTPATIENT)
Dept: HEALTH INFORMATION MANAGEMENT | Facility: CLINIC | Age: 57
End: 2022-04-27
Payer: COMMERCIAL

## 2022-04-28 ENCOUNTER — VIRTUAL VISIT (OUTPATIENT)
Dept: BEHAVIORAL HEALTH | Facility: CLINIC | Age: 57
End: 2022-04-28
Payer: COMMERCIAL

## 2022-04-28 DIAGNOSIS — F33.3 SEVERE EPISODE OF RECURRENT MAJOR DEPRESSIVE DISORDER, WITH PSYCHOTIC FEATURES (H): Primary | ICD-10-CM

## 2022-04-28 DIAGNOSIS — F43.10 PTSD (POST-TRAUMATIC STRESS DISORDER): ICD-10-CM

## 2022-04-28 PROCEDURE — 90832 PSYTX W PT 30 MINUTES: CPT | Mod: 95 | Performed by: SOCIAL WORKER

## 2022-04-28 NOTE — PROGRESS NOTES
"      [facility]                                     Progress Note    Patient Name: Indra Jason  Date: 2022         Service Type: Individual      Session Start Time: 2:41pm  Session End Time: 3:01pm     Session Length: 20 minutes    Session #: 3    Attendees: Client and Client's Brother, and Kinyarwanda  (ID#[id number])    Service Modality:  Phone Visit:      Provider verified identity through the following two step process.  Patient provided:  Patient  and Patient is known previously to provider    The patient has been notified of the following:      \"We have found that certain health care needs can be provided without the need for a face to face visit.  This service lets us provide the care you need with a phone conversation.       I will have full access to your [facility] medical record during this entire phone call.   I will be taking notes for your medical record.      Since this is like an office visit, we will bill your insurance company for this service.       There are potential benefits and risks of telephone visits (e.g. limits to patient confidentiality) that differ from in-person visits.?Confidentiality still applies for telephone services, and nobody will record the visit.  It is important to be in a quiet, private space that is free of distractions (including cell phone or other devices) during the visit.??      If during the course of the call I believe a telephone visit is not appropriate, you will not be charged for this service\"     Consent has been obtained for this service by care team member: Yes     DATA  Interactive Complexity: No  Crisis: No        Progress Since Last Session (Related to Symptoms / Goals / Homework):   Symptoms: Improving mood.     Homework: Partially completed- spending time with family. Went to visit a friend today as well.       Episode of Care Goals: Minimal progress - ACTION (Actively working towards change); Intervened by " reinforcing change plan / affirming steps taken     Current / Ongoing Stressors and Concerns:   Depressed mood   Chronic Pain   Fatigue     Treatment Objective(s) Addressed in This Session:   identify 2 strategies for managing pain  Increase interest, engagement, and pleasure in doing things  Decrease frequency and intensity of feeling down, depressed, hopeless  Improve quantity and quality of night time sleep / decrease daytime naps  Decrease thoughts that you'd be better off dead or of suicide / self-harm       Intervention:  Patient centered- Patient processed thoughts, feelings and experiences contributing to mental health symptoms. Provided empathic listening, support and validation.   Coping with pain. Physiological and psychological impact of pain.      Motivational Interviewing    MI Intervention: Expressed Empathy/Understanding, Supported Autonomy, Collaboration, Evocation, Permission to raise concern or advise, Open-ended questions, Reflections: simple and complex and Change talk (evoked)     Change Talk Expressed by the Patient: Taking steps    Provider Response to Change Talk: E - Evoked more info from patient about behavior change, A - Affirmed patient's thoughts, decisions, or attempts at behavior change, R - Reflected patient's change talk and S - Summarized patient's change talk statements      Assessments completed prior to visit:  The following assessments were completed by patient for this visit:  PHQ9:   PHQ-9 SCORE 10/21/2019 12/4/2019 6/15/2020 1/29/2021 10/1/2021 3/24/2022   PHQ-9 Total Score 12 10 11 12 10 20     GAD7:   MICAELA-7 SCORE 10/21/2019 3/24/2022   Total Score 5 8     CAGE-AID:   CAGE-AID Total Score 10/21/2019   Total Score 0     PROMIS 10-Global Health (all questions and answers displayed):   PROMIS 10 3/24/2022   In general, would you say your health is: 1   In general, would you say your quality of life is: 1   In general, how would you rate your physical health? 1   In general,  how would you rate your mental health, including your mood and your ability to think? 1   In general, how would you rate your satisfaction with your social activities and relationships? 2   In general, please rate how well you carry out your usual social activities and roles. (This includes activities at home, at work and in your community, and responsibilities as a parent, child, spouse, employee, friend, etc.) 2   To what extent are you able to carry out your everyday physical activities such as walking, climbing stairs, carrying groceries, or moving a chair? 2   In the past 7 days, how often have you been bothered by emotional problems such as feeling anxious, depressed, or irritable? 4   In the past 7 days, how would you rate your fatigue on average? 5   In the past 7 days, how would you rate your pain on average, where 0 means no pain, and 10 means worst imaginable pain? 8   Global Mental Health Score 6   Global Physical Health Score 6   PROMIS TOTAL - SUBSCORES 12   Some recent data might be hidden     PROMIS 10-Global Health (only subscores and total score):   PROMIS-10 Scores Only 3/24/2022   Global Mental Health Score 6   Global Physical Health Score 6   PROMIS TOTAL - SUBSCORES 12     [place] Suicide Severity Rating Scale (Lifetime/Recent)  [place] Suicide Severity Rating (Lifetime/Recent) 10/21/2019 3/22/2022 3/22/2022   Q1 Wished to be Dead (Past Month) no yes yes   Q2 Suicidal Thoughts (Past Month) no yes yes   Q3 Suicidal Thought Method - yes yes   Q4 Suicidal Intent without Specific Plan - no yes   Q5 Suicide Intent with Specific Plan - yes yes   Q6 Suicide Behavior (Lifetime) - - no   Level of Risk per Screen - high risk high risk     [place] Suicide Severity Rating Scale (Short Version)  [place] Suicide Severity Rating (Short Version) 10/21/2019 8/7/2021 3/22/2022 3/22/2022 3/31/2022   Over the past 2 weeks have you felt down, depressed, or hopeless? - no yes - -   Over the past 2 weeks have you  "had thoughts of killing yourself? - no yes - -   Have you ever attempted to kill yourself? - no no - -   Q1 Wished to be Dead (Past Month) no - yes yes -   Q2 Suicidal Thoughts (Past Month) no - yes yes -   Q3 Suicidal Thought Method - - yes yes -   Q4 Suicidal Intent without Specific Plan - - no yes -   Q5 Suicide Intent with Specific Plan - - yes yes -   Q6 Suicide Behavior (Lifetime) - - - no -   Level of Risk per Screen - - high risk high risk -   1. Wish to be Dead (Since Last Contact) - - - - 1   Wish to be Dead Description (Since Last Contact) - - - - Reports: \"I wish I didn't have those thoughts'   2. Non-Specific Active Suicidal Thoughts (Since Last Contact) - - - - 1   3. Active Suicidal Ideation with any Methods (Not Plan) Without Intent to Act (Since Last Contact) - - - - 1   4. Active Suicidal Ideation with Some Intent to Act, Without Specific Plan (Since Last Contact) - - - - 0   5. Active Suicidal Ideation with Specific Plan and Intent (Since Last Contact) - - - - 0   Most Severe Ideation Rating (Since Last Contact) - - - - 3   Frequency (Since Last Contact) - - - - 3   Duration (Since Last Contact) - - - - 3   Deterrents (Since Last Contact) - - - - 2   Reasons for Ideation (Since Last Contact) - - - - 5   Actual Attempt (Since Last Contact) - - - - 0   Has subject engaged in non-suicidal self-injurious behavior? (Since Last Contact) - - - - 0   Interrupted Attempts (Since Last Contact) - - - - 0   Aborted or Self-Interrupted Attempt (Since Last Contact) - - - - 0   Preparatory Acts or Behavior (Since Last Contact) - - - - 0   Suicide (Since Last Contact) - - - - 0   Calculated C-SSRS Risk Score (Since Last Contact) - - - - Medium Risk         ASSESSMENT: Current Emotional / Mental Status (status of significant symptoms):   Risk status (Self / Other harm or suicidal ideation)   Patient denies current fears or concerns for personal safety.   Patient reports the following current or recent suicidal " ideation or behaviors: occasional passive SI. Continued decrease in SI. .   Patient denies current or recent homicidal ideation or behaviors.   Patient denies current or recent self injurious behavior or ideation.   Patient denies other safety concerns.   Patient reports there has been no change in risk factors since their last session.     Patient reports there has been no change in protective factors since their last session.     A safety and risk management plan has been developed including: Patient consented to co-developed safety plan on 3/2022.  Safety and risk management plan was reviewed.   Patient agreed to use safety plan should any safety concerns arise.  A copy was made available to the patient.        Attitude:   Cooperative    Orientation:   All   Speech    Rate / Production: Normal     Volume:  Normal    Mood:    Depressed    Thought Content:  Clear . Time of psychosis and dissociation reported by brother.   Thought Form:  Coherent  Logical , Psychosis at times as reported by brother.   Insight:    Fair        Medication Review:   Changes to psychiatric medications, see updated Medication List in EPIC.    Current Outpatient Medications   Medication     acetaminophen (TYLENOL) 500 MG tablet     acetaminophen-codeine (TYLENOL #3) 300-30 MG tablet     albuterol (PROAIR HFA/PROVENTIL HFA/VENTOLIN HFA) 108 (90 Base) MCG/ACT inhaler     amLODIPine (NORVASC) 2.5 MG tablet     amLODIPine (NORVASC) 5 MG tablet     capsaicin (ZOSTRIX) 0.025 % cream     cetirizine (ZYRTEC) 10 MG tablet     Cholecalciferol (D3-1000) 25 MCG (1000 UT) CAPS     cyanocobalamin (VITAMIN B-12) 1000 MCG tablet     cyanocobalamin (VITAMIN B-12) 1000 MCG tablet     deferasirox (EXJADE) 500 MG disintegrating tablet     diaper,brief,adult,disposable Misc     DULoxetine (CYMBALTA) 30 MG capsule     DULoxetine (CYMBALTA) 60 MG capsule     ergocalciferol, vitamin D2, (VITAMIN D2 ORAL)     famotidine (PEPCID) 20 MG tablet     famotidine  (PEPCID) 40 MG tablet     FLUoxetine (PROZAC) 40 MG capsule     fluticasone (FLONASE) 50 MCG/ACT nasal spray     lidocaine (LIDODERM) 5 %     magic mouthwash (ENTER INGREDIENTS IN COMMENTS) suspension     meclizine (ANTIVERT) 25 MG tablet     ondansetron (ZOFRAN ODT) 4 MG disintegrating tablet     QUEtiapine (SEROQUEL) 25 MG tablet     vitamin B-12 (CYANOCOBALAMIN) 1000 MCG tablet     No current facility-administered medications for this visit.          Medication Compliance:   Yes- nurse visits to set up of medications     Changes in Health Issues:   Yes: Pain, Associated Psychological Distress  Recent follow up with PCP re: depression, pain and blood pressure.       Chemical Use Review:   Substance Use: Chemical use reviewed, no active concerns identified      Tobacco Use: No current tobacco use.      Diagnosis:  1. Severe episode of recurrent major depressive disorder, with psychotic features (H)    2. PTSD (post-traumatic stress disorder)        Collateral Reports Completed:   Not Applicable    PLAN: (Patient Tasks / Therapist Tasks / Other)  Patient is scheduled for follow up psychotherapy on 5/12/2022.  Prior to next session, continue engagement with family and friends. Spend time out of bed each day.      Karena Ashton, [facility] 4/28/2022                                                         ______________________________________________________________________    Individual Treatment Plan    Patient's Name: Indra Jason  YOB: 1965    Date of Creation: 4/7/2022  Date Treatment Plan Last Reviewed/Revised: 4/7/2022    DSM5 Diagnoses:   1. Severe episode of recurrent major depressive disorder, with psychotic features (H)    2. PTSD (post-traumatic stress disorder)      Psychosocial / Contextual Factors: exposure to war and refugee experience, , loss of child, pain, bereavement.  PROMIS (reviewed every 90 days):   PROMIS-10 Scores Only 3/24/2022   Global Mental Health Score 6   Global  Physical Health Score 6   PROMIS TOTAL - SUBSCORES 12       Referral / Collaboration:  Referral to another professional/service is not indicated at this time. Already established with psychiatry through [facility].     Anticipated number of session for this episode of care: 20+  Anticipation frequency of session: Weekly at this time due to severity of symptoms. May change  Anticipated Duration of each session: 38-52 minutes  Treatment plan will be reviewed in 90 days or when goals have been changed.     MeasurableTreatment Goal(s) related to diagnosis / functional impairment(s)    Goal 1: Patient will decrease level of depression as evidenced by PHQ-9 score <5.    I will know I've met my goal when I find more breezy in life, enjoy activities, socialize, have increased energy and hope.       Objective #A (Patient Action)                          Patient will Increase interest, engagement, and pleasure in doing things.  Status: new 4/7/2022     Intervention(s)  Therapist will assign homework for patient to engage in activities and socialization 3x/week..     Objective #B  Patient will Decrease frequency and intensity of feeling down, depressed, hopeless.  Status: new 4/7/2022     Intervention(s)  Therapist will coordinate mental health care with psychiatry as appropriate  Therapist will assign homework for patient to engage in daily gratitude practice.   Therapist will teach sleep hygiene skills and assign homework for maintaining sleep routine.    Objective #C  Patient will Identify negative self-talk and behaviors: challenge core beliefs, myths, and actions.  Status: new 4/7/2022     Intervention(s)  Therapist will teach cognitive reframing skills and educate on programming/conditioning.    Therapist will assign homework for patient to implement cognitive reframing skills and engage in positive self talk. Therapist will assign homework to engage in journaling and daily positive self talk.       Goal 2: Patient will reduce  "levels anxiety and fear related to trauma symptoms. Patient will decrease impact of trauma symptoms on daily functioning.    I will know I've met my goal when I feel less scared, have less anxiety and distress impacting my daily life related to trauma history.      Objective #A (Patient Action)    Patient will implement resourcing and emotional regulation skills outside of session to decrease hyperarousal levels..  Status: New - Date: new 4/7/2022    Intervention(s)  Therapist will teach emotional regulation skills. Practice grounding exercises and guided imagery with patient in session. Therapist will guide patient through these practices in session and assign homework for patient to practice these skills outside of session.    Objective #B  Patient will learn about trauma and the impact on the mind and body..  Status: New - Date: new 4/7/2022    Intervention(s)  Therapist will provide educational materials on the brain and how trauma is stored in the body. Therapist will teach patient about the brain. Provider with utiliize somatic/experiential techniques in sessions..    Objective #C  Patient will challenge negative cognitions and replace them with positive cognitions.   Patient will develop insight into values, strengths, capacities and \"most resourced self.\".  Patient will process through trauma memories in a safe space to bring healing.   Status: New - Date: new 4/7/2022    Intervention(s)  Therapist will assign homework around cognitive reframing, identifying values and most resourced self. Therapist will provide EMDR treatment for processing of trauma memeories and challenging negative cognitions.      Patient has reviewed and agreed to the above plan.      Karena Ashton, [facility]  April 7, 2022    -----------------------------------------------------------------------------------------         [facility] "Casie   : 1965  MRN: [id number]     SAFETY PLAN:  Step 1: Warning signs / cues (Thoughts, images, mood, situation, behavior) that a crisis may be developing:  ? Thoughts: \"I just want this to end\"  ? Images: obsessive thoughts of death or dying: Suicidal ideations of going to the river to drown or hang self and flashbacks  ? Thinking Processes: ruminations (can't stop thinking about my problems): depressive thoughts, racing thoughts, intrusive thoughts (bothersome, unwanted thoughts that come out of nowhere): flashbacks, intrusive thoughts of suicidal ideations, depersonalization and psychosis and dissociation   ? Mood: worsening depression, hopelessness, helplessness and agitation  ? Behaviors: isolating/withdrawing , impulsive, reckless behaviors (acting without thinking): such as suddenly leaving the house- even in the middle of the night, saying good-bye, not taking care of myself, not taking care of my responsibilities, sleeping too much, not sleeping enough and increasing frequency and duration of dissociation  ? Situations: anniversary of losses, changes in symptoms: worsening depression, pain and trauma    Step 2: Coping strategies - Things I can do to take my mind off of my problems without contacting another person (relaxation technique, physical activity):  ? Distress Tolerance Strategies:  listen to positive and upbeat music: your favorite music, sensory based activities/self-soothe with five senses: what can I see, hear, taste, touch, smell?, watch a funny movie: or show, change body temperature (ice pack/cold water)  and paced breathing/progressive muscle relaxation  ? Physical Activities: go for a walk, gardening, meditation, deep breathing and stretching   ? Focus on helpful thoughts:  \"This is temporary\", \"I will get through this\", \"Ride the wave\" and remind myself of what is important to me: such as my family  Step 3: People and social settings that provide " distraction:                 Name: Brother (Man)                    Phone: [phone] (Lives with patient)                 Name: daughter (Victor M)                               Name: 2 younger sons                     ? park                Step 4: Remind myself of people and things that are important to me and worth living for: my children and other family members     Step 5: When I am in crisis, I can ask these people to help me use my safety plan:                 Name: Brother (Man) and his family with whom she resides         Phone: [phone] (Lives with patient)                 Name: Home Health RN (Kourtney)                   Phone: [phone]                 Name: Adult daughter (Victor M)     Peer Support (non-crisis) [place] Warmline: 915.331.1476                                Or text  Support  to 90120     Step 6: Making the environment safe:   ? dispose of old medications , remove things I could use to hurt myself: such as ropes or sharp objects, arrange transportation: -having family drive  and be around others  Step 7: Professionals or agencies I can contact during a crisis:  ? Suicide Prevention Lifeline: 8-414-944-NZUC (5673)  ? Crisis Text Line Service (available 24 hours a day, 7 days a week): Text [place] to 201965    Local Crisis Services: [facility] Crisis: [phone]         Call 911 or go to my nearest emergency department.       I helped develop this safety plan and agree to use it when needed.      Client signature ________Indra Jason (virtual visit)______________________  Today s date:  3/24/2022  Completed by Provider Name/ Credentials:  TAL Kruger                             March 24, 2022  Adapted from Safety Plan Template 2008 Jen Adler is reprinted with the express permission of the authors.  No portion of the Safety Plan Template may be reproduced without the express, written permission.  You can contact the authors at [email or web address] or  [email or web address].

## 2022-04-29 ENCOUNTER — MEDICAL CORRESPONDENCE (OUTPATIENT)
Dept: HEALTH INFORMATION MANAGEMENT | Facility: CLINIC | Age: 57
End: 2022-04-29
Payer: COMMERCIAL

## 2022-05-04 ENCOUNTER — PATIENT OUTREACH (OUTPATIENT)
Dept: CARE COORDINATION | Facility: CLINIC | Age: 57
End: 2022-05-04
Payer: COMMERCIAL

## 2022-05-04 DIAGNOSIS — R10.13 EPIGASTRIC PAIN: ICD-10-CM

## 2022-05-08 RX ORDER — FAMOTIDINE 40 MG/1
40 TABLET, FILM COATED ORAL DAILY
Qty: 90 TABLET | Refills: 3 | Status: SHIPPED | OUTPATIENT
Start: 2022-05-08

## 2022-05-08 NOTE — TELEPHONE ENCOUNTER
"Last Written Prescription Date:  9/27/21  Last Fill Quantity: 90,  # refills: 1   Last office visit provider:  4/11/22     Requested Prescriptions   Pending Prescriptions Disp Refills     famotidine (PEPCID) 40 MG tablet 90 tablet 1     Sig: Take 1 tablet (40 mg) by mouth daily       H2 Blockers Protocol Passed - 5/8/2022 10:01 AM        Passed - Patient is age 12 or older        Passed - Recent (12 mo) or future (30 days) visit within the authorizing provider's specialty     Patient has had an office visit with the authorizing provider or a provider within the authorizing providers department within the previous 12 mos or has a future within next 30 days. See \"Patient Info\" tab in inbasket, or \"Choose Columns\" in Meds & Orders section of the refill encounter.              Passed - Medication is active on med list             Aneudy Reese RN 05/08/22 10:01 AM  "

## 2022-05-10 ENCOUNTER — MEDICAL CORRESPONDENCE (OUTPATIENT)
Dept: HEALTH INFORMATION MANAGEMENT | Facility: CLINIC | Age: 57
End: 2022-05-10
Payer: COMMERCIAL

## 2022-05-10 ENCOUNTER — TELEPHONE (OUTPATIENT)
Dept: FAMILY MEDICINE | Facility: CLINIC | Age: 57
End: 2022-05-10
Payer: COMMERCIAL

## 2022-05-10 DIAGNOSIS — Z53.9 DIAGNOSIS NOT YET DEFINED: Primary | ICD-10-CM

## 2022-05-10 DIAGNOSIS — M79.89 LEG SWELLING: Primary | ICD-10-CM

## 2022-05-10 PROCEDURE — G0179 MD RECERTIFICATION HHA PT: HCPCS | Performed by: FAMILY MEDICINE

## 2022-05-10 NOTE — TELEPHONE ENCOUNTER
Reason for Call: Request for an order or referral:    Order or referral being requested: Order for compression sock    Date needed: as soon as possible    Has the patient been seen by the PCP for this problem? NO    Additional comments: Jason from [facility] nurse called to request provider to fax a order for compression sock to [facility] and she will measure and order the sock for patient. Please fax order to [phone].    Phone number Patient can be reached at:  Home number on file [phone] (home)    Best Time:  any    Can we leave a detailed message on this number?  YES    Call taken on 5/10/2022 at 12:18 PM by Jeri Brunner

## 2022-05-12 ENCOUNTER — VIRTUAL VISIT (OUTPATIENT)
Dept: BEHAVIORAL HEALTH | Facility: CLINIC | Age: 57
End: 2022-05-12
Payer: COMMERCIAL

## 2022-05-12 DIAGNOSIS — F33.3 SEVERE EPISODE OF RECURRENT MAJOR DEPRESSIVE DISORDER, WITH PSYCHOTIC FEATURES (H): ICD-10-CM

## 2022-05-12 DIAGNOSIS — F43.10 PTSD (POST-TRAUMATIC STRESS DISORDER): Primary | ICD-10-CM

## 2022-05-12 PROCEDURE — 90832 PSYTX W PT 30 MINUTES: CPT | Mod: 95 | Performed by: SOCIAL WORKER

## 2022-05-13 ENCOUNTER — PATIENT OUTREACH (OUTPATIENT)
Dept: NURSING | Facility: CLINIC | Age: 57
End: 2022-05-13
Payer: COMMERCIAL

## 2022-05-13 NOTE — PROGRESS NOTES
5/13/2022  Clinic Care Coordination Contact  Community Health Worker Follow Up    Intervention and Education during outreach:   Antoine :     ID: [id number]  Called and spoke to patient and follow up on goals  Patient reported:  - has received SNAP benefit last month but not this month.  -still waiting to hear from someone to call back.    CHW Follow up: Monthly  CHW Plan: Follow up on goal (s)  CHW Next Follow Up: 6-13-22    Wes Juan  Community Health Worker  [facility]  Clinic Care Coordination  [email or web address]   Office: [phone]  Fax: [phone]  Clinic Care Coordination Contact    Community Health Worker Follow Up    Care Gaps:     Health Maintenance Due   Topic Date Due     ADVANCE CARE PLANNING  Never done     Pneumococcal Vaccine: Pediatrics (0 to 5 Years) and At-Risk Patients (6 to 64 Years) (1 - PCV) Never done     ZOSTER IMMUNIZATION (1 of 2) Never done     COVID-19 Vaccine (3 - Moderna risk series) 04/07/2022       Care Gap Goal set: Yes    Goals:    Goals Addressed as of 5/13/2022 at 2:56 PM                    Today    4/8/22       Financial Wellbeing (pt-stated)   50%  30%    Added 9/9/21 by Ihsan Braga, [facility]      Goal Statement: I want to re connect with an organization for support with the Social Security process within one month  Date Goal set: 2-28-22  Barriers: Language  Strengths:   Date to Achieve By: 4-2022  Patient expressed understanding of goal: Yes  Action steps to achieve this goal:  1.I am still waiting to hear from [facility] to check on status of her case completed intake appt on 3-21-22 at 10:15am  2. I will update [facility] team    [employer]  [address] [place] [id number]  [phone]  Fax:[phone]    Updated:  5-13-21               Medical (pt-stated)   50%  50%    Added 3/8/22 by Ihsan Braga, LICSW      Goal Statement: I want to have a follow up with my PCP to discuss  physical therapy and referral to neurology within one month  Date Goal set: 03/08/22    Barriers:   Strengths:   Date to Achieve By: 4/30/2022  Patient expressed understanding of goal: Yes  Action steps to achieve this goal:  1. I will wait for someone to call to schedule for PT.    Updated: 5-13-22 AL

## 2022-05-14 NOTE — PROGRESS NOTES
"      [facility]                                     Progress Note    Patient Name: Indra Jason  Date: 2022         Service Type: Individual      Session Start Time: 12:10pm  Session End Time: 12:31pm     Session Length: 21 minutes    Session #: 4    Attendees: Client and Telugu     Service Modality:  Phone Visit: [phone]      Provider verified identity through the following two step process.  Patient provided:  Patient  and Patient is known previously to provider    The patient has been notified of the following:      \"We have found that certain health care needs can be provided without the need for a face to face visit.  This service lets us provide the care you need with a phone conversation.       I will have full access to your [facility] medical record during this entire phone call.   I will be taking notes for your medical record.      Since this is like an office visit, we will bill your insurance company for this service.       There are potential benefits and risks of telephone visits (e.g. limits to patient confidentiality) that differ from in-person visits.?Confidentiality still applies for telephone services, and nobody will record the visit.  It is important to be in a quiet, private space that is free of distractions (including cell phone or other devices) during the visit.??      If during the course of the call I believe a telephone visit is not appropriate, you will not be charged for this service\"     Consent has been obtained for this service by care team member: Yes     DATA  Interactive Complexity: No  Crisis: No        Progress Since Last Session (Related to Symptoms / Goals / Homework):   Symptoms: Improving depression and energy. No SI.    Homework: Partially completed- spending time with family and increasing engagement in activities.       Episode of Care Goals: Satisfactory progress - ACTION (Actively working towards change); Intervened " by reinforcing change plan / affirming steps taken     Current / Ongoing Stressors and Concerns:   Depressed mood   Fatigue   Chronic Pain     Treatment Objective(s) Addressed in This Session:   identify 2 strategies for managing pain  Increase interest, engagement, and pleasure in doing things  Decrease frequency and intensity of feeling down, depressed, hopeless  Improve quantity and quality of night time sleep / decrease daytime naps  Decrease thoughts that you'd be better off dead or of suicide / self-harm       Intervention:  Patient centered- Patient processed thoughts, feelings and experiences contributing to mental health symptoms. Provided empathic listening, support and validation.   Behavioral Activation   Motivational Interviewing    MI Intervention: Expressed Empathy/Understanding, Supported Autonomy, Collaboration, Evocation, Permission to raise concern or advise, Open-ended questions, Reflections: simple and complex and Change talk (evoked)     Change Talk Expressed by the Patient: Taking steps    Provider Response to Change Talk: E - Evoked more info from patient about behavior change, A - Affirmed patient's thoughts, decisions, or attempts at behavior change, R - Reflected patient's change talk and S - Summarized patient's change talk statements      Assessments completed prior to visit:  The following assessments were completed by patient for this visit:  PHQ9:   PHQ-9 SCORE 10/21/2019 12/4/2019 6/15/2020 1/29/2021 10/1/2021 3/24/2022   PHQ-9 Total Score 12 10 11 12 10 20     GAD7:   MICAELA-7 SCORE 10/21/2019 3/24/2022   Total Score 5 8     CAGE-AID:   CAGE-AID Total Score 10/21/2019   Total Score 0     PROMIS 10-Global Health (all questions and answers displayed):   PROMIS 10 3/24/2022   In general, would you say your health is: 1   In general, would you say your quality of life is: 1   In general, how would you rate your physical health? 1   In general, how would you rate your mental health,  including your mood and your ability to think? 1   In general, how would you rate your satisfaction with your social activities and relationships? 2   In general, please rate how well you carry out your usual social activities and roles. (This includes activities at home, at work and in your community, and responsibilities as a parent, child, spouse, employee, friend, etc.) 2   To what extent are you able to carry out your everyday physical activities such as walking, climbing stairs, carrying groceries, or moving a chair? 2   In the past 7 days, how often have you been bothered by emotional problems such as feeling anxious, depressed, or irritable? 4   In the past 7 days, how would you rate your fatigue on average? 5   In the past 7 days, how would you rate your pain on average, where 0 means no pain, and 10 means worst imaginable pain? 8   Global Mental Health Score 6   Global Physical Health Score 6   PROMIS TOTAL - SUBSCORES 12   Some recent data might be hidden     PROMIS 10-Global Health (only subscores and total score):   PROMIS-10 Scores Only 3/24/2022   Global Mental Health Score 6   Global Physical Health Score 6   PROMIS TOTAL - SUBSCORES 12     Campbell Suicide Severity Rating Scale (Lifetime/Recent)  Campbell Suicide Severity Rating (Lifetime/Recent) 10/21/2019 3/22/2022 3/22/2022   Q1 Wished to be Dead (Past Month) no yes yes   Q2 Suicidal Thoughts (Past Month) no yes yes   Q3 Suicidal Thought Method - yes yes   Q4 Suicidal Intent without Specific Plan - no yes   Q5 Suicide Intent with Specific Plan - yes yes   Q6 Suicide Behavior (Lifetime) - - no   Level of Risk per Screen - high risk high risk     Campbell Suicide Severity Rating Scale (Short Version)  Campbell Suicide Severity Rating (Short Version) 10/21/2019 8/7/2021 3/22/2022 3/22/2022 3/31/2022   Over the past 2 weeks have you felt down, depressed, or hopeless? - no yes - -   Over the past 2 weeks have you had thoughts of killing yourself? - no  "yes - -   Have you ever attempted to kill yourself? - no no - -   Q1 Wished to be Dead (Past Month) no - yes yes -   Q2 Suicidal Thoughts (Past Month) no - yes yes -   Q3 Suicidal Thought Method - - yes yes -   Q4 Suicidal Intent without Specific Plan - - no yes -   Q5 Suicide Intent with Specific Plan - - yes yes -   Q6 Suicide Behavior (Lifetime) - - - no -   Level of Risk per Screen - - high risk high risk -   1. Wish to be Dead (Since Last Contact) - - - - 1   Wish to be Dead Description (Since Last Contact) - - - - Reports: \"I wish I didn't have those thoughts'   2. Non-Specific Active Suicidal Thoughts (Since Last Contact) - - - - 1   3. Active Suicidal Ideation with any Methods (Not Plan) Without Intent to Act (Since Last Contact) - - - - 1   4. Active Suicidal Ideation with Some Intent to Act, Without Specific Plan (Since Last Contact) - - - - 0   5. Active Suicidal Ideation with Specific Plan and Intent (Since Last Contact) - - - - 0   Most Severe Ideation Rating (Since Last Contact) - - - - 3   Frequency (Since Last Contact) - - - - 3   Duration (Since Last Contact) - - - - 3   Deterrents (Since Last Contact) - - - - 2   Reasons for Ideation (Since Last Contact) - - - - 5   Actual Attempt (Since Last Contact) - - - - 0   Has subject engaged in non-suicidal self-injurious behavior? (Since Last Contact) - - - - 0   Interrupted Attempts (Since Last Contact) - - - - 0   Aborted or Self-Interrupted Attempt (Since Last Contact) - - - - 0   Preparatory Acts or Behavior (Since Last Contact) - - - - 0   Suicide (Since Last Contact) - - - - 0   Calculated C-SSRS Risk Score (Since Last Contact) - - - - Medium Risk         ASSESSMENT: Current Emotional / Mental Status (status of significant symptoms):   Risk status (Self / Other harm or suicidal ideation)   Patient denies current fears or concerns for personal safety.   Patient denies current or recent suicidal ideation or behaviors.    Patient denies current or " recent homicidal ideation or behaviors.   Patient denies current or recent self injurious behavior or ideation.   Patient denies other safety concerns.   Patient reports there has been no change in risk factors since their last session.     Patient reports there has been no change in protective factors since their last session.     A safety and risk management plan has been developed including: Patient consented to co-developed safety plan on 3/2022.  Safety and risk management plan was reviewed.   Patient agreed to use safety plan should any safety concerns arise.  A copy was made available to the patient.        Attitude:   Cooperative . Increased engagement.   Orientation:   All   Speech    Rate / Production: Normal . More energized today.     Volume:  Normal    Mood:    Normal (Improved- less depressed today)   Thought Content:  Clear    Thought Form:  Coherent  Logical    Insight:    Fair        Medication Review:   No current psychiatric medications prescribed   Current Outpatient Medications   Medication     acetaminophen (TYLENOL) 500 MG tablet     acetaminophen-codeine (TYLENOL #3) 300-30 MG tablet     albuterol (PROAIR HFA/PROVENTIL HFA/VENTOLIN HFA) 108 (90 Base) MCG/ACT inhaler     amLODIPine (NORVASC) 2.5 MG tablet     amLODIPine (NORVASC) 5 MG tablet     capsaicin (ZOSTRIX) 0.025 % cream     cetirizine (ZYRTEC) 10 MG tablet     Cholecalciferol (D3-1000) 25 MCG (1000 UT) CAPS     cyanocobalamin (VITAMIN B-12) 1000 MCG tablet     cyanocobalamin (VITAMIN B-12) 1000 MCG tablet     deferasirox (EXJADE) 500 MG disintegrating tablet     diaper,brief,adult,disposable Misc     DULoxetine (CYMBALTA) 30 MG capsule     DULoxetine (CYMBALTA) 60 MG capsule     ergocalciferol, vitamin D2, (VITAMIN D2 ORAL)     famotidine (PEPCID) 20 MG tablet     famotidine (PEPCID) 40 MG tablet     FLUoxetine (PROZAC) 40 MG capsule     fluticasone (FLONASE) 50 MCG/ACT nasal spray     lidocaine (LIDODERM) 5 %     magic mouthwash  (ENTER INGREDIENTS IN COMMENTS) suspension     meclizine (ANTIVERT) 25 MG tablet     ondansetron (ZOFRAN ODT) 4 MG disintegrating tablet     QUEtiapine (SEROQUEL) 25 MG tablet     vitamin B-12 (CYANOCOBALAMIN) 1000 MCG tablet     No current facility-administered medications for this visit.          Medication Compliance:   Yes- nurse visits to set up of medications     Changes in Health Issues:   Yes: Pain, Associated Psychological Distress       Chemical Use Review:   Substance Use: Chemical use reviewed, no active concerns identified      Tobacco Use: No current tobacco use.      Diagnosis:  1. PTSD (post-traumatic stress disorder)    2. Severe episode of recurrent major depressive disorder, with psychotic features (H)        Collateral Reports Completed:   Not Applicable    PLAN: (Patient Tasks / Therapist Tasks / Other)  Patient is scheduled for follow up psychotherapy on 5/26/2022  Prior to next session, continue increasing engagement in enjoyable activities and time with family.   Decreasing frequency of sessions to biweekly due to improvements in depression.       Karena Ashton, [facility] 5/12/2022                                                         ______________________________________________________________________    Individual Treatment Plan    Patient's Name: Indra Jason  YOB: 1965    Date of Creation: 4/7/2022  Date Treatment Plan Last Reviewed/Revised: 4/7/2022    DSM5 Diagnoses:   1. PTSD (post-traumatic stress disorder)    2. Severe episode of recurrent major depressive disorder, with psychotic features (H)      Psychosocial / Contextual Factors: exposure to war and refugee experience, , loss of child, pain, bereavement.  PROMIS (reviewed every 90 days):   PROMIS-10 Scores Only 3/24/2022   Global Mental Health Score 6   Global Physical Health Score 6   PROMIS TOTAL - SUBSCORES 12       Referral / Collaboration:  Referral to another professional/service is not indicated at  this time. Already established with psychiatry through [facility].     Anticipated number of session for this episode of care: 20+  Anticipation frequency of session: Weekly to biweekly  Anticipated Duration of each session: 38-52 minutes  Treatment plan will be reviewed in 90 days or when goals have been changed.     MeasurableTreatment Goal(s) related to diagnosis / functional impairment(s)    Goal 1: Patient will decrease level of depression as evidenced by PHQ-9 score <5.    I will know I've met my goal when I find more breezy in life, enjoy activities, socialize, have increased energy and hope.       Objective #A (Patient Action)                          Patient will Increase interest, engagement, and pleasure in doing things.  Status: new 4/7/2022     Intervention(s)  Therapist will assign homework for patient to engage in activities and socialization 3x/week..     Objective #B  Patient will Decrease frequency and intensity of feeling down, depressed, hopeless.  Status: new 4/7/2022     Intervention(s)  Therapist will coordinate mental health care with psychiatry as appropriate  Therapist will assign homework for patient to engage in daily gratitude practice.   Therapist will teach sleep hygiene skills and assign homework for maintaining sleep routine.    Objective #C  Patient will Identify negative self-talk and behaviors: challenge core beliefs, myths, and actions.  Status: new 4/7/2022     Intervention(s)  Therapist will teach cognitive reframing skills and educate on programming/conditioning.    Therapist will assign homework for patient to implement cognitive reframing skills and engage in positive self talk. Therapist will assign homework to engage in journaling and daily positive self talk.       Goal 2: Patient will reduce levels anxiety and fear related to trauma symptoms. Patient will decrease impact of trauma symptoms on daily functioning.    I will know I've met my goal when I feel less scared, have less  "anxiety and distress impacting my daily life related to trauma history.      Objective #A (Patient Action)    Patient will implement resourcing and emotional regulation skills outside of session to decrease hyperarousal levels..  Status: New - Date: new 2022    Intervention(s)  Therapist will teach emotional regulation skills. Practice grounding exercises and guided imagery with patient in session. Therapist will guide patient through these practices in session and assign homework for patient to practice these skills outside of session.    Objective #B  Patient will learn about trauma and the impact on the mind and body..  Status: New - Date: new 2022    Intervention(s)  Therapist will provide educational materials on the brain and how trauma is stored in the body. Therapist will teach patient about the brain. Provider with utiliize somatic/experiential techniques in sessions..    Objective #C  Patient will challenge negative cognitions and replace them with positive cognitions.   Patient will develop insight into values, strengths, capacities and \"most resourced self.\".  Patient will process through trauma memories in a safe space to bring healing.   Status: New - Date: new 2022    Intervention(s)  Therapist will assign homework around cognitive reframing, identifying values and most resourced self. Therapist will provide EMDR treatment for processing of trauma memeories and challenging negative cognitions.      Patient has reviewed and agreed to the above plan.      TAL Cook  2022    -----------------------------------------------------------------------------------------         [facility]                                        Indra Jason   : 1965  MRN: [id number]     SAFETY PLAN:  Step 1: Warning signs / cues (Thoughts, images, mood, situation, behavior) that a crisis may be developing:  ? Thoughts: \"I just want this to end\"  ? Images: obsessive " "thoughts of death or dying: Suicidal ideations of going to the river to drown or hang self and flashbacks  ? Thinking Processes: ruminations (can't stop thinking about my problems): depressive thoughts, racing thoughts, intrusive thoughts (bothersome, unwanted thoughts that come out of nowhere): flashbacks, intrusive thoughts of suicidal ideations, depersonalization and psychosis and dissociation   ? Mood: worsening depression, hopelessness, helplessness and agitation  ? Behaviors: isolating/withdrawing , impulsive, reckless behaviors (acting without thinking): such as suddenly leaving the house- even in the middle of the night, saying good-bye, not taking care of myself, not taking care of my responsibilities, sleeping too much, not sleeping enough and increasing frequency and duration of dissociation  ? Situations: anniversary of losses, changes in symptoms: worsening depression, pain and trauma    Step 2: Coping strategies - Things I can do to take my mind off of my problems without contacting another person (relaxation technique, physical activity):  ? Distress Tolerance Strategies:  listen to positive and upbeat music: your favorite music, sensory based activities/self-soothe with five senses: what can I see, hear, taste, touch, smell?, watch a funny movie: or show, change body temperature (ice pack/cold water)  and paced breathing/progressive muscle relaxation  ? Physical Activities: go for a walk, gardening, meditation, deep breathing and stretching   ? Focus on helpful thoughts:  \"This is temporary\", \"I will get through this\", \"Ride the wave\" and remind myself of what is important to me: such as my family  Step 3: People and social settings that provide distraction:                 Name: Brother (Man)                    Phone: [phone] (Lives with patient)                 Name: daughter (Victor M)                               Name: 2 younger sons                     ? park                Step 4: Remind " myself of people and things that are important to me and worth living for: my children and other family members     Step 5: When I am in crisis, I can ask these people to help me use my safety plan:                 Name: Brother (Man) and his family with whom she resides         Phone: [phone] (Lives with patient)                 Name: Home Health RN (Kourtney)                   Phone: [phone]                 Name: Adult daughter (Victor M)     Peer Support (non-crisis) [place] Warmline: 347.116.9840                                Or text  Support  to 38987     Step 6: Making the environment safe:   ? dispose of old medications , remove things I could use to hurt myself: such as ropes or sharp objects, arrange transportation: -having family drive  and be around others  Step 7: Professionals or agencies I can contact during a crisis:  ? Suicide Prevention Lifeline: 4-162-916-MVFV (6074)  ? Crisis Text Line Service (available 24 hours a day, 7 days a week): Text [place] to 974793    Local Crisis Services: [facility] Crisis: [phone]         Call 911 or go to my nearest emergency department.       I helped develop this safety plan and agree to use it when needed.      Client signature ________Indra Jason (virtual visit)______________________  Today s date:  3/24/2022  Completed by Provider Name/ Credentials:  TAL Kruger                             March 24, 2022  Adapted from Safety Plan Template 2008 Jen Enriquez and Serg Adler is reprinted with the express permission of the authors.  No portion of the Safety Plan Template may be reproduced without the express, written permission.  You can contact the authors at [email or web address] or [email or web address].

## 2022-05-27 ENCOUNTER — VIRTUAL VISIT (OUTPATIENT)
Dept: BEHAVIORAL HEALTH | Facility: CLINIC | Age: 57
End: 2022-05-27
Payer: COMMERCIAL

## 2022-05-27 DIAGNOSIS — F43.10 PTSD (POST-TRAUMATIC STRESS DISORDER): Primary | ICD-10-CM

## 2022-05-27 DIAGNOSIS — F33.3 SEVERE EPISODE OF RECURRENT MAJOR DEPRESSIVE DISORDER, WITH PSYCHOTIC FEATURES (H): ICD-10-CM

## 2022-05-27 PROCEDURE — 90832 PSYTX W PT 30 MINUTES: CPT | Mod: 95 | Performed by: SOCIAL WORKER

## 2022-05-27 NOTE — PROGRESS NOTES
"      [facility]                                     Progress Note    Patient Name: Indra Jason  Date: 2022         Service Type: Individual      Session Start Time: 9:15am  Session End Time: 9:43am     Session Length: 28 minutes    Session #: 5    Attendees: Client and Korean     Service Modality:  Phone Visit: [phone]      Provider verified identity through the following two step process.  Patient provided:  Patient  and Patient is known previously to provider    The patient has been notified of the following:      \"We have found that certain health care needs can be provided without the need for a face to face visit.  This service lets us provide the care you need with a phone conversation.       I will have full access to your [facility] medical record during this entire phone call.   I will be taking notes for your medical record.      Since this is like an office visit, we will bill your insurance company for this service.       There are potential benefits and risks of telephone visits (e.g. limits to patient confidentiality) that differ from in-person visits.?Confidentiality still applies for telephone services, and nobody will record the visit.  It is important to be in a quiet, private space that is free of distractions (including cell phone or other devices) during the visit.??      If during the course of the call I believe a telephone visit is not appropriate, you will not be charged for this service\"     Consent has been obtained for this service by care team member: Yes     DATA  Interactive Complexity: No  Crisis: No        Progress Since Last Session (Related to Symptoms / Goals / Homework):   Symptoms: No change - continued management of depression, pain and trauma symptoms.  \"some good days. Some days not so good.\"    Homework: Partially completed- spending time with family. Enjoys going on walks with brother (spending time with family and out of " "bed and house)      Episode of Care Goals: Satisfactory progress - ACTION (Actively working towards change); Intervened by reinforcing change plan / affirming steps taken     Current / Ongoing Stressors and Concerns:   Physical health concerns- swelling, pain   Sometimes mind is is good and sometimes it is kind of scared. Experiences feeling scared mostly at night. Nightmares   Depressed mood        Treatment Objective(s) Addressed in This Session:   identify 2 strategies for managing pain  Increase interest, engagement, and pleasure in doing things  Decrease frequency and intensity of feeling down, depressed, hopeless       Intervention:  Patient centered- Patient processed thoughts, feelings and experiences contributing to mental health symptoms. Provided empathic listening, support and validation.   Guided patient through Calm Place exercise. Identified Calm Place as \"walking outside with brother.\"   Motivational Interviewing    MI Intervention: Expressed Empathy/Understanding, Supported Autonomy, Collaboration, Evocation, Permission to raise concern or advise, Open-ended questions, Reflections: simple and complex and Change talk (evoked)     Change Talk Expressed by the Patient: Taking steps    Provider Response to Change Talk: E - Evoked more info from patient about behavior change, A - Affirmed patient's thoughts, decisions, or attempts at behavior change, R - Reflected patient's change talk and S - Summarized patient's change talk statements      Assessments completed prior to visit:  The following assessments were completed by patient for this visit:  PHQ9:   PHQ-9 SCORE 10/21/2019 12/4/2019 6/15/2020 1/29/2021 10/1/2021 3/24/2022   PHQ-9 Total Score 12 10 11 12 10 20     GAD7:   MICAELA-7 SCORE 10/21/2019 3/24/2022   Total Score 5 8     CAGE-AID:   CAGE-AID Total Score 10/21/2019   Total Score 0     PROMIS 10-Global Health (all questions and answers displayed):   PROMIS 10 3/24/2022   In general, would you say " your health is: 1   In general, would you say your quality of life is: 1   In general, how would you rate your physical health? 1   In general, how would you rate your mental health, including your mood and your ability to think? 1   In general, how would you rate your satisfaction with your social activities and relationships? 2   In general, please rate how well you carry out your usual social activities and roles. (This includes activities at home, at work and in your community, and responsibilities as a parent, child, spouse, employee, friend, etc.) 2   To what extent are you able to carry out your everyday physical activities such as walking, climbing stairs, carrying groceries, or moving a chair? 2   In the past 7 days, how often have you been bothered by emotional problems such as feeling anxious, depressed, or irritable? 4   In the past 7 days, how would you rate your fatigue on average? 5   In the past 7 days, how would you rate your pain on average, where 0 means no pain, and 10 means worst imaginable pain? 8   Global Mental Health Score 6   Global Physical Health Score 6   PROMIS TOTAL - SUBSCORES 12   Some recent data might be hidden     PROMIS 10-Global Health (only subscores and total score):   PROMIS-10 Scores Only 3/24/2022   Global Mental Health Score 6   Global Physical Health Score 6   PROMIS TOTAL - SUBSCORES 12     [place] Suicide Severity Rating Scale (Lifetime/Recent)  [place] Suicide Severity Rating (Lifetime/Recent) 10/21/2019 3/22/2022 3/22/2022   Q1 Wished to be Dead (Past Month) no yes yes   Q2 Suicidal Thoughts (Past Month) no yes yes   Q3 Suicidal Thought Method - yes yes   Q4 Suicidal Intent without Specific Plan - no yes   Q5 Suicide Intent with Specific Plan - yes yes   Q6 Suicide Behavior (Lifetime) - - no   Level of Risk per Screen - high risk high risk     [place] Suicide Severity Rating Scale (Short Version)  [place] Suicide Severity Rating (Short Version) 10/21/2019 8/7/2021  "3/22/2022 3/22/2022 3/31/2022   Over the past 2 weeks have you felt down, depressed, or hopeless? - no yes - -   Over the past 2 weeks have you had thoughts of killing yourself? - no yes - -   Have you ever attempted to kill yourself? - no no - -   Q1 Wished to be Dead (Past Month) no - yes yes -   Q2 Suicidal Thoughts (Past Month) no - yes yes -   Q3 Suicidal Thought Method - - yes yes -   Q4 Suicidal Intent without Specific Plan - - no yes -   Q5 Suicide Intent with Specific Plan - - yes yes -   Q6 Suicide Behavior (Lifetime) - - - no -   Level of Risk per Screen - - high risk high risk -   1. Wish to be Dead (Since Last Contact) - - - - 1   Wish to be Dead Description (Since Last Contact) - - - - Reports: \"I wish I didn't have those thoughts'   2. Non-Specific Active Suicidal Thoughts (Since Last Contact) - - - - 1   3. Active Suicidal Ideation with any Methods (Not Plan) Without Intent to Act (Since Last Contact) - - - - 1   4. Active Suicidal Ideation with Some Intent to Act, Without Specific Plan (Since Last Contact) - - - - 0   5. Active Suicidal Ideation with Specific Plan and Intent (Since Last Contact) - - - - 0   Most Severe Ideation Rating (Since Last Contact) - - - - 3   Frequency (Since Last Contact) - - - - 3   Duration (Since Last Contact) - - - - 3   Deterrents (Since Last Contact) - - - - 2   Reasons for Ideation (Since Last Contact) - - - - 5   Actual Attempt (Since Last Contact) - - - - 0   Has subject engaged in non-suicidal self-injurious behavior? (Since Last Contact) - - - - 0   Interrupted Attempts (Since Last Contact) - - - - 0   Aborted or Self-Interrupted Attempt (Since Last Contact) - - - - 0   Preparatory Acts or Behavior (Since Last Contact) - - - - 0   Suicide (Since Last Contact) - - - - 0   Calculated C-SSRS Risk Score (Since Last Contact) - - - - Medium Risk         ASSESSMENT: Current Emotional / Mental Status (status of significant symptoms):   Risk status (Self / Other harm or " suicidal ideation)   Patient denies current fears or concerns for personal safety.   Patient denies current or recent suicidal ideation or behaviors.    Patient denies current or recent homicidal ideation or behaviors.   Patient denies current or recent self injurious behavior or ideation.   Patient denies other safety concerns.   Patient reports there has been no change in risk factors since their last session.     Patient reports there has been no change in protective factors since their last session.     A safety and risk management plan has been developed including: Patient consented to co-developed safety plan on 3/2022.  Safety and risk management plan was reviewed.   Patient agreed to use safety plan should any safety concerns arise.  A copy was made available to the patient.        Attitude:   Cooperative    Orientation:   All   Speech    Rate / Production: Normal      Volume:  Normal    Mood:    Normal    Thought Content:  Clear    Thought Form:  Coherent  Logical    Insight:    Fair        Medication Review:   No changes to current psychiatric medication(s)   Current Outpatient Medications   Medication     acetaminophen (TYLENOL) 500 MG tablet     acetaminophen-codeine (TYLENOL #3) 300-30 MG tablet     albuterol (PROAIR HFA/PROVENTIL HFA/VENTOLIN HFA) 108 (90 Base) MCG/ACT inhaler     amLODIPine (NORVASC) 2.5 MG tablet     amLODIPine (NORVASC) 5 MG tablet     capsaicin (ZOSTRIX) 0.025 % cream     cetirizine (ZYRTEC) 10 MG tablet     Cholecalciferol (D3-1000) 25 MCG (1000 UT) CAPS     cyanocobalamin (VITAMIN B-12) 1000 MCG tablet     cyanocobalamin (VITAMIN B-12) 1000 MCG tablet     deferasirox (EXJADE) 500 MG disintegrating tablet     diaper,brief,adult,disposable Misc     DULoxetine (CYMBALTA) 30 MG capsule     DULoxetine (CYMBALTA) 60 MG capsule     ergocalciferol, vitamin D2, (VITAMIN D2 ORAL)     famotidine (PEPCID) 20 MG tablet     famotidine (PEPCID) 40 MG tablet     FLUoxetine (PROZAC) 40 MG capsule      fluticasone (FLONASE) 50 MCG/ACT nasal spray     lidocaine (LIDODERM) 5 %     magic mouthwash (ENTER INGREDIENTS IN COMMENTS) suspension     meclizine (ANTIVERT) 25 MG tablet     ondansetron (ZOFRAN ODT) 4 MG disintegrating tablet     QUEtiapine (SEROQUEL) 25 MG tablet     vitamin B-12 (CYANOCOBALAMIN) 1000 MCG tablet     No current facility-administered medications for this visit.        Medication Compliance:   Yes- nurse visits to set up of medications     Changes in Health Issues:   Yes: Pain, Associated Psychological Distress       Chemical Use Review:   Substance Use: Chemical use reviewed, no active concerns identified      Tobacco Use: No current tobacco use.      Diagnosis:  1. PTSD (post-traumatic stress disorder)    2. Severe episode of recurrent major depressive disorder, with psychotic features (H)        Collateral Reports Completed:   Not Applicable      PLAN: (Patient Tasks / Therapist Tasks / Other)  Patient is scheduled for follow up psychotherapy on 6/10/2022.  Prior to next session, practice Calm Place exercise.          Karena Ashton, [facility] 5/27/2022                                                         ______________________________________________________________________    Individual Treatment Plan    Patient's Name: Indra Jason  YOB: 1965    Date of Creation: 4/7/2022  Date Treatment Plan Last Reviewed/Revised: 4/7/2022    DSM5 Diagnoses:   1. PTSD (post-traumatic stress disorder)    2. Severe episode of recurrent major depressive disorder, with psychotic features (H)      Psychosocial / Contextual Factors: exposure to war and refugee experience, , loss of child, pain, bereavement.  PROMIS (reviewed every 90 days):   PROMIS-10 Scores Only 3/24/2022   Global Mental Health Score 6   Global Physical Health Score 6   PROMIS TOTAL - SUBSCORES 12       Referral / Collaboration:  Referral to another professional/service is not indicated at this time. Already  established with psychiatry through [facility].     Anticipated number of session for this episode of care: 20+  Anticipation frequency of session: Weekly to biweekly  Anticipated Duration of each session: 38-52 minutes  Treatment plan will be reviewed in 90 days or when goals have been changed.     MeasurableTreatment Goal(s) related to diagnosis / functional impairment(s)    Goal 1: Patient will decrease level of depression as evidenced by PHQ-9 score <5.    I will know I've met my goal when I find more breezy in life, enjoy activities, socialize, have increased energy and hope.       Objective #A (Patient Action)                          Patient will Increase interest, engagement, and pleasure in doing things.  Status: new 4/7/2022     Intervention(s)  Therapist will assign homework for patient to engage in activities and socialization 3x/week..     Objective #B  Patient will Decrease frequency and intensity of feeling down, depressed, hopeless.  Status: new 4/7/2022     Intervention(s)  Therapist will coordinate mental health care with psychiatry as appropriate  Therapist will assign homework for patient to engage in daily gratitude practice.   Therapist will teach sleep hygiene skills and assign homework for maintaining sleep routine.    Objective #C  Patient will Identify negative self-talk and behaviors: challenge core beliefs, myths, and actions.  Status: new 4/7/2022     Intervention(s)  Therapist will teach cognitive reframing skills and educate on programming/conditioning.    Therapist will assign homework for patient to implement cognitive reframing skills and engage in positive self talk. Therapist will assign homework to engage in journaling and daily positive self talk.       Goal 2: Patient will reduce levels anxiety and fear related to trauma symptoms. Patient will decrease impact of trauma symptoms on daily functioning.    I will know I've met my goal when I feel less scared, have less anxiety and  "distress impacting my daily life related to trauma history.      Objective #A (Patient Action)    Patient will implement resourcing and emotional regulation skills outside of session to decrease hyperarousal levels..  Status: New - Date: new 2022    Intervention(s)  Therapist will teach emotional regulation skills. Practice grounding exercises and guided imagery with patient in session. Therapist will guide patient through these practices in session and assign homework for patient to practice these skills outside of session.    Objective #B  Patient will learn about trauma and the impact on the mind and body..  Status: New - Date: new 2022    Intervention(s)  Therapist will provide educational materials on the brain and how trauma is stored in the body. Therapist will teach patient about the brain. Provider with utiliize somatic/experiential techniques in sessions..    Objective #C  Patient will challenge negative cognitions and replace them with positive cognitions.   Patient will develop insight into values, strengths, capacities and \"most resourced self.\".  Patient will process through trauma memories in a safe space to bring healing.   Status: New - Date: new 2022    Intervention(s)  Therapist will assign homework around cognitive reframing, identifying values and most resourced self. Therapist will provide EMDR treatment for processing of trauma memeories and challenging negative cognitions.      Patient has reviewed and agreed to the above plan.      TAL Cook  2022    -----------------------------------------------------------------------------------------         [facility]                                        Indra NA Casie   : 1965  MRN: [id number]     SAFETY PLAN:  Step 1: Warning signs / cues (Thoughts, images, mood, situation, behavior) that a crisis may be developing:  ? Thoughts: \"I just want this to end\"  ? Images: obsessive thoughts of " "death or dying: Suicidal ideations of going to the river to drown or hang self and flashbacks  ? Thinking Processes: ruminations (can't stop thinking about my problems): depressive thoughts, racing thoughts, intrusive thoughts (bothersome, unwanted thoughts that come out of nowhere): flashbacks, intrusive thoughts of suicidal ideations, depersonalization and psychosis and dissociation   ? Mood: worsening depression, hopelessness, helplessness and agitation  ? Behaviors: isolating/withdrawing , impulsive, reckless behaviors (acting without thinking): such as suddenly leaving the house- even in the middle of the night, saying good-bye, not taking care of myself, not taking care of my responsibilities, sleeping too much, not sleeping enough and increasing frequency and duration of dissociation  ? Situations: anniversary of losses, changes in symptoms: worsening depression, pain and trauma    Step 2: Coping strategies - Things I can do to take my mind off of my problems without contacting another person (relaxation technique, physical activity):  ? Distress Tolerance Strategies:  listen to positive and upbeat music: your favorite music, sensory based activities/self-soothe with five senses: what can I see, hear, taste, touch, smell?, watch a funny movie: or show, change body temperature (ice pack/cold water)  and paced breathing/progressive muscle relaxation  ? Physical Activities: go for a walk, gardening, meditation, deep breathing and stretching   ? Focus on helpful thoughts:  \"This is temporary\", \"I will get through this\", \"Ride the wave\" and remind myself of what is important to me: such as my family  Step 3: People and social settings that provide distraction:                 Name: Brother (Man)                    Phone: [phone] (Lives with patient)                 Name: daughter (Victor M)                               Name: 2 younger sons                     ? park                Step 4: Remind myself of " people and things that are important to me and worth living for: my children and other family members     Step 5: When I am in crisis, I can ask these people to help me use my safety plan:                 Name: Brother (Man) and his family with whom she resides         Phone: [phone] (Lives with patient)                 Name: Home Health RN (Kourtney)                   Phone: [phone]                 Name: Adult daughter (Victor M)     Peer Support (non-crisis) [place] Warmline: 558.919.4864                                Or text  Support  to 59907     Step 6: Making the environment safe:   ? dispose of old medications , remove things I could use to hurt myself: such as ropes or sharp objects, arrange transportation: -having family drive  and be around others  Step 7: Professionals or agencies I can contact during a crisis:  ? Suicide Prevention Lifeline: 5-272-013-BZRM (4299)  ? Crisis Text Line Service (available 24 hours a day, 7 days a week): Text [place] to 102134    Local Crisis Services: [facility] Crisis: [phone]         Call 911 or go to my nearest emergency department.       I helped develop this safety plan and agree to use it when needed.      Client signature ________Indra Jason (virtual visit)______________________  Today s date:  3/24/2022  Completed by Provider Name/ Credentials:  TAL Kruger                             March 24, 2022  Adapted from Safety Plan Template 2008 Jen Enriquez and Serg Adler is reprinted with the express permission of the authors.  No portion of the Safety Plan Template may be reproduced without the express, written permission.  You can contact the authors at [email or web address] or [email or web address].

## 2022-06-06 ENCOUNTER — PATIENT OUTREACH (OUTPATIENT)
Dept: CARE COORDINATION | Facility: CLINIC | Age: 57
End: 2022-06-06
Payer: COMMERCIAL

## 2022-06-06 NOTE — PROGRESS NOTES
Care Coordination Clinician Chart Review     Situation: Patient chart reviewed by SW/care coordinator.?     Background: Initial assessment and enrollment to Care Coordination was 3-8-22.?? Patient centered goals were developed with participation from patient.? Lead CC handed patient off to CHW for continued outreach every 30 days.??     Assessment: Per chart review, patient outreach completed by CC CHW on 5-13-22.? Patient is actively working to accomplish goal(s).? Patient's goal(s) remain(s) appropriate at this time.? Patient is not due for updated Plan of Care.? Annual assessment will be due 3-8-23..      Goals        Financial Wellbeing (pt-stated)       Goal Statement: I want to re connect with an organization for support with the Social Security process within one month  Date Goal set: 2-28-22  Barriers: Language  Strengths:   Date to Achieve By: 4-2022  Patient expressed understanding of goal: Yes  Action steps to achieve this goal:  1.I am still waiting to hear from [facility] to check on status of her case completed intake appt on 3-21-22 at 10:15am  2. I will update [facility] team    [facility] [address] [address]  [phone]  Fax:[phone]    Updated:  5-13-21               Medical (pt-stated)       Goal Statement: I want to have a follow up with my PCP to discuss physical therapy and referral to neurology within one month  Date Goal set: 03/08/22    Barriers:   Strengths:   Date to Achieve By: 4/30/2022  Patient expressed understanding of goal: Yes  Action steps to achieve this goal:  1. I will wait for someone to call to schedule for PT.    Updated: 5-13-22 AL            ??     Plan/Recommendations: The patient will continue working with Care Coordination to achieve above goal(s).? CHW will involve Lead CC as needed or if patient is ready to move to maintenance.? Lead CC will continue to monitor CHW s monthly outreaches and progress to goal(s) every 6  weeks.?     Plan of Care updated and sent to patient: No    Pedro [place], SW/CC  [facility]

## 2022-06-10 ENCOUNTER — VIRTUAL VISIT (OUTPATIENT)
Dept: BEHAVIORAL HEALTH | Facility: CLINIC | Age: 57
End: 2022-06-10
Payer: COMMERCIAL

## 2022-06-10 DIAGNOSIS — F33.1 MODERATE EPISODE OF RECURRENT MAJOR DEPRESSIVE DISORDER (H): ICD-10-CM

## 2022-06-10 DIAGNOSIS — F43.10 PTSD (POST-TRAUMATIC STRESS DISORDER): Primary | ICD-10-CM

## 2022-06-10 PROCEDURE — 90832 PSYTX W PT 30 MINUTES: CPT | Mod: 95 | Performed by: SOCIAL WORKER

## 2022-06-11 NOTE — PROGRESS NOTES
"      [facility]                                     Progress Note    Patient Name: Indra Jason  Date: 6/10/2022         Service Type: Individual      Session Start Time: 1:17pm  Session End Time: 1:34pm     Session Length: 17 minutes    Session #: 6    Attendees: Client and Irish - Vit #[id number]    Service Modality:  Phone Visit: [phone]      Provider verified identity through the following two step process.  Patient provided:  Patient  and Patient is known previously to provider    The patient has been notified of the following:      \"We have found that certain health care needs can be provided without the need for a face to face visit.  This service lets us provide the care you need with a phone conversation.       I will have full access to your [facility] medical record during this entire phone call.   I will be taking notes for your medical record.      Since this is like an office visit, we will bill your insurance company for this service.       There are potential benefits and risks of telephone visits (e.g. limits to patient confidentiality) that differ from in-person visits.?Confidentiality still applies for telephone services, and nobody will record the visit.  It is important to be in a quiet, private space that is free of distractions (including cell phone or other devices) during the visit.??      If during the course of the call I believe a telephone visit is not appropriate, you will not be charged for this service\"     Consent has been obtained for this service by care team member: Yes     DATA  Interactive Complexity: No  Crisis: No        Progress Since Last Session (Related to Symptoms / Goals / Homework):   Symptoms: Improving depression. Continued worry about health. \"some good days.     Homework: Partially completed- increased engagement with family.       Episode of Care Goals: Satisfactory progress - ACTION (Actively working towards change); " Intervened by reinforcing change plan / affirming steps taken     Current / Ongoing Stressors and Concerns:   Continued worry about swelling in legs- at the point of being unable to walk because of it.    Depressed mood.   Fatigue        Treatment Objective(s) Addressed in This Session:   identify 2 strategies for managing pain  Increase interest, engagement, and pleasure in doing things  Decrease frequency and intensity of feeling down, depressed, hopeless       Intervention:  Patient centered- Patient processed thoughts, feelings and experiences contributing to mental health symptoms. Provided empathic listening, support and validation.   Problem solving- provided education about walk in care being available since she isn't able to see her PCP for several weeks. Provided address for walk in care for her to see a physician sooner if she feels it is needed.    Motivational Interviewing    MI Intervention: Expressed Empathy/Understanding, Supported Autonomy, Collaboration, Evocation, Permission to raise concern or advise, Open-ended questions, Reflections: simple and complex and Change talk (evoked)     Change Talk Expressed by the Patient: Taking steps    Provider Response to Change Talk: E - Evoked more info from patient about behavior change, A - Affirmed patient's thoughts, decisions, or attempts at behavior change, R - Reflected patient's change talk and S - Summarized patient's change talk statements      Assessments completed prior to visit:  The following assessments were completed by patient for this visit:  PHQ9:   PHQ-9 SCORE 10/21/2019 12/4/2019 6/15/2020 1/29/2021 10/1/2021 3/24/2022   PHQ-9 Total Score 12 10 11 12 10 20     GAD7:   MICAELA-7 SCORE 10/21/2019 3/24/2022   Total Score 5 8     CAGE-AID:   CAGE-AID Total Score 10/21/2019   Total Score 0     PROMIS 10-Global Health (all questions and answers displayed):   PROMIS 10 3/24/2022   In general, would you say your health is: 1   In general, would you  say your quality of life is: 1   In general, how would you rate your physical health? 1   In general, how would you rate your mental health, including your mood and your ability to think? 1   In general, how would you rate your satisfaction with your social activities and relationships? 2   In general, please rate how well you carry out your usual social activities and roles. (This includes activities at home, at work and in your community, and responsibilities as a parent, child, spouse, employee, friend, etc.) 2   To what extent are you able to carry out your everyday physical activities such as walking, climbing stairs, carrying groceries, or moving a chair? 2   In the past 7 days, how often have you been bothered by emotional problems such as feeling anxious, depressed, or irritable? 4   In the past 7 days, how would you rate your fatigue on average? 5   In the past 7 days, how would you rate your pain on average, where 0 means no pain, and 10 means worst imaginable pain? 8   Global Mental Health Score 6   Global Physical Health Score 6   PROMIS TOTAL - SUBSCORES 12   Some recent data might be hidden     PROMIS 10-Global Health (only subscores and total score):   PROMIS-10 Scores Only 3/24/2022   Global Mental Health Score 6   Global Physical Health Score 6   PROMIS TOTAL - SUBSCORES 12     [place] Suicide Severity Rating Scale (Lifetime/Recent)  [place] Suicide Severity Rating (Lifetime/Recent) 10/21/2019 3/22/2022 3/22/2022   Q1 Wished to be Dead (Past Month) no yes yes   Q2 Suicidal Thoughts (Past Month) no yes yes   Q3 Suicidal Thought Method - yes yes   Q4 Suicidal Intent without Specific Plan - no yes   Q5 Suicide Intent with Specific Plan - yes yes   Q6 Suicide Behavior (Lifetime) - - no   Level of Risk per Screen - high risk high risk     [place] Suicide Severity Rating Scale (Short Version)  [place] Suicide Severity Rating (Short Version) 10/21/2019 8/7/2021 3/22/2022 3/22/2022 3/31/2022   Over the  "past 2 weeks have you felt down, depressed, or hopeless? - no yes - -   Over the past 2 weeks have you had thoughts of killing yourself? - no yes - -   Have you ever attempted to kill yourself? - no no - -   Q1 Wished to be Dead (Past Month) no - yes yes -   Q2 Suicidal Thoughts (Past Month) no - yes yes -   Q3 Suicidal Thought Method - - yes yes -   Q4 Suicidal Intent without Specific Plan - - no yes -   Q5 Suicide Intent with Specific Plan - - yes yes -   Q6 Suicide Behavior (Lifetime) - - - no -   Level of Risk per Screen - - high risk high risk -   1. Wish to be Dead (Since Last Contact) - - - - 1   Wish to be Dead Description (Since Last Contact) - - - - Reports: \"I wish I didn't have those thoughts'   2. Non-Specific Active Suicidal Thoughts (Since Last Contact) - - - - 1   3. Active Suicidal Ideation with any Methods (Not Plan) Without Intent to Act (Since Last Contact) - - - - 1   4. Active Suicidal Ideation with Some Intent to Act, Without Specific Plan (Since Last Contact) - - - - 0   5. Active Suicidal Ideation with Specific Plan and Intent (Since Last Contact) - - - - 0   Most Severe Ideation Rating (Since Last Contact) - - - - 3   Frequency (Since Last Contact) - - - - 3   Duration (Since Last Contact) - - - - 3   Deterrents (Since Last Contact) - - - - 2   Reasons for Ideation (Since Last Contact) - - - - 5   Actual Attempt (Since Last Contact) - - - - 0   Has subject engaged in non-suicidal self-injurious behavior? (Since Last Contact) - - - - 0   Interrupted Attempts (Since Last Contact) - - - - 0   Aborted or Self-Interrupted Attempt (Since Last Contact) - - - - 0   Preparatory Acts or Behavior (Since Last Contact) - - - - 0   Suicide (Since Last Contact) - - - - 0   Calculated C-SSRS Risk Score (Since Last Contact) - - - - Medium Risk         ASSESSMENT: Current Emotional / Mental Status (status of significant symptoms):   Risk status (Self / Other harm or suicidal ideation)   Patient denies " current fears or concerns for personal safety.   Patient denies current or recent suicidal ideation or behaviors.    Patient denies current or recent homicidal ideation or behaviors.   Patient denies current or recent self injurious behavior or ideation.   Patient denies other safety concerns.   Patient reports there has been no change in risk factors since their last session.     Patient reports there has been no change in protective factors since their last session.     A safety and risk management plan has been developed including: Patient consented to co-developed safety plan on 3/2022.  Safety and risk management plan was reviewed.   Patient agreed to use safety plan should any safety concerns arise.  A copy was made available to the patient.        Attitude:   Cooperative    Orientation:   All   Speech    Rate / Production: Normal      Volume:  Normal    Mood:    Normal    Thought Content:  Clear    Thought Form:  Coherent  Logical    Insight:    Fair        Medication Review:   No changes to current psychiatric medication(s)   Current Outpatient Medications   Medication     acetaminophen (TYLENOL) 500 MG tablet     acetaminophen-codeine (TYLENOL #3) 300-30 MG tablet     albuterol (PROAIR HFA/PROVENTIL HFA/VENTOLIN HFA) 108 (90 Base) MCG/ACT inhaler     amLODIPine (NORVASC) 2.5 MG tablet     amLODIPine (NORVASC) 5 MG tablet     capsaicin (ZOSTRIX) 0.025 % cream     cetirizine (ZYRTEC) 10 MG tablet     Cholecalciferol (D3-1000) 25 MCG (1000 UT) CAPS     cyanocobalamin (VITAMIN B-12) 1000 MCG tablet     cyanocobalamin (VITAMIN B-12) 1000 MCG tablet     deferasirox (EXJADE) 500 MG disintegrating tablet     diaper,brief,adult,disposable Misc     DULoxetine (CYMBALTA) 30 MG capsule     DULoxetine (CYMBALTA) 60 MG capsule     ergocalciferol, vitamin D2, (VITAMIN D2 ORAL)     famotidine (PEPCID) 20 MG tablet     famotidine (PEPCID) 40 MG tablet     FLUoxetine (PROZAC) 40 MG capsule     fluticasone (FLONASE) 50  "MCG/ACT nasal spray     lidocaine (LIDODERM) 5 %     magic mouthwash (ENTER INGREDIENTS IN COMMENTS) suspension     meclizine (ANTIVERT) 25 MG tablet     ondansetron (ZOFRAN ODT) 4 MG disintegrating tablet     QUEtiapine (SEROQUEL) 25 MG tablet     vitamin B-12 (CYANOCOBALAMIN) 1000 MCG tablet     No current facility-administered medications for this visit.        Medication Compliance:   Yes- nurse visits to set up of medications     Changes in Health Issues:   Yes: Pain, Associated Psychological Distress  Continued leg swelling- \"can't walk because of it now.\"     Chemical Use Review:   Substance Use: Chemical use reviewed, no active concerns identified      Tobacco Use: No current tobacco use.      Diagnosis:  1. PTSD (post-traumatic stress disorder)    2. Moderate episode of recurrent major depressive disorder (H)        Collateral Reports Completed:   Not Applicable      PLAN: (Patient Tasks / Therapist Tasks / Other)  Patient is scheduled for follow up psychotherapy on 6/10/2022.  Prior to next session, have family take you to [facility] if concerned about health.          Karena Ashton, [facility] 6/10/2022                                                         ______________________________________________________________________    Individual Treatment Plan    Patient's Name: Indra Jason  YOB: 1965    Date of Creation: 4/7/2022  Date Treatment Plan Last Reviewed/Revised: 4/7/2022    DSM5 Diagnoses:   1. PTSD (post-traumatic stress disorder)    2. Moderate episode of recurrent major depressive disorder (H)      Psychosocial / Contextual Factors: exposure to war and refugee experience, , loss of child, pain, bereavement.  PROMIS (reviewed every 90 days):   PROMIS-10 Scores Only 3/24/2022   Global Mental Health Score 6   Global Physical Health Score 6   PROMIS TOTAL - SUBSCORES 12       Referral / Collaboration:  Referral to another professional/service is not indicated at " this time. Already established with psychiatry through [facility].     Anticipated number of session for this episode of care: 20+  Anticipation frequency of session: Weekly to biweekly  Anticipated Duration of each session: 38-52 minutes  Treatment plan will be reviewed in 90 days or when goals have been changed.     MeasurableTreatment Goal(s) related to diagnosis / functional impairment(s)    Goal 1: Patient will decrease level of depression as evidenced by PHQ-9 score <5.    I will know I've met my goal when I find more breezy in life, enjoy activities, socialize, have increased energy and hope.       Objective #A (Patient Action)                          Patient will Increase interest, engagement, and pleasure in doing things.  Status: new 4/7/2022     Intervention(s)  Therapist will assign homework for patient to engage in activities and socialization 3x/week..     Objective #B  Patient will Decrease frequency and intensity of feeling down, depressed, hopeless.  Status: new 4/7/2022     Intervention(s)  Therapist will coordinate mental health care with psychiatry as appropriate  Therapist will assign homework for patient to engage in daily gratitude practice.   Therapist will teach sleep hygiene skills and assign homework for maintaining sleep routine.    Objective #C  Patient will Identify negative self-talk and behaviors: challenge core beliefs, myths, and actions.  Status: new 4/7/2022     Intervention(s)  Therapist will teach cognitive reframing skills and educate on programming/conditioning.    Therapist will assign homework for patient to implement cognitive reframing skills and engage in positive self talk. Therapist will assign homework to engage in journaling and daily positive self talk.       Goal 2: Patient will reduce levels anxiety and fear related to trauma symptoms. Patient will decrease impact of trauma symptoms on daily functioning.    I will know I've met my goal when I feel less scared, have less  "anxiety and distress impacting my daily life related to trauma history.      Objective #A (Patient Action)    Patient will implement resourcing and emotional regulation skills outside of session to decrease hyperarousal levels..  Status: New - Date: new 2022    Intervention(s)  Therapist will teach emotional regulation skills. Practice grounding exercises and guided imagery with patient in session. Therapist will guide patient through these practices in session and assign homework for patient to practice these skills outside of session.    Objective #B  Patient will learn about trauma and the impact on the mind and body..  Status: New - Date: new 2022    Intervention(s)  Therapist will provide educational materials on the brain and how trauma is stored in the body. Therapist will teach patient about the brain. Provider with utiliize somatic/experiential techniques in sessions..    Objective #C  Patient will challenge negative cognitions and replace them with positive cognitions.   Patient will develop insight into values, strengths, capacities and \"most resourced self.\".  Patient will process through trauma memories in a safe space to bring healing.   Status: New - Date: new 2022    Intervention(s)  Therapist will assign homework around cognitive reframing, identifying values and most resourced self. Therapist will provide EMDR treatment for processing of trauma memeories and challenging negative cognitions.      Patient has reviewed and agreed to the above plan.      TAL Cook  2022    -----------------------------------------------------------------------------------------         [facility]                                        Indra Jason   : 1965  MRN: [id number]     SAFETY PLAN:  Step 1: Warning signs / cues (Thoughts, images, mood, situation, behavior) that a crisis may be developing:  ? Thoughts: \"I just want this to end\"  ? Images: obsessive " "thoughts of death or dying: Suicidal ideations of going to the river to drown or hang self and flashbacks  ? Thinking Processes: ruminations (can't stop thinking about my problems): depressive thoughts, racing thoughts, intrusive thoughts (bothersome, unwanted thoughts that come out of nowhere): flashbacks, intrusive thoughts of suicidal ideations, depersonalization and psychosis and dissociation   ? Mood: worsening depression, hopelessness, helplessness and agitation  ? Behaviors: isolating/withdrawing , impulsive, reckless behaviors (acting without thinking): such as suddenly leaving the house- even in the middle of the night, saying good-bye, not taking care of myself, not taking care of my responsibilities, sleeping too much, not sleeping enough and increasing frequency and duration of dissociation  ? Situations: anniversary of losses, changes in symptoms: worsening depression, pain and trauma    Step 2: Coping strategies - Things I can do to take my mind off of my problems without contacting another person (relaxation technique, physical activity):  ? Distress Tolerance Strategies:  listen to positive and upbeat music: your favorite music, sensory based activities/self-soothe with five senses: what can I see, hear, taste, touch, smell?, watch a funny movie: or show, change body temperature (ice pack/cold water)  and paced breathing/progressive muscle relaxation  ? Physical Activities: go for a walk, gardening, meditation, deep breathing and stretching   ? Focus on helpful thoughts:  \"This is temporary\", \"I will get through this\", \"Ride the wave\" and remind myself of what is important to me: such as my family  Step 3: People and social settings that provide distraction:                 Name: Brother (Man)                    Phone: [phone] (Lives with patient)                 Name: daughter (Victor M)                               Name: 2 younger sons                     ? park                Step 4: Remind " myself of people and things that are important to me and worth living for: my children and other family members     Step 5: When I am in crisis, I can ask these people to help me use my safety plan:                 Name: Brother (Man) and his family with whom she resides         Phone: [phone] (Lives with patient)                 Name: Home Health RN (Kourtney)                   Phone: [phone]                 Name: Adult daughter (Victor M)     Peer Support (non-crisis) [place] Warmline: 675.392.1461                                Or text  Support  to 42173     Step 6: Making the environment safe:   ? dispose of old medications , remove things I could use to hurt myself: such as ropes or sharp objects, arrange transportation: -having family drive  and be around others  Step 7: Professionals or agencies I can contact during a crisis:  ? Suicide Prevention Lifeline: 3-810-566-QLZM (0456)  ? Crisis Text Line Service (available 24 hours a day, 7 days a week): Text [place] to 671603    Local Crisis Services: [facility] Crisis: [phone]         Call 911 or go to my nearest emergency department.       I helped develop this safety plan and agree to use it when needed.      Client signature ________Indra Jason (virtual visit)______________________  Today s date:  3/24/2022  Completed by Provider Name/ Credentials:  TAL Kruger                             March 24, 2022  Adapted from Safety Plan Template 2008 Jen Enriquez and Serg Adler is reprinted with the express permission of the authors.  No portion of the Safety Plan Template may be reproduced without the express, written permission.  You can contact the authors at [email or web address] or [email or web address].

## 2022-06-14 ENCOUNTER — MEDICAL CORRESPONDENCE (OUTPATIENT)
Dept: HEALTH INFORMATION MANAGEMENT | Facility: CLINIC | Age: 57
End: 2022-06-14

## 2022-06-15 ENCOUNTER — PATIENT OUTREACH (OUTPATIENT)
Dept: CARE COORDINATION | Facility: CLINIC | Age: 57
End: 2022-06-15
Payer: COMMERCIAL

## 2022-06-15 NOTE — PROGRESS NOTES
6/15/2022  Clinic Care Coordination Contact  Community Health Worker Follow Up    Intervention and Education during outreach:   Antoine :  Courtney ID# [id number]  -did not go to PT because of lack of transportation.  Will discuss with doctor if they can reschedule when she has transportation.  -received message that she was approved for [place] benefit benefit but not sure what the status is  Conference call to [employer] [phone] and spoke to staff. She stated they waiting for SSA for determination when they review the medical records.   She was approved for county for SNAP/  Reminded of upcoming appts and how she will get to appt on 7-5-22 at 2pm to [facility].  -Patient's brother will take her to appt on 7-5-22 at 2pm at [facility]  Suggested to talk to her  about the children certificate of naturalization since they were one who help to fill out the application.  Suggested contact her  children certificate of naturalization.  Patient will contact .    CHW Follow up: Monthly  CHW Plan: Follow up on goal  CHW Next Follow Up: 7-27-22    Wes Juan  Community Health Worker  [facility] Care Coordination  [email or web address]   Office: [phone]  Fax: [phone]      Clinic Care Coordination Contact    Community Health Worker Follow Up    Care Gaps:     Health Maintenance Due   Topic Date Due     ADVANCE CARE PLANNING  Never done     Pneumococcal Vaccine: Pediatrics (0 to 5 Years) and At-Risk Patients (6 to 64 Years) (1 - PCV) Never done     ZOSTER IMMUNIZATION (1 of 2) Never done     COVID-19 Vaccine (3 - Moderna risk series) 04/07/2022       Care Gap Goal set: Yes and Scheduled 7-5-22 with PCP      Goals:    Goals Addressed as of 6/15/2022 at 5:12 PM                    Today    5/13/22       Medical (pt-stated)   50%  50%    Added 3/8/22 by Ihsan Braga, [facility]      Goal Statement: I want  to  discuss with PCP about physical therapy and referral to neurology within one month  Date Goal set: 03/08/22 Updated 6-15-22    Barriers:   Strengths:   Date to Achieve By: 8/30/2022  Patient expressed understanding of goal: Yes  Action steps to achieve this goal:  1. I will talk to doctor about re schedule for PT.    Updated: 6-15-22 AL

## 2022-06-21 ENCOUNTER — MEDICAL CORRESPONDENCE (OUTPATIENT)
Dept: HEALTH INFORMATION MANAGEMENT | Facility: CLINIC | Age: 57
End: 2022-06-21

## 2022-06-24 ENCOUNTER — VIRTUAL VISIT (OUTPATIENT)
Dept: BEHAVIORAL HEALTH | Facility: CLINIC | Age: 57
End: 2022-06-24
Payer: COMMERCIAL

## 2022-06-24 DIAGNOSIS — F43.10 PTSD (POST-TRAUMATIC STRESS DISORDER): Primary | ICD-10-CM

## 2022-06-24 DIAGNOSIS — F33.1 MODERATE EPISODE OF RECURRENT MAJOR DEPRESSIVE DISORDER (H): ICD-10-CM

## 2022-06-24 PROCEDURE — 90832 PSYTX W PT 30 MINUTES: CPT | Mod: 95 | Performed by: SOCIAL WORKER

## 2022-06-27 NOTE — PROGRESS NOTES
"      [facility]                                     Progress Note    Patient Name: Indra Jason  Date: 2022         Service Type: Individual      Session Start Time: 9:37am  Session End Time: 9:57am     Session Length: 20 minutes    Session #: 7    Attendees: Client and English     Service Modality:  Phone Visit: [phone]      Provider verified identity through the following two step process.  Patient provided:  Patient  and Patient is known previously to provider    The patient has been notified of the following:      \"We have found that certain health care needs can be provided without the need for a face to face visit.  This service lets us provide the care you need with a phone conversation.       I will have full access to your [facility] medical record during this entire phone call.   I will be taking notes for your medical record.      Since this is like an office visit, we will bill your insurance company for this service.       There are potential benefits and risks of telephone visits (e.g. limits to patient confidentiality) that differ from in-person visits.?Confidentiality still applies for telephone services, and nobody will record the visit.  It is important to be in a quiet, private space that is free of distractions (including cell phone or other devices) during the visit.??      If during the course of the call I believe a telephone visit is not appropriate, you will not be charged for this service\"     Consent has been obtained for this service by care team member: Yes     DATA  Interactive Complexity: No  Crisis: No        Progress Since Last Session (Related to Symptoms / Goals / Homework):   Symptoms: Improving depression. Some continued concerns about health.    Homework: Did not complete- didn't follow up on health concerns due to transportation barrier.        Episode of Care Goals: Satisfactory progress - ACTION (Actively working towards " change); Intervened by reinforcing change plan / affirming steps taken     Current / Ongoing Stressors and Concerns:   Depression   Pain   Fatigue.   Limited engagement in activities- mostly at home and in bed.   Transportation barriers        Treatment Objective(s) Addressed in This Session:   identify 2 strategies for managing pain  Increase interest, engagement, and pleasure in doing things  Decrease frequency and intensity of feeling down, depressed, hopeless  Feel less tired and more energy during the day   complete ADLs daily (maintain personal hygiene, wears clean clothes, eats regular meals, etc.) 5 day per week       Intervention:  Patient centered- Patient processed thoughts, feelings and experiences contributing to mental health symptoms. Provided empathic listening, support and validation.   Behavioral Activation techniques.   Psychoeducation   Motivational Interviewing    MI Intervention: Expressed Empathy/Understanding, Supported Autonomy, Collaboration, Evocation, Permission to raise concern or advise, Open-ended questions, Reflections: simple and complex and Change talk (evoked)     Change Talk Expressed by the Patient: Taking steps    Provider Response to Change Talk: E - Evoked more info from patient about behavior change, A - Affirmed patient's thoughts, decisions, or attempts at behavior change, R - Reflected patient's change talk and S - Summarized patient's change talk statements      Assessments completed prior to visit:  The following assessments were completed by patient for this visit:  PHQ9:   PHQ-9 SCORE 10/21/2019 12/4/2019 6/15/2020 1/29/2021 10/1/2021 3/24/2022   PHQ-9 Total Score 12 10 11 12 10 20     GAD7:   MICAELA-7 SCORE 10/21/2019 3/24/2022   Total Score 5 8     CAGE-AID:   CAGE-AID Total Score 10/21/2019   Total Score 0     PROMIS 10-Global Health (all questions and answers displayed):   PROMIS 10 3/24/2022   In general, would you say your health is: 1   In general, would you say  your quality of life is: 1   In general, how would you rate your physical health? 1   In general, how would you rate your mental health, including your mood and your ability to think? 1   In general, how would you rate your satisfaction with your social activities and relationships? 2   In general, please rate how well you carry out your usual social activities and roles. (This includes activities at home, at work and in your community, and responsibilities as a parent, child, spouse, employee, friend, etc.) 2   To what extent are you able to carry out your everyday physical activities such as walking, climbing stairs, carrying groceries, or moving a chair? 2   In the past 7 days, how often have you been bothered by emotional problems such as feeling anxious, depressed, or irritable? 4   In the past 7 days, how would you rate your fatigue on average? 5   In the past 7 days, how would you rate your pain on average, where 0 means no pain, and 10 means worst imaginable pain? 8   Global Mental Health Score 6   Global Physical Health Score 6   PROMIS TOTAL - SUBSCORES 12   Some recent data might be hidden     PROMIS 10-Global Health (only subscores and total score):   PROMIS-10 Scores Only 3/24/2022   Global Mental Health Score 6   Global Physical Health Score 6   PROMIS TOTAL - SUBSCORES 12     [place] Suicide Severity Rating Scale (Lifetime/Recent)  [place] Suicide Severity Rating (Lifetime/Recent) 10/21/2019 3/22/2022 3/22/2022   Q1 Wished to be Dead (Past Month) no yes yes   Q2 Suicidal Thoughts (Past Month) no yes yes   Q3 Suicidal Thought Method - yes yes   Q4 Suicidal Intent without Specific Plan - no yes   Q5 Suicide Intent with Specific Plan - yes yes   Q6 Suicide Behavior (Lifetime) - - no   Level of Risk per Screen - high risk high risk     [place] Suicide Severity Rating Scale (Short Version)  [place] Suicide Severity Rating (Short Version) 10/21/2019 8/7/2021 3/22/2022 3/22/2022 3/31/2022   Over the past  "2 weeks have you felt down, depressed, or hopeless? - no yes - -   Over the past 2 weeks have you had thoughts of killing yourself? - no yes - -   Have you ever attempted to kill yourself? - no no - -   Q1 Wished to be Dead (Past Month) no - yes yes -   Q2 Suicidal Thoughts (Past Month) no - yes yes -   Q3 Suicidal Thought Method - - yes yes -   Q4 Suicidal Intent without Specific Plan - - no yes -   Q5 Suicide Intent with Specific Plan - - yes yes -   Q6 Suicide Behavior (Lifetime) - - - no -   Level of Risk per Screen - - high risk high risk -   1. Wish to be Dead (Since Last Contact) - - - - 1   Wish to be Dead Description (Since Last Contact) - - - - Reports: \"I wish I didn't have those thoughts'   2. Non-Specific Active Suicidal Thoughts (Since Last Contact) - - - - 1   3. Active Suicidal Ideation with any Methods (Not Plan) Without Intent to Act (Since Last Contact) - - - - 1   4. Active Suicidal Ideation with Some Intent to Act, Without Specific Plan (Since Last Contact) - - - - 0   5. Active Suicidal Ideation with Specific Plan and Intent (Since Last Contact) - - - - 0   Most Severe Ideation Rating (Since Last Contact) - - - - 3   Frequency (Since Last Contact) - - - - 3   Duration (Since Last Contact) - - - - 3   Deterrents (Since Last Contact) - - - - 2   Reasons for Ideation (Since Last Contact) - - - - 5   Actual Attempt (Since Last Contact) - - - - 0   Has subject engaged in non-suicidal self-injurious behavior? (Since Last Contact) - - - - 0   Interrupted Attempts (Since Last Contact) - - - - 0   Aborted or Self-Interrupted Attempt (Since Last Contact) - - - - 0   Preparatory Acts or Behavior (Since Last Contact) - - - - 0   Suicide (Since Last Contact) - - - - 0   Calculated C-SSRS Risk Score (Since Last Contact) - - - - Medium Risk         ASSESSMENT: Current Emotional / Mental Status (status of significant symptoms):   Risk status (Self / Other harm or suicidal ideation)   Patient denies current " fears or concerns for personal safety.   Patient denies current or recent suicidal ideation or behaviors.    Patient denies current or recent homicidal ideation or behaviors.   Patient denies current or recent self injurious behavior or ideation.   Patient denies other safety concerns.   Patient reports there has been no change in risk factors since their last session.     Patient reports there has been no change in protective factors since their last session.     A safety and risk management plan has been developed including: Patient consented to co-developed safety plan on 3/2022.  Safety and risk management plan was reviewed.   Patient agreed to use safety plan should any safety concerns arise.  A copy was made available to the patient.        Attitude:   Cooperative    Orientation:   All   Speech    Rate / Production: Normal . Flat    Volume:  Normal    Mood:    Depressed     Thought Content:  Clear    Thought Form:  Coherent  Logical    Insight:    Fair        Medication Review:   No changes to current psychiatric medication(s)   Current Outpatient Medications   Medication     acetaminophen (TYLENOL) 500 MG tablet     acetaminophen-codeine (TYLENOL #3) 300-30 MG tablet     albuterol (PROAIR HFA/PROVENTIL HFA/VENTOLIN HFA) 108 (90 Base) MCG/ACT inhaler     amLODIPine (NORVASC) 2.5 MG tablet     amLODIPine (NORVASC) 5 MG tablet     capsaicin (ZOSTRIX) 0.025 % cream     cetirizine (ZYRTEC) 10 MG tablet     Cholecalciferol (D3-1000) 25 MCG (1000 UT) CAPS     cyanocobalamin (VITAMIN B-12) 1000 MCG tablet     cyanocobalamin (VITAMIN B-12) 1000 MCG tablet     deferasirox (EXJADE) 500 MG disintegrating tablet     diaper,brief,adult,disposable Misc     DULoxetine (CYMBALTA) 30 MG capsule     DULoxetine (CYMBALTA) 60 MG capsule     ergocalciferol, vitamin D2, (VITAMIN D2 ORAL)     famotidine (PEPCID) 20 MG tablet     famotidine (PEPCID) 40 MG tablet     FLUoxetine (PROZAC) 40 MG capsule     fluticasone (FLONASE) 50  "MCG/ACT nasal spray     lidocaine (LIDODERM) 5 %     magic mouthwash (ENTER INGREDIENTS IN COMMENTS) suspension     meclizine (ANTIVERT) 25 MG tablet     ondansetron (ZOFRAN ODT) 4 MG disintegrating tablet     QUEtiapine (SEROQUEL) 25 MG tablet     vitamin B-12 (CYANOCOBALAMIN) 1000 MCG tablet     No current facility-administered medications for this visit.        Medication Compliance:   Yes- weekly nurse visits to set up of medications     Changes in Health Issues:   Yes: Pain, Associated Psychological Distress  Still experiencing leg swelling- \"can't walk\"- reports mild improvement. Never attended walk in care for it yet (transportation barrier).  Appointment with Primary Care on 7/5/2022.     Chemical Use Review:   Substance Use: Chemical use reviewed, no active concerns identified      Tobacco Use: No current tobacco use.      Diagnosis:  1. PTSD (post-traumatic stress disorder)    2. Moderate episode of recurrent major depressive disorder (H)        Collateral Reports Completed:   Not Applicable      PLAN: (Patient Tasks / Therapist Tasks / Other)  Patient is scheduled for follow up psychotherapy on 7/22/2022  Prior to next session, implement behavioral activation techniques to increase ADLs and engagement in activities and time with family.         Karena Ashton, [facility] 6/23/2022                                                         ______________________________________________________________________    Individual Treatment Plan    Patient's Name: Indra Jason  YOB: 1965    Date of Creation: 4/7/2022  Date Treatment Plan Last Reviewed/Revised: 4/7/2022    DSM5 Diagnoses:   1. PTSD (post-traumatic stress disorder)    2. Moderate episode of recurrent major depressive disorder (H)      Psychosocial / Contextual Factors: exposure to war and refugee experience, , loss of child, pain, bereavement.  PROMIS (reviewed every 90 days):   PROMIS-10 Scores Only 3/24/2022   Global Mental Health " Score 6   Global Physical Health Score 6   PROMIS TOTAL - SUBSCORES 12       Referral / Collaboration:  Referral to another professional/service is not indicated at this time. Already established with psychiatry through [facility].     Anticipated number of session for this episode of care: 20+  Anticipation frequency of session: Weekly to biweekly  Anticipated Duration of each session: 38-52 minutes  Treatment plan will be reviewed in 90 days or when goals have been changed.     MeasurableTreatment Goal(s) related to diagnosis / functional impairment(s)    Goal 1: Patient will decrease level of depression as evidenced by PHQ-9 score <5.    I will know I've met my goal when I find more breezy in life, enjoy activities, socialize, have increased energy and hope.       Objective #A (Patient Action)                          Patient will Increase interest, engagement, and pleasure in doing things.  Status: new 4/7/2022     Intervention(s)  Therapist will assign homework for patient to engage in activities and socialization 3x/week..     Objective #B  Patient will Decrease frequency and intensity of feeling down, depressed, hopeless.  Status: new 4/7/2022     Intervention(s)  Therapist will coordinate mental health care with psychiatry as appropriate  Therapist will assign homework for patient to engage in daily gratitude practice.   Therapist will teach sleep hygiene skills and assign homework for maintaining sleep routine.    Objective #C  Patient will Identify negative self-talk and behaviors: challenge core beliefs, myths, and actions.  Status: new 4/7/2022     Intervention(s)  Therapist will teach cognitive reframing skills and educate on programming/conditioning.    Therapist will assign homework for patient to implement cognitive reframing skills and engage in positive self talk. Therapist will assign homework to engage in journaling and daily positive self talk.       Goal 2: Patient will reduce levels anxiety and fear  "related to trauma symptoms. Patient will decrease impact of trauma symptoms on daily functioning.    I will know I've met my goal when I feel less scared, have less anxiety and distress impacting my daily life related to trauma history.      Objective #A (Patient Action)    Patient will implement resourcing and emotional regulation skills outside of session to decrease hyperarousal levels..  Status: New - Date: new 2022    Intervention(s)  Therapist will teach emotional regulation skills. Practice grounding exercises and guided imagery with patient in session. Therapist will guide patient through these practices in session and assign homework for patient to practice these skills outside of session.    Objective #B  Patient will learn about trauma and the impact on the mind and body..  Status: New - Date: new 2022    Intervention(s)  Therapist will provide educational materials on the brain and how trauma is stored in the body. Therapist will teach patient about the brain. Provider with utiliize somatic/experiential techniques in sessions..    Objective #C  Patient will challenge negative cognitions and replace them with positive cognitions.   Patient will develop insight into values, strengths, capacities and \"most resourced self.\".  Patient will process through trauma memories in a safe space to bring healing.   Status: New - Date: new 2022    Intervention(s)  Therapist will assign homework around cognitive reframing, identifying values and most resourced self. Therapist will provide EMDR treatment for processing of trauma memeories and challenging negative cognitions.      Patient has reviewed and agreed to the above plan.      Karena Ashton [facility]  2022    -----------------------------------------------------------------------------------------         [facility]                                        Indra Jason   : 1965  MRN: [id number]     SAFETY PLAN:  Step 1: " "Warning signs / cues (Thoughts, images, mood, situation, behavior) that a crisis may be developing:  ? Thoughts: \"I just want this to end\"  ? Images: obsessive thoughts of death or dying: Suicidal ideations of going to the river to drown or hang self and flashbacks  ? Thinking Processes: ruminations (can't stop thinking about my problems): depressive thoughts, racing thoughts, intrusive thoughts (bothersome, unwanted thoughts that come out of nowhere): flashbacks, intrusive thoughts of suicidal ideations, depersonalization and psychosis and dissociation   ? Mood: worsening depression, hopelessness, helplessness and agitation  ? Behaviors: isolating/withdrawing , impulsive, reckless behaviors (acting without thinking): such as suddenly leaving the house- even in the middle of the night, saying good-bye, not taking care of myself, not taking care of my responsibilities, sleeping too much, not sleeping enough and increasing frequency and duration of dissociation  ? Situations: anniversary of losses, changes in symptoms: worsening depression, pain and trauma    Step 2: Coping strategies - Things I can do to take my mind off of my problems without contacting another person (relaxation technique, physical activity):  ? Distress Tolerance Strategies:  listen to positive and upbeat music: your favorite music, sensory based activities/self-soothe with five senses: what can I see, hear, taste, touch, smell?, watch a funny movie: or show, change body temperature (ice pack/cold water)  and paced breathing/progressive muscle relaxation  ? Physical Activities: go for a walk, gardening, meditation, deep breathing and stretching   ? Focus on helpful thoughts:  \"This is temporary\", \"I will get through this\", \"Ride the wave\" and remind myself of what is important to me: such as my family  Step 3: People and social settings that provide distraction:                 Name: Brother (Man)                    Phone: [phone] (Lives with " patient)                 Name: daughter (Victor M)                               Name: 2 younger sons                     ? park                Step 4: Remind myself of people and things that are important to me and worth living for: my children and other family members     Step 5: When I am in crisis, I can ask these people to help me use my safety plan:                 Name: Brother (Man) and his family with whom she resides         Phone: [phone] (Lives with patient)                 Name: Home Health RN (Kourtney)                   Phone: [phone]                 Name: Adult daughter (Victor M)     Peer Support (non-crisis) [place] Warmline: 826.178.3433                                Or text  Support  to 94640     Step 6: Making the environment safe:   ? dispose of old medications , remove things I could use to hurt myself: such as ropes or sharp objects, arrange transportation: -having family drive  and be around others  Step 7: Professionals or agencies I can contact during a crisis:  ? Suicide Prevention Lifeline: 4-781-295-ZQLL (5744)  ? Crisis Text Line Service (available 24 hours a day, 7 days a week): Text [place] to 938954    [facility] Crisis Services: [facility] Crisis: [phone]         Call 911 or go to my nearest emergency department.       I helped develop this safety plan and agree to use it when needed.      Client signature ________Indra Jason (virtual visit)______________________  Today s date:  3/24/2022  Completed by Provider Name/ Credentials:  STEVE Kruger                             March 24, 2022  Adapted from Safety Plan Template 2008 Jen Enriquez and Serg Adler is reprinted with the express permission of the authors.  No portion of the Safety Plan Template may be reproduced without the express, written permission.  You can contact the authors at [email or web address] or [email or web address].

## 2022-07-01 DIAGNOSIS — I10 ESSENTIAL HYPERTENSION: ICD-10-CM

## 2022-07-01 NOTE — TELEPHONE ENCOUNTER
"Routing refill request to provider for review/approval because:  bp out of range    Last Written Prescription Date:  3/10/22  Last Fill Quantity: 30,  # refills: 3   Last office visit provider:  4/11/22     Requested Prescriptions   Pending Prescriptions Disp Refills     amLODIPine (NORVASC) 2.5 MG tablet 30 tablet 3     Sig: Take 1 tablet (2.5 mg) by mouth daily       Calcium Channel Blockers Protocol  Failed - 7/1/2022 11:19 AM        Failed - Blood pressure under 140/90 in past 12 months     BP Readings from Last 3 Encounters:   04/11/22 (!) 142/84   03/22/22 (!) 155/91   03/10/22 (!) 155/97                 Passed - Recent (12 mo) or future (30 days) visit within the authorizing provider's specialty     Patient has had an office visit with the authorizing provider or a provider within the authorizing providers department within the previous 12 mos or has a future within next 30 days. See \"Patient Info\" tab in inbasket, or \"Choose Columns\" in Meds & Orders section of the refill encounter.              Passed - Medication is active on med list        Passed - Patient is age 18 or older        Passed - No active pregnancy on record        Passed - Normal serum creatinine on file in past 12 months     Recent Labs   Lab Test 03/22/22  1325   CR 0.70       Ok to refill medication if creatinine is low          Passed - No positive pregnancy test in past 12 months             Amalia Galvin, RN 07/01/22 5:25 PM  "

## 2022-07-02 RX ORDER — AMLODIPINE BESYLATE 2.5 MG/1
2.5 TABLET ORAL DAILY
Qty: 30 TABLET | Refills: 3 | Status: SHIPPED | OUTPATIENT
Start: 2022-07-02

## 2022-07-08 ENCOUNTER — MEDICAL CORRESPONDENCE (OUTPATIENT)
Dept: HEALTH INFORMATION MANAGEMENT | Facility: CLINIC | Age: 57
End: 2022-07-08

## 2022-07-13 ENCOUNTER — MEDICAL CORRESPONDENCE (OUTPATIENT)
Dept: HEALTH INFORMATION MANAGEMENT | Facility: CLINIC | Age: 57
End: 2022-07-13

## 2022-07-21 ENCOUNTER — PATIENT OUTREACH (OUTPATIENT)
Dept: CARE COORDINATION | Facility: CLINIC | Age: 57
End: 2022-07-21

## 2022-07-21 NOTE — LETTER
[facility]  Patient Centered Plan of Care  About Me:        Patient Name:  Indra Mota    YOB: 1965  Age:         57 year old   [employer] MRN:    [id number] Telephone Information:  Home Phone [phone]   Mobile [phone]       Address:  [address]  [place] [address] Email address:  [email or web address]      Emergency Contact(s)    Name Relationship Lgl Grd Work Phone Home Phone Mobile Phone   Adams MOTA, MAN Brother    [phone]           Primary language:  Upper sorbian     needed? Yes   [place] [facility]:  [phone] op. 1  Other communication barriers:Language barrier    Preferred Method of Communication:     Current living arrangement: I live in a private home with family    Mobility Status/ Medical Equipment: Independent w/Device        Health Maintenance  Health Maintenance Reviewed:  Never   Done ADVANCE CARE PLANNING (Every 5 Years)        Never   Done Pneumococcal Vaccine: Pediatrics (0 to 5 Years) and At-Risk Patients (6 to 64 Years) (1 - PCV)       Never   Done ZOSTER IMMUNIZATION (1 of 2)       APR 7 2022 COVID-19 Vaccine (3 - Moderna risk series)  Last completed: Mar 10, 2022           My Access Plan  Medical Emergency 911   Primary Clinic Line   -     24 Hour Appointment Line [phone] or  [phone]) (toll-free)   24 Hour Nurse Line [phone] (toll-free)   Preferred Urgent Care [facility], [phone]     [facility]  [phone]     Preferred Pharmacy [facility] - [place] [id number] [facility]     Behavioral Health Crisis Line The National Suicide Prevention Lifeline at 1-465.241.5760 or 988             My Care Team Members  Patient Care Team       Relationship Specialty Notifications Start End    Heidi Hernandez DO PCP - General   10/18/18     Phone: [phone] Fax: [phone] [address] [place] [address]     Steve Pavon MD MD Hematology & Oncology Admissions 3/11/19     Phone: [phone] Fax: [phone]         [address] [facility] [address]    Francisco Zafar, PharmD Pharmacist Pharmacist  12/4/19     Phone: [phone]          [facility] [address] [place] [address]    Megan Osborne PharmD Pharmacist Pharmacist  1/17/20     Phone: [phone]          [facility] [address] [place] [address]    Heidi Hernandez DO Assigned PCP   6/16/21     Phone: [phone] Fax: [phone]         [address] [place] [address]    [facility]- PCA Personal Care Attendant [facility]  9/1/21     PCA services 8.5 hours    [address] [place] [address]    Phone: [phone]         [facility] [address] [place] [address] [phone] PCA services 8.5 hours    Steve Pavon MD Assigned Cancer Care Provider   12/12/21     Phone: [phone] Fax: [phone]         [address] [place] [address]    Wes Juan Community Health Worker Primary Care - CC  2/28/22     Phone: [phone] Fax: [phone]         [address] [place] [address]    [employer]     3/22/22     Member ID#      Phone: [phone]         Kourtney Hu RN Skilled Nurse  Registered Nurse [facility]  3/22/22     [facility]  skilled nurse set up medications    Phone: [phone] Karena Carlson LICSW Licensed Mental Health Professional  Admissions 3/24/22     psychotherapist    Phone: [phone] Fax: [phone]         [address] [place] [address]    Allyson Adler LSW Lead Care Coordinator Primary Care - CC Admissions 6/20/22             My Care Plans  Self Management and Treatment Plan  Goals and (Comments)   Goals        General     Medical (pt-stated)      Notes - Note edited  6/15/2022  5:07 PM by Wes Juan     Goal Statement: I  want  to discuss with PCP about physical therapy and referral to neurology within one month  Date Goal set: 03/08/22 Updated 6-15-22    Barriers:   Strengths:   Date to Achieve By: 8/30/2022  Patient expressed understanding of goal: Yes  Action steps to achieve this goal:  1. I will talk to doctor about re schedule for PT.    Updated: 6-15-22 AL                 Action Plans on File:                       Advance Care Plans/Directives Type:   No data recorded    My Medical and Care Information  Problem List   Patient Active Problem List   Diagnosis     Fatigue     Vitamin D deficiency     Anemia     Abdominal pain     Tension headache     Chronic pain     PTSD (post-traumatic stress disorder)     Moderate episode of recurrent major depressive disorder (H)     Elevated ferritin level     Dizziness     Screen for colon cancer     Leg swelling     Iron overload syndrome     Anxiety and depression     Elevated blood pressure reading without diagnosis of hypertension     Other drug-induced neutropenia (H)      Current Medications and Allergies:  See printed Medication Report.    Care Coordination Start Date: 3/8/2022   Frequency of Care Coordination: monthly     Form Last Updated: 07/21/2022

## 2022-07-21 NOTE — PROGRESS NOTES
Care Coordination Clinician Chart Review     Situation: Patient chart reviewed by care coordinator.?     Background: Initial assessment and enrollment to Care Coordination was 3/8/2022.?? Patient centered goals were developed with participation from patient.? Lead CC handed patient off to CHW for continued outreach every 30 days.??     Assessment: Per chart review, patient outreach completed by CC CHW on 6/15/2022.? Patient is actively working to accomplish goal(s).? Patient's goal(s) remain(s) appropriate at this time.? Patient is due for updated Plan of Care.? Annual assessment will be due 3/8/2023.      Goals        Medical (pt-stated)       Goal Statement: I want  to discuss with PCP about physical therapy and referral to neurology within one month  Date Goal set: 03/08/22 Updated 6-15-22    Barriers:   Strengths:   Date to Achieve By: 8/30/2022  Patient expressed understanding of goal: Yes  Action steps to achieve this goal:  1. I will talk to doctor about re schedule for PT.    Updated: 6-15-22 AL            ??     Plan/Recommendations: The patient will continue working with Care Coordination to achieve above goal(s).? CHW will involve Lead CC as needed or if patient is ready to move to maintenance.? Lead CC will continue to monitor CHW s monthly outreaches and progress to goal(s) every 6 weeks.?     Plan of Care updated and sent to patient: Yes

## 2022-07-26 DIAGNOSIS — J98.01 BRONCHOSPASM: Primary | ICD-10-CM

## 2022-07-26 RX ORDER — ALBUTEROL SULFATE 90 UG/1
2 AEROSOL, METERED RESPIRATORY (INHALATION) EVERY 6 HOURS
Qty: 18 G | Refills: 5 | Status: SHIPPED | OUTPATIENT
Start: 2022-07-26

## 2022-07-26 NOTE — TELEPHONE ENCOUNTER
"Last Written Prescription Date:  3/30/22  Last Fill Quantity: 18 g,  # refills: 2   Last office visit provider:  4/11/22     Requested Prescriptions   Pending Prescriptions Disp Refills     albuterol (PROAIR HFA/PROVENTIL HFA/VENTOLIN HFA) 108 (90 Base) MCG/ACT inhaler 18 g      Sig: Inhale 2 puffs into the lungs every 6 hours       Asthma Maintenance Inhalers - Anticholinergics Passed - 7/26/2022  2:56 PM        Passed - Patient is age 12 years or older        Passed - Recent (12 mo) or future (30 days) visit within the authorizing provider's specialty     Patient has had an office visit with the authorizing provider or a provider within the authorizing providers department within the previous 12 mos or has a future within next 30 days. See \"Patient Info\" tab in inbasket, or \"Choose Columns\" in Meds & Orders section of the refill encounter.              Passed - Medication is active on med list       Short-Acting Beta Agonist Inhalers Protocol  Passed - 7/26/2022  2:56 PM        Passed - Patient is age 12 or older        Passed - Recent (12 mo) or future (30 days) visit within the authorizing provider's specialty     Patient has had an office visit with the authorizing provider or a provider within the authorizing providers department within the previous 12 mos or has a future within next 30 days. See \"Patient Info\" tab in inbasket, or \"Choose Columns\" in Meds & Orders section of the refill encounter.              Passed - Medication is active on med list             Aneudy Reese RN 07/26/22 2:57 PM  "

## 2022-07-27 ENCOUNTER — PATIENT OUTREACH (OUTPATIENT)
Dept: CARE COORDINATION | Facility: CLINIC | Age: 57
End: 2022-07-27

## 2022-07-27 NOTE — PROGRESS NOTES
7/27/2022  Clinic Care Coordination Contact  Community Health Worker Follow Up    Intervention and Education during outreach:   Antoine : [id number] Declan  Called and spoke to patient and follow up on goal.  Patient reported:  She completed PT and has appt in August but forgot the time and where the location is     Indra Jason MRN: [id number]    Date: 8/16/2022 Status: Scheduled   Time: 9:15 AM Length: 30   Visit Type: NEW HEADACHE [1043] Copay: $3.00   Provider: Itz Cline MD       [address] [place] [id number]    Department Phone: [phone]     Provided patient's brother the address and appt information.  Brother requested to receive in the mail the appt information.  CHW will mail itinerary of upcoming appts with address information  CHW Follow up: Monthly  CHW Plan: Follow up on goal   CHW Next Follow Up: 8-31-22    Wes Juan  [facility] Health Worker  [facility]  Clinic Care Coordination  [email or web address]   Office: [phone]  Fax: [phone]    Clinic Care Coordination Contact    Community Health Worker Follow Up    Care Gaps:     Health Maintenance Due   Topic Date Due     ADVANCE CARE PLANNING  Never done     Pneumococcal Vaccine: Pediatrics (0 to 5 Years) and At-Risk Patients (6 to 64 Years) (1 - PCV) Never done     ZOSTER IMMUNIZATION (1 of 2) Never done     COVID-19 Vaccine (3 - Moderna risk series) 04/07/2022       Care Gaps Last addressed on will discuss with PCP     Goals:    Goals Addressed as of 7/27/2022 at 3:14 PM                    Today    7/21/22       Medical (pt-stated)   70%  On track    Added 3/8/22 by Ihsan Braga, [facility]      Goal Statement: I will attend neurology appointment on 8-16-22 at 9:15 am  Date Goal set: 03/08/22 Updated 6-15-22  Barriers: language  Strengths: support from brother and CC Team  Date to Achieve By: 8/30/2022  Patient expressed understanding of goal: Yes  Action steps to achieve  this goal:  1. I will attend neurology appt on 8-16-22 at 9:15am Dr Cline, [facility]   Neurology   [address] [place] [id number] [phone]   Updated 7-27-22

## 2022-07-28 NOTE — PROGRESS NOTES
7/28/2022  Mail itinerary report of upcoming appts with address and telephone.    Wes Juan  Community Health Worker  [facility] Care Coordination  [email or web address]   Office: [phone]  Fax: [phone]

## 2022-08-03 ENCOUNTER — TELEPHONE (OUTPATIENT)
Dept: FAMILY MEDICINE | Facility: CLINIC | Age: 57
End: 2022-08-03

## 2022-08-03 NOTE — TELEPHONE ENCOUNTER
Reason for Call:  Home Health Care    Nirmala with [facility] called regarding (reason for call): Verbal Order    Orders are needed for this patient. Medical Social Work    PT: n/a    OT: n/a    Skilled Nursing: n/a    Pt Provider: Dr. Hernandez    Phone Number Homecare Nurse can be reached at: [phone]    Can we leave a detailed message on this number? YES    Phone number patient can be reached at: Home number on file [phone] (home)    Best Time: any    Call taken on 8/3/2022 at 8:19 AM by Jeri Brunner

## 2022-08-05 ENCOUNTER — MEDICAL CORRESPONDENCE (OUTPATIENT)
Dept: HEALTH INFORMATION MANAGEMENT | Facility: CLINIC | Age: 57
End: 2022-08-05

## 2022-08-24 ENCOUNTER — PATIENT OUTREACH (OUTPATIENT)
Dept: CARE COORDINATION | Facility: CLINIC | Age: 57
End: 2022-08-24

## 2022-08-24 NOTE — PROGRESS NOTES
8/24/2022  Clinic Care Coordination - Chart Review Only    Situation/Background: Patient chart reviewed by care coordinator related to [employer] conversion.    Assessment: Patient continues to be followed by Clinic Care Coordination.    Plan: Patient's chart updated to align with [employer] program for ongoing patient management.    Wes Juan  Community Health Worker  [facility] Care Coordination  [email or web address]   Office: [phone]  Fax: [phone]

## 2022-08-31 ENCOUNTER — PATIENT OUTREACH (OUTPATIENT)
Dept: CARE COORDINATION | Facility: CLINIC | Age: 57
End: 2022-08-31

## 2022-08-31 NOTE — PROGRESS NOTES
8/31/2022  Clinic Care Coordination Contact  Community Health Worker Follow Up    Intervention and Education during outreach:   Antoine : Zayda  ID: [id number]  Called and spoke to patient and patient's brother Man and follow up on goal.  Patient reported:  -she went to the appt but the doctor did not see her and that the next available appt will be until January 2023  So she did not reschedule it because it too far out.  She will be moving to [place], [place] in Sept to take care and be closer to her 90 year-old mother.  Goal deleted.  No other goals at this time.   Patient questions about the S.S.I benefit    Conference call to [employer] to check on the status of S.S.I benefit before she moves to [place]   Spoke to Sandi and reported they are still waiting for the determination letter from SSA office.   If patient moves to call and notify [employer] office of her new address and new phone number. They can send the information to the SSA office in [place].  Thanked Sandi for the updated information.    Patient handed the phone to talk to the family in [place] to write down the DS number and information  Spoke to a family member and provided DS number [phone] and to ask for Sandi to update the address.  [employer]  [address] [place] [id number] [phone]  Fax:[phone]    Provided CHW direct contact number if family member has any questions.    Spoke to patient and brother to make sure to have enough medications and refills before leaving.  Offered to schedule follow up with PCP for medications refills.  Patient declined stated she is leaving in Sept.  Pt could not confirm the date when she will be leaving.    Routed to PCP and CC Team as FYI    CHW Follow up: Monthly  CHW Plan: Follow up on goal  CHW Next Follow Up: 9-30-22    Wes Juan  Community Health Worker  [facility] Care Coordination  [email or web address]  [email or web address]   Office:  [phone]  Fax: [phone]      Clinic Care Coordination Contact    Community Health Worker Follow Up    Care Gaps:     Health Maintenance Due   Topic Date Due     ADVANCE CARE PLANNING  Never done     Pneumococcal Vaccine: Pediatrics (0 to 5 Years) and At-Risk Patients (6 to 64 Years) (1 - PCV) Never done     ZOSTER IMMUNIZATION (1 of 2) Never done     COVID-19 Vaccine (3 - Moderna risk series) 04/07/2022     INFLUENZA VACCINE (1) 09/01/2022     PHQ-9  09/24/2022     PREVENTIVE CARE VISIT  10/01/2022       Declined due to moving out of state in Sept 2022

## 2022-09-08 ENCOUNTER — TELEPHONE (OUTPATIENT)
Dept: FAMILY MEDICINE | Facility: CLINIC | Age: 57
End: 2022-09-08

## 2022-09-08 NOTE — TELEPHONE ENCOUNTER
Travel questionnaire was asked. Verified that they have no signs of COVID-19 symptoms.    Patient dropped off Request for medical opinion for Dr. Hernandez to fill out. Placed the original copies in the 's slot.    When forms are completed, patient would like it:    Mail-Please mail it back to home address when form has been completed.    Ok to leave a detailed message if unable to get a hold of the Patient.    Please re-route task back to the  to shred the copied forms and complete the task. Thanks!

## 2022-09-18 ENCOUNTER — HEALTH MAINTENANCE LETTER (OUTPATIENT)
Age: 57
End: 2022-09-18

## 2022-09-22 DIAGNOSIS — F33.1 MODERATE EPISODE OF RECURRENT MAJOR DEPRESSIVE DISORDER (H): ICD-10-CM

## 2022-09-22 DIAGNOSIS — I10 ESSENTIAL HYPERTENSION: ICD-10-CM

## 2022-09-22 DIAGNOSIS — F32.A ANXIETY AND DEPRESSION: ICD-10-CM

## 2022-09-22 DIAGNOSIS — R42 DIZZINESS: ICD-10-CM

## 2022-09-22 DIAGNOSIS — F43.10 PTSD (POST-TRAUMATIC STRESS DISORDER): ICD-10-CM

## 2022-09-22 DIAGNOSIS — F41.9 ANXIETY AND DEPRESSION: ICD-10-CM

## 2022-09-22 DIAGNOSIS — G89.29 OTHER CHRONIC PAIN: ICD-10-CM

## 2022-09-23 RX ORDER — UBIDECARENONE 100 MG
1 CAPSULE ORAL DAILY
Qty: 90 CAPSULE | Refills: 2 | Status: SHIPPED | OUTPATIENT
Start: 2022-09-23

## 2022-09-23 NOTE — TELEPHONE ENCOUNTER
"Last Written Prescription Date:  3/30/22  Last Fill Quantity: 90,  # refills: 1   Last office visit provider:  4/11/22     Requested Prescriptions   Pending Prescriptions Disp Refills     FLUoxetine (PROZAC) 40 MG capsule 90 capsule 1     Sig: Take 1 capsule (40 mg) by mouth daily       SSRIs Protocol Failed - 9/23/2022  1:09 PM        Failed - PHQ-9 score less than 5 in past 6 months     Please review last PHQ-9 score.           Passed - Medication is active on med list        Passed - Patient is age 18 or older        Passed - No active pregnancy on record        Passed - No positive pregnancy test in last 12 months        Passed - Recent (6 mo) or future (30 days) visit within the authorizing provider's specialty     Patient had office visit in the last 6 months or has a visit in the next 30 days with authorizing provider or within the authorizing provider's specialty.  See \"Patient Info\" tab in inbasket, or \"Choose Columns\" in Meds & Orders section of the refill encounter.               QUEtiapine (SEROQUEL) 25 MG tablet 90 tablet 1     Sig: Take 1 tablet (25 mg) by mouth At Bedtime       Antipsychotic Medications Failed - 9/23/2022  1:09 PM        Failed - Blood pressure under 140/90 in past 12 months     BP Readings from Last 3 Encounters:   04/11/22 (!) 142/84   03/22/22 (!) 155/91   03/10/22 (!) 155/97                 Failed - Lipid panel on file within the past 12 months     Recent Labs   Lab Test 10/11/19  1218   CHOL 163   TRIG 154*   HDL 44*   LDL 88               Passed - Patient is 12 years of age or older        Passed - CBC on file in past 12 months     Recent Labs   Lab Test 03/22/22  1325   WBC 9.6   RBC 5.10   HGB 11.6*   HCT 39.1                    Passed - Heart Rate on file within past 12 months     Pulse Readings from Last 3 Encounters:   04/11/22 94   03/22/22 78   03/10/22 88               Passed - A1c or Glucose on file in past 12 months     Recent Labs   Lab Test 03/22/22  1325 " "04/12/19  1212 10/18/18  1048      < >  --    A1C  --   --  5.8    < > = values in this interval not displayed.       Please review patients last 3 weights. If a weight gain of >10 lbs exists, you may refill the prescription once after instructing the patient to schedule an appointment within the next 30 days.    Wt Readings from Last 3 Encounters:   04/11/22 75.3 kg (166 lb 1.6 oz)   03/10/22 73.9 kg (163 lb)   02/16/22 75.8 kg (167 lb)             Passed - Medication is active on med list        Passed - Patient is not pregnant        Passed - No positve pregnancy test on file in past 12 months        Passed - Recent (6 mo) or future (30 days) visit within the authorizing provider's specialty     Patient had office visit in the last 6 months or has a visit in the next 30 days with authorizing provider or within the authorizing provider's specialty.  See \"Patient Info\" tab in in[employer]ita Food Industrieset, or \"Choose Columns\" in Meds & Orders section of the refill encounter.               DULoxetine (CYMBALTA) 60 MG capsule 90 capsule 1     Sig: Take 1 capsule (60 mg) by mouth daily       Serotonin-Norepinephrine Reuptake Inhibitors  Failed - 9/22/2022  8:50 AM        Failed - Blood pressure under 140/90 in past 12 months     BP Readings from Last 3 Encounters:   04/11/22 (!) 142/84   03/22/22 (!) 155/91   03/10/22 (!) 155/97                 Failed - PHQ-9 score of less than 5 in past 6 months     Please review last PHQ-9 score.           Passed - Medication is active on med list        Passed - Patient is age 18 or older        Passed - No active pregnancy on record        Passed - No positive pregnancy test in past 12 months        Passed - Recent (6 mo) or future (30 days) visit within the authorizing provider's specialty     Patient had office visit in the last 6 months or has a visit in the next 30 days with authorizing provider or within the authorizing provider's specialty.  See \"Patient Info\" tab in in[employer]et, or \"Choose " "Columns\" in Meds & Orders section of the refill encounter.               Cholecalciferol (D3-1000) 25 MCG (1000 UT) CAPS 90 capsule 1     Sig: Take 1 tablet by mouth daily       Vitamin Supplements (Adult) Protocol Passed - 9/22/2022  8:50 AM        Passed - High dose Vitamin D not ordered        Passed - Recent (12 mo) or future (30 days) visit within the authorizing provider's specialty     Patient has had an office visit with the authorizing provider or a provider within the authorizing providers department within the previous 12 mos or has a future within next 30 days. See \"Patient Info\" tab in inbasket, or \"Choose Columns\" in Meds & Orders section of the refill encounter.              Passed - Medication is active on med list           DULoxetine (CYMBALTA) 30 MG capsule 90 capsule 1     Sig: Take 1 capsule (30 mg) by mouth daily       Serotonin-Norepinephrine Reuptake Inhibitors  Failed - 9/22/2022  8:50 AM        Failed - Blood pressure under 140/90 in past 12 months     BP Readings from Last 3 Encounters:   04/11/22 (!) 142/84   03/22/22 (!) 155/91   03/10/22 (!) 155/97                 Failed - PHQ-9 score of less than 5 in past 6 months     Please review last PHQ-9 score.           Passed - Medication is active on med list        Passed - Patient is age 18 or older        Passed - No active pregnancy on record        Passed - No positive pregnancy test in past 12 months        Passed - Recent (6 mo) or future (30 days) visit within the authorizing provider's specialty     Patient had office visit in the last 6 months or has a visit in the next 30 days with authorizing provider or within the authorizing provider's specialty.  See \"Patient Info\" tab in inbasket, or \"Choose Columns\" in Meds & Orders section of the refill encounter.               amLODIPine (NORVASC) 5 MG tablet 90 tablet 1     Sig: Take 1 tablet (5 mg) by mouth daily       Calcium Channel Blockers Protocol  Failed - 9/22/2022  8:50 AM        " "Failed - Blood pressure under 140/90 in past 12 months     BP Readings from Last 3 Encounters:   04/11/22 (!) 142/84   03/22/22 (!) 155/91   03/10/22 (!) 155/97                 Passed - Recent (12 mo) or future (30 days) visit within the authorizing provider's specialty     Patient has had an office visit with the authorizing provider or a provider within the authorizing providers department within the previous 12 mos or has a future within next 30 days. See \"Patient Info\" tab in inbasket, or \"Choose Columns\" in Meds & Orders section of the refill encounter.              Passed - Medication is active on med list        Passed - Patient is age 18 or older        Passed - No active pregnancy on record        Passed - Normal serum creatinine on file in past 12 months     Recent Labs   Lab Test 03/22/22  1325   CR 0.70       Ok to refill medication if creatinine is low          Passed - No positive pregnancy test in past 12 months             Aneudy Reese RN 09/23/22 1:09 PM  "

## 2022-09-23 NOTE — TELEPHONE ENCOUNTER
"Routing refill request to provider for review/approval because:  Labs not current:  Lipid panel  BP not in range.  PHQ 9 score - not on file/out of date.    Last Written Prescription Date:  3/25/22  Last Fill Quantity: 90,  # refills: 1   Last office visit provider:  4/11/22     Last Written Prescription Date:  3/27/22  Last Fill Quantity: 90,  # refills: 1   Last office visit provider:  4/11/22    Last Written Prescription Date:  4/11/22  Last Fill Quantity: 90,  # refills: 1   Last office visit provider:  4/1/22    Last Written Prescription Date:  3/30/22  Last Fill Quantity: 90,  # refills: 1   Last office visit provider:  4/11/22    Requested Prescriptions   Pending Prescriptions Disp Refills     FLUoxetine (PROZAC) 40 MG capsule 90 capsule 1     Sig: Take 1 capsule (40 mg) by mouth daily       SSRIs Protocol Failed - 9/23/2022  1:09 PM        Failed - PHQ-9 score less than 5 in past 6 months     Please review last PHQ-9 score.           Passed - Medication is active on med list        Passed - Patient is age 18 or older        Passed - No active pregnancy on record        Passed - No positive pregnancy test in last 12 months        Passed - Recent (6 mo) or future (30 days) visit within the authorizing provider's specialty     Patient had office visit in the last 6 months or has a visit in the next 30 days with authorizing provider or within the authorizing provider's specialty.  See \"Patient Info\" tab in inbasket, or \"Choose Columns\" in Meds & Orders section of the refill encounter.               QUEtiapine (SEROQUEL) 25 MG tablet 90 tablet 1     Sig: Take 1 tablet (25 mg) by mouth At Bedtime       Antipsychotic Medications Failed - 9/23/2022  1:09 PM        Failed - Blood pressure under 140/90 in past 12 months     BP Readings from Last 3 Encounters:   04/11/22 (!) 142/84   03/22/22 (!) 155/91   03/10/22 (!) 155/97                 Failed - Lipid panel on file within the past 12 months     Recent Labs   Lab " "Test 10/11/19  1218   CHOL 163   TRIG 154*   HDL 44*   LDL 88               Passed - Patient is 12 years of age or older        Passed - CBC on file in past 12 months     Recent Labs   Lab Test 03/22/22  1325   WBC 9.6   RBC 5.10   HGB 11.6*   HCT 39.1                    Passed - Heart Rate on file within past 12 months     Pulse Readings from Last 3 Encounters:   04/11/22 94   03/22/22 78   03/10/22 88               Passed - A1c or Glucose on file in past 12 months     Recent Labs   Lab Test 03/22/22  1325 04/12/19  1212 10/18/18  1048      < >  --    A1C  --   --  5.8    < > = values in this interval not displayed.       Please review patients last 3 weights. If a weight gain of >10 lbs exists, you may refill the prescription once after instructing the patient to schedule an appointment within the next 30 days.    Wt Readings from Last 3 Encounters:   04/11/22 75.3 kg (166 lb 1.6 oz)   03/10/22 73.9 kg (163 lb)   02/16/22 75.8 kg (167 lb)             Passed - Medication is active on med list        Passed - Patient is not pregnant        Passed - No positve pregnancy test on file in past 12 months        Passed - Recent (6 mo) or future (30 days) visit within the authorizing provider's specialty     Patient had office visit in the last 6 months or has a visit in the next 30 days with authorizing provider or within the authorizing provider's specialty.  See \"Patient Info\" tab in inbasket, or \"Choose Columns\" in Meds & Orders section of the refill encounter.               DULoxetine (CYMBALTA) 60 MG capsule 90 capsule 1     Sig: Take 1 capsule (60 mg) by mouth daily       Serotonin-Norepinephrine Reuptake Inhibitors  Failed - 9/22/2022  8:50 AM        Failed - Blood pressure under 140/90 in past 12 months     BP Readings from Last 3 Encounters:   04/11/22 (!) 142/84   03/22/22 (!) 155/91   03/10/22 (!) 155/97                 Failed - PHQ-9 score of less than 5 in past 6 months     Please review last " "PHQ-9 score.           Passed - Medication is active on med list        Passed - Patient is age 18 or older        Passed - No active pregnancy on record        Passed - No positive pregnancy test in past 12 months        Passed - Recent (6 mo) or future (30 days) visit within the authorizing provider's specialty     Patient had office visit in the last 6 months or has a visit in the next 30 days with authorizing provider or within the authorizing provider's specialty.  See \"Patient Info\" tab in inbasket, or \"Choose Columns\" in Meds & Orders section of the refill encounter.               DULoxetine (CYMBALTA) 30 MG capsule 90 capsule 1     Sig: Take 1 capsule (30 mg) by mouth daily       Serotonin-Norepinephrine Reuptake Inhibitors  Failed - 9/22/2022  8:50 AM        Failed - Blood pressure under 140/90 in past 12 months     BP Readings from Last 3 Encounters:   04/11/22 (!) 142/84   03/22/22 (!) 155/91   03/10/22 (!) 155/97                 Failed - PHQ-9 score of less than 5 in past 6 months     Please review last PHQ-9 score.           Passed - Medication is active on med list        Passed - Patient is age 18 or older        Passed - No active pregnancy on record        Passed - No positive pregnancy test in past 12 months        Passed - Recent (6 mo) or future (30 days) visit within the authorizing provider's specialty     Patient had office visit in the last 6 months or has a visit in the next 30 days with authorizing provider or within the authorizing provider's specialty.  See \"Patient Info\" tab in inbasket, or \"Choose Columns\" in Meds & Orders section of the refill encounter.               amLODIPine (NORVASC) 5 MG tablet 90 tablet 1     Sig: Take 1 tablet (5 mg) by mouth daily       Calcium Channel Blockers Protocol  Failed - 9/22/2022  8:50 AM        Failed - Blood pressure under 140/90 in past 12 months     BP Readings from Last 3 Encounters:   04/11/22 (!) 142/84   03/22/22 (!) 155/91   03/10/22 (!) " "155/97                 Passed - Recent (12 mo) or future (30 days) visit within the authorizing provider's specialty     Patient has had an office visit with the authorizing provider or a provider within the authorizing providers department within the previous 12 mos or has a future within next 30 days. See \"Patient Info\" tab in inbasket, or \"Choose Columns\" in Meds & Orders section of the refill encounter.              Passed - Medication is active on med list        Passed - Patient is age 18 or older        Passed - No active pregnancy on record        Passed - Normal serum creatinine on file in past 12 months     Recent Labs   Lab Test 03/22/22  1325   CR 0.70       Ok to refill medication if creatinine is low          Passed - No positive pregnancy test in past 12 months         Signed Prescriptions Disp Refills    Cholecalciferol (D3-1000) 25 MCG (1000 UT) CAPS 90 capsule 2     Sig: Take 1 tablet by mouth daily       Vitamin Supplements (Adult) Protocol Passed - 9/22/2022  8:50 AM        Passed - High dose Vitamin D not ordered        Passed - Recent (12 mo) or future (30 days) visit within the authorizing provider's specialty     Patient has had an office visit with the authorizing provider or a provider within the authorizing providers department within the previous 12 mos or has a future within next 30 days. See \"Patient Info\" tab in inbasket, or \"Choose Columns\" in Meds & Orders section of the refill encounter.              Passed - Medication is active on med list             Aneudy Reese RN 09/23/22 1:11 PM  "

## 2022-09-24 RX ORDER — AMLODIPINE BESYLATE 5 MG/1
5 TABLET ORAL DAILY
Qty: 90 TABLET | Refills: 1 | Status: SHIPPED | OUTPATIENT
Start: 2022-09-24

## 2022-09-24 RX ORDER — DULOXETIN HYDROCHLORIDE 60 MG/1
60 CAPSULE, DELAYED RELEASE ORAL DAILY
Qty: 90 CAPSULE | Refills: 0 | Status: SHIPPED | OUTPATIENT
Start: 2022-09-24

## 2022-09-24 RX ORDER — DULOXETIN HYDROCHLORIDE 30 MG/1
30 CAPSULE, DELAYED RELEASE ORAL DAILY
Qty: 90 CAPSULE | Refills: 0 | Status: SHIPPED | OUTPATIENT
Start: 2022-09-24

## 2022-09-24 RX ORDER — QUETIAPINE FUMARATE 25 MG/1
25 TABLET, FILM COATED ORAL AT BEDTIME
Qty: 90 TABLET | Refills: 0 | Status: SHIPPED | OUTPATIENT
Start: 2022-09-24

## 2022-09-24 RX ORDER — FLUOXETINE 40 MG/1
40 CAPSULE ORAL DAILY
Qty: 90 CAPSULE | Refills: 0 | Status: SHIPPED | OUTPATIENT
Start: 2022-09-24

## 2022-09-30 ENCOUNTER — PATIENT OUTREACH (OUTPATIENT)
Dept: CARE COORDINATION | Facility: CLINIC | Age: 57
End: 2022-09-30

## 2022-09-30 NOTE — PROGRESS NOTES
9/30/2022  Clinic Care Coordination Contact  Community Health Worker Follow Up    Intervention and Education during outreach:   Antoine : [place] [id number]  Spoke to patient's brother Man stated they moved to [place], [place] on 9-14-22.  Family member will call on 10-3-22 at 10am to update address to  [employer] to transfer case to [facility] office in [place].    CHW Plan: graduate from [employer] completed all goals and has support from family in [place] for information and resources  CHW Next Follow Up: 10-3-22    Wes Juan  Community Health Worker  [facility]  Clinic Care Coordination  [email or web address]   Office: [phone]  Fax: [phone]      Clinic Care Coordination Contact    Community Health Worker Follow Up    Care Gaps:     Health Maintenance Due   Topic Date Due     ADVANCE CARE PLANNING  Never done     Pneumococcal Vaccine: Pediatrics (0 to 5 Years) and At-Risk Patients (6 to 64 Years) (1 - PCV) Never done     ZOSTER IMMUNIZATION (1 of 2) Never done     COVID-19 Vaccine (3 - Moderna risk series) 04/07/2022     INFLUENZA VACCINE (1) 09/01/2022     PHQ-9  09/24/2022     PREVENTIVE CARE VISIT  10/01/2022     Patient moved to [place], [place] and transfer to [facility]

## 2022-10-03 ENCOUNTER — PATIENT OUTREACH (OUTPATIENT)
Dept: CARE COORDINATION | Facility: CLINIC | Age: 57
End: 2022-10-03

## 2022-10-03 NOTE — PROGRESS NOTES
10/3/2022  Clinic Care Coordination Contact  Community Health Worker Follow Up    Intervention and Education during outreach:   Antoine : Issac ID# [id number]  Called and spoke to patient's brother Man..  Stated he will have English family member to speak to provide n [place], [place] to provide new address to  [employer]  [phone].  New address   [address]  [place] [address]    Conference call to [employer] and spoke to Naya Gordon.   Transferred and spoke to Fawn.  She stated [facility].  She transferred the case/claim to [facility] office in [place].  Patient just waiting for [facility] office to process the application.  Stated family member can call her at [phone] to check on the status or have any questions.  Thanked Fawn for the updated information.  Provided English family member the number to [employer].    Patient transferred pf care to [place].  No further outreach from CCC team.  Removed CCC from Care Team.    Routed to CC SW,  [facility] and transfer care to [place].    Wes Juan  Community Health Worker  [facility] Care Coordination  [email or web address]  [email or web address]   Office: [phone]  Fax: [phone]

## 2022-10-07 ENCOUNTER — MEDICAL CORRESPONDENCE (OUTPATIENT)
Dept: HEALTH INFORMATION MANAGEMENT | Facility: CLINIC | Age: 57
End: 2022-10-07

## 2022-10-28 NOTE — ED NOTES
Katherine, brother in law #[phone]   There are no Wet Read(s) to document. There are 2 Wet Read(s) to document.

## 2023-01-29 ENCOUNTER — HEALTH MAINTENANCE LETTER (OUTPATIENT)
Age: 58
End: 2023-01-29

## 2023-12-17 ENCOUNTER — HEALTH MAINTENANCE LETTER (OUTPATIENT)
Age: 58
End: 2023-12-17

## 2024-02-25 ENCOUNTER — HEALTH MAINTENANCE LETTER (OUTPATIENT)
Age: 59
End: 2024-02-25

## 2024-05-31 NOTE — PROGRESS NOTES
Subjective:      Indra Jason is a 54 y.o. female who presents for evaluation of eye lesion.  She was here seeing our therapist today and was bothered by an eye lesion.  She was then put on my schedule.  She has had a large lesion on her right upper eyelid for 5 days.  She thinks it is possible to getting a little better.  It has not been draining any fluid.  It is painful.  She is never had this happen before.  She does not recall a scratch or a bug bite to her eyelid.  Her vision is generally normal in that eye, possibly slightly blurry.  No fevers.    Patient Active Problem List   Diagnosis     Fatigue     Vitamin D deficiency     Adjustment Disorder     Tension headache     Chronic pain     PTSD (post-traumatic stress disorder)     Moderate episode of recurrent major depressive disorder (H)     Elevated ferritin level     Dizziness     Epigastric pain     Somatic dysfunction of cervical region     Somatic dysfunction of head region     Somatic dysfunction of thoracic region     Somatic dysfunction of abdominal region     TB lung, latent     RBC microcytosis     Leg swelling       Current Outpatient Medications:      acetaminophen (Q-PAP EXTRA STRENGTH) 500 MG tablet, TAKE 1-2 TABLETS (500-1,000 MG TOTAL) BY MOUTH 3 TIMES A DAY AS NEEDED FOR PAIN., Disp: 100 tablet, Rfl: 5     ascorbic acid, vitamin C, (ASCORBIC ACID WITH JEN HIPS) 500 MG tablet, TAKE 1 TABLET (500 MG TOTAL) BY MOUTH 2 TIMES A DAY WITH IRON TABLETS, Disp: 180 tablet, Rfl: 3     cephalexin (KEFLEX) 500 MG capsule, Take 1 capsule (500 mg total) by mouth 3 (three) times a day for 10 days., Disp: 30 capsule, Rfl: 0     cetirizine (ZYRTEC) 10 MG tablet, Take 1 tablet (10 mg total) by mouth daily., Disp: 90 tablet, Rfl: 2     cholecalciferol, vitamin D3, (VITAMIN D3) 1,000 unit capsule, Take 1 capsule (1,000 Units total) by mouth daily., Disp: 90 capsule, Rfl: 3     cyanocobalamin 1000 MCG tablet, Take 1,000 mcg by mouth daily. Indications:  Prevention of Vitamin B12 Deficiency, Disp: , Rfl:      deferasirox (EXJADE) 250 MG disintegrating tablet, Dispense 5 tabs (1,250mg) in an appropriate amount of water, orange or apply juice and take po daily before breakfast, Disp: 150 tablet, Rfl: 0     FLUoxetine (PROZAC) 20 MG capsule, Take 20 mg by mouth daily. Indications: major depressive disorder, Disp: , Rfl:      fluticasone (FLONASE ALLERGY RELIEF) 50 mcg/actuation nasal spray, 1-2 sprays a day as needed., Disp: 16 g, Rfl: 5     isoniazid (NYDRAZID) 300 MG tablet, Take 300 mg by mouth daily., Disp: , Rfl: 7     lidocaine (XYLOCAINE) 5 % ointment, Apply sized amount to affected area up to 3 times daily. Lower back, Disp: 35.44 g, Rfl: 2     meclizine (ANTIVERT) 25 mg tablet, Take 1 tablet (25 mg total) by mouth 3 (three) times a day as needed for dizziness or nausea., Disp: 30 tablet, Rfl: 1     pantoprazole (PROTONIX) 20 MG tablet, Take 1 tablet (20 mg total) by mouth daily., Disp: 90 tablet, Rfl: 1     QUEtiapine (SEROQUEL) 50 MG tablet, Take 50 mg by mouth at bedtime., Disp: , Rfl:      ranitidine (ZANTAC) 150 MG tablet, TAKE ONE TABLET BY MOUTH DAILY AS NEEDED FOR HEARTBURN, Disp: 90 tablet, Rfl: 3     SEA SOFT NASAL MIST 0.65 % nasal spray, USE 1 SPRAY INTO EACH NOSTRIL AS NEEDED FOR CONGESTION., Disp: 45 mL, Rfl: 5     traMADol (ULTRAM) 50 mg tablet, Take 1 tablet (50 mg total) by mouth every 6 (six) hours as needed for pain., Disp: 10 tablet, Rfl: 0     Objective:     Allergies:  Patient has no known allergies.    Vitals:  Vitals:    09/11/19 1321   BP: 124/76   Pulse: 80   Resp: 16     Body mass index is 29.05 kg/m .    Vital signs reviewed.  General: Patient is alert and oriented x 3, in no apparent distress  Cardiac: regular rate and rhythm, no murmurs  Pulmonary: lungs clear to auscultation bilaterally, no crackles, rales, rhonchi, or wheezing noted  Eyes: PERRLA on right, EOMs intact without pain on the right, sclera clear  Skin: 2 cm size  raised somewhat fluctuant lesion present on the right upper eyelid, minimally erythematous, no active drainage    I discussed the possibility of doing an I&D to this lesion.  Patient agrees.  Given his location, I was reluctant to use lidocaine, and I also did not want to use numbing spray.    Procedure  1 cm incision was made with an 11 blade, some purulent fluid and blood expressed, I used a small ring forceps to try to break up any lobules present in the lesion, with further pressure expressed more purulent fluid and more blood.  Procedure was stopped due to patient's discomfort.  Lesion had probably only been decreased about 50% in size.  Band-Aid applied to incision site.  Appropriate wound aftercare discussed with patient.      Assessment and Plan:   1.  Boil right upper eyelid.  I&D completed today and some fluid was drained.  We had to stop before all purulent fluid was expressed due to patient discomfort.  Prescription sent for Keflex.  This medicine should be compatible with her other medications.  I reviewed other treatment including applying heat to the area.  I am anticipating that lesion will continue to drain and resolve over the next several days.  If patient notices lesion is getting larger again, or other concerns, she should contact us.  No concern for problems with the eyeball today, lesion completely on the surface of the eyelid.    This dictation uses voice recognition software, which may contain typographical errors.   Tylenol 650 mg oral every 4-6 hrs as needed/Take over the counter pain medication

## 2024-11-04 NOTE — ED NOTES
ED Provider In Triage Note  [facility]  Encounter Date: Mar 22, 2022    Chief Complaint   Patient presents with     Dizziness   weakness    Brief HPI:   Indra Jason is a 57 year old female presenting to the Emergency Department with a chief complaint of generalized weakness x2-3 wks. Per EMS, home health aide sent her in today for progressive generalized weakness.    Brief Physical Exam:  BP (!) 155/85   Pulse 87   Temp 97.8  F (36.6  C) (Temporal)   Resp 16   SpO2 95%   General: Non-toxic appearing  HEENT: Atraumatic  Resp: No respiratory distress  Abdomen: Non-peritoneal  Neuro: Alert, oriented, answers questions appropriately  Psych: Behavior appropriate      Plan Initiated in Triage:  Orders Placed This Encounter     Comprehensive metabolic panel     Troponin I     Magnesium     TSH with free T4 reflex     UA with Microscopic reflex to Culture       PIT Dispo:   Return to lobby while awaiting workup and ED bed availability    Rita Aguila MD on 3/22/2022 at 12:49 PM    Patient was evaluated by the Physician in Triage due to a limitation of available rooms in the Emergency Department. A plan of care was discussed based on the information obtained on the initial evaluation and patient was consuled to return back to the Emergency Department lobby after this initial evalutaiton until results were obtained or a room became available in the Emergency Department. Patient was counseled not to leave prior to receiving the results of their workup.        Rita Aguila MD  03/22/22 9992     [phone]

## 2024-12-10 NOTE — LETTER
Letter by Ihsan Braga LGSW at      Author: Ihsan Braga LGSW Service: -- Author Type: --    Filed:  Encounter Date: 5/14/2021 Status: (Other)       CARE COORDINATION    May 14, 2021    Indra Jason  [address] [facility] [address]  [place] [address]      Dear Indra,  Your Care Team congratulates you on your journey to maintain wellness. This document will help guide you on your journey to maintain a healthy lifestyle.  You can use this to help you overcome any barriers you may encounter.  If you should have any questions or concerns, you can contact the members of your Care Team or contact your Primary Care Clinic for assistance.    Health Maintenance  Health Maintenance Reviewed:      My Access Plan  Medical Emergency 911   Primary Clinic Line Heidi Hernandez DO - [phone]   24 Hour Appointment Line [phone] or  [phone]) (toll-free)   24 Hour Nurse Line [phone]   Preferred Urgent Care     Preferred Hospital     Preferred Pharmacy [address]     Behavioral Health Crisis Line The National Suicide Prevention Lifeline at 1-572.149.6656 or 911     My Care Team Members  Patient Care Team       Relationship Specialty Notifications Start End    Heidi Hernandez DO PCP - General Family Medicine  10/18/18     Phone: [phone] Fax: [phone]         [address] [place] [address]    Kelsey Barajas Audiologist Audiology  8/3/15     [facility]    Phone: [phone] Fax: [phone]        [facility] [address] [place] [address]    Lora Sherman Nurse Practitioner Audiology  8/3/15     [facility]    Phone: [phone] Fax: [phone]        [facility] [address] [place] [address]    Olive Ortega CNP Nurse Practitioner Pain Medicine  2/23/16     [facility]  (Pain Center) [address] [place] [address]    Phone: [phone] Fax:  [phone]         [address] [facility] [address]    Jen Baxter (PT) Physical Therapist Rehabilitation  3/8/16     Physical Therapy at [facility]  [facility]  [address] [place] [address]    Phone: [phone] Fax: [phone]        [facility] [address] [place] [address]    [employer] Line    4/30/16     Member ID:                                             PMI#: [id number]     Phone: [phone]               Idalia Villavicencio, MSW, LICSW, LADC,  Behavioral Health  1/7/19     [facility]  appt that was Feb 2, 2019 was rescheduled d/t weather    Phone: [phone] Fax: [phone]        [facility]  [address] [facility] [address], [place] [id number] [phone]     Karena Ashton, [facility]  Behavioral Health  1/25/19     [facility]  [facility] [phone]    Phone: [phone] Fax: [phone]         [address] [place] [address]    Wilmer Fam PA-C  Psychiatry  3/8/19     [facility] -Psychiatry services - psych medication management     Phone: [phone] Fax: [phone]         [address] [place] [address] [address] [place] [address]    Steve Pavon MD Physician Hematology and Oncology Admissions 3/11/19     Phone: [phone] Fax: [phone]         [address] [place] [address]    [facility]  Home Health Services  6/25/19     Phone: [phone]         [facility]  [phone]     Heidi Hernandez DO Assigned PCP   7/28/19     Phone: [phone] Fax: [phone]         [address] [place] [address]    Anastasiia Bush [facility]    7/31/19     support with citizenship process and application     Phone: [phone] Fax: [phone]        [place] [address] [address] [place] [address]    Francisco Zafar, PharmD Pharmacist Pharmacist  12/4/19     Phone: [phone] Fax: [phone]          [facility]  [address] [facility] [address]    Megan [place], PharmD Pharmacist Pharmacist  1/17/20     Phone: [phone] Fax: [phone]         [facility] MTM [address] [facility] [address] [facility] [address]    Connie Alvarado, RN  Registered Nurse Home Health Services  10/22/20     [facility]- med management weekly visit    Phone: [phone]         Delaney Treviño, CNP Assigned Cancer Care Provider   3/7/21     Phone: [phone] Fax: [phone]         [facility] [address]              Goals     ? COMPLETED: Financial Wellbeing (pt-stated)      My family member, Man, is going to pay off my travel loan.      ? COMPLETED: Housing issues have been resolved.  (pt-stated)      Personal Plan:   Daughter in law can call [facility] Intake Line [phone] if they have issues with landlord or to learn about tenant's rights.  [address]  [place], [place] [address] [phone]  Fax: [phone]  Intake Line [phone]  Call between 9-11:45am and 1-3pm.        ? COMPLETED: I have connected with [facility] to help with citizenship process. (pt-stated)      Personal Plan:  Son in law Amandeep Delcid will contact [facility] at [phone] if I have any questions about  citizenship process and application.  [facility]   [address] [facility] [address]  [place] [address] [phone]  Daughter will support to go to complete [facility] on 9-5-19 at 3pm.  [address] [facility] [address]  [place] [address]                ? COMPLETED: I now have food support from the [facility].  (pt-stated)      Personal Plan:  My daughter in law will support to renew food stamp or apply for [facility] benefit by going to   [facility]  [address] [place] [address]  EZ info Line [phone] to check on status of application and benefit.        ? COMPLETED: I now have PCA services to assist me with my daily needs at home.  (pt-stated)       Personal Plan:   My daughter Victor M will support me at home.  If there is issues with PCA services daughter can contact [facility] [phone]       ? COMPLETED: I now see Wilmer Fam PA-C  pscyhiatrist at [facility] for medications. (pt-stated)      Personal Plan:  My son will support me to appt at [facility] to see psychiatrist.   [address] [place], [place] [address]  [place] [address]  [phone]  Fax: [phone]      ? COMPLETED: I want to get my eyes check and eye glasses so I can see better. (pt-stated)      Action steps to achieve this goal             Date goal set:  7/15/2015          ? COMPLETED: I want to hear better in the next 3 months. (pt-stated)      Action steps to achieve this goal           Date goal set:  7/15/2015        ? COMPLETED: Medical (pt-stated)      I'm unable to get homecare nursing at this time        ? COMPLETED: Medical (pt-stated)      Goal Statement: I completed the MRI scan and connected with PCP the result.  Date Goal set: 08/18/20    Barriers: language  Strengths: family support  Date to Achieve By: September  Patient expressed understanding of goal: yes  Personal Plan:   I will schedule upcoming health maintenance as recommended by my doctor.  [facility] [address] [place] [address]  [phone]        ? COMPLETED: Medication 1      Goal Statement- I have support to setup my medications with Home HealthCare Clinic.    Personal Plan:  I will continue to meet with Connie Alvarado [phone] from [employer] for medication set up  every week.          ? COMPLETED: RN GOAL:  I would like assistance with my medication set up in the next 30-90 days. (pt-stated)      Action steps to achieve this goal  1.  I will meet with the [facility] RN on 4/19/2019.  2.  My children will  the medications from [facility] as instructed on 4/12/19.  3.  My children will be home to sign for the specialty pharmacy medication  as coordinated on 4/12/19.  4.  I will be open to the idea of Home Care to set up my medications on a monthly basis.    Date goal set:  4/15/2019  Update: 08/27/19  RN CCC outreach and spoke Son in Law   He states that they have home care set up -goal completed               It has been your Clinic Care Team's pleasure to work with you on your goals.    Regards,  Your Clinic Care Team        4

## 2025-03-15 ENCOUNTER — HEALTH MAINTENANCE LETTER (OUTPATIENT)
Age: 60
End: 2025-03-15